# Patient Record
Sex: FEMALE | Race: OTHER | HISPANIC OR LATINO | Employment: OTHER | URBAN - METROPOLITAN AREA
[De-identification: names, ages, dates, MRNs, and addresses within clinical notes are randomized per-mention and may not be internally consistent; named-entity substitution may affect disease eponyms.]

---

## 2018-05-11 LAB
ALBUMIN SERPL BCP-MCNC: 4.1 G/DL (ref 3–5.2)
ALP SERPL-CCNC: 92 U/L (ref 43–122)
ALT SERPL W P-5'-P-CCNC: 24 U/L (ref 9–52)
ANION GAP SERPL CALCULATED.3IONS-SCNC: 12 MMOL/L (ref 5–14)
AST SERPL W P-5'-P-CCNC: 21 U/L (ref 14–36)
B-NP NT PRO (HISTORICAL): 182 PG/ML
BILIRUB SERPL-MCNC: 0.3 MG/DL
BUN SERPL-MCNC: 31 MG/DL (ref 5–25)
CALCIUM SERPL-MCNC: 9.2 MG/DL (ref 8.4–10.2)
CHLORIDE SERPL-SCNC: 101 MEQ/L (ref 97–108)
CHOLEST SERPL-MCNC: 160 MG/DL
CHOLEST/HDLC SERPL: 2.8 {RATIO}
CO2 SERPL-SCNC: 29 MMOL/L (ref 22–30)
CREATINE, SERUM (HISTORICAL): 0.73 MG/DL (ref 0.6–1.2)
EGFR (HISTORICAL): >60 ML/MIN/1.73 M2
GLUCOSE SERPL-MCNC: 90 MG/DL (ref 70–99)
HDLC SERPL-MCNC: 57 MG/DL
LDL/HDL RATIO (HISTORICAL): 1.3
LDLC SERPL CALC-MCNC: 73 MG/DL
POTASSIUM SERPL-SCNC: 4.6 MEQ/L (ref 3.6–5)
SODIUM SERPL-SCNC: 142 MEQ/L (ref 137–147)
TOTAL PROTEIN (HISTORICAL): 7.5 G/DL (ref 5.9–8.4)
TRIGL SERPL-MCNC: 149 MG/DL
VLDLC SERPL CALC-MCNC: 30 MG/DL (ref 0–40)

## 2018-05-18 LAB
AMORPHOUS MATERIAL (HISTORICAL): ABNORMAL
BACTERIA UR QL AUTO: ABNORMAL
BILIRUB UR QL STRIP: NEGATIVE MG/DL
CASTS/CASTS TYPE (HISTORICAL): ABNORMAL /LPF
CLARITY UR: ABNORMAL
COLOR UR: YELLOW
CRYSTAL TYPE (HISTORICAL): ABNORMAL /HPF
GLUCOSE UR STRIP-MCNC: NEGATIVE MG/DL
HGB UR QL STRIP.AUTO: ABNORMAL
KETONES UR STRIP-MCNC: NEGATIVE MG/DL
LEUKOCYTE ESTERASE UR QL STRIP: ABNORMAL
MUCOUS THREADS URNS QL MICRO: ABNORMAL
NITRITE UR QL STRIP: NEGATIVE
NON-SQ EPI CELLS URNS QL MICRO: ABNORMAL
OTHER STN SPEC: ABNORMAL
PH UR STRIP.AUTO: 6.5 [PH] (ref 4.5–8)
PROT UR STRIP-MCNC: 30 MG/DL
RBC #/AREA URNS AUTO: 1 /HPF
SP GR UR STRIP.AUTO: 1 (ref 1–1.04)
UROBILINOGEN UR QL STRIP.AUTO: NEGATIVE MG/DL (ref 0–1)
WBC #/AREA URNS AUTO: 8 /HPF

## 2018-07-16 ENCOUNTER — CONSULT (OUTPATIENT)
Dept: FAMILY MEDICINE CLINIC | Facility: CLINIC | Age: 83
End: 2018-07-16
Payer: COMMERCIAL

## 2018-07-16 VITALS
SYSTOLIC BLOOD PRESSURE: 170 MMHG | HEART RATE: 77 BPM | RESPIRATION RATE: 16 BRPM | DIASTOLIC BLOOD PRESSURE: 92 MMHG | WEIGHT: 212 LBS | HEIGHT: 62 IN | BODY MASS INDEX: 39.01 KG/M2 | TEMPERATURE: 86.4 F | OXYGEN SATURATION: 96 %

## 2018-07-16 DIAGNOSIS — I16.0 HYPERTENSIVE URGENCY: ICD-10-CM

## 2018-07-16 DIAGNOSIS — Z01.810 PREOPERATIVE CARDIOVASCULAR EXAMINATION: Primary | ICD-10-CM

## 2018-07-16 PROBLEM — H25.11 NUCLEAR SCLEROTIC CATARACT OF RIGHT EYE: Status: ACTIVE | Noted: 2018-06-15

## 2018-07-16 PROCEDURE — 3725F SCREEN DEPRESSION PERFORMED: CPT | Performed by: FAMILY MEDICINE

## 2018-07-16 PROCEDURE — 99214 OFFICE O/P EST MOD 30 MIN: CPT | Performed by: FAMILY MEDICINE

## 2018-07-16 RX ORDER — TIMOLOL 5 MG/ML
SOLUTION/ DROPS OPHTHALMIC
COMMUNITY
End: 2018-09-12

## 2018-07-16 RX ORDER — HYDROCHLOROTHIAZIDE 25 MG/1
TABLET ORAL
COMMUNITY
Start: 2018-03-23 | End: 2019-06-21 | Stop reason: SDUPTHER

## 2018-07-16 RX ORDER — LATANOPROST 50 UG/ML
SOLUTION/ DROPS OPHTHALMIC
COMMUNITY
Start: 2016-06-13 | End: 2018-09-12

## 2018-07-16 RX ORDER — TIMOLOL 5.12 MG/ML
1 SOLUTION/ DROPS OPHTHALMIC 2 TIMES DAILY
COMMUNITY
Start: 2014-09-11

## 2018-07-16 RX ORDER — ATORVASTATIN CALCIUM 20 MG/1
TABLET, FILM COATED ORAL EVERY 24 HOURS
COMMUNITY
Start: 2018-03-23 | End: 2018-12-17 | Stop reason: SDUPTHER

## 2018-07-16 RX ORDER — FESOTERODINE FUMARATE 8 MG/1
TABLET, EXTENDED RELEASE ORAL EVERY 24 HOURS
COMMUNITY
End: 2018-10-09 | Stop reason: SDUPTHER

## 2018-07-16 RX ORDER — LISINOPRIL 40 MG/1
TABLET ORAL
COMMUNITY
Start: 2017-01-12 | End: 2018-08-22 | Stop reason: SDUPTHER

## 2018-07-16 RX ORDER — ALBUTEROL SULFATE 90 UG/1
AEROSOL, METERED RESPIRATORY (INHALATION)
COMMUNITY
Start: 2018-03-08 | End: 2018-10-09 | Stop reason: SDUPTHER

## 2018-07-16 RX ORDER — VIT A/VIT C/VIT E/ZINC/COPPER 4296-226
CAPSULE ORAL
COMMUNITY
Start: 2017-01-12 | End: 2019-03-15 | Stop reason: ALTCHOICE

## 2018-07-16 RX ORDER — DOCUSATE SODIUM 100 MG/1
CAPSULE, LIQUID FILLED ORAL EVERY 12 HOURS
COMMUNITY
Start: 2017-01-12 | End: 2018-09-12 | Stop reason: SDUPTHER

## 2018-07-16 RX ORDER — RANITIDINE 150 MG/1
TABLET ORAL EVERY 12 HOURS
COMMUNITY
Start: 2017-01-12 | End: 2018-09-12 | Stop reason: SDUPTHER

## 2018-07-16 RX ORDER — FUROSEMIDE 40 MG/1
TABLET ORAL EVERY 24 HOURS
COMMUNITY
Start: 2018-05-18 | End: 2018-09-12 | Stop reason: SDUPTHER

## 2018-07-16 RX ORDER — ASPIRIN 81 MG/1
TABLET ORAL EVERY 24 HOURS
COMMUNITY
Start: 2017-08-10 | End: 2018-09-17 | Stop reason: ALTCHOICE

## 2018-07-16 RX ORDER — ANASTROZOLE 1 MG/1
TABLET ORAL
COMMUNITY
End: 2019-10-04 | Stop reason: HOSPADM

## 2018-07-16 NOTE — PROGRESS NOTES
Assessment/Plan:    Preoperative cardiovascular examination    Diagnoses and all orders for this visit:    Preoperative cardiovascular examination    Hypertensive urgency    Other orders  -     fluticasone-salmeterol (ADVAIR DISKUS) 500-50 mcg/dose inhaler; 2 (two) times a day  -     lisinopril (ZESTRIL) 40 mg tablet; take 1 tablet by oral route  every bedtime  -     hydrochlorothiazide (HYDRODIURIL) 25 mg tablet; take 1 tablet (25MG)  by oral route  every day  -     furosemide (LASIX) 40 mg tablet; every 24 hours  -     hydrocortisone (ANUSOL-HC, PROCTOSOL HC,) 2 5 % rectal cream; Every 12 hours  -     docusate sodium (COLACE) 100 mg capsule; Every 12 hours  -     Calcium Carbonate-Vitamin D (CALCIUM 500 + D) 500-125 MG-UNIT TABS; every 24 hours  -     timolol (BETIMOL) 0 5 % ophthalmic solution; Every 12 hours  -     atorvastatin (LIPITOR) 20 mg tablet; every 24 hours  -     aspirin (ASPIR-81) 81 mg EC tablet; every 24 hours  -     anastrozole (ARIMIDEX) 1 mg tablet; take 1 tablet (1MG)  by oral route  every day  -     latanoprost (XALATAN) 0 005 % ophthalmic solution;   -     Fesoterodine Fumarate ER (TOVIAZ) 8 MG TB24; every 24 hours  -     albuterol (VENTOLIN HFA) 90 mcg/act inhaler; inhale 2 puff by inhalation route every 4 - 6 hours as needed as needed  -     Timolol Maleate PF 0 5 % SOLN; as directed  -     ranitidine (ZANTAC) 150 mg tablet; Every 12 hours  -     Multiple Vitamins-Minerals (PRESERVISION AREDS) CAPS; take 1 tablet by oral route  every evening  -     Misc  Devices (CANE) MISC; Four point aluminum cane  Subjective:      Patient ID: Marshall Neither is a 80 y o  female  HPI    The following portions of the patient's history were reviewed and updated as appropriate: allergies, current medications, past family history, past medical history, past social history, past surgical history and problem list     Review of Systems   Constitutional: Negative  HENT: Negative  Respiratory: Negative  Cardiovascular: Negative  Gastrointestinal: Negative  Endocrine: Negative  Genitourinary: Negative  Musculoskeletal: Negative  Objective:      /92 (BP Location: Left arm, Patient Position: Sitting, Cuff Size: Adult)   Pulse 77   Temp (!) 86 4 °F (30 2 °C) (Tympanic)   Resp 16   Ht 5' 2" (1 575 m)   Wt 96 2 kg (212 lb)   SpO2 96%   BMI 38 78 kg/m²          Physical Exam   Constitutional: She is oriented to person, place, and time  She appears well-developed and well-nourished  No distress  HENT:   Head: Normocephalic and atraumatic  Eyes: Pupils are equal, round, and reactive to light  Neck: Normal range of motion  Neck supple  Cardiovascular: Normal rate, regular rhythm and normal heart sounds  Exam reveals no gallop and no friction rub  No murmur heard  Pulmonary/Chest: Effort normal and breath sounds normal  No respiratory distress  Abdominal: Soft  Bowel sounds are normal  She exhibits no distension  Musculoskeletal: Normal range of motion  She exhibits edema  Neurological: She is alert and oriented to person, place, and time  Skin: Skin is warm  Vitals reviewed

## 2018-07-16 NOTE — PROGRESS NOTES
[facility] PRE-OPERATIVE EVALUATION  [facility]YS[facility] - [address] [facility]    NAME: Alejandrina Zfaar  AGE: 80 y o  SEX: female  : 1934     DATE: 2018    [facility] Pre-Operative Evaluation      Chief Complaint: Pre-operative Evaluation     Surgery: 2018  Anticipated Date of Surgery: 18     History of Present Illness:     Patient has no prior history of bleeding issues or blood clots  Chronic conditions, medications and allergies were reviewed  There is no currently active infectious process  Assessment of Cardiac Risk:  · No unstable or severe angina or MI in the last 6 weeks or history of stent placement in the last year   · No decompensated heart failure (e g  New onset heart failure, NYHA functional class IV heart failure, or worsening existing heart failure) in past 3 mos  · No severe heart valve disease including aortic stenosis or symptomatic mitral stenosis     Exercise Capacity:  · Able to walk 4 blocks without symptoms  · Able to walk 2 flights without symptoms    NO Prior Anesthesia Reactions       No prolonged steroid use in past 6 mos  P A T  If done reviewed  Review of Systems:     Review of Systems   Constitutional: Negative  HENT: Negative  Respiratory: Negative  Cardiovascular: Negative  Gastrointestinal: Negative  Genitourinary: Negative  Musculoskeletal: Negative  Skin: Negative  Neurological: Negative  Hematological: Negative          Current Problem List:     Patient Active Problem List   Diagnosis    Abnormal gait    Anemia    Aortic stenosis    Asthma    Benign essential hypertension    Bilateral occipital neuralgia    Carpal tunnel syndrome on both sides    Diastolic dysfunction    Glaucoma    Hyperlipidemia    Malignant neoplasm of overlapping sites of left female breast ([address] )    Nuclear sclerotic cataract of right eye    Preoperative cardiovascular examination    Hypertensive urgency       Allergies: Allergies   Allergen Reactions    No Active Allergies        Current Medications:       Current Outpatient Prescriptions:     albuterol (VENTOLIN HFA) 90 mcg/act inhaler, inhale 2 puff by inhalation route every 4 - 6 hours as needed as needed, Disp: , Rfl:     aspirin (ASPIR-81) 81 mg EC tablet, every 24 hours, Disp: , Rfl:     atorvastatin (LIPITOR) 20 mg tablet, every 24 hours, Disp: , Rfl:     Calcium Carbonate-Vitamin D (CALCIUM 500 + D) 500-125 MG-UNIT TABS, every 24 hours, Disp: , Rfl:     docusate sodium (COLACE) 100 mg capsule, Every 12 hours, Disp: , Rfl:     fluticasone-salmeterol (ADVAIR DISKUS) 500-50 mcg/dose inhaler, 2 (two) times a day   , Disp: , Rfl:     furosemide (LASIX) 40 mg tablet, every 24 hours, Disp: , Rfl:     hydrochlorothiazide (HYDRODIURIL) 25 mg tablet, take 1 tablet (25MG)  by oral route  every day, Disp: , Rfl:     latanoprost (XALATAN) 0 005 % ophthalmic solution, , Disp: , Rfl:     lisinopril (ZESTRIL) 40 mg tablet, take 1 tablet by oral route  every bedtime, Disp: , Rfl:     Misc   Devices (CANE) MISC, Four point aluminum cane , Disp: , Rfl:     Multiple Vitamins-Minerals (PRESERVISION AREDS) CAPS, take 1 tablet by oral route  every evening, Disp: , Rfl:     ranitidine (ZANTAC) 150 mg tablet, Every 12 hours, Disp: , Rfl:     timolol (BETIMOL) 0 5 % ophthalmic solution, Every 12 hours, Disp: , Rfl:     anastrozole (ARIMIDEX) 1 mg tablet, take 1 tablet (1MG)  by oral route  every day, Disp: , Rfl:     Fesoterodine Fumarate ER (TOVIAZ) 8 MG TB24, every 24 hours, Disp: , Rfl:     hydrocortisone (ANUSOL-HC, PROCTOSOL HC,) 2 5 % rectal cream, Every 12 hours, Disp: , Rfl:     Timolol Maleate PF 0 5 % SOLN, as directed, Disp: , Rfl:     Past Medical History:       Past Medical History:   Diagnosis Date    Diverticulosis 2001    Heart failure ([facility] [address] )     History of cystocele     Hypertension     Obesity     Positive PPD 1999    treated        Past Surgical History:   Procedure Laterality Date    CARDIOVASCULAR STRESS TEST  2001    dobutamine stress test    CHOLECYSTECTOMY  04/2002    COLONOSCOPY      CYSTOCELE REPAIR      HYSTERECTOMY      KNEE SURGERY      knee replacement        Family History   Problem Relation Age of Onset    Family history unknown: Yes        Social History     Social History    Marital status: Single     Spouse name: N/A    Number of children: N/A    Years of education: N/A     Occupational History    Not on file  Social History Main Topics    Smoking status: Never Smoker    Smokeless tobacco: Never Used    Alcohol use No    Drug use: No    Sexual activity: Not Currently     Partners: Male     Other Topics Concern    Not on file     Social History Narrative    Lives at home with son and 2 grandkids  Physical Exam:     /92 (BP Location: Left arm, Patient Position: Sitting, Cuff Size: Adult)   Pulse 77   Temp (!) 86 4 °F (30 2 °C) (Tympanic)   Resp 16   Ht 5' 2" (1 575 m)   Wt 96 2 kg (212 lb)   SpO2 96%   BMI 38 78 kg/m²     Physical Exam   Constitutional: She is oriented to person, place, and time  She appears well-developed and well-nourished  No distress  HENT:   Head: Normocephalic and atraumatic  Eyes: Pupils are equal, round, and reactive to light  Neck: Normal range of motion  Neck supple  Cardiovascular: Normal rate, regular rhythm and normal heart sounds  Exam reveals no gallop and no friction rub  No murmur heard  Pulmonary/Chest: Effort normal and breath sounds normal  No respiratory distress  Abdominal: Soft  Bowel sounds are normal  She exhibits no distension  Musculoskeletal: Normal range of motion  She exhibits edema  Neurological: She is alert and oriented to person, place, and time  Skin: Skin is warm  Vitals reviewed  Operative site has been examined and clear of skin infection and inflammation  Assessment & Recommendations:     Patient is cleared for surgery as detailed above       Surgical Procedure risk category: low risk    Patient specific operative risk categegory: Low risk

## 2018-07-17 ENCOUNTER — TELEPHONE (OUTPATIENT)
Dept: FAMILY MEDICINE CLINIC | Facility: CLINIC | Age: 83
End: 2018-07-17

## 2018-07-17 NOTE — TELEPHONE ENCOUNTER
Rosalie from [facility] called they need a physical signature not electronic signature    Sherre Soulier Sherre Soulier Sherre Soulier She has sx coming up if you can call her if you have questions at [phone]    Sherre Soulier

## 2018-07-18 NOTE — TELEPHONE ENCOUNTER
Rosalie from [address] [place] [address]  for sight will fax a form for me to sign     Thanks

## 2018-08-22 DIAGNOSIS — I10 HTN (HYPERTENSION): Primary | ICD-10-CM

## 2018-08-22 RX ORDER — LISINOPRIL 40 MG/1
40 TABLET ORAL DAILY
Qty: 90 TABLET | Refills: 0 | Status: SHIPPED | OUTPATIENT
Start: 2018-08-22 | End: 2018-10-09 | Stop reason: SDUPTHER

## 2018-08-23 ENCOUNTER — OFFICE VISIT (OUTPATIENT)
Dept: OTOLARYNGOLOGY | Facility: CLINIC | Age: 83
End: 2018-08-23
Payer: COMMERCIAL

## 2018-08-23 VITALS
HEART RATE: 90 BPM | WEIGHT: 209.6 LBS | HEIGHT: 62 IN | BODY MASS INDEX: 38.57 KG/M2 | DIASTOLIC BLOOD PRESSURE: 78 MMHG | SYSTOLIC BLOOD PRESSURE: 158 MMHG

## 2018-08-23 DIAGNOSIS — H90.3 HEARING LOSS, SENSORINEURAL, ASYMMETRICAL: Primary | ICD-10-CM

## 2018-08-23 PROCEDURE — 99213 OFFICE O/P EST LOW 20 MIN: CPT | Performed by: OTOLARYNGOLOGY

## 2018-08-23 NOTE — PROGRESS NOTES
Tahria Frances 80 y o  female MRN: [id number]  Unit/Bed#:  Encounter: [id number]            History of Present Illness     Reason for Visit[de-identified]  Follow-up  HPI: Tahira Frances is a 80y o  year old female who presents with history of asymmetric sensorineural hearing loss  She uses a hearing aid  She has been followed before in the office last visit was February of 2018  In addition she does complain of nasal obstruction mostly when she is lying flat  And also complains of throat discomfort  She does have a history of cardiac issues and CHF  Review of Systems  Revision of Systems:    Complete review done, only positive for the symptoms described in the H&P section above      Historical Information   Past Medical History:   Diagnosis Date    Diverticulosis 2001    Heart failure ([address] )     History of cystocele     Hypertension     Obesity     Positive PPD 1999    treated     Past Surgical History:   Procedure Laterality Date    CARDIOVASCULAR STRESS TEST  2001    dobutamine stress test    CHOLECYSTECTOMY  04/2002    COLONOSCOPY      CYSTOCELE REPAIR      HYSTERECTOMY      KNEE SURGERY      knee replacement     Social History   History   Alcohol Use No     History   Drug Use No     History   Smoking Status    Never Smoker   Smokeless Tobacco    Never Used     Family History:   Family History   Problem Relation Age of Onset    Family history unknown: Yes       Meds/Allergies   No current facility-administered medications for this visit      Current Outpatient Prescriptions:     albuterol (VENTOLIN HFA) 90 mcg/act inhaler, inhale 2 puff by inhalation route every 4 - 6 hours as needed as needed, Disp: , Rfl:     aspirin (ASPIR-81) 81 mg EC tablet, every 24 hours, Disp: , Rfl:     atorvastatin (LIPITOR) 20 mg tablet, every 24 hours, Disp: , Rfl:     Calcium Carbonate-Vitamin D (CALCIUM 500 + D) 500-125 MG-UNIT TABS, every 24 hours, Disp: , Rfl:     docusate sodium (COLACE) 100 mg capsule, Every 12 hours, Disp: , Rfl:     fluticasone-salmeterol (ADVAIR DISKUS) 500-50 mcg/dose inhaler, 2 (two) times a day   , Disp: , Rfl:     furosemide (LASIX) 40 mg tablet, every 24 hours, Disp: , Rfl:     hydrochlorothiazide (HYDRODIURIL) 25 mg tablet, take 1 tablet (25MG)  by oral route  every day, Disp: , Rfl:     latanoprost (XALATAN) 0 005 % ophthalmic solution, , Disp: , Rfl:     lisinopril (ZESTRIL) 40 mg tablet, take 1 tablet by oral route  every bedtime, Disp: , Rfl:     Misc  Devices (CANE) MISC, Four point aluminum cane , Disp: , Rfl:     Multiple Vitamins-Minerals (PRESERVISION AREDS) CAPS, take 1 tablet by oral route  every evening, Disp: , Rfl:     ranitidine (ZANTAC) 150 mg tablet, Every 12 hours, Disp: , Rfl:     timolol (BETIMOL) 0 5 % ophthalmic solution, Every 12 hours, Disp: , Rfl:     anastrozole (ARIMIDEX) 1 mg tablet, take 1 tablet (1MG)  by oral route  every day, Disp: , Rfl:     Fesoterodine Fumarate ER (TOVIAZ) 8 MG TB24, every 24 hours, Disp: , Rfl:     hydrocortisone (ANUSOL-HC, PROCTOSOL HC,) 2 5 % rectal cream, Every 12 hours, Disp: , Rfl:     Timolol Maleate PF 0 5 % SOLN, as directed, Disp: , Rfl:       Allergies   Allergen Reactions    No Active Allergies        Objective       Physical Exam   Blood pressure 158/78, pulse 90, height 5' 2" (1 575 m), weight 95 1 kg (209 lb 9 6 oz)  Constitutional: Oriented to person, place, and time  Well-developed, obese, no apparent distress, non-toxic appearance  Cooperative, able to hear and answer questions without difficulty  Voice: Normal voice quality  Head: Normocephalic, atraumatic  No scars, masses or lesions  Face: Symmetric, no edema, no sinus tenderness  Eyes: Vision grossly intact, extra-ocular movement intact  Right Ear: External ear normal, scant wax removed  Auditory canal clear  Tympanic membrane with myringosclerosis with old perforation healed  No fluid present   No post-auricular erythema or tenderness  Left Ear: External ear normal   Auditory canal clear  Tympanic membrane well-appearing, without retraction or scarring  No fluid present  No post-auricular erythema or tenderness  Nose: Septum deviated to right Mucosa moist, turbinates normal size, no edema  No polyps or masses, no discharge evident  Oral cavity:  Lips normal, no mucosal lesions  Partially edentulous  Gingiva normal in appearance  Mucosa moist,  Tongue mobile, floor of mouth normal   Hard palate unremarkable  No masses or lesions  Oropharynx: Uvula is midline, sot palate normal   Unremarkable oropharyngeal inlet  Tonsils 2+   Posterior pharyngeal wall clear  No masses or lesions  Salivary glands:  Parotid glands and submandibular glands symmetric, no enlargement or tenderness  Neck: Normal laryngeal elevation with swallow  Trachea midline  No masses or lesions  No palpable adenopathy  Thyroid: normal in size, and consistency, unremarkable without tenderness or palpable nodules  Pulmonary/Chest: Normal effort and rate  No respiratory distress  Musculoskeletal: Normal range of motion  Neurological: Cranial nerves 2-12 intact  Skin: Skin is warm and dry  Psychiatric: Normal mood and affect  Lab Results: CBC: No results found for: WBC, HGB, HCT, MCV, PLT, ADJUSTEDWBC, MCH, MCHC, RDW, MPV, NRBC, CMP: No results found for: NA, K, CL, CO2, ANIONGAP, BUN, CREATININE, GLUCOSE, CALCIUM, AST, ALT, ALKPHOS, PROT, ALBUMIN, BILITOT, EGFR  Imaging Studies: I have personally reviewed images on the PACS system and : non contributory     EKG, Pathology, and   Other Studies: I have personally reviewed pertinent reports and Audiometry [facility] 6/3/15: PTA L30, r 55 WITH 100% DISCR  MRI [facility] 7/2/15 WAS (-)    Assessment:  ASYMMETRIC SENSORINEURAL HEARING LOSS  CHRONIC PHARYNGITIS  SEPTAL DEVIATION    Plan:  PATIENT NEEDS SEPTATED AUDIOMETRY LAST AUDIOMETRY WAS 3 YEARS AGO

## 2018-09-12 ENCOUNTER — LAB (OUTPATIENT)
Dept: LAB | Facility: HOSPITAL | Age: 83
End: 2018-09-12
Payer: COMMERCIAL

## 2018-09-12 ENCOUNTER — OFFICE VISIT (OUTPATIENT)
Dept: FAMILY MEDICINE CLINIC | Facility: CLINIC | Age: 83
End: 2018-09-12
Payer: COMMERCIAL

## 2018-09-12 ENCOUNTER — OFFICE VISIT (OUTPATIENT)
Dept: AUDIOLOGY | Age: 83
End: 2018-09-12
Payer: COMMERCIAL

## 2018-09-12 VITALS
OXYGEN SATURATION: 98 % | TEMPERATURE: 97.3 F | SYSTOLIC BLOOD PRESSURE: 124 MMHG | BODY MASS INDEX: 37.31 KG/M2 | HEART RATE: 81 BPM | RESPIRATION RATE: 16 BRPM | WEIGHT: 204 LBS | DIASTOLIC BLOOD PRESSURE: 82 MMHG

## 2018-09-12 DIAGNOSIS — I50.9 CHF (CONGESTIVE HEART FAILURE) (HCC): ICD-10-CM

## 2018-09-12 DIAGNOSIS — K64.4 EXTERNAL HEMORRHOIDS: ICD-10-CM

## 2018-09-12 DIAGNOSIS — K64.9 HEMORRHOIDS: ICD-10-CM

## 2018-09-12 DIAGNOSIS — Z13.29 SCREENING FOR THYROID DISORDER: ICD-10-CM

## 2018-09-12 DIAGNOSIS — K59.00 CONSTIPATION: ICD-10-CM

## 2018-09-12 DIAGNOSIS — H90.3 SENSORINEURAL HEARING LOSS, BILATERAL: Primary | ICD-10-CM

## 2018-09-12 DIAGNOSIS — M54.81 BILATERAL OCCIPITAL NEURALGIA: ICD-10-CM

## 2018-09-12 DIAGNOSIS — M54.9 BACK PAIN: Primary | ICD-10-CM

## 2018-09-12 DIAGNOSIS — H40.9 GLAUCOMA: ICD-10-CM

## 2018-09-12 DIAGNOSIS — E78.5 HLD (HYPERLIPIDEMIA): ICD-10-CM

## 2018-09-12 DIAGNOSIS — E78.2 MIXED HYPERLIPIDEMIA: ICD-10-CM

## 2018-09-12 DIAGNOSIS — I10 BENIGN ESSENTIAL HYPERTENSION: ICD-10-CM

## 2018-09-12 DIAGNOSIS — K21.9 GERD (GASTROESOPHAGEAL REFLUX DISEASE): ICD-10-CM

## 2018-09-12 PROBLEM — H26.9 CATARACT: Status: ACTIVE | Noted: 2018-07-23

## 2018-09-12 LAB
ALBUMIN SERPL BCP-MCNC: 4.4 G/DL (ref 3–5.2)
ALP SERPL-CCNC: 105 U/L (ref 43–122)
ALT SERPL W P-5'-P-CCNC: 24 U/L (ref 9–52)
ANION GAP SERPL CALCULATED.3IONS-SCNC: 10 MMOL/L (ref 5–14)
AST SERPL W P-5'-P-CCNC: 29 U/L (ref 14–36)
BILIRUB SERPL-MCNC: 0.6 MG/DL
BILIRUB UR QL STRIP: NEGATIVE
BUN SERPL-MCNC: 22 MG/DL (ref 5–25)
CALCIUM SERPL-MCNC: 9.1 MG/DL (ref 8.4–10.2)
CHLORIDE SERPL-SCNC: 98 MMOL/L (ref 97–108)
CHOLEST SERPL-MCNC: 188 MG/DL
CLARITY UR: NORMAL
CO2 SERPL-SCNC: 32 MMOL/L (ref 22–30)
COLOR UR: YELLOW
CREAT SERPL-MCNC: 0.67 MG/DL (ref 0.6–1.2)
EOSINOPHIL # BLD AUTO: 0.99 THOUSAND/UL (ref 0–0.4)
EOSINOPHIL NFR BLD MANUAL: 17 % (ref 0–6)
ERYTHROCYTE [DISTWIDTH] IN BLOOD BY AUTOMATED COUNT: 14.3 %
GFR SERPL CREATININE-BSD FRML MDRD: 93 ML/MIN/1.73SQ M
GLUCOSE P FAST SERPL-MCNC: 97 MG/DL (ref 70–99)
GLUCOSE UR STRIP-MCNC: NEGATIVE MG/DL
HCT VFR BLD AUTO: 38.2 % (ref 36–46)
HDLC SERPL-MCNC: 65 MG/DL (ref 40–59)
HGB BLD-MCNC: 12.3 G/DL (ref 12–16)
HGB UR QL STRIP.AUTO: NEGATIVE
KETONES UR STRIP-MCNC: NEGATIVE MG/DL
LDLC SERPL CALC-MCNC: 98 MG/DL
LEUKOCYTE ESTERASE UR QL STRIP: NEGATIVE
LYMPHOCYTES # BLD AUTO: 2.09 THOUSAND/UL (ref 0.5–4)
LYMPHOCYTES # BLD AUTO: 36 % (ref 20–50)
MCH RBC QN AUTO: 29.2 PG (ref 26.8–34.3)
MCHC RBC AUTO-ENTMCNC: 32.2 G/DL (ref 31.4–37.4)
MCV RBC AUTO: 91 FL (ref 82–98)
MONOCYTES # BLD AUTO: 0.17 THOUSAND/UL (ref 0.2–0.9)
MONOCYTES NFR BLD AUTO: 3 % (ref 1–10)
NEUTS BAND NFR BLD MANUAL: 1 % (ref 0–8)
NEUTS SEG # BLD: 2.55 THOUSAND/UL (ref 1.8–7.8)
NEUTS SEG NFR BLD AUTO: 43 %
NITRITE UR QL STRIP: NEGATIVE
NONHDLC SERPL-MCNC: 123 MG/DL
PH UR STRIP.AUTO: 8 [PH] (ref 4.5–8)
PLATELET # BLD AUTO: 268 THOUSANDS/UL (ref 150–450)
PLATELET BLD QL SMEAR: ADEQUATE
PMV BLD AUTO: 7.8 FL (ref 8.9–12.7)
POTASSIUM SERPL-SCNC: 4.3 MMOL/L (ref 3.6–5)
PROT SERPL-MCNC: 8.7 G/DL (ref 5.9–8.4)
PROT UR STRIP-MCNC: NEGATIVE MG/DL
RBC # BLD AUTO: 4.21 MILLION/UL (ref 4–5.2)
RBC MORPH BLD: NORMAL
SL AMB  POCT GLUCOSE, UA: NORMAL
SL AMB LEUKOCYTE ESTERASE,UA: ABNORMAL
SL AMB POCT BILIRUBIN,UA: NEGATIVE
SL AMB POCT BLOOD,UA: NEGATIVE
SL AMB POCT CLARITY,UA: CLEAR
SL AMB POCT COLOR,UA: YELLOW
SL AMB POCT KETONES,UA: NEGATIVE
SL AMB POCT NITRITE,UA: NEGATIVE
SL AMB POCT PH,UA: 8
SL AMB POCT SPECIFIC GRAVITY,UA: 1
SL AMB POCT URINE PROTEIN: ABNORMAL
SL AMB POCT UROBILINOGEN: NORMAL
SODIUM SERPL-SCNC: 140 MMOL/L (ref 137–147)
SP GR UR STRIP.AUTO: 1.01 (ref 1–1.03)
TOTAL CELLS COUNTED SPEC: 100
TRIGL SERPL-MCNC: 123 MG/DL
TSH SERPL DL<=0.05 MIU/L-ACNC: 1.53 UIU/ML (ref 0.47–4.68)
UROBILINOGEN UR QL STRIP.AUTO: 0.2 E.U./DL
WBC # BLD AUTO: 5.8 THOUSAND/UL (ref 4.31–10.16)

## 2018-09-12 PROCEDURE — 81002 URINALYSIS NONAUTO W/O SCOPE: CPT | Performed by: FAMILY MEDICINE

## 2018-09-12 PROCEDURE — 3079F DIAST BP 80-89 MM HG: CPT | Performed by: FAMILY MEDICINE

## 2018-09-12 PROCEDURE — 36415 COLL VENOUS BLD VENIPUNCTURE: CPT

## 2018-09-12 PROCEDURE — 3074F SYST BP LT 130 MM HG: CPT | Performed by: FAMILY MEDICINE

## 2018-09-12 PROCEDURE — 99213 OFFICE O/P EST LOW 20 MIN: CPT | Performed by: FAMILY MEDICINE

## 2018-09-12 PROCEDURE — 80061 LIPID PANEL: CPT

## 2018-09-12 PROCEDURE — 85027 COMPLETE CBC AUTOMATED: CPT

## 2018-09-12 PROCEDURE — 85007 BL SMEAR W/DIFF WBC COUNT: CPT

## 2018-09-12 PROCEDURE — 80053 COMPREHEN METABOLIC PANEL: CPT

## 2018-09-12 PROCEDURE — 84443 ASSAY THYROID STIM HORMONE: CPT

## 2018-09-12 PROCEDURE — 92557 COMPREHENSIVE HEARING TEST: CPT | Performed by: AUDIOLOGIST

## 2018-09-12 PROCEDURE — 81003 URINALYSIS AUTO W/O SCOPE: CPT | Performed by: FAMILY MEDICINE

## 2018-09-12 PROCEDURE — 92567 TYMPANOMETRY: CPT | Performed by: AUDIOLOGIST

## 2018-09-12 RX ORDER — AMITRIPTYLINE HYDROCHLORIDE 25 MG/1
25 TABLET, FILM COATED ORAL
COMMUNITY
End: 2018-10-15 | Stop reason: SDUPTHER

## 2018-09-12 RX ORDER — CELECOXIB 50 MG/1
50 CAPSULE ORAL 2 TIMES DAILY
COMMUNITY
End: 2018-12-17 | Stop reason: SDUPTHER

## 2018-09-12 RX ORDER — DOCUSATE SODIUM 100 MG/1
100 CAPSULE, LIQUID FILLED ORAL 3 TIMES DAILY PRN
Qty: 60 CAPSULE | Refills: 2 | Status: SHIPPED | OUTPATIENT
Start: 2018-09-12 | End: 2018-12-17 | Stop reason: SDUPTHER

## 2018-09-12 RX ORDER — RANITIDINE 150 MG/1
150 TABLET ORAL 2 TIMES DAILY
Qty: 180 TABLET | Refills: 2 | Status: SHIPPED | OUTPATIENT
Start: 2018-09-12 | End: 2019-03-25 | Stop reason: SDUPTHER

## 2018-09-12 RX ORDER — FUROSEMIDE 40 MG/1
40 TABLET ORAL DAILY
Qty: 90 TABLET | Refills: 3 | Status: SHIPPED | OUTPATIENT
Start: 2018-09-12 | End: 2019-06-21 | Stop reason: SDUPTHER

## 2018-09-12 NOTE — ASSESSMENT & PLAN NOTE
Controlled on ranitidine  Reviewed the causes of heartburn  Discussed importance of diet and lifestyle modifications to control GERD symptoms  Avoid things which worsen heartburn (Ex: Caffeine, tomato based products, spicy foods, tobacco, alcohol, obesity, tight-fitting clothing )  Discussed importance of eating small frequent meals instead of large meals    Elevate the head of the bed and to not lay down 2-3 hours following a meal

## 2018-09-12 NOTE — ASSESSMENT & PLAN NOTE
Current blood pressure is controlled at this time  Reviewed blood pressure target goal with patient  Continue to maintain healthy balanced diet with focus on low-salt intake  Limit alcohol intake

## 2018-09-12 NOTE — LETTER
September 12, 2018     Fred Mclaughlin MD  [address]    Patient: Vani Oquendo   YOB: 1934   Date of Visit: 9/12/2018       Dear Dr Dyan Van: Thank you for referring [facility] to me for evaluation  Below are my notes for this consultation  If you have questions, please do not hesitate to call me  I look forward to following your patient along with you           Sincerely,        Ambar Hamilton        CC: No Recipients

## 2018-09-12 NOTE — PROGRESS NOTES
HEARING EVALUATION    Name:  Mark Anthony Aguilar  :  1934  Age:  80 y o  Date of Evaluation: 18     History: Known hearing loss, wears Roge hearing aid in right ear  Reason for visit: Mark Anthony Aguilar is being seen today at the request of Dr Ciera Shabazz for an evaluation of hearing  Patient reports that hearing aid is working well  EVALUATION:    Otoscopic Evaluation:   Right Ear: Clear and healthy ear canal and tympanic membrane   Left Ear: Clear and healthy ear canal and tympanic membrane    Tympanometry:   Right: Type Ad - hypermobile compliance   Left: Type A - normal middle ear pressure and compliance    Audiogram Results:  Right: Moderate to moderately severe essentially sensorineural hearing loss  Left: Mild essentially sensorineural (air bone gap at 1k Hz) hearing loss    *see attached audiogram       Hearing Aid Visit:    Name:  Mark Anthony Aguilar  :  1934  Age:  80 y o  Date of Evaluation: 18     Geraldine Florez is being seen for a hearing aid visit  Mark Anthony Aguilar   is fit in the right ear only with Roge 3 Series 110 LAZARO hearing aid(s) serial number [id number] right  Warranty 19  Patient reports good benefit from hearing aid  Action:  Hearing aid was cleaned and checked and found to be working well  Instructed patient on wax filter changing and gave her pack of wax traps  RECOMMENDATIONS:  Annual hearing eval, Return to [facility]  for F/U and Copy to Patient/Caregiver, patient will contact center should any issues arise    PATIENT EDUCATION:   Discussed results and recommendations with patient  Questions were addressed and the patient was encouraged to contact our department should concerns arise        Debbie Sheffield   Clinical Audiologist

## 2018-09-12 NOTE — PROGRESS NOTES
Assessment/Plan:    Hemorrhoids  Have been chronic, complete relief with anusol but has been using more recently  Will refer to GI for possibility of banding her hemorrhoids  Constipation  Recommended to increase fiber in diet  Uses docusate PRN  Will send to GI to evaluate for constipation and hemorrhoids  GERD (gastroesophageal reflux disease)  Controlled on ranitidine  Reviewed the causes of heartburn  Discussed importance of diet and lifestyle modifications to control GERD symptoms  Avoid things which worsen heartburn (Ex: Caffeine, tomato based products, spicy foods, tobacco, alcohol, obesity, tight-fitting clothing )  Discussed importance of eating small frequent meals instead of large meals  Elevate the head of the bed and to not lay down 2-3 hours following a meal       Benign essential hypertension  Current blood pressure is controlled at this time  Reviewed blood pressure target goal with patient  Continue to maintain healthy balanced diet with focus on low-salt intake  Limit alcohol intake  CHF (congestive heart failure) (HCC)  Reports compliance with her furosemide and compression stockings  Weight is stable  Bilateral occipital neuralgia  Followed by neurology, on QHS elavil and prn celebrex  Glaucoma  Reports compliance with timolol  Denies any acute concerns at this time  HLD (hyperlipidemia)  Discussed increased cholesterol with patient as well as target cholesterol goal   Maintain a low-fat, low-cholesterol diet  Outlined low-fat, low-cholesterol alternatives  Weight loss can help control cholesterol levels along with diet  Will follow lipid panel  Back pain  Discussed nonpharmacological management options for osteoarthritis such as weight loss, ice and warm packs, physical therapy, occupational therapy, and exercise           Diagnoses and all orders for this visit:    Back pain  -     Urinalysis with reflex to microscopic  -     POCT urine dip    Hemorrhoids  -     hydrocortisone (ANUSOL-HC, PROCTOSOL HC,) 2 5 % rectal cream; Insert into the rectum 2 (two) times a day for 30 days    Constipation  -     docusate sodium (COLACE) 100 mg capsule; Take 1 capsule (100 mg total) by mouth 3 (three) times a day as needed for constipation for up to 90 days    CHF (congestive heart failure) (HCC)  -     furosemide (LASIX) 40 mg tablet; Take 1 tablet (40 mg total) by mouth daily for 360 days  -     CBC and differential; Future  -     Comprehensive metabolic panel; Future    GERD (gastroesophageal reflux disease)  -     ranitidine (ZANTAC) 150 mg tablet; Take 1 tablet (150 mg total) by mouth 2 (two) times a day for 90 days    HLD (hyperlipidemia)  -     Lipid panel; Future  -     Comprehensive metabolic panel; Future    Screening for thyroid disorder  -     TSH, 3rd generation; Future    Mixed hyperlipidemia    External hemorrhoids    Benign essential hypertension    Bilateral occipital neuralgia    Glaucoma    Other orders  -     Multiple Vitamins-Minerals (ICAPS AREDS 2) CAPS; Take 1 capsule by mouth  -     celecoxib (CeleBREX) 50 MG capsule; Take 50 mg by mouth 2 (two) times a day  -     amitriptyline (ELAVIL) 25 mg tablet; Take 25 mg by mouth daily at bedtime          Subjective:      Patient ID: Rafa Renteria is a 80 y o  female  HPI    The following portions of the patient's history were reviewed and updated as appropriate: allergies, current medications, past family history, past medical history, past social history, past surgical history and problem list     Review of Systems   Constitutional: Negative for activity change, appetite change, fatigue and fever  HENT: Negative for drooling and sore throat  Eyes: Negative for pain and visual disturbance  Respiratory: Negative for cough, chest tightness and shortness of breath  Cardiovascular: Negative for chest pain and palpitations  Gastrointestinal: Positive for constipation and rectal pain  Negative for abdominal pain, diarrhea and nausea  Genitourinary: Negative for dysuria and hematuria  Musculoskeletal: Negative for back pain and myalgias  Skin: Negative for color change  Neurological: Negative for numbness and headaches  Psychiatric/Behavioral: Negative for hallucinations and suicidal ideas  Objective:      /82 (BP Location: Left arm, Patient Position: Sitting, Cuff Size: Adult)   Pulse 81   Temp (!) 97 3 °F (36 3 °C) (Temporal)   Resp 16   Wt 92 5 kg (204 lb)   SpO2 98%   BMI 37 31 kg/m²          Physical Exam   Constitutional: She is oriented to person, place, and time  She appears well-developed and well-nourished  HENT:   Head: Normocephalic and atraumatic  Right Ear: External ear normal    Left Ear: External ear normal    Eyes: Pupils are equal, round, and reactive to light  Neck: Normal range of motion  Neck supple  Cardiovascular: Normal rate, regular rhythm and intact distal pulses  Pulmonary/Chest: Effort normal and breath sounds normal  No respiratory distress  She has no wheezes  She has no rales  Abdominal: Soft  Bowel sounds are normal  She exhibits no distension  There is no tenderness  Musculoskeletal: Normal range of motion  She exhibits edema (+1 b/l pitting edema)  Lymphadenopathy:     She has no cervical adenopathy  Neurological: She is alert and oriented to person, place, and time  Skin: Skin is warm and dry  Psychiatric: She has a normal mood and affect

## 2018-09-12 NOTE — ASSESSMENT & PLAN NOTE
Have been chronic, complete relief with anusol but has been using more recently  Will refer to GI for possibility of banding her hemorrhoids

## 2018-09-12 NOTE — ASSESSMENT & PLAN NOTE
Discussed increased cholesterol with patient as well as target cholesterol goal   Maintain a low-fat, low-cholesterol diet  Outlined low-fat, low-cholesterol alternatives  Weight loss can help control cholesterol levels along with diet  Will follow lipid panel

## 2018-09-12 NOTE — ASSESSMENT & PLAN NOTE
Discussed nonpharmacological management options for osteoarthritis such as weight loss, ice and warm packs, physical therapy, occupational therapy, and exercise

## 2018-09-12 NOTE — PATIENT INSTRUCTIONS
Deshidratación   CUIDADO AMBULATORIO:   Deshidratación  es daniel afección que se produce cuando guzman cuerpo no tiene suficiente líquido  Usted podría deshidratarse si no edy suficiente agua o pierde demasiado líquido  La pérdida de líquido también podría provocar la pérdida de electrolitos (minerales), piter el sodio  Los síntomas más comunes incluyen los siguientes:   · Ojos o boca seca    · Aumento de la sed    · Orina de color amarillo oscuro    · Orinar poco o nada en lo absoluto    · Cansancio o debilidad corporal    · Dolor de gin, mareos o confusión    · Respiración rápida o irregular, ritmo cardíaco valentina o acelerado y presión arterial baja    · Pérdida repentina de peso  Busque atención médica de inmediato si:   · Usted sufre daniel convulsión  · Usted está confundido o no puede pensar con claridad  · Usted está muy soñoliento, u otra persona no puede despertarlo  · Usted se siente mareado o se desmaya al ponerse de pie  · Usted no puede orinar  · Usted tiene dificultad para respirar  · Guzman ritmo cardíaco es acelerado o irregular  · Tiene las albert o los pies fríos o palidez en la joce  Pregúntele a guzman [place] vitaminas y minerales son adecuados para usted  · Usted tiene dificultad para alfa líquidos porque tiene vómitos  · [place] síntomas empeoran  · Usted tiene fiebre  · Usted se siente muy débil o cansado  · Usted tiene preguntas o inquietudes acerca de guzman condición o cuidado  El tratamiento para la deshidratación  podría incluir cualquiera de los siguientes:  · Soluciones orales:      ¨ Si tiene deshidratación leve o moderada, podría necesitar solución de rehidratación oral (SRO o kvng oral)  Esta solución contiene la cantidad [place] de azúcar, sal y minerales en agua para reponer los líquidos corporales   Pregunte a guzman médico dónde puede conseguir daniel solución de rehidratación oral      ¨ Luana un solución de rehidratación oral en pequeñas cantidades si usted ha estado vomitando  Si vomita, espere 30 minutos y vuelva a intentarlo  Pregunte a los médicos cuál es la cantidad de solución de rehidratación oral que necesita cuando está deshidratado y con qué frecuencia debería tomarla  ¨ Daniel bebida deportiva no  es lo mismo que daniel solución de rehidratación oral  No tome bebidas deportivas sin consultar a guzman médico     ¨ No luana jugos de fruta ni refrescos  Estas bebidas pueden empeorar la condición  · Es posible que le administren daniel sustancia líquida por [place]  Los electrolitos también podrían estar incluidos en la solución líquida  · Con la hipodermoclisis se administra rápidamente daniel gran cantidad de agua al cuerpo  Se administra el agua en la capa más profunda de la piel  Pida más información a guzman médico sobre la hipodermoclisis  Prevenga o controle la deshidratación:   · [place] líquidos piter se le haya indicado  Los líquidos que contienen agua, azúcar y [employer] pueden contribuir a que guzman organismo retenga líquido y evitar que se deshidrate  Consuma líquido a lo wilber del día y no solamente cuando tenga sed  Los hombres deben alfa aproximadamente 3 litros de líquido al día (13 vasos de ocho onzas)  Las mujeres deben alfa aproximadamente 2 litros de líquido al día (9 vasos de ocho onzas o 250 ml)  Luana incluso más líquido si realiza actividades al [employer], si se encuentra al sol henry un período prolongado de [place] o está practicando STEINKREUZ  · Manténgase fresco   Limite la cantidad de tiempo que pasa al aire andria henry las horas mas calurosas del día  Vístase con Justino Sauceda y fresca  · Mantenga un registro de la frecuencia con que orina  Si orina menos de lo usual o guzman orina es más Igor, luana más líquidos  Acuda a jonas consultas de control con guzman médico según le indicaron  Anote jonas preguntas para que se acuerde de hacerlas henry jonas visitas     © 2017 [address] [employer] Information is for End User's use only and may not be sold, redistributed or otherwise used for commercial purposes  All illustrations and images included in CareNotes® are the copyrighted property of A D A M , Inc  or Brady [employer]  Esta información es sólo para uso en educación  Guzman intención no es darle un consejo médico sobre enfermedades o tratamientos  Colsulte con guzman Chauvin Jazmyn farmacéutico antes de seguir cualquier régimen médico para saber si es seguro y efectivo para usted

## 2018-09-12 NOTE — ASSESSMENT & PLAN NOTE
Recommended to increase fiber in diet  Uses docusate PRN  Will send to GI to evaluate for constipation and hemorrhoids

## 2018-09-17 ENCOUNTER — OFFICE VISIT (OUTPATIENT)
Dept: CARDIOLOGY CLINIC | Facility: CLINIC | Age: 83
End: 2018-09-17
Payer: COMMERCIAL

## 2018-09-17 VITALS
BODY MASS INDEX: 38.83 KG/M2 | DIASTOLIC BLOOD PRESSURE: 82 MMHG | HEART RATE: 73 BPM | SYSTOLIC BLOOD PRESSURE: 140 MMHG | OXYGEN SATURATION: 98 % | HEIGHT: 62 IN | WEIGHT: 211 LBS

## 2018-09-17 DIAGNOSIS — I10 HYPERTENSION, UNSPECIFIED TYPE: Primary | ICD-10-CM

## 2018-09-17 DIAGNOSIS — E78.2 MIXED HYPERLIPIDEMIA: ICD-10-CM

## 2018-09-17 DIAGNOSIS — I35.0 NONRHEUMATIC AORTIC VALVE STENOSIS: ICD-10-CM

## 2018-09-17 DIAGNOSIS — I10 BENIGN ESSENTIAL HYPERTENSION: ICD-10-CM

## 2018-09-17 PROBLEM — I16.0 HYPERTENSIVE URGENCY: Status: RESOLVED | Noted: 2018-07-16 | Resolved: 2018-09-17

## 2018-09-17 PROCEDURE — 99214 OFFICE O/P EST MOD 30 MIN: CPT | Performed by: INTERNAL MEDICINE

## 2018-09-17 PROCEDURE — 93000 ELECTROCARDIOGRAM COMPLETE: CPT | Performed by: INTERNAL MEDICINE

## 2018-09-17 NOTE — PROGRESS NOTES
Cardiology Follow Up    Remington Levi  1934  [id number]  [address] [facility] [facility]  [address]  [place] [address]  [phone]    1  Hypertension, unspecified type  POCT ECG   2  Nonrheumatic aortic valve stenosis  Echo complete with contrast if indicated   3  Benign essential hypertension     4  Mixed hyperlipidemia         Patient was originally referred from [facility] for evaluation of an abnormal echocardiogram  Echocardiogram demonstrated normal LV systolic function with mild-to-moderate aortic stenosis  Other history includes hypertension and hyperlipidemia  02/09/2018 echocardiogram: Mild aortic stenosis, aortic valve velocity 2 7 Meters/sec  Normal LV systolic function, EF 68%  Grade 1 diastolic dysfunction  Normal PA pressures       Interval History:   Patient denies chest discomfort, unusual shortness of breath or symptoms of near-syncope/syncope  She has no complaints      Patient Active Problem List   Diagnosis    Abnormal gait    Anemia    Aortic stenosis    Asthma    Benign essential hypertension    Bilateral occipital neuralgia    Carpal tunnel syndrome on both sides    Diastolic dysfunction    Glaucoma    HLD (hyperlipidemia)    Malignant neoplasm of overlapping sites of left female breast [facility])    Nuclear sclerotic cataract of right eye    Preoperative cardiovascular examination    Cataract    Hemorrhoids    Back pain    Constipation    CHF (congestive heart failure) (HCC)    GERD (gastroesophageal reflux disease)    Screening for thyroid disorder    Hypertension     Past Medical History:   Diagnosis Date    Diverticulosis 2001    Heart failure ([address] )     History of cystocele     Hypertension     Obesity     Positive PPD 1999    treated     Social History     Social History    Marital status: Single     Spouse name: N/A    Number of children: N/A    Years of education: N/A     Occupational History    Not on file  Social History Main Topics    Smoking status: Never Smoker    Smokeless tobacco: Never Used    Alcohol use No    Drug use: No    Sexual activity: Not Currently     Partners: Male     Other Topics Concern    Not on file     Social History Narrative    Lives at home with son and 2 grandkids        Family History   Problem Relation Age of Onset    Family history unknown: Yes     Past Surgical History:   Procedure Laterality Date    CARDIOVASCULAR STRESS TEST  2001    dobutamine stress test    CHOLECYSTECTOMY  04/2002    COLONOSCOPY      CYSTOCELE REPAIR      HYSTERECTOMY      KNEE SURGERY      knee replacement       Current Outpatient Prescriptions:     albuterol (VENTOLIN HFA) 90 mcg/act inhaler, inhale 2 puff by inhalation route every 4 - 6 hours as needed as needed, Disp: , Rfl:     amitriptyline (ELAVIL) 25 mg tablet, Take 25 mg by mouth daily at bedtime, Disp: , Rfl:     anastrozole (ARIMIDEX) 1 mg tablet, take 1 tablet (1MG)  by oral route  every day, Disp: , Rfl:     atorvastatin (LIPITOR) 20 mg tablet, every 24 hours, Disp: , Rfl:     Calcium Carbonate-Vitamin D (CALCIUM 500 + D) 500-125 MG-UNIT TABS, every 24 hours, Disp: , Rfl:     celecoxib (CeleBREX) 50 MG capsule, Take 50 mg by mouth 2 (two) times a day, Disp: , Rfl:     docusate sodium (COLACE) 100 mg capsule, Take 1 capsule (100 mg total) by mouth 3 (three) times a day as needed for constipation for up to 90 days, Disp: 60 capsule, Rfl: 2    Fesoterodine Fumarate ER (TOVIAZ) 8 MG TB24, every 24 hours, Disp: , Rfl:     fluticasone-salmeterol (ADVAIR DISKUS) 500-50 mcg/dose inhaler, 2 (two) times a day   , Disp: , Rfl:     furosemide (LASIX) 40 mg tablet, Take 1 tablet (40 mg total) by mouth daily for 360 days, Disp: 90 tablet, Rfl: 3    hydrochlorothiazide (HYDRODIURIL) 25 mg tablet, take 1 tablet (25MG)  by oral route  every day, Disp: , Rfl:   hydrocortisone (ANUSOL-HC, PROCTOSOL HC,) 2 5 % rectal cream, Insert into the rectum 2 (two) times a day for 30 days, Disp: 30 g, Rfl: 2    lisinopril (ZESTRIL) 40 mg tablet, Take 1 tablet (40 mg total) by mouth daily for 90 days, Disp: 90 tablet, Rfl: 0    Misc  Devices (CANE) MISC, Four point aluminum cane , Disp: , Rfl:     Multiple Vitamins-Minerals (ICAPS AREDS 2) CAPS, Take 1 capsule by mouth, Disp: , Rfl:     Multiple Vitamins-Minerals (PRESERVISION AREDS) CAPS, take 1 tablet by oral route  every evening, Disp: , Rfl:     ranitidine (ZANTAC) 150 mg tablet, Take 1 tablet (150 mg total) by mouth 2 (two) times a day for 90 days, Disp: 180 tablet, Rfl: 2    timolol (BETIMOL) 0 5 % ophthalmic solution, Every 12 hours, Disp: , Rfl:   Allergies   Allergen Reactions    No Active Allergies        Results for orders placed or performed in visit on 09/17/18   POCT ECG    Narrative    Normal sinus rhythm at a rate of 73 beats per minute  Lead V6 as artifact  Otherwise normal tracing           Lab Results   Component Value Date    CHOL 160 05/11/2018     Lab Results   Component Value Date    HDL 65 (H) 09/12/2018    HDL 57 05/11/2018     Lab Results   Component Value Date    LDLCALC 98 09/12/2018    LDLCALC 73 05/11/2018     Lab Results   Component Value Date    TRIG 123 09/12/2018    TRIG 149 05/11/2018     No components found for: CHOLHDL  Lab Results   Component Value Date    GLUCOSE 90 05/11/2018    CALCIUM 9 1 09/12/2018     09/12/2018    K 4 3 09/12/2018    CO2 32 (H) 09/12/2018    CL 98 09/12/2018    BUN 22 09/12/2018    CREATININE 0 67 09/12/2018     Lab Results   Component Value Date     09/12/2018    K 4 3 09/12/2018    CL 98 09/12/2018    CO2 32 (H) 09/12/2018    ANIONGAP 12 05/11/2018    BUN 22 09/12/2018    CREATININE 0 67 09/12/2018    GLUCOSE 90 05/11/2018    GLUF 97 09/12/2018    CALCIUM 9 1 09/12/2018    AST 29 09/12/2018    ALT 24 09/12/2018    ALKPHOS 105 09/12/2018    PROT 7 5 05/11/2018    BILITOT 0 3 05/11/2018    EGFR 93 09/12/2018     Lab Results   Component Value Date    WBC 5 80 09/12/2018    HGB 12 3 09/12/2018    HCT 38 2 09/12/2018    MCV 91 09/12/2018     09/12/2018     Lab Results   Component Value Date    LDQ2LVMQZQBC 1 530 09/12/2018      Echocardiogram:  02/09/2018 echocardiogram: Mild aortic stenosis, aortic valve velocity 2 7 Meters/sec  Normal LV systolic function, EF 32%  Grade 1 diastolic dysfunction  Normal PA pressures  Review of Systems:  Review of Systems   Constitutional: Negative  HENT: Negative  Respiratory: Negative for chest tightness and shortness of breath  Cardiovascular: Negative for chest pain and leg swelling  Gastrointestinal: Negative  Endocrine: Negative  Genitourinary: Negative  Musculoskeletal: Negative  Skin: Negative  Allergic/Immunologic: Negative  Neurological: Negative  Hematological: Negative  Psychiatric/Behavioral: Negative  Physical Exam:  /82 (BP Location: Left arm, Patient Position: Sitting, Cuff Size: Large)   Pulse 73   Ht 5' 2" (1 575 m)   Wt 95 7 kg (211 lb)   SpO2 98%   BMI 38 59 kg/m²   Physical Exam   Constitutional: She is oriented to person, place, and time  She appears well-developed and well-nourished  Obese   HENT:   Head: Normocephalic and atraumatic  Eyes: Conjunctivae and EOM are normal  Pupils are equal, round, and reactive to light  Neck: Normal range of motion  Neck supple  No JVD present  No tracheal deviation present  No thyromegaly present  Cardiovascular: Normal rate, regular rhythm and intact distal pulses  Exam reveals no gallop and no friction rub  Murmur heard  Grade 1/6 faint systolic ejection murmur at the base  Pulmonary/Chest: Effort normal  No respiratory distress  She has no rales  Abdominal: Soft  Bowel sounds are normal    Musculoskeletal: Normal range of motion  She exhibits no edema     Neurological: She is alert and oriented to person, place, and time  Skin: Skin is warm and dry  Psychiatric: She has a normal mood and affect  Her behavior is normal        Discussion/Summary:  1  Mild aortic stenosis  2  Hypertension  3  Hyperlipidemia    Plan:  1  Return in 1 year  2   Echocardiogram several weeks prior to return appointment

## 2018-10-09 ENCOUNTER — TELEPHONE (OUTPATIENT)
Dept: FAMILY MEDICINE CLINIC | Facility: CLINIC | Age: 83
End: 2018-10-09

## 2018-10-09 DIAGNOSIS — J44.9 COPD (CHRONIC OBSTRUCTIVE PULMONARY DISEASE) (HCC): ICD-10-CM

## 2018-10-09 DIAGNOSIS — I10 HTN (HYPERTENSION): ICD-10-CM

## 2018-10-09 DIAGNOSIS — K26.9 DUODENAL ULCER: Primary | ICD-10-CM

## 2018-10-09 DIAGNOSIS — N32.81 OVERACTIVE BLADDER: Primary | ICD-10-CM

## 2018-10-09 DIAGNOSIS — K21.9 GERD (GASTROESOPHAGEAL REFLUX DISEASE): ICD-10-CM

## 2018-10-09 RX ORDER — ASPIRIN 81 MG/1
TABLET ORAL
Qty: 90 TABLET | Refills: 0 | Status: SHIPPED | OUTPATIENT
Start: 2018-10-09 | End: 2019-03-15 | Stop reason: ALTCHOICE

## 2018-10-09 RX ORDER — OMEPRAZOLE 20 MG/1
CAPSULE, DELAYED RELEASE ORAL
Qty: 180 CAPSULE | Refills: 0 | Status: SHIPPED | OUTPATIENT
Start: 2018-10-09 | End: 2018-10-09

## 2018-10-09 RX ORDER — FLUTICASONE PROPIONATE 50 MCG
SPRAY, SUSPENSION (ML) NASAL
Qty: 16 G | Refills: 0 | Status: SHIPPED | OUTPATIENT
Start: 2018-10-09 | End: 2018-11-01 | Stop reason: SDUPTHER

## 2018-10-09 RX ORDER — FESOTERODINE FUMARATE 8 MG/1
TABLET, FILM COATED, EXTENDED RELEASE ORAL
Qty: 90 TABLET | Refills: 0 | Status: SHIPPED | OUTPATIENT
Start: 2018-10-09 | End: 2019-01-02 | Stop reason: SDUPTHER

## 2018-10-09 RX ORDER — LISINOPRIL 40 MG/1
TABLET ORAL
Qty: 90 TABLET | Refills: 0 | Status: SHIPPED | OUTPATIENT
Start: 2018-10-09 | End: 2019-01-02 | Stop reason: SDUPTHER

## 2018-10-09 RX ORDER — SUCRALFATE 1 G/10ML
SUSPENSION ORAL
Qty: 1200 ML | Refills: 0 | Status: SHIPPED | OUTPATIENT
Start: 2018-10-09 | End: 2018-12-17 | Stop reason: SDUPTHER

## 2018-10-10 ENCOUNTER — TELEPHONE (OUTPATIENT)
Dept: FAMILY MEDICINE CLINIC | Facility: CLINIC | Age: 83
End: 2018-10-10

## 2018-10-10 DIAGNOSIS — K64.9 HEMORRHOIDS: ICD-10-CM

## 2018-10-10 DIAGNOSIS — K59.00 CONSTIPATION: ICD-10-CM

## 2018-10-10 DIAGNOSIS — K64.9 HEMORRHOIDS: Primary | ICD-10-CM

## 2018-10-10 DIAGNOSIS — K64.4 EXTERNAL HEMORRHOIDS: ICD-10-CM

## 2018-10-10 NOTE — TELEPHONE ENCOUNTER
Please contact Kasandra regarding ajleesa's gastro/colonoscopy appt    Please contact mari after 2pm [phone]    DDay: Spoke to Brianna Davis, she is her daughter in law, helps her with her appointments and medications  Calling regarding GI referral sent in September, she still has not heard back  Will resend, it is for chronic painful hemorroids previously controlled with foam but have gotten worse   Will resend referral

## 2018-10-15 DIAGNOSIS — M79.2 NEUROPATHIC PAIN: Primary | ICD-10-CM

## 2018-10-15 RX ORDER — AMITRIPTYLINE HYDROCHLORIDE 25 MG/1
TABLET, FILM COATED ORAL
Qty: 90 TABLET | Refills: 0 | Status: SHIPPED | OUTPATIENT
Start: 2018-10-15 | End: 2019-04-18

## 2018-10-15 NOTE — TELEPHONE ENCOUNTER
They were all sent to her pharmacy on 10/9/18  Please all pharmacy and confirm that they received the orders if patient still cannot pick them up

## 2018-10-30 DIAGNOSIS — J44.9 COPD (CHRONIC OBSTRUCTIVE PULMONARY DISEASE) (HCC): Primary | ICD-10-CM

## 2018-11-01 ENCOUNTER — OFFICE VISIT (OUTPATIENT)
Dept: OTOLARYNGOLOGY | Facility: CLINIC | Age: 83
End: 2018-11-01
Payer: COMMERCIAL

## 2018-11-01 VITALS
HEART RATE: 91 BPM | DIASTOLIC BLOOD PRESSURE: 80 MMHG | HEIGHT: 62 IN | BODY MASS INDEX: 38.28 KG/M2 | WEIGHT: 208 LBS | SYSTOLIC BLOOD PRESSURE: 145 MMHG

## 2018-11-01 DIAGNOSIS — H91.13 PRESBYCUSIS OF BOTH EARS: ICD-10-CM

## 2018-11-01 DIAGNOSIS — J30.1 SEASONAL ALLERGIC RHINITIS DUE TO POLLEN: Primary | ICD-10-CM

## 2018-11-01 PROCEDURE — 99203 OFFICE O/P NEW LOW 30 MIN: CPT | Performed by: SPECIALIST

## 2018-11-01 RX ORDER — FLUTICASONE PROPIONATE 50 MCG
1 SPRAY, SUSPENSION (ML) NASAL 2 TIMES DAILY
Qty: 1 BOTTLE | Refills: 5 | Status: SHIPPED | OUTPATIENT
Start: 2018-11-01 | End: 2019-09-11 | Stop reason: SDUPTHER

## 2018-11-01 NOTE — PROGRESS NOTES
Otolaryngology Head and Neck Surgery History and Physical    Chief complaint  Nasal congestion and hearing loss      History of the Present Illness    Geraldine Chavez is a 80 y  o  who presents with complaints of nasal congestion  She does report that Flonase tends to help  Sometimes worse when she lies down  We did discuss that this is secondary to fluid changes with changes in position  Patient also has a history of hearing loss  She did get some hearing aids and have her hearing checked recently in 2018  Review of Systems    Past Medical History:   Diagnosis Date    Diverticulosis 2001    Heart failure ([address] )     History of cystocele     Hypertension     Obesity     Positive PPD 1999    treated       Past Surgical History:   Procedure Laterality Date    CARDIOVASCULAR STRESS TEST  2001    dobutamine stress test    CHOLECYSTECTOMY  04/2002    COLONOSCOPY      CYSTOCELE REPAIR      HYSTERECTOMY      KNEE SURGERY      knee replacement       Social History     Social History    Marital status: Single     Spouse name: N/A    Number of children: N/A    Years of education: N/A     Occupational History    Not on file  Social History Main Topics    Smoking status: Never Smoker    Smokeless tobacco: Never Used    Alcohol use No    Drug use: No    Sexual activity: Not Currently     Partners: Male     Other Topics Concern    Not on file     Social History Narrative    Lives at home with son and 2 grandkids  Family History   Problem Relation Age of Onset    Family history unknown: Yes           /80   Pulse 91   Ht 5' 2" (1 575 m)   Wt 94 3 kg (208 lb)   BMI 38 04 kg/m²     Physical Exam   Constitutional: She is oriented to person, place, and time  She appears well-developed and well-nourished  HENT:   Head: Normocephalic and atraumatic  Right Ear: External ear normal  No drainage, swelling or tenderness  No mastoid tenderness   Tympanic membrane is not injected and not perforated  Tympanic membrane mobility is normal  No middle ear effusion  Left Ear: External ear normal  No drainage, swelling or tenderness  No mastoid tenderness  Tympanic membrane is not injected and not perforated  Tympanic membrane mobility is normal   No middle ear effusion  Nose: Nose normal  No mucosal edema, rhinorrhea, sinus tenderness, nasal deformity or septal deviation  Right sinus exhibits no maxillary sinus tenderness and no frontal sinus tenderness  Left sinus exhibits no maxillary sinus tenderness and no frontal sinus tenderness  Mouth/Throat: Uvula is midline and oropharynx is clear and moist  Mucous membranes are not pale and not dry  No oral lesions  Normal dentition  Eyes: Pupils are equal, round, and reactive to light  Conjunctivae and EOM are normal    Neck: Normal range of motion  Neck supple  No JVD present  No tracheal deviation present  No thyromegaly present  Cardiovascular:   Carotid normal   Jugular no distension   Pulmonary/Chest: No respiratory distress  Abdominal: Soft  She exhibits no distension  Musculoskeletal: Normal range of motion  Lymphadenopathy:     She has no cervical adenopathy  Neurological: She is alert and oriented to person, place, and time  No cranial nerve deficit  Skin: Skin is warm  No rash noted  No erythema  Psychiatric: She has a normal mood and affect  Her behavior is normal  Thought content normal          Procedure:      Imaging studies:      Pertinent laboratory data:      Assessment and plan:    1  Seasonal allergic rhinitis due to pollen  fluticasone (FLONASE) 50 mcg/act nasal spray   2  Presbycusis of both ears         Patient doing well on Flonase  Renewed for 6 months  I asked her to get this from her family physician if it continues to help her  2  Patient continue with amplification  Contact us if there is any change in her situation

## 2018-11-19 ENCOUNTER — TELEPHONE (OUTPATIENT)
Dept: FAMILY MEDICINE CLINIC | Facility: CLINIC | Age: 83
End: 2018-11-19

## 2018-11-19 DIAGNOSIS — K21.9 GASTROESOPHAGEAL REFLUX DISEASE, ESOPHAGITIS PRESENCE NOT SPECIFIED: ICD-10-CM

## 2018-11-19 DIAGNOSIS — K62.89 ANAL OR RECTAL PAIN: ICD-10-CM

## 2018-11-19 DIAGNOSIS — K59.00 CONSTIPATION: Primary | ICD-10-CM

## 2018-11-19 NOTE — TELEPHONE ENCOUNTER
Daughter of pt stopped by the office today requesting if you can please contact her  Griselda(daughter) is concern about pt's medical conditions  Natalia Check states pt has been acting like she might be developing Alzheimer and would like for her to be tested for it  Pt as well has been having loss of appetite for about 1 month  Please contact Natalia Check since there are other concerns she will like to speak to you about  Phone number is [phone]  Home [phone]

## 2018-11-20 ENCOUNTER — TELEPHONE (OUTPATIENT)
Dept: OTHER | Facility: HOSPITAL | Age: 83
End: 2018-11-20

## 2018-11-20 PROBLEM — K62.89 ANAL OR RECTAL PAIN: Status: ACTIVE | Noted: 2018-11-20

## 2018-11-20 NOTE — TELEPHONE ENCOUNTER
Called regarding Geraldine's appetite, worried about her weight, explained that in September she was 204lb and that on visit 11/1 with ENT she weighed 208lb, explained weight is stable  Her other concern is her mom's rectal pain, explained that her mom refuses to be examined and that I cannot force her  Again will follow up on referral to GI for her hemorrhoids, rectal pain and GERD  She verbalized understanding  GI referral was placed again and routed to [facility] [place] for follow up

## 2018-11-23 ENCOUNTER — OFFICE VISIT (OUTPATIENT)
Dept: GASTROENTEROLOGY | Facility: CLINIC | Age: 83
End: 2018-11-23
Payer: COMMERCIAL

## 2018-11-23 VITALS
DIASTOLIC BLOOD PRESSURE: 76 MMHG | HEART RATE: 69 BPM | HEIGHT: 62 IN | TEMPERATURE: 97.6 F | SYSTOLIC BLOOD PRESSURE: 134 MMHG | WEIGHT: 208 LBS | BODY MASS INDEX: 38.28 KG/M2

## 2018-11-23 DIAGNOSIS — K21.9 GASTROESOPHAGEAL REFLUX DISEASE, ESOPHAGITIS PRESENCE NOT SPECIFIED: ICD-10-CM

## 2018-11-23 DIAGNOSIS — R10.13 EPIGASTRIC ABDOMINAL PAIN: ICD-10-CM

## 2018-11-23 DIAGNOSIS — K59.00 CONSTIPATION: ICD-10-CM

## 2018-11-23 DIAGNOSIS — K62.89 ANAL OR RECTAL PAIN: ICD-10-CM

## 2018-11-23 DIAGNOSIS — K64.9 HEMORRHOIDS, UNSPECIFIED HEMORRHOID TYPE: Primary | ICD-10-CM

## 2018-11-23 PROCEDURE — 99205 OFFICE O/P NEW HI 60 MIN: CPT | Performed by: INTERNAL MEDICINE

## 2018-11-23 PROCEDURE — 4040F PNEUMOC VAC/ADMIN/RCVD: CPT | Performed by: INTERNAL MEDICINE

## 2018-11-23 NOTE — LETTER
November 23, 2018     Daja Zafar MD  [address]    Patient: Antony Randall   YOB: 1934   Date of Visit: 11/23/2018       Dear Dr Fang Cormier: Thank you for referring Rubina Rich to me for evaluation  Below are my notes for this consultation  If you have questions, please do not hesitate to call me  I look forward to following your patient along with you  Sincerely,        Horacio Moreland DO        CC: No Recipients  Horacio Moreland DO  11/23/2018  9:39 AM  Sign at close encounter  [facility] - Outpatient Consultation  Antony Randall 80 y o  female MRN: [id number]  Encounter: [id number]          ASSESSMENT AND PLAN:    80year old female referred by PCP  She is Belizean speaking only and history is obtained by the aid of my MA and the pt's granddaughter       1  Constipation- improved with colace and miralax      2  Anal or rectal pain- on exam no source identified, will treat symptomatically with steroid cream  Explained to daughter and pt that if not improved can do colonoscopy to eval for a colonic source but given her advanced age and aortic stenosis favor conservative treatment  She is not anemic and does not have alarm symptoms      3  Gastroesophageal reflux disease, esophagitis presence not specified- continue zantac    4  Possible history of H pylori- will check h pylori stool ag to confirm eradication      Follow up in a few months time        ______________________________________________________________________    HPI:  80year old Faroese speaking female referred by PCP  She is accompanied by her daughter and granddaughter  History obtained by aid of my MA and the patient's granddaughter  Pt and family are somewhat of poor historians even in their native language  She reports anal pain for the past month  She denies any rectal bleeding    She reports a prior colonoscopy a few years ago but she is unsure when       The daughter reports she had what sounds like H pylori in the past   This was a few years ago  The pt's daughter states that she has some epigastric pain now  She has a bowel movement every day but has to strain to have a BM  Weight is stable and she is eating fine  REVIEW OF SYSTEMS:    CONSTITUTIONAL: Denies any fever, chills, rigors, and weight loss  HEENT: No earache or tinnitus  Denies hearing loss or visual disturbances  CARDIOVASCULAR: No chest pain or palpitations  RESPIRATORY: Denies any cough, hemoptysis, shortness of breath or dyspnea on exertion  GASTROINTESTINAL: As noted in the History of Present Illness  GENITOURINARY: No problems with urination  Denies any hematuria or dysuria  NEUROLOGIC: No dizziness or vertigo, denies headaches  MUSCULOSKELETAL: Denies any muscle or joint pain  SKIN: Denies skin rashes or itching  ENDOCRINE: Denies excessive thirst  Denies intolerance to heat or cold  PSYCHOSOCIAL: Denies depression or anxiety  Denies any recent memory loss         Historical Information   Past Medical History:   Diagnosis Date    Diverticulosis 2001    Heart failure ([facility] [address] )     History of cystocele     Hypertension     Obesity     Positive PPD 1999    treated     Past Surgical History:   Procedure Laterality Date    CARDIOVASCULAR STRESS TEST  2001    dobutamine stress test    CHOLECYSTECTOMY  04/2002    COLONOSCOPY      CYSTOCELE REPAIR      HYSTERECTOMY      KNEE SURGERY      knee replacement    UPPER GASTROINTESTINAL ENDOSCOPY       Social History   History   Alcohol Use No     History   Drug Use No     History   Smoking Status    Never Smoker   Smokeless Tobacco    Never Used     Family History   Problem Relation Age of Onset    Family history unknown: Yes       Meds/Allergies       Current Outpatient Prescriptions:     amitriptyline (ELAVIL) 25 mg tablet    anastrozole (ARIMIDEX) 1 mg tablet    atorvastatin (LIPITOR) 20 mg tablet   Calcium Carbonate-Vitamin D (CALCIUM 500 + D) 500-125 MG-UNIT TABS    celecoxib (CeleBREX) 50 MG capsule    docusate sodium (COLACE) 100 mg capsule    fluticasone (FLONASE) 50 mcg/act nasal spray    fluticasone-salmeterol (ADVAIR DISKUS) 500-50 mcg/dose inhaler    furosemide (LASIX) 40 mg tablet    hydrochlorothiazide (HYDRODIURIL) 25 mg tablet    Misc  Devices (CANE) MISC    Multiple Vitamins-Minerals (ICAPS AREDS 2) CAPS    Multiple Vitamins-Minerals (PRESERVISION AREDS) CAPS    ranitidine (ZANTAC) 150 mg tablet    timolol (BETIMOL) 0 5 % ophthalmic solution    TOVIAZ 8 MG TB24    VENTOLIN  (90 Base) MCG/ACT inhaler    aspirin (ECOTRIN LOW STRENGTH) 81 mg EC tablet    CARAFATE 1 GM/10ML suspension    hydrocortisone (ANUSOL-HC, PROCTOSOL HC,) 2 5 % rectal cream    lisinopril (ZESTRIL) 40 mg tablet    Allergies   Allergen Reactions    No Active Allergies            Objective     Blood pressure 134/76, pulse 69, temperature 97 6 °F (36 4 °C), temperature source Tympanic, height 5' 2" (1 575 m), weight 94 3 kg (208 lb)  Body mass index is 38 04 kg/m²  PHYSICAL EXAM:      General Appearance:   Alert, cooperative, no distress   HEENT:   Normocephalic, atraumatic, anicteric      Neck:  Supple, symmetrical, trachea midline   Lungs:   Clear to auscultation bilaterally; no rales, rhonchi or wheezing; respirations unlabored    Heart[de-identified]   Regular rate and rhythm; no murmur, rub, or gallop  Abdomen:   Soft, non-tender, non-distended; normal bowel sounds; no masses, no organomegaly    Genitalia:   Deferred    Rectal:   Deferred    Extremities:  No cyanosis, clubbing or edema    Pulses:  2+ and symmetric    Skin:  No jaundice, rashes, or lesions    Lymph nodes:  No palpable cervical lymphadenopathy        Lab Results:   No visits with results within 1 Day(s) from this visit     Latest known visit with results is:   Lab on 09/12/2018   Component Date Value    WBC 09/12/2018 5 80     RBC 09/12/2018 4 21     Hemoglobin 09/12/2018 12 3     Hematocrit 09/12/2018 38 2     MCV 09/12/2018 91     MCH 09/12/2018 29 2     MCHC 09/12/2018 32 2     RDW 09/12/2018 14 3     MPV 09/12/2018 7 8*    Platelets 36/22/6403 268     Cholesterol 09/12/2018 188     Triglycerides 09/12/2018 123     HDL, Direct 09/12/2018 65*    LDL Calculated 09/12/2018 98     Non-HDL-Chol (CHOL-HDL) 09/12/2018 123     TSH 3RD GENERATON 09/12/2018 1 530     Sodium 09/12/2018 140     Potassium 09/12/2018 4 3     Chloride 09/12/2018 98     CO2 09/12/2018 32*    ANION GAP 09/12/2018 10     BUN 09/12/2018 22     Creatinine 09/12/2018 0 67     Glucose, Fasting 09/12/2018 97     Calcium 09/12/2018 9 1     AST 09/12/2018 29     ALT 09/12/2018 24     Alkaline Phosphatase 09/12/2018 105     Total Protein 09/12/2018 8 7*    Albumin 09/12/2018 4 4     Total Bilirubin 09/12/2018 0 60     eGFR 09/12/2018 93     Segmented % 09/12/2018 43*    Bands % 09/12/2018 1     Lymphocytes % 09/12/2018 36     Monocytes % 09/12/2018 3     Eosinophils, % 09/12/2018 17*    Neutrophils Absolute 09/12/2018 2 55     Lymphocytes Absolute 09/12/2018 2 09     Monocytes Absolute 09/12/2018 0 17*    Eosinophils Absolute 09/12/2018 0 99*    Total Counted 09/12/2018 100     RBC Morphology 09/12/2018 Normal     Platelet Estimate 16/13/8511 Adequate          Radiology Results:   No results found

## 2018-11-23 NOTE — PROGRESS NOTES
[facility] - Outpatient Consultation  Jeff Hatch 80 y o  female MRN: [id number]  Encounter: [id number]          ASSESSMENT AND PLAN:    80year old female referred by PCP  She is Maldivian speaking only and history is obtained by the aid of my MA and the pt's granddaughter       1  Constipation- improved with colace and miralax      2  Anal or rectal pain- on exam no source identified, will treat symptomatically with steroid cream  Explained to daughter and pt that if not improved can do colonoscopy to eval for a colonic source but given her advanced age and aortic stenosis favor conservative treatment  She is not anemic and does not have alarm symptoms    3  Gastroesophageal reflux disease, esophagitis presence not specified- continue zantac    4  Possible history of H pylori- will check h pylori stool ag to confirm eradication      Follow up in a few months time        ______________________________________________________________________    HPI:  80year old Filipino speaking female referred by PCP  She is accompanied by her daughter and granddaughter  History obtained by aid of my MA and the patient's granddaughter  Pt and family are somewhat of poor historians even in their native language  She reports anal pain for the past month  She denies any rectal bleeding  She reports a prior colonoscopy a few years ago but she is unsure when  The daughter reports she had what sounds like H pylori in the past   This was a few years ago  The pt's daughter states that she has some epigastric pain now  She has a bowel movement every day but has to strain to have a BM  Weight is stable and she is eating fine  REVIEW OF SYSTEMS:    CONSTITUTIONAL: Denies any fever, chills, rigors, and weight loss  HEENT: No earache or tinnitus  Denies hearing loss or visual disturbances  CARDIOVASCULAR: No chest pain or palpitations     RESPIRATORY: Denies any cough, hemoptysis, shortness of breath or dyspnea on exertion  GASTROINTESTINAL: As noted in the History of Present Illness  GENITOURINARY: No problems with urination  Denies any hematuria or dysuria  NEUROLOGIC: No dizziness or vertigo, denies headaches  MUSCULOSKELETAL: Denies any muscle or joint pain  SKIN: Denies skin rashes or itching  ENDOCRINE: Denies excessive thirst  Denies intolerance to heat or cold  PSYCHOSOCIAL: Denies depression or anxiety  Denies any recent memory loss  Historical Information   Past Medical History:   Diagnosis Date    Diverticulosis 2001    Heart failure ([address] )     History of cystocele     Hypertension     Obesity     Positive PPD 1999    treated     Past Surgical History:   Procedure Laterality Date    CARDIOVASCULAR STRESS TEST  2001    dobutamine stress test    CHOLECYSTECTOMY  04/2002    COLONOSCOPY      CYSTOCELE REPAIR      HYSTERECTOMY      KNEE SURGERY      knee replacement    UPPER GASTROINTESTINAL ENDOSCOPY       Social History   History   Alcohol Use No     History   Drug Use No     History   Smoking Status    Never Smoker   Smokeless Tobacco    Never Used     Family History   Problem Relation Age of Onset    Family history unknown: Yes       Meds/Allergies       Current Outpatient Prescriptions:     amitriptyline (ELAVIL) 25 mg tablet    anastrozole (ARIMIDEX) 1 mg tablet    atorvastatin (LIPITOR) 20 mg tablet    Calcium Carbonate-Vitamin D (CALCIUM 500 + D) 500-125 MG-UNIT TABS    celecoxib (CeleBREX) 50 MG capsule    docusate sodium (COLACE) 100 mg capsule    fluticasone (FLONASE) 50 mcg/act nasal spray    fluticasone-salmeterol (ADVAIR DISKUS) 500-50 mcg/dose inhaler    furosemide (LASIX) 40 mg tablet    hydrochlorothiazide (HYDRODIURIL) 25 mg tablet    Misc   Devices (CANE) MISC    Multiple Vitamins-Minerals (ICAPS AREDS 2) CAPS    Multiple Vitamins-Minerals (PRESERVISION AREDS) CAPS    ranitidine (ZANTAC) 150 mg tablet    timolol (BETIMOL) 0 5 % ophthalmic solution    TOVIAZ 8 MG TB24    VENTOLIN  (90 Base) MCG/ACT inhaler    aspirin (ECOTRIN LOW STRENGTH) 81 mg EC tablet    CARAFATE 1 GM/10ML suspension    hydrocortisone (ANUSOL-HC, PROCTOSOL HC,) 2 5 % rectal cream    lisinopril (ZESTRIL) 40 mg tablet    Allergies   Allergen Reactions    No Active Allergies            Objective     Blood pressure 134/76, pulse 69, temperature 97 6 °F (36 4 °C), temperature source Tympanic, height 5' 2" (1 575 m), weight 94 3 kg (208 lb)  Body mass index is 38 04 kg/m²  PHYSICAL EXAM:      General Appearance:   Alert, cooperative, no distress   HEENT:   Normocephalic, atraumatic, anicteric      Neck:  Supple, symmetrical, trachea midline   Lungs:   Clear to auscultation bilaterally; no rales, rhonchi or wheezing; respirations unlabored    Heart[de-identified]   Regular rate and rhythm; no murmur, rub, or gallop  Abdomen:   Soft, non-tender, non-distended; normal bowel sounds; no masses, no organomegaly    Genitalia:   Deferred    Rectal:   Deferred    Extremities:  No cyanosis, clubbing or edema    Pulses:  2+ and symmetric    Skin:  No jaundice, rashes, or lesions    Lymph nodes:  No palpable cervical lymphadenopathy        Lab Results:   No visits with results within 1 Day(s) from this visit     Latest known visit with results is:   Lab on 09/12/2018   Component Date Value    WBC 09/12/2018 5 80     RBC 09/12/2018 4 21     Hemoglobin 09/12/2018 12 3     Hematocrit 09/12/2018 38 2     MCV 09/12/2018 91     MCH 09/12/2018 29 2     MCHC 09/12/2018 32 2     RDW 09/12/2018 14 3     MPV 09/12/2018 7 8*    Platelets 68/56/5945 268     Cholesterol 09/12/2018 188     Triglycerides 09/12/2018 123     HDL, Direct 09/12/2018 65*    LDL Calculated 09/12/2018 98     Non-HDL-Chol (CHOL-HDL) 09/12/2018 123     TSH 3RD GENERATON 09/12/2018 1 530     Sodium 09/12/2018 140     Potassium 09/12/2018 4 3     Chloride 09/12/2018 98     CO2 09/12/2018 32*    ANION GAP 09/12/2018 10     BUN 09/12/2018 22     Creatinine 09/12/2018 0 67     Glucose, Fasting 09/12/2018 97     Calcium 09/12/2018 9 1     AST 09/12/2018 29     ALT 09/12/2018 24     Alkaline Phosphatase 09/12/2018 105     Total Protein 09/12/2018 8 7*    Albumin 09/12/2018 4 4     Total Bilirubin 09/12/2018 0 60     eGFR 09/12/2018 93     Segmented % 09/12/2018 43*    Bands % 09/12/2018 1     Lymphocytes % 09/12/2018 36     Monocytes % 09/12/2018 3     Eosinophils, % 09/12/2018 17*    Neutrophils Absolute 09/12/2018 2 55     Lymphocytes Absolute 09/12/2018 2 09     Monocytes Absolute 09/12/2018 0 17*    Eosinophils Absolute 09/12/2018 0 99*    Total Counted 09/12/2018 100     RBC Morphology 09/12/2018 Normal     Platelet Estimate 90/51/2840 Adequate          Radiology Results:   No results found

## 2018-11-24 ENCOUNTER — APPOINTMENT (OUTPATIENT)
Dept: LAB | Facility: HOSPITAL | Age: 83
End: 2018-11-24
Attending: INTERNAL MEDICINE
Payer: COMMERCIAL

## 2018-11-24 DIAGNOSIS — R10.13 EPIGASTRIC ABDOMINAL PAIN: ICD-10-CM

## 2018-11-24 PROCEDURE — 87338 HPYLORI STOOL AG IA: CPT

## 2018-11-26 LAB — H PYLORI AG STL QL IA: NEGATIVE

## 2018-11-28 ENCOUNTER — TELEPHONE (OUTPATIENT)
Dept: GASTROENTEROLOGY | Facility: MEDICAL CENTER | Age: 83
End: 2018-11-28

## 2018-11-28 NOTE — TELEPHONE ENCOUNTER
----- Message from Jonny January MA sent at 11/27/2018  9:52 AM EST -----      ----- Message -----  From: Andrea Luevano DO  Sent: 11/26/2018  10:04 PM  To: [facility]    Please let patient know that h pylori was negative, good news

## 2018-11-30 NOTE — TELEPHONE ENCOUNTER
Left voicemail for patient to call office  Letter had been sent to the patient  So they can call the office for results

## 2018-12-06 ENCOUNTER — HOSPITAL ENCOUNTER (OUTPATIENT)
Dept: NON INVASIVE DIAGNOSTICS | Facility: HOSPITAL | Age: 83
Discharge: HOME | End: 2018-12-06
Payer: COMMERCIAL

## 2018-12-06 DIAGNOSIS — I35.0 NONRHEUMATIC AORTIC VALVE STENOSIS: ICD-10-CM

## 2018-12-06 PROCEDURE — 93306 TTE W/DOPPLER COMPLETE: CPT

## 2018-12-06 PROCEDURE — 93306 TTE W/DOPPLER COMPLETE: CPT | Performed by: INTERNAL MEDICINE

## 2018-12-16 PROBLEM — I50.32 CHRONIC DIASTOLIC CONGESTIVE HEART FAILURE (HCC): Status: ACTIVE | Noted: 2018-09-12

## 2018-12-17 ENCOUNTER — OFFICE VISIT (OUTPATIENT)
Dept: FAMILY MEDICINE CLINIC | Facility: CLINIC | Age: 83
End: 2018-12-17
Payer: COMMERCIAL

## 2018-12-17 VITALS
SYSTOLIC BLOOD PRESSURE: 138 MMHG | OXYGEN SATURATION: 97 % | RESPIRATION RATE: 17 BRPM | HEART RATE: 91 BPM | BODY MASS INDEX: 38.04 KG/M2 | TEMPERATURE: 97.1 F | DIASTOLIC BLOOD PRESSURE: 80 MMHG | WEIGHT: 208 LBS

## 2018-12-17 DIAGNOSIS — I51.89 DIASTOLIC DYSFUNCTION: Primary | ICD-10-CM

## 2018-12-17 DIAGNOSIS — R26.9 ABNORMAL GAIT: ICD-10-CM

## 2018-12-17 DIAGNOSIS — M19.90 OSTEOARTHRITIS: ICD-10-CM

## 2018-12-17 DIAGNOSIS — K59.00 CONSTIPATION: ICD-10-CM

## 2018-12-17 DIAGNOSIS — K26.9 DUODENAL ULCER: ICD-10-CM

## 2018-12-17 DIAGNOSIS — K62.89 ANAL OR RECTAL PAIN: ICD-10-CM

## 2018-12-17 DIAGNOSIS — K64.9 HEMORRHOIDS, UNSPECIFIED HEMORRHOID TYPE: ICD-10-CM

## 2018-12-17 DIAGNOSIS — I50.32 CHRONIC DIASTOLIC CONGESTIVE HEART FAILURE (HCC): ICD-10-CM

## 2018-12-17 DIAGNOSIS — E78.2 MIXED HYPERLIPIDEMIA: ICD-10-CM

## 2018-12-17 PROCEDURE — 1036F TOBACCO NON-USER: CPT | Performed by: INTERNAL MEDICINE

## 2018-12-17 PROCEDURE — 99213 OFFICE O/P EST LOW 20 MIN: CPT | Performed by: INTERNAL MEDICINE

## 2018-12-17 PROCEDURE — 1160F RVW MEDS BY RX/DR IN RCRD: CPT | Performed by: INTERNAL MEDICINE

## 2018-12-17 RX ORDER — SUCRALFATE ORAL 1 G/10ML
1 SUSPENSION ORAL
Qty: 1200 ML | Refills: 0 | Status: SHIPPED | OUTPATIENT
Start: 2018-12-17 | End: 2018-12-28 | Stop reason: SDUPTHER

## 2018-12-17 RX ORDER — CELECOXIB 50 MG/1
50 CAPSULE ORAL 2 TIMES DAILY
Qty: 180 CAPSULE | Refills: 0 | Status: SHIPPED | OUTPATIENT
Start: 2018-12-17 | End: 2018-12-28 | Stop reason: SDUPTHER

## 2018-12-17 RX ORDER — DOCUSATE SODIUM 100 MG/1
100 CAPSULE, LIQUID FILLED ORAL 3 TIMES DAILY PRN
Qty: 60 CAPSULE | Refills: 0 | Status: ON HOLD | OUTPATIENT
Start: 2018-12-17 | End: 2019-10-03 | Stop reason: SDUPTHER

## 2018-12-17 RX ORDER — ATORVASTATIN CALCIUM 20 MG/1
20 TABLET, FILM COATED ORAL DAILY
Qty: 90 TABLET | Refills: 0 | Status: SHIPPED | OUTPATIENT
Start: 2018-12-17 | End: 2019-01-02 | Stop reason: SDUPTHER

## 2018-12-17 NOTE — PROGRESS NOTES
Assessment/Plan:    Hemorrhoids  GI did not find a source of rectal pain on exam agreed to continue conservative management with steroids  They discussed risks and benefits of colonoscopy with patient  HLD (hyperlipidemia)  Lipid panel in 9/2018 WNL, will continue on atorvastatin 20mg daily  Chronic diastolic congestive heart failure ([facility] [address] )  ECHO 12/6: IMPRESSIONS:  1  Mild concentric left ventricular hypertrophy, normal left ventricular systolic function, EF around 60-65%  Grade 1 diastolic dysfunction  2  No other significant chamber hypertrophy or enlargement  3  Mild aortic valve stenosis and mild-to-moderate aortic valve regurgitation  4  Trace mitral valve and trace to mild tricuspid valve regurgitation  5  Mild pulmonary hypertension  Estimated RVSP/PASP is 40 mmHg  6  Trace, hemodynamically insignificant pericardial effusion        GERD (gastroesophageal reflux disease)  Reviewed the causes of heartburn  Discussed importance of diet and lifestyle modifications to control GERD symptoms  Avoid things which worsen heartburn (Ex: Caffeine, tomato based products, spicy foods, tobacco, alcohol, obesity, tight-fitting clothing )  Discussed importance of eating small frequent meals instead of large meals  Elevate the head of the bed and to not lay down 2-3 hours following a meal     Controlled on ranitidine    Osteoarthritis  Discussed importance of daily exercise and incorporation of light weight-bearing exercises to  Agreeable to go to physical therapy for evaluation and treatment  Abnormal gait  Stable with a cane, will send to PT  Constipation  Controlled on docusate and diet  Diagnoses and all orders for this visit:    Diastolic dysfunction  -     Compression Stocking    Hemorrhoids, unspecified hemorrhoid type    Anal or rectal pain  -     atorvastatin (LIPITOR) 20 mg tablet;  Take 1 tablet (20 mg total) by mouth daily for 90 days    Constipation  -     atorvastatin (LIPITOR) 20 mg tablet; Take 1 tablet (20 mg total) by mouth daily for 90 days  -     docusate sodium (COLACE) 100 mg capsule; Take 1 capsule (100 mg total) by mouth 3 (three) times a day as needed for constipation for up to 90 days    Mixed hyperlipidemia    Chronic diastolic congestive heart failure (HCC)  -     Compression Stocking    Osteoarthritis  -     celecoxib (CeleBREX) 50 MG capsule; Take 1 capsule (50 mg total) by mouth 2 (two) times a day for 90 days  -     Ambulatory referral to Physical Therapy; Future    Duodenal ulcer  -     sucralfate (CARAFATE) 1 g/10 mL suspension; Take 10 mL (1 g total) by mouth 4 (four) times a day (with meals and at bedtime)    Abnormal gait          Subjective:      Patient ID: Raman Pérez is a 80 y o  female  She presents for management of chronic conditions  The following portions of the patient's history were reviewed and updated as appropriate: allergies, current medications, past family history, past medical history, past social history, past surgical history and problem list     Review of Systems   Constitutional: Negative for activity change, appetite change, fatigue and fever  HENT: Negative for drooling and sore throat  Eyes: Negative for pain and visual disturbance  Respiratory: Negative for cough, chest tightness and shortness of breath  Cardiovascular: Negative for chest pain and palpitations  Gastrointestinal: Negative for abdominal pain, constipation, diarrhea and nausea  Genitourinary: Negative for dysuria and hematuria  Musculoskeletal: Negative for back pain and myalgias  Skin: Negative for color change  Neurological: Negative for numbness and headaches  Psychiatric/Behavioral: Negative for hallucinations and suicidal ideas           Objective:      /80 (BP Location: Left arm, Patient Position: Sitting, Cuff Size: Adult)   Pulse 91   Temp (!) 97 1 °F (36 2 °C) (Temporal)   Resp 17   Wt 94 3 kg (208 lb)   SpO2 97%   BMI 38 04 kg/m²          Physical Exam   Constitutional: She is oriented to person, place, and time  She appears well-developed and well-nourished  HENT:   Head: Normocephalic and atraumatic  Right Ear: External ear normal    Left Ear: External ear normal    Eyes: Pupils are equal, round, and reactive to light  Neck: Normal range of motion  Neck supple  Cardiovascular: Normal rate, regular rhythm and intact distal pulses  Exam reveals no gallop and no friction rub  Murmur (Systolic ejection murmur) heard  Pulmonary/Chest: Effort normal and breath sounds normal  No respiratory distress  Abdominal: Soft  Bowel sounds are normal  She exhibits no distension  There is no tenderness  Musculoskeletal: Normal range of motion  She exhibits edema (+2 pitting edema to knee bilaterally)  Lymphadenopathy:     She has no cervical adenopathy  Neurological: She is alert and oriented to person, place, and time  Skin: Skin is warm and dry  Psychiatric: She has a normal mood and affect  Vitals reviewed

## 2018-12-17 NOTE — ASSESSMENT & PLAN NOTE
GI did not find a source of rectal pain on exam agreed to continue conservative management with steroids  They discussed risks and benefits of colonoscopy with patient

## 2018-12-17 NOTE — ASSESSMENT & PLAN NOTE
Discussed importance of daily exercise and incorporation of light weight-bearing exercises to  Agreeable to go to physical therapy for evaluation and treatment

## 2018-12-17 NOTE — PATIENT INSTRUCTIONS
Dolor de gin rachna   CUIDADO AMBULATORIO:   Un dolor de gin rachna  es un dolor o molestia que comienza de repente y empeora rápidamente  Usted puede tener un dolor de gin rachna sólo cuando siente estrés o come ciertos alimentos  Otro tipo dolor de gin rachna puede producirse todos los días y a veces varias veces al día  La causa de un dolor de gin rachna puede no conocerse  Es probable que sea provocado por el estrés, fatiga, hormonas, alimentos o trauma  Tipos comunes de dolor de gin rachna:   · El dolor de gin tensional  es el tipo de dolor de gin más común  Normalmente aparece por la tarde y se va al atardecer  El dolor generalmente es leve o moderado  La nanci brillante o el ruido valentina podrían estorbarle  Generalmente el dolor se encuentra a través de la frente o en la parte posterior de la gin y con frecuencia sólo en un lado  Sondra dolor de gin podría ocurrir todos los días  · Las migrañas  provocan dolor de moderado a intenso  El dolor de gin dura de 1 a 3 días generalmente y [employer] es recurrente  El dolor tiende a estar sólo en un lado quinn puede cambiar de Audrey  La migraña se localiza en la sien o en la parte posterior de la gin o del jerome  El dolor podría pulsar o estar rachna y continuo  · Daniel migraña con aura  significa que usted ve o siente algo antes de [place]  Podría tosha daniel pequeña nazia rodeada de líneas brillantes en zigzag  Otros signos o síntomas podrían seguir al aura  · El dolor de gin en racimo  se siente solamente en un lado  A menudo causa dolor severo y puede durar de 30 minutos a 2 horas  Sondra dolor de gin podría ocurrir 1 vez o 2 veces al día, a menudo a la noche  El dolor puede despertarlo  Busque atención médica de inmediato si:   · Usted tiene dolor intenso  · Usted tiene entumecimiento en un lado de guzman joce o cuerpo  · Usted tiene un dolor de gin que ocurre después de un golpe en la gin, daniel caída u otro trauma  · Tiene dolor de [place], está olvidadizo o confundido o tiene dificultad para hablar  · Tiene dolor de [place], rigidez en el connie y [place]  Pregúntele a guzman E[employer]se Show vitaminas y minerales son adecuados para usted  · Usted tiene un dolor de gin javed y está vomitando  · Usted tiene dolor de gin todos los días y no se [place] aun después de tratarlo  · Sunshine zion de [place] u ocurren nuevos síntomas cuando tiene dolor de [place]  · Usted tiene preguntas o inquietudes acerca de guzman condición o cuidado  Tratamiento:   · Medicamento  se pueden administrar para disminuir el dolor  El medicamento que recomienda guzman médico dependerá del tipo de dolor de gin que tenga  Usted necesitará alfa medicamentos para el dolor de gin según las indicaciones para evitar un problema llamado dolor de gin de rebote  Estos zion de [place] ocurren con el uso regular de analgésicos para los trastornos de dolor de [place]  Los AINEs o el acetaminofeno pueden ayudar a aliviar algunos tipos de zion de [place]  · La bioretroalimentación  puede ayudar a aprender a [employer] al estrés  Por ejemplo, usted aprende a disminuir guzman latido cardíaco cuando se molesta  Puede que también aprenda cómo prevenir ciertos zion de gin al combinar el calor con el relajamiento  · La terapia cognitivo conductual,  o el manejo del estrés pueden ser utilizadas junto con otras terapias para prevenir zion de [place]  El manejo de guzman síntomas:   · Aplique hielo o calor  en la gloria donde guzman hijo siente el dolor de gin  Utilice un paquete (compresa) de hielo o calor  Para un paquete de hielo, también puede colocar hielo molido en daniel bolsa plástica  Cubra el paquete de hielo o la bolsa con daniel toalla pequeña antes de aplicarla en la piel  Tanto el hielo piter el calor ayudan a reducir el dolor, y el calor también contribuye a reducir los [employer]   Aplique calor henry 20 a 30 minutos cada 2 horas  Aplique hielo henry 15 a 20 minutos cada hora  Aplique calor o hielo henry el tiempo y la cantidad de días que se le indique  Usted puede alternar el calor y el hielo  · Relaje jonas músculos  Acuéstese en daniel posición cómoda y cierre jonas ojos  Relaje jonas músculos lentamente  Comience por los dedos de los pies y avance hacia arriba al charlette de guzman cuerpo  · Registre en un diario jonas zion de [place]  Escriba cuándo comienzan y terminan jonas migrañas  Curlene Pel y qué estaba haciendo cuando comenzó la migraña  Registre lo que comió y lo que tomó las 24 horas previas al comienzo de guzman migraña  Meriam Lisa dolor y dónde le duele: Lleve un registro de lo que hizo para tratar guzman Yoly Gee y si obtuvo un resultado satisfactorio  Cómo prevenir un dolor de gin rachna:   · Evite cualquier cosa que provoque un dolor de gin rachna  Los ejemplos incluyen la exposición a sustancias químicas, las grandes altitudes o no dormir lo suficiente  Alicia daniel rutina para dormir  Acuéstese y [employer] días a la misma hora  No utilice aparatos electrónicos antes de acostarse  Pueden provocarle un dolor de gin o impedirle dormir andrew  · No fume  La nicotina y otras sustancias químicas en los cigarrillos y puros pueden desencadenar un dolor de gin rachna o [place]  Pida información a guzman médico si usted actualmente fuma y necesita ayuda para dejar de fumar  Los cigarrillos electrónicos o tabaco sin humo todavía contienen nicotina  Consulte con guzman médico antes de [employer]  · Limite el consumo de alcohol según le indicaron  El alcohol puede provocar un dolor de gin rachna o empeorarlo  Si usted tiene zion de [place] de racimo, no leatha alcohol henry un episodio  Para otros tipos de zion de [place], pregúntele a guzman proveedor de atención médica si es seguro para usted beber alcohol   Pregunte cuál es la cantidad anderson que puede beber y con [place] frecuencia  · Ejercítese según indicaciones  El ejercicio puede reducir la tensión y [place] a aliviar el dolor de [place]  Propóngase hacer 30 minutos de [place] física triny todos los días de la [place]  Guzman médico puede ayudarle a crear un plan de ejercicios  · Consuma alimentos saludables y variados  [address] frutas, verduras, productos lácteos bajos en grasa, david [place], pescado y frijoles cocidos  Guzman médico o dietista puede ayudarle a crear planes de comidas si desea evitar los alimentos que provocan zion de [place]  Acuda a jonas consultas de control con guzman médico según le indicaron  Traiga guzman registro de zion de gin con usted cuando visite a guzman médico  Anote jonas preguntas para que se acuerde de hacerlas henry jonas visitas  © 2017 [address]  Information is for End User's use only and may not be sold, redistributed or otherwise used for commercial purposes  All illustrations and images included in CareNotes® are the copyrighted property of A D A M , Inc  or [employer]   Esta información es sólo para uso en educación  Guzman intención no es darle un consejo médico sobre enfermedades o tratamientos  Colsulte con guzman [place] Pinta farmacéutico antes de seguir cualquier régimen médico para saber si es seguro y efectivo para usted

## 2018-12-17 NOTE — ASSESSMENT & PLAN NOTE
Reviewed the causes of heartburn  Discussed importance of diet and lifestyle modifications to control GERD symptoms  Avoid things which worsen heartburn (Ex: Caffeine, tomato based products, spicy foods, tobacco, alcohol, obesity, tight-fitting clothing )  Discussed importance of eating small frequent meals instead of large meals    Elevate the head of the bed and to not lay down 2-3 hours following a meal     Controlled on ranitidine

## 2018-12-17 NOTE — ASSESSMENT & PLAN NOTE
ECHO 12/6: IMPRESSIONS:  1  Mild concentric left ventricular hypertrophy, normal left ventricular systolic function, EF around 60-65%  Grade 1 diastolic dysfunction  2  No other significant chamber hypertrophy or enlargement  3  Mild aortic valve stenosis and mild-to-moderate aortic valve regurgitation  4  Trace mitral valve and trace to mild tricuspid valve regurgitation  5  Mild pulmonary hypertension  Estimated RVSP/PASP is 40 mmHg    6  Trace, hemodynamically insignificant pericardial effusion

## 2018-12-28 DIAGNOSIS — J44.9 COPD (CHRONIC OBSTRUCTIVE PULMONARY DISEASE) (HCC): ICD-10-CM

## 2018-12-28 DIAGNOSIS — K26.9 DUODENAL ULCER: ICD-10-CM

## 2018-12-28 DIAGNOSIS — M19.90 OSTEOARTHRITIS: ICD-10-CM

## 2018-12-31 RX ORDER — SUCRALFATE 1 G/10ML
1 SUSPENSION ORAL
Qty: 1200 ML | Refills: 4 | Status: SHIPPED | OUTPATIENT
Start: 2018-12-31 | End: 2019-05-30 | Stop reason: SDUPTHER

## 2018-12-31 RX ORDER — CELECOXIB 50 MG/1
50 CAPSULE ORAL 2 TIMES DAILY
Qty: 42 CAPSULE | Refills: 1 | Status: SHIPPED | OUTPATIENT
Start: 2018-12-31 | End: 2019-01-03 | Stop reason: SDUPTHER

## 2019-01-02 DIAGNOSIS — N32.81 OVERACTIVE BLADDER: ICD-10-CM

## 2019-01-02 DIAGNOSIS — K59.00 CONSTIPATION: ICD-10-CM

## 2019-01-02 DIAGNOSIS — K62.89 ANAL OR RECTAL PAIN: ICD-10-CM

## 2019-01-02 DIAGNOSIS — I10 HTN (HYPERTENSION): ICD-10-CM

## 2019-01-02 DIAGNOSIS — G43.909 MIGRAINE WITHOUT STATUS MIGRAINOSUS, NOT INTRACTABLE, UNSPECIFIED MIGRAINE TYPE: Primary | ICD-10-CM

## 2019-01-02 RX ORDER — ALBUTEROL SULFATE 90 UG/1
2 AEROSOL, METERED RESPIRATORY (INHALATION)
Qty: 18 G | Refills: 4 | Status: CANCELLED | OUTPATIENT
Start: 2019-01-02

## 2019-01-03 DIAGNOSIS — J44.9 CHRONIC OBSTRUCTIVE PULMONARY DISEASE, UNSPECIFIED COPD TYPE (HCC): Primary | ICD-10-CM

## 2019-01-03 RX ORDER — LISINOPRIL 40 MG/1
TABLET ORAL
Qty: 90 TABLET | Refills: 2 | Status: SHIPPED | OUTPATIENT
Start: 2019-01-03 | End: 2019-03-15 | Stop reason: ALTCHOICE

## 2019-01-03 RX ORDER — FESOTERODINE FUMARATE 8 MG/1
TABLET, FILM COATED, EXTENDED RELEASE ORAL
Qty: 90 TABLET | Refills: 2 | Status: SHIPPED | OUTPATIENT
Start: 2019-01-03 | End: 2019-09-23 | Stop reason: SDUPTHER

## 2019-01-03 RX ORDER — CELECOXIB 50 MG/1
50 CAPSULE ORAL 2 TIMES DAILY
Qty: 42 CAPSULE | Refills: 1 | Status: SHIPPED | OUTPATIENT
Start: 2019-01-03 | End: 2019-05-30

## 2019-01-03 RX ORDER — ATORVASTATIN CALCIUM 20 MG/1
20 TABLET, FILM COATED ORAL DAILY
Qty: 90 TABLET | Refills: 2 | Status: SHIPPED | OUTPATIENT
Start: 2019-01-03 | End: 2019-05-30

## 2019-01-08 ENCOUNTER — HOSPITAL ENCOUNTER (EMERGENCY)
Facility: HOSPITAL | Age: 84
Discharge: HOME | End: 2019-01-08
Attending: EMERGENCY MEDICINE | Admitting: EMERGENCY MEDICINE
Payer: COMMERCIAL

## 2019-01-08 VITALS
WEIGHT: 207.31 LBS | SYSTOLIC BLOOD PRESSURE: 160 MMHG | RESPIRATION RATE: 16 BRPM | TEMPERATURE: 98.7 F | OXYGEN SATURATION: 96 % | HEART RATE: 84 BPM | BODY MASS INDEX: 36.73 KG/M2 | DIASTOLIC BLOOD PRESSURE: 83 MMHG | HEIGHT: 63 IN

## 2019-01-08 DIAGNOSIS — M79.662 PAIN OF LEFT CALF: Primary | ICD-10-CM

## 2019-01-08 PROCEDURE — 99284 EMERGENCY DEPT VISIT MOD MDM: CPT

## 2019-01-09 ENCOUNTER — HOSPITAL ENCOUNTER (OUTPATIENT)
Dept: NON INVASIVE DIAGNOSTICS | Facility: HOSPITAL | Age: 84
Discharge: HOME | End: 2019-01-09
Attending: EMERGENCY MEDICINE
Payer: COMMERCIAL

## 2019-01-09 DIAGNOSIS — M79.662 PAIN OF LEFT CALF: ICD-10-CM

## 2019-01-09 PROCEDURE — 93971 EXTREMITY STUDY: CPT

## 2019-01-09 NOTE — ED PROVIDER NOTES
History  Chief Complaint   Patient presents with    Leg Pain     Patient went to an appt at [facility] for physical therapy and patient has been complaining of Left lower extremity pain  Before they start therapy they want to rule out DVT  Denies long car or plane rides  Patient is an 77-year-old female  She went for therapy at [facility] today and mentioned that she was having left calf pain  They would not do physical therapy until she underwent an ultrasound to rule out DVT  Patient denies any trauma  No history of thromboembolic disease  She is not on oral anticoagulations  She denies any chest pain or trouble breathing  Essentially patient is here to obtain an ultrasound of her left lower extremity to rule out DVT  Prior to Admission Medications   Prescriptions Last Dose Informant Patient Reported? Taking? CARAFATE 1 GM/10ML suspension   No No   Sig: Take 10 mL (1 g total) by mouth 4 (four) times a day (with meals and at bedtime)   Calcium Carbonate-Vitamin D (CALCIUM 500 + D) 500-125 MG-UNIT TABS  Self Yes No   Sig: every 24 hours   Misc  Devices (CANE) MISC  Self Yes No   Sig: Four point aluminum cane  Multiple Vitamins-Minerals (ICAPS AREDS 2) CAPS  Self Yes No   Sig: Take 1 capsule by mouth   Multiple Vitamins-Minerals (PRESERVISION AREDS) CAPS  Self Yes No   Sig: take 1 tablet by oral route  every evening   TOVIAZ 8 MG TB24   No No   Sig: TAKE 1 TABLET BY MOUTH ONCE DAILY  VENTOLIN  (90 Base) MCG/ACT inhaler   No No   Sig: inhale 2 puffs by mouth every 4 - 6 hours as needed as needed   amitriptyline (ELAVIL) 25 mg tablet  Self No No   Sig: TAKE ONE TABLET BY MOUTH DAILY AT BEDTIME     anastrozole (ARIMIDEX) 1 mg tablet  Self Yes No   Sig: take 1 tablet (1MG)  by oral route  every day   aspirin (ECOTRIN LOW STRENGTH) 81 mg EC tablet  Self No No   Sig: TAKE ONE TABLET BY MOUTH EVERY DAY   Patient not taking: Reported on 11/23/2018   atorvastatin (LIPITOR) 20 mg tablet No No   Sig: Take 1 tablet (20 mg total) by mouth daily for 90 days   celecoxib (CeleBREX) 50 MG capsule   No No   Sig: Take 1 capsule (50 mg total) by mouth 2 (two) times a day for 90 days   docusate sodium (COLACE) 100 mg capsule   No No   Sig: Take 1 capsule (100 mg total) by mouth 3 (three) times a day as needed for constipation for up to 90 days   fluticasone (FLONASE) 50 mcg/act nasal spray  Self No No   Si spray into each nostril 2 (two) times a day for 181 days   fluticasone-salmeterol (ADVAIR DISKUS) 500-50 mcg/dose inhaler   No No   Sig: Inhale 1 puff 2 (two) times a day for 91 days Rinse mouth after use  furosemide (LASIX) 40 mg tablet  Self No No   Sig: Take 1 tablet (40 mg total) by mouth daily for 360 days   hydrochlorothiazide (HYDRODIURIL) 25 mg tablet  Self Yes No   Sig: take 1 tablet (25MG)  by oral route  every day   hydrocortisone (ANUSOL-HC, PROCTOSOL HC,) 2 5 % rectal cream  Self No No   Sig: Insert into the rectum 2 (two) times a day for 30 days   hydrocortisone (ANUSOL-HC, PROCTOSOL HC,) 2 5 % rectal cream   No No   Sig: Insert into the rectum 2 (two) times a day   lisinopril (ZESTRIL) 40 mg tablet   No No   Sig: TAKE 1 TABLET BY MOUTH ONCE DAILY     ranitidine (ZANTAC) 150 mg tablet  Self No No   Sig: Take 1 tablet (150 mg total) by mouth 2 (two) times a day for 90 days   timolol (BETIMOL) 0 5 % ophthalmic solution  Self Yes No   Sig: Every 12 hours      Facility-Administered Medications: None       Past Medical History:   Diagnosis Date    Diverticulosis     Heart failure ([facility] [address] )     History of cystocele     Hypertension     Obesity     Positive PPD     treated       Past Surgical History:   Procedure Laterality Date    CARDIOVASCULAR STRESS TEST  2001    dobutamine stress test    CHOLECYSTECTOMY  2002    COLONOSCOPY      CYSTOCELE REPAIR      HYSTERECTOMY      KNEE SURGERY      knee replacement    UPPER GASTROINTESTINAL ENDOSCOPY         Family History Problem Relation Age of Onset    Family history unknown: Yes     I have reviewed and agree with the history as documented  Social History   Substance Use Topics    Smoking status: Never Smoker    Smokeless tobacco: Never Used    Alcohol use No        Review of Systems   Respiratory: Negative for cough and shortness of breath  Cardiovascular: Negative for chest pain and leg swelling  All other systems reviewed and are negative  Physical Exam  Physical Exam   Constitutional: No distress  Obese female   HENT:   Head: Normocephalic and atraumatic  Neck: Normal range of motion  Neck supple  Cardiovascular: Normal rate, regular rhythm, normal heart sounds and intact distal pulses  Exam reveals no gallop and no friction rub  No murmur heard  Pulmonary/Chest: Effort normal and breath sounds normal  No respiratory distress  She has no wheezes  She has no rales  Abdominal: Soft  Bowel sounds are normal  She exhibits no distension  There is no tenderness  Musculoskeletal:   There is tenderness to the left calf down near the Achilles tendon  Lora test is negative  Neurovascular exam is normal   There is no swelling  There are no palpable cords  Neurological: She is alert  No focal motor or sensory deficits   Skin: Skin is warm and dry  Psychiatric: She has a normal mood and affect  Vitals reviewed        Vital Signs  ED Triage Vitals [01/08/19 1952]   Temperature Pulse Respirations Blood Pressure SpO2   98 7 °F (37 1 °C) 84 16 160/83 96 %      Temp Source Heart Rate Source Patient Position - Orthostatic VS BP Location FiO2 (%)   Tympanic Monitor -- -- --      Pain Score       5           Vitals:    01/08/19 1952   BP: 160/83   Pulse: 84       Visual Acuity      ED Medications  Medications - No data to display    Diagnostic Studies  Results Reviewed     None                 VAS lower limb venous duplex study, unilateral/limited    (Results Pending) Procedures  Procedures       Phone Contacts  ED Phone Contact    ED Course                               [facility]  Number of Diagnoses or Management Options  Diagnosis management comments: Ultrasound is not available here at this time  Will write a prescription for duplex to be done in the a m  Recommended a shot of Lovenox in the meantime  Patient declined  Understands risks of pulmonary embolism and DVT  Amount and/or Complexity of Data Reviewed  Tests in the radiology section of CPT®: ordered      CritCare Time    Disposition  Final diagnoses:   Pain of left calf     Time reflects when diagnosis was documented in both MDM as applicable and the Disposition within this note     Time User Action Codes Description Comment    1/8/2019  8:40 PM Kelvin Garay Add [[id number] [id number]] Pain of left calf       ED Disposition     ED Disposition Condition Comment    Discharge  Geraldine Beltrán discharge to home/self care  Condition at discharge: Good        Follow-up Information     Follow up With Specialties Details Why Contact Info       Follow-up with family doctor later this week to review ultrasound results           Patient's Medications   Discharge Prescriptions    No medications on file       Outpatient Discharge Orders  VAS lower limb venous duplex study, unilateral/limited   Standing Status: Future  Standing Exp   Date: 01/08/23         ED Provider  Electronically Signed by           Kamran Martinez MD  01/08/19 3770

## 2019-01-09 NOTE — DISCHARGE INSTRUCTIONS
Calambres en las piernas   LO QUE NECESITA SABER:   Un calambre en la pierna es un apretón doloroso y repentino en la pantorrilla (pierna inferior) o en los músculos del muslo (pierna superior)  El músculo puede sacudirse debajo de la piel o sentirse andry  Guzman pierna podría sentirse dolorida mucho después de que los músculos se relajan  INSTRUCCIONES SOBRE EL [facility]A HOSPITALARIA:   Para estirar guzman pierna:  [place] jonas músculos antes de estirarse  Barb daniel caminata corta o corra lentamente en lugar  Si usted tiene BUSTER Andres que duerme, es posible que también desee estirarse antes de [place]  Los siguientes [address] pantorrillas y los muslos para ayudar a detener o prevenir calambres en las piernas:  · Para estirar guzman pantorrilla y guzman talón:      ¨ Póngase de pie y apoye las khanh de jonas albert sobre daniel pared  ¨ Inclínese hacia la pared, con daniel pierna atrás de la otra  Doble la pierna de adelante y deje la pierna de atrás derecha  ¨ Presione el talón de guzman pierna de atrás [employer]  ¨ Barb esto por 15 a 30 segundos, entonces cambie de lado  · Para estirar la parte de atrás de guzman Davida Liner y guzman muslo:      ¨ Siéntese en el piso con las piernas enfrente suyo  No extienda las puntas de los pies  [address] guzman mano a jonas lados en el piso  Deslice jonas albert más allá de jonas caderas hacia guzman tobillos  ¨ Muévase hacia jonas tobillos hasta que sienta un estiramiento en la parte posterior de jonas piernas  No intente tocar jonas rodillas con guzman gin ni redondee guzman espalda  ¨ Sostenga está posición por 30 segundos  Luana abundantes líquidos henry el ejercicio:  El agua y [de-identified] líquidos [facility] a prevenir calambres al [employer] líquidos que se pierden al sudar  Luana más líquidos cuando esté activo cuando hace calor  Para detener un calambre en la pierna si le pasa otra vez:   · Estire guzman músculo y eve un masaje para detener el calambre       · Aplique calor o hielo al calambre  Daniel almohadilla o compresa caliente podría ayudar a [employer]  Daniel bolsa de hielo o hielo milo en daniel bolsa, envuelto en daniel toalla puede [employer] músculos sensibles o doloridos  [place] guzman medicamento según sunshine indicaciones:  Llame a guzman médico si usted piensa que el medicamento no está ayudando o si tiene efectos secundarios  Infórmele si está tomando vitaminas, hierbas u otros medicamentos  Lleve daniel lista de los medicamentos que jamie  Incluya los siguientes datos de los medicamentos: cantidad, frecuencia y motivo de administración  Traiga con usted la lista o los envases de la píldoras a sunshine citas de seguimiento  Acuda a sunshine consultas de control con guzman médico según le indicaron  Anote cualquier pregunta que tenga así se acuerda de hacerla henry sunshine citas de seguimiento  Pregúntele a guzman Ardie Mamie vitaminas y minerales son adecuados para usted  · Sunshine calambres empeoran o suceden más seguido  · Guzman pierna se siente entumecida  · Sunshine calambres en las piernas no desaparecen al estirarse ni con masajes  · Se siente mareado, aturdido o confundido cuando hace ejercicios y hace calor  Podría tener dolor de gin o visión borrosa  © 2017 [address] [employer]en Information is for End User's use only and may not be sold, redistributed or otherwise used for commercial purposes  All illustrations and images included in CareNotes® are the copyrighted property of A D A M , Inc  or [place]  Esta información es sólo para uso en educación  Guzman intención no es darle un consejo médico sobre enfermedades o tratamientos  Colsulte con guzman Elder Pinta farmacéutico antes de seguir cualquier régimen médico para saber si es seguro y efectivo para usted

## 2019-01-10 PROCEDURE — 93971 EXTREMITY STUDY: CPT | Performed by: SURGERY

## 2019-01-15 ENCOUNTER — TELEPHONE (OUTPATIENT)
Dept: FAMILY MEDICINE CLINIC | Facility: CLINIC | Age: 84
End: 2019-01-15

## 2019-01-15 NOTE — TELEPHONE ENCOUNTER
The US of legs are negative for DVT in both legs, please call the therapist tomorrow morning and let him know, thank you!

## 2019-01-15 NOTE — TELEPHONE ENCOUNTER
As per daughter she needs to know the results and info needs to be given to patient physical therapy [address] Therapist Jigar Moser phone # [phone]  She has an appt with them [email or web address] they want to make sure the results are negative before they continue with therapy tomorrow

## 2019-01-15 NOTE — TELEPHONE ENCOUNTER
Pt had duplex done at the er  Can you please contact Florin Otto, Daughter of pt to inform her the results

## 2019-01-16 NOTE — TELEPHONE ENCOUNTER
Spoke to mendy from Physical therapy gave her the results and she stated she will let Iftikhar Colon know about Negative DVT on both legs

## 2019-02-13 DIAGNOSIS — J44.9 COPD (CHRONIC OBSTRUCTIVE PULMONARY DISEASE) (HCC): ICD-10-CM

## 2019-03-13 RX ORDER — SENNOSIDES 8.6 MG
1 CAPSULE ORAL AS NEEDED
COMMUNITY
Start: 2017-08-10 | End: 2021-08-25 | Stop reason: HOSPADM

## 2019-03-15 ENCOUNTER — OFFICE VISIT (OUTPATIENT)
Dept: CARDIOLOGY CLINIC | Facility: CLINIC | Age: 84
End: 2019-03-15
Payer: COMMERCIAL

## 2019-03-15 VITALS
HEIGHT: 63 IN | HEART RATE: 92 BPM | BODY MASS INDEX: 36.68 KG/M2 | WEIGHT: 207 LBS | SYSTOLIC BLOOD PRESSURE: 138 MMHG | DIASTOLIC BLOOD PRESSURE: 68 MMHG

## 2019-03-15 DIAGNOSIS — I35.0 NONRHEUMATIC AORTIC VALVE STENOSIS: ICD-10-CM

## 2019-03-15 DIAGNOSIS — I10 BENIGN ESSENTIAL HYPERTENSION: Primary | ICD-10-CM

## 2019-03-15 DIAGNOSIS — E78.2 MIXED HYPERLIPIDEMIA: ICD-10-CM

## 2019-03-15 PROCEDURE — 93000 ELECTROCARDIOGRAM COMPLETE: CPT | Performed by: INTERNAL MEDICINE

## 2019-03-15 PROCEDURE — 99215 OFFICE O/P EST HI 40 MIN: CPT | Performed by: INTERNAL MEDICINE

## 2019-03-15 RX ORDER — OMEPRAZOLE 20 MG/1
20 CAPSULE, DELAYED RELEASE ORAL DAILY
COMMUNITY
End: 2019-05-30

## 2019-03-15 NOTE — PROGRESS NOTES
Cardiology Follow Up    Patric Age  1934  [address]  [address] [address] [phone]    1  Benign essential hypertension  POCT ECG   2  Nonrheumatic aortic valve stenosis     3  Mixed hyperlipidemia         Patient was originally referred from [facility] for evaluation of an abnormal echocardiogram  Echocardiogram demonstrated normal LV systolic function with mild-to-moderate aortic stenosis  Other history includes hypertension and hyperlipidemia  02/09/2018 echocardiogram: Mild aortic stenosis, aortic valve velocity 2 7 Meters/sec  Normal LV systolic function, EF 75%  Grade 1 diastolic dysfunction  Normal PA pressures       Interval History:  Patient was very difficult to communicate with  We tried to interpretation phone but the patient did not seem to be able to understand the person speaking Chinese to her  We got the son from the waiting room to communicate with her, but he was having difficulty too  Patient denies chest discomfort or shortness of breath  She denies palpitations  She denies dizziness, lightheadedness or near-syncope/syncope  She has chronic lower extremity edema and wear support stockings  Patient has her medicine delivered by the [facility] every 3 months with a 3 months supply at 1 time  The medicine is in a bubble pack divided into daily doses  She has not been receiving the lisinopril or aspirin for many months but is taking the other medications  Will discontinue these from her list     Presently her blood pressure is well controlled for her age  Her last echocardiogram demonstrates mild to aortic stenosis  She is having no symptoms from her aortic stenosis      Patient Active Problem List   Diagnosis    Abnormal gait    Anemia    Aortic stenosis    Asthma    Benign essential hypertension    Bilateral occipital neuralgia    Carpal tunnel syndrome on both sides    Glaucoma    HLD (hyperlipidemia)    Malignant neoplasm of overlapping sites of left female breast [facility])    Nuclear sclerotic cataract of right eye    Preoperative cardiovascular examination    Cataract    Hemorrhoids    Back pain    Constipation    Chronic diastolic congestive heart failure (HCC)    GERD (gastroesophageal reflux disease)    Screening for thyroid disorder    Hypertension    Anal or rectal pain    Chronic tension-type headache, not intractable    Osteoarthritis     Past Medical History:   Diagnosis Date    Diverticulosis 2001    Heart failure (HCC)     History of cystocele     Hypertension     Obesity     Positive PPD 1999    treated     Social History     Socioeconomic History    Marital status:       Spouse name: Not on file    Number of children: Not on file    Years of education: Not on file    Highest education level: Not on file   Occupational History    Not on file   Social Needs    Financial resource strain: Not on file    Food insecurity:     Worry: Not on file     Inability: Not on file    Transportation needs:     Medical: Not on file     Non-medical: Not on file   Tobacco Use    Smoking status: Never Smoker    Smokeless tobacco: Never Used   Substance and Sexual Activity    Alcohol use: No    Drug use: No    Sexual activity: Not Currently     Partners: Male   Lifestyle    Physical activity:     Days per week: Not on file     Minutes per session: Not on file    Stress: Not on file   Relationships    Social connections:     Talks on phone: Not on file     Gets together: Not on file     Attends Worship service: Not on file     Active member of club or organization: Not on file     Attends meetings of clubs or organizations: Not on file     Relationship status: Not on file    Intimate partner violence:     Fear of current or ex partner: Not on file     Emotionally abused: Not on file Physically abused: Not on file     Forced sexual activity: Not on file   Other Topics Concern    Not on file   Social History Narrative    Lives at home with son and 2 grandkids  Family History   Family history unknown: Yes     Past Surgical History:   Procedure Laterality Date    CARDIOVASCULAR STRESS TEST  2001    dobutamine stress test    CHOLECYSTECTOMY  04/2002    COLONOSCOPY      CYSTOCELE REPAIR      HYSTERECTOMY      KNEE SURGERY      knee replacement    UPPER GASTROINTESTINAL ENDOSCOPY         Current Outpatient Medications:     acetaminophen (TYLENOL) 650 mg CR tablet, 1 tablet every 6 (six) hours, Disp: , Rfl:     ADVAIR DISKUS 500-50 MCG/DOSE inhaler, Inhale 1 puff by mouth 2 (two) times a day   Rinse mouth after use , Disp: 1 Inhaler, Rfl: 2    amitriptyline (ELAVIL) 25 mg tablet, TAKE ONE TABLET BY MOUTH DAILY AT BEDTIME , Disp: 90 tablet, Rfl: 0    anastrozole (ARIMIDEX) 1 mg tablet, take 1 tablet (1MG)  by oral route  every day, Disp: , Rfl:     atorvastatin (LIPITOR) 20 mg tablet, Take 1 tablet (20 mg total) by mouth daily for 90 days, Disp: 90 tablet, Rfl: 2    Calcium Carbonate-Vitamin D (CALCIUM 500 + D) 500-125 MG-UNIT TABS, every 24 hours, Disp: , Rfl:     CARAFATE 1 GM/10ML suspension, Take 10 mL (1 g total) by mouth 4 (four) times a day (with meals and at bedtime), Disp: 1200 mL, Rfl: 4    celecoxib (CeleBREX) 50 MG capsule, Take 1 capsule (50 mg total) by mouth 2 (two) times a day for 90 days, Disp: 42 capsule, Rfl: 1    docusate sodium (COLACE) 100 mg capsule, Take 1 capsule (100 mg total) by mouth 3 (three) times a day as needed for constipation for up to 90 days, Disp: 60 capsule, Rfl: 0    fluticasone (FLONASE) 50 mcg/act nasal spray, 1 spray into each nostril 2 (two) times a day for 181 days, Disp: 1 Bottle, Rfl: 5    furosemide (LASIX) 40 mg tablet, Take 1 tablet (40 mg total) by mouth daily for 360 days, Disp: 90 tablet, Rfl: 3    hydrochlorothiazide (HYDRODIURIL) 25 mg tablet, take 1 tablet (25MG)  by oral route  every day, Disp: , Rfl:     hydrocortisone (ANUSOL-HC, PROCTOSOL HC,) 2 5 % rectal cream, Insert into the rectum 2 (two) times a day, Disp: 30 g, Rfl: 5    hydrocortisone 2 5 % cream, Apply topically 4 (four) times a day as needed, Disp: , Rfl:     Misc  Devices (CANE) MISC, Four point aluminum cane , Disp: , Rfl:     omeprazole (PriLOSEC) 20 mg delayed release capsule, Take 20 mg by mouth daily, Disp: , Rfl:     ranitidine (ZANTAC) 150 mg tablet, Take 1 tablet (150 mg total) by mouth 2 (two) times a day for 90 days, Disp: 180 tablet, Rfl: 2    timolol (BETIMOL) 0 5 % ophthalmic solution, Every 12 hours, Disp: , Rfl:     TOVIAZ 8 MG TB24, TAKE 1 TABLET BY MOUTH ONCE DAILY  , Disp: 90 tablet, Rfl: 2    VENTOLIN  (90 Base) MCG/ACT inhaler, inhale 2 puffs by mouth every 4 - 6 hours as needed as needed, Disp: 18 g, Rfl: 4    aspirin (ECOTRIN LOW STRENGTH) 81 mg EC tablet, TAKE ONE TABLET BY MOUTH EVERY DAY (Patient not taking: Reported on 3/15/2019), Disp: 90 tablet, Rfl: 0    hydrocortisone (ANUSOL-HC, PROCTOSOL HC,) 2 5 % rectal cream, Insert into the rectum 2 (two) times a day for 30 days, Disp: 30 g, Rfl: 2    lisinopril (ZESTRIL) 40 mg tablet, TAKE 1 TABLET BY MOUTH ONCE DAILY  (Patient not taking: Reported on 3/15/2019), Disp: 90 tablet, Rfl: 2  Allergies   Allergen Reactions    No Active Allergies        Results for orders placed or performed in visit on 03/15/19   POCT ECG    Narrative    Normal sinus rhythm with ventricular premature complexes  Old anterior septal myocardial infarction  Cannot rule out old inferior wall myocardial infarction           Lab Results   Component Value Date    CHOL 160 05/11/2018     Lab Results   Component Value Date    HDL 65 (H) 09/12/2018    HDL 57 05/11/2018     Lab Results   Component Value Date    LDLCALC 98 09/12/2018    LDLCALC 73 05/11/2018     Lab Results   Component Value Date    TRIG 123 09/12/2018    TRIG 149 05/11/2018     No results found for: [place], [place]  Lab Results   Component Value Date    GLUCOSE 90 05/11/2018    CALCIUM 9 1 09/12/2018     05/11/2018    SODIUM 140 09/12/2018    K 4 3 09/12/2018    CO2 32 (H) 09/12/2018    CL 98 09/12/2018    BUN 22 09/12/2018    CREATININE 0 67 09/12/2018     Lab Results   Component Value Date     05/11/2018    SODIUM 140 09/12/2018    K 4 3 09/12/2018    CL 98 09/12/2018    CO2 32 (H) 09/12/2018    ANIONGAP 12 05/11/2018    AGAP 10 09/12/2018    BUN 22 09/12/2018    CREATININE 0 67 09/12/2018    GLUF 97 09/12/2018    CALCIUM 9 1 09/12/2018    AST 29 09/12/2018    ALT 24 09/12/2018    ALKPHOS 105 09/12/2018    PROT 7 5 05/11/2018    TP 8 7 (H) 09/12/2018    BILITOT 0 3 05/11/2018    TBILI 0 60 09/12/2018    EGFR 93 09/12/2018     Lab Results   Component Value Date    WBC 5 80 09/12/2018    HGB 12 3 09/12/2018    HCT 38 2 09/12/2018    MCV 91 09/12/2018     09/12/2018     Lab Results   Component Value Date    MAR4QQIUKKGH 1 530 09/12/2018       Review of Systems:  Review of Systems   Respiratory: Negative for cough, choking, chest tightness, shortness of breath and wheezing  Cardiovascular: Positive for leg swelling  Negative for chest pain and palpitations  Musculoskeletal: Negative for gait problem  Skin: Negative for rash  Neurological: Negative for dizziness, tremors, syncope, weakness, light-headedness, numbness and headaches  Psychiatric/Behavioral: Negative for agitation and behavioral problems  The patient is not hyperactive  Physical Exam:  /68 (BP Location: Right arm, Patient Position: Sitting, Cuff Size: Adult)   Pulse 92   Ht 5' 3" (1 6 m)   Wt 93 9 kg (207 lb)   BMI 36 67 kg/m²    Physical Exam   Constitutional: She is oriented to person, place, and time  She appears well-developed and well-nourished  No distress  HENT:   Head: Normocephalic and atraumatic  Neck: No JVD present   No thyromegaly present  Cardiovascular: Normal rate, regular rhythm and intact distal pulses  Exam reveals no gallop and no friction rub  Murmur heard  Grade 2/6 early peaking systolic ejection murmur radiating to the carotids  Pulmonary/Chest: No respiratory distress  She has no wheezes  She has no rales  Musculoskeletal: She exhibits no edema  Neurological: She is alert and oriented to person, place, and time  Skin: Skin is warm and dry  Psychiatric: She has a normal mood and affect  Her behavior is normal        Discussion/Summary:  1  Patient to bring a bubble pack of her medications   2  Return in 6 months

## 2019-03-18 DIAGNOSIS — K64.9 HEMORRHOIDS: ICD-10-CM

## 2019-03-25 DIAGNOSIS — K21.9 GERD (GASTROESOPHAGEAL REFLUX DISEASE): ICD-10-CM

## 2019-03-25 RX ORDER — RANITIDINE 150 MG/1
TABLET ORAL
Qty: 180 TABLET | Refills: 5 | Status: SHIPPED | OUTPATIENT
Start: 2019-03-25 | End: 2019-11-04 | Stop reason: SDDI

## 2019-03-29 ENCOUNTER — TELEPHONE (OUTPATIENT)
Dept: FAMILY MEDICINE CLINIC | Facility: CLINIC | Age: 84
End: 2019-03-29

## 2019-03-29 ENCOUNTER — OFFICE VISIT (OUTPATIENT)
Dept: FAMILY MEDICINE CLINIC | Facility: CLINIC | Age: 84
End: 2019-03-29

## 2019-03-29 VITALS
OXYGEN SATURATION: 97 % | HEIGHT: 63 IN | WEIGHT: 206 LBS | SYSTOLIC BLOOD PRESSURE: 132 MMHG | TEMPERATURE: 97.8 F | HEART RATE: 84 BPM | DIASTOLIC BLOOD PRESSURE: 84 MMHG | BODY MASS INDEX: 36.5 KG/M2 | RESPIRATION RATE: 18 BRPM

## 2019-03-29 DIAGNOSIS — B08.5 ACUTE HERPANGINA: ICD-10-CM

## 2019-03-29 DIAGNOSIS — H66.92 OTITIS OF LEFT EAR: Primary | ICD-10-CM

## 2019-03-29 PROCEDURE — 99214 OFFICE O/P EST MOD 30 MIN: CPT | Performed by: FAMILY MEDICINE

## 2019-03-29 RX ORDER — AMOXICILLIN 500 MG/1
500 TABLET, FILM COATED ORAL 3 TIMES DAILY
Qty: 30 TABLET | Refills: 0 | Status: SHIPPED | OUTPATIENT
Start: 2019-03-29 | End: 2019-04-08

## 2019-03-29 NOTE — PROGRESS NOTES
Assessment/Plan:    No problem-specific Assessment & Plan notes found for this encounter  Problem List Items Addressed This Visit        Digestive    Acute herpangina    Relevant Medications    al mag oxide-diphenhydramine-lidocaine viscous (MAGIC MOUTHWASH) 1:1:1 suspension       Nervous and Auditory    Otitis of left ear - Primary    Relevant Medications    amoxicillin (AMOXIL) 500 MG tablet            Subjective:      Patient ID: Antony Randall is a 80 y o  female  79 yo  female complains of sore throat and ear pain for about 5 days  States it is very painful to swallow  She is also having trouble with her dentures, as she states that the friction of the dentures is causing pain on her palate  Denies fever, chills, cough  The following portions of the patient's history were reviewed and updated as appropriate: She  has a past medical history of Diverticulosis (2001), Heart failure ([address] ), History of cystocele, Hypertension, Obesity, and Positive PPD (1999)    She   Patient Active Problem List    Diagnosis Date Noted    Acute herpangina 03/29/2019    Otitis of left ear 03/29/2019    Osteoarthritis 12/17/2018    Anal or rectal pain 11/20/2018    Hypertension 09/17/2018    Hemorrhoids 09/12/2018    Back pain 09/12/2018    Constipation 09/12/2018    Chronic diastolic congestive heart failure ([address] ) 09/12/2018    GERD (gastroesophageal reflux disease) 09/12/2018    Screening for thyroid disorder 09/12/2018    Cataract 07/23/2018    Preoperative cardiovascular examination 07/16/2018    Nuclear sclerotic cataract of right eye 06/15/2018    Malignant neoplasm of overlapping sites of left female breast ([address] ) 11/10/2016    HLD (hyperlipidemia) 10/31/2016    Aortic stenosis 10/26/2016    Carpal tunnel syndrome on both sides 09/02/2016    Bilateral occipital neuralgia 10/15/2015    Abnormal gait 06/24/2015    Anemia 09/25/2014    Asthma 09/25/2014    Benign essential hypertension 09/25/2014    Glaucoma 09/11/2014    Chronic tension-type headache, not intractable 09/11/2014     She  has a past surgical history that includes Cholecystectomy (04/2002); Cystocele repair; Colonoscopy; Knee surgery; Hysterectomy; Cardiovascular stress test (2001); and Upper gastrointestinal endoscopy  Her Family history is unknown by patient  She  reports that she has never smoked  She has never used smokeless tobacco  She reports that she does not drink alcohol or use drugs  Current Outpatient Medications   Medication Sig Dispense Refill    acetaminophen (TYLENOL) 650 mg CR tablet 1 tablet every 6 (six) hours      ADVAIR DISKUS 500-50 MCG/DOSE inhaler Inhale 1 puff by mouth 2 (two) times a day  Rinse mouth after use  1 Inhaler 2    al mag oxide-diphenhydramine-lidocaine viscous (MAGIC MOUTHWASH) 1:1:1 suspension Swish and spit 10 mL every 4 (four) hours as needed for mouth pain or discomfort 180 mL 0    amitriptyline (ELAVIL) 25 mg tablet TAKE ONE TABLET BY MOUTH DAILY AT BEDTIME   90 tablet 0    amoxicillin (AMOXIL) 500 MG tablet Take 1 tablet (500 mg total) by mouth 3 (three) times a day for 10 days 30 tablet 0    anastrozole (ARIMIDEX) 1 mg tablet take 1 tablet (1MG)  by oral route  every day      atorvastatin (LIPITOR) 20 mg tablet Take 1 tablet (20 mg total) by mouth daily for 90 days 90 tablet 2    Calcium Carbonate-Vitamin D (CALCIUM 500 + D) 500-125 MG-UNIT TABS every 24 hours      CARAFATE 1 GM/10ML suspension Take 10 mL (1 g total) by mouth 4 (four) times a day (with meals and at bedtime) 1200 mL 4    celecoxib (CeleBREX) 50 MG capsule Take 1 capsule (50 mg total) by mouth 2 (two) times a day for 90 days 42 capsule 1    docusate sodium (COLACE) 100 mg capsule Take 1 capsule (100 mg total) by mouth 3 (three) times a day as needed for constipation for up to 90 days 60 capsule 0    fluticasone (FLONASE) 50 mcg/act nasal spray 1 spray into each nostril 2 (two) times a day for 181 days 1 Bottle 5    furosemide (LASIX) 40 mg tablet Take 1 tablet (40 mg total) by mouth daily for 360 days 90 tablet 3    hydrochlorothiazide (HYDRODIURIL) 25 mg tablet take 1 tablet (25MG)  by oral route  every day      hydrocortisone (ANUSOL-HC, PROCTOSOL HC,) 2 5 % rectal cream Insert into the rectum 2 (two) times a day for 30 days 30 g 2    hydrocortisone 2 5 % cream Apply topically 4 (four) times a day as needed      Misc  Devices (CANE) MISC Four point aluminum cane   omeprazole (PriLOSEC) 20 mg delayed release capsule Take 20 mg by mouth daily      ranitidine (ZANTAC) 150 mg tablet TAKE 1 TABLET BY MOUTH TWICE DAILY  180 tablet 5    timolol (BETIMOL) 0 5 % ophthalmic solution Every 12 hours      TOVIAZ 8 MG TB24 TAKE 1 TABLET BY MOUTH ONCE DAILY  90 tablet 2    VENTOLIN  (90 Base) MCG/ACT inhaler inhale 2 puffs by mouth every 4 - 6 hours as needed as needed 18 g 4     No current facility-administered medications for this visit  Current Outpatient Medications on File Prior to Visit   Medication Sig    acetaminophen (TYLENOL) 650 mg CR tablet 1 tablet every 6 (six) hours    ADVAIR DISKUS 500-50 MCG/DOSE inhaler Inhale 1 puff by mouth 2 (two) times a day  Rinse mouth after use   amitriptyline (ELAVIL) 25 mg tablet TAKE ONE TABLET BY MOUTH DAILY AT BEDTIME      anastrozole (ARIMIDEX) 1 mg tablet take 1 tablet (1MG)  by oral route  every day    atorvastatin (LIPITOR) 20 mg tablet Take 1 tablet (20 mg total) by mouth daily for 90 days    Calcium Carbonate-Vitamin D (CALCIUM 500 + D) 500-125 MG-UNIT TABS every 24 hours    CARAFATE 1 GM/10ML suspension Take 10 mL (1 g total) by mouth 4 (four) times a day (with meals and at bedtime)    celecoxib (CeleBREX) 50 MG capsule Take 1 capsule (50 mg total) by mouth 2 (two) times a day for 90 days    docusate sodium (COLACE) 100 mg capsule Take 1 capsule (100 mg total) by mouth 3 (three) times a day as needed for constipation for up to 90 days    fluticasone (FLONASE) 50 mcg/act nasal spray 1 spray into each nostril 2 (two) times a day for 181 days    furosemide (LASIX) 40 mg tablet Take 1 tablet (40 mg total) by mouth daily for 360 days    hydrochlorothiazide (HYDRODIURIL) 25 mg tablet take 1 tablet (25MG)  by oral route  every day    hydrocortisone (ANUSOL-HC, PROCTOSOL HC,) 2 5 % rectal cream Insert into the rectum 2 (two) times a day for 30 days    hydrocortisone 2 5 % cream Apply topically 4 (four) times a day as needed    Misc  Devices (CANE) MISC Four point aluminum cane   omeprazole (PriLOSEC) 20 mg delayed release capsule Take 20 mg by mouth daily    ranitidine (ZANTAC) 150 mg tablet TAKE 1 TABLET BY MOUTH TWICE DAILY   timolol (BETIMOL) 0 5 % ophthalmic solution Every 12 hours    TOVIAZ 8 MG TB24 TAKE 1 TABLET BY MOUTH ONCE DAILY   VENTOLIN  (90 Base) MCG/ACT inhaler inhale 2 puffs by mouth every 4 - 6 hours as needed as needed     No current facility-administered medications on file prior to visit       Review of Systems   HENT: Positive for ear pain, mouth sores and sore throat  All other systems reviewed and are negative  Objective:      /84 (BP Location: Left arm, Patient Position: Sitting, Cuff Size: Large)   Pulse 84   Temp 97 8 °F (36 6 °C) (Temporal)   Resp 18   Ht 5' 3" (1 6 m)   Wt 93 4 kg (206 lb)   SpO2 97%   BMI 36 49 kg/m²          Physical Exam   Constitutional: She is oriented to person, place, and time  She appears well-developed  HENT:   Head: Normocephalic  Right Ear: External ear normal    Left Ear: External ear normal  Tympanic membrane is erythematous and retracted  Tympanic membrane mobility is abnormal    Nose: Nose normal    Mouth/Throat: Oral lesions present  Posterior oropharyngeal edema and posterior oropharyngeal erythema present  Eyes: Pupils are equal, round, and reactive to light  Conjunctivae and EOM are normal    Neck: Normal range of motion   Neck supple  No thyromegaly present  Cardiovascular: Normal rate, regular rhythm and normal heart sounds  Pulmonary/Chest: Effort normal and breath sounds normal    Abdominal: Soft  There is no tenderness  There is no rebound and no guarding  Musculoskeletal: Normal range of motion  Neurological: She is alert and oriented to person, place, and time  She has normal reflexes  Skin: Skin is dry  Psychiatric: She has a normal mood and affect  Nursing note and vitals reviewed

## 2019-04-12 ENCOUNTER — TELEPHONE (OUTPATIENT)
Dept: GASTROENTEROLOGY | Facility: CLINIC | Age: 84
End: 2019-04-12

## 2019-04-18 ENCOUNTER — CONSULT (OUTPATIENT)
Dept: FAMILY MEDICINE CLINIC | Facility: CLINIC | Age: 84
End: 2019-04-18

## 2019-04-18 ENCOUNTER — APPOINTMENT (OUTPATIENT)
Dept: LAB | Facility: CLINIC | Age: 84
End: 2019-04-18
Payer: COMMERCIAL

## 2019-04-18 VITALS
DIASTOLIC BLOOD PRESSURE: 70 MMHG | BODY MASS INDEX: 36.49 KG/M2 | RESPIRATION RATE: 16 BRPM | WEIGHT: 206 LBS | TEMPERATURE: 97.6 F | HEART RATE: 69 BPM | OXYGEN SATURATION: 95 % | SYSTOLIC BLOOD PRESSURE: 118 MMHG

## 2019-04-18 DIAGNOSIS — I10 BENIGN ESSENTIAL HYPERTENSION: Primary | ICD-10-CM

## 2019-04-18 DIAGNOSIS — R41.89 COGNITIVE IMPAIRMENT: ICD-10-CM

## 2019-04-18 DIAGNOSIS — R30.0 DYSURIA: ICD-10-CM

## 2019-04-18 LAB
BILIRUB UR QL STRIP: NEGATIVE
CLARITY UR: CLEAR
COLOR UR: YELLOW
GLUCOSE UR STRIP-MCNC: NEGATIVE MG/DL
HGB UR QL STRIP.AUTO: NEGATIVE
KETONES UR STRIP-MCNC: NEGATIVE MG/DL
LEUKOCYTE ESTERASE UR QL STRIP: NEGATIVE
NITRITE UR QL STRIP: NEGATIVE
PH UR STRIP.AUTO: 7.5 [PH]
PROT UR STRIP-MCNC: NEGATIVE MG/DL
SP GR UR STRIP.AUTO: 1.01 (ref 1–1.03)
UROBILINOGEN UR QL STRIP.AUTO: 0.2 E.U./DL

## 2019-04-18 PROCEDURE — 81003 URINALYSIS AUTO W/O SCOPE: CPT | Performed by: FAMILY MEDICINE

## 2019-04-18 PROCEDURE — 99213 OFFICE O/P EST LOW 20 MIN: CPT | Performed by: FAMILY MEDICINE

## 2019-05-30 ENCOUNTER — OFFICE VISIT (OUTPATIENT)
Dept: FAMILY MEDICINE CLINIC | Facility: CLINIC | Age: 84
End: 2019-05-30

## 2019-05-30 ENCOUNTER — APPOINTMENT (OUTPATIENT)
Dept: LAB | Facility: CLINIC | Age: 84
End: 2019-05-30
Payer: COMMERCIAL

## 2019-05-30 DIAGNOSIS — I10 ESSENTIAL HYPERTENSION: ICD-10-CM

## 2019-05-30 DIAGNOSIS — K26.9 DUODENAL ULCER: ICD-10-CM

## 2019-05-30 DIAGNOSIS — E78.2 MIXED HYPERLIPIDEMIA: ICD-10-CM

## 2019-05-30 DIAGNOSIS — R41.89 COGNITIVE IMPAIRMENT: ICD-10-CM

## 2019-05-30 DIAGNOSIS — K64.9 HEMORRHOIDS: ICD-10-CM

## 2019-05-30 DIAGNOSIS — I10 ESSENTIAL HYPERTENSION: Primary | ICD-10-CM

## 2019-05-30 DIAGNOSIS — K21.9 GASTROESOPHAGEAL REFLUX DISEASE, ESOPHAGITIS PRESENCE NOT SPECIFIED: ICD-10-CM

## 2019-05-30 LAB
ANION GAP SERPL CALCULATED.3IONS-SCNC: 5 MMOL/L (ref 4–13)
BASOPHILS # BLD AUTO: 0.03 THOUSANDS/ΜL (ref 0–0.1)
BASOPHILS NFR BLD AUTO: 1 % (ref 0–1)
BUN SERPL-MCNC: 33 MG/DL (ref 5–25)
CALCIUM SERPL-MCNC: 8.9 MG/DL (ref 8.3–10.1)
CHLORIDE SERPL-SCNC: 103 MMOL/L (ref 100–108)
CHOLEST SERPL-MCNC: 173 MG/DL (ref 50–200)
CO2 SERPL-SCNC: 29 MMOL/L (ref 21–32)
CREAT SERPL-MCNC: 0.8 MG/DL (ref 0.6–1.3)
EOSINOPHIL # BLD AUTO: 0.72 THOUSAND/ΜL (ref 0–0.61)
EOSINOPHIL NFR BLD AUTO: 13 % (ref 0–6)
ERYTHROCYTE [DISTWIDTH] IN BLOOD BY AUTOMATED COUNT: 13.2 % (ref 11.6–15.1)
GFR SERPL CREATININE-BSD FRML MDRD: 67 ML/MIN/1.73SQ M
GLUCOSE P FAST SERPL-MCNC: 90 MG/DL (ref 65–99)
HCT VFR BLD AUTO: 36.9 % (ref 34.8–46.1)
HDLC SERPL-MCNC: 63 MG/DL (ref 40–60)
HGB BLD-MCNC: 11.8 G/DL (ref 11.5–15.4)
IMM GRANULOCYTES # BLD AUTO: 0.01 THOUSAND/UL (ref 0–0.2)
IMM GRANULOCYTES NFR BLD AUTO: 0 % (ref 0–2)
LDLC SERPL CALC-MCNC: 89 MG/DL (ref 0–100)
LYMPHOCYTES # BLD AUTO: 1.74 THOUSANDS/ΜL (ref 0.6–4.47)
LYMPHOCYTES NFR BLD AUTO: 32 % (ref 14–44)
MCH RBC QN AUTO: 29.8 PG (ref 26.8–34.3)
MCHC RBC AUTO-ENTMCNC: 32 G/DL (ref 31.4–37.4)
MCV RBC AUTO: 93 FL (ref 82–98)
MONOCYTES # BLD AUTO: 0.41 THOUSAND/ΜL (ref 0.17–1.22)
MONOCYTES NFR BLD AUTO: 8 % (ref 4–12)
NEUTROPHILS # BLD AUTO: 2.54 THOUSANDS/ΜL (ref 1.85–7.62)
NEUTS SEG NFR BLD AUTO: 46 % (ref 43–75)
NONHDLC SERPL-MCNC: 110 MG/DL
NRBC BLD AUTO-RTO: 0 /100 WBCS
PLATELET # BLD AUTO: 247 THOUSANDS/UL (ref 149–390)
PMV BLD AUTO: 10.3 FL (ref 8.9–12.7)
POTASSIUM SERPL-SCNC: 4 MMOL/L (ref 3.5–5.3)
RBC # BLD AUTO: 3.96 MILLION/UL (ref 3.81–5.12)
SODIUM SERPL-SCNC: 137 MMOL/L (ref 136–145)
TRIGL SERPL-MCNC: 104 MG/DL
TSH SERPL DL<=0.05 MIU/L-ACNC: 1.73 UIU/ML (ref 0.36–3.74)
WBC # BLD AUTO: 5.45 THOUSAND/UL (ref 4.31–10.16)

## 2019-05-30 PROCEDURE — 36415 COLL VENOUS BLD VENIPUNCTURE: CPT

## 2019-05-30 PROCEDURE — 80061 LIPID PANEL: CPT

## 2019-05-30 PROCEDURE — 84443 ASSAY THYROID STIM HORMONE: CPT

## 2019-05-30 PROCEDURE — 80048 BASIC METABOLIC PNL TOTAL CA: CPT

## 2019-05-30 PROCEDURE — 85025 COMPLETE CBC W/AUTO DIFF WBC: CPT

## 2019-05-30 PROCEDURE — 99213 OFFICE O/P EST LOW 20 MIN: CPT | Performed by: INTERNAL MEDICINE

## 2019-05-30 RX ORDER — SUCRALFATE ORAL 1 G/10ML
1 SUSPENSION ORAL
Qty: 1200 ML | Refills: 4 | Status: SHIPPED | OUTPATIENT
Start: 2019-05-30 | End: 2019-11-04 | Stop reason: CLARIF

## 2019-06-21 DIAGNOSIS — I50.9 CHF (CONGESTIVE HEART FAILURE) (HCC): ICD-10-CM

## 2019-06-24 ENCOUNTER — OFFICE VISIT (OUTPATIENT)
Dept: FAMILY MEDICINE CLINIC | Facility: CLINIC | Age: 84
End: 2019-06-24

## 2019-06-24 VITALS
TEMPERATURE: 97.8 F | BODY MASS INDEX: 36.14 KG/M2 | RESPIRATION RATE: 16 BRPM | OXYGEN SATURATION: 99 % | SYSTOLIC BLOOD PRESSURE: 130 MMHG | WEIGHT: 204 LBS | HEART RATE: 98 BPM | DIASTOLIC BLOOD PRESSURE: 70 MMHG

## 2019-06-24 DIAGNOSIS — I10 BENIGN ESSENTIAL HYPERTENSION: ICD-10-CM

## 2019-06-24 DIAGNOSIS — J30.1 SEASONAL ALLERGIC RHINITIS DUE TO POLLEN: ICD-10-CM

## 2019-06-24 DIAGNOSIS — J02.9 SORE THROAT: Primary | ICD-10-CM

## 2019-06-24 DIAGNOSIS — R26.9 ABNORMAL GAIT: ICD-10-CM

## 2019-06-24 DIAGNOSIS — I50.32 CHRONIC DIASTOLIC CONGESTIVE HEART FAILURE (HCC): ICD-10-CM

## 2019-06-24 PROBLEM — B97.89 SORE THROAT (VIRAL): Status: ACTIVE | Noted: 2019-06-24

## 2019-06-24 PROBLEM — J02.8 SORE THROAT (VIRAL): Status: ACTIVE | Noted: 2019-06-24

## 2019-06-24 PROCEDURE — 99213 OFFICE O/P EST LOW 20 MIN: CPT | Performed by: INTERNAL MEDICINE

## 2019-06-24 RX ORDER — HYDROCHLOROTHIAZIDE 25 MG/1
TABLET ORAL
Qty: 90 TABLET | Refills: 3 | Status: SHIPPED | OUTPATIENT
Start: 2019-06-24 | End: 2019-11-12 | Stop reason: DRUGHIGH

## 2019-06-24 RX ORDER — FUROSEMIDE 40 MG/1
TABLET ORAL
Qty: 90 TABLET | Refills: 3 | Status: SHIPPED | OUTPATIENT
Start: 2019-06-24 | End: 2020-09-29

## 2019-06-24 RX ORDER — GUAIFENESIN/DEXTROMETHORPHAN 100-10MG/5
5 SYRUP ORAL 3 TIMES DAILY PRN
Qty: 118 ML | Refills: 0 | Status: SHIPPED | OUTPATIENT
Start: 2019-06-24 | End: 2019-09-11 | Stop reason: SDUPTHER

## 2019-08-12 ENCOUNTER — OFFICE VISIT (OUTPATIENT)
Dept: FAMILY MEDICINE CLINIC | Facility: CLINIC | Age: 84
End: 2019-08-12

## 2019-08-12 VITALS
DIASTOLIC BLOOD PRESSURE: 80 MMHG | HEART RATE: 106 BPM | TEMPERATURE: 98.5 F | WEIGHT: 212 LBS | SYSTOLIC BLOOD PRESSURE: 120 MMHG | RESPIRATION RATE: 20 BRPM | BODY MASS INDEX: 37.55 KG/M2 | OXYGEN SATURATION: 98 %

## 2019-08-12 DIAGNOSIS — J02.8 ACUTE PHARYNGITIS DUE TO OTHER SPECIFIED ORGANISMS: Primary | ICD-10-CM

## 2019-08-12 DIAGNOSIS — J44.1 COPD EXACERBATION (HCC): ICD-10-CM

## 2019-08-12 DIAGNOSIS — H72.01 CENTRAL PERFORATION OF TYMPANIC MEMBRANE OF RIGHT EAR: ICD-10-CM

## 2019-08-12 PROBLEM — H72.00 CENTRAL PERFORATION OF TYMPANIC MEMBRANE: Status: ACTIVE | Noted: 2019-08-12

## 2019-08-12 PROCEDURE — 99213 OFFICE O/P EST LOW 20 MIN: CPT | Performed by: FAMILY MEDICINE

## 2019-08-12 RX ORDER — PREDNISONE 20 MG/1
40 TABLET ORAL DAILY
Qty: 10 TABLET | Refills: 0 | Status: SHIPPED | OUTPATIENT
Start: 2019-08-12 | End: 2019-08-17

## 2019-08-12 RX ORDER — AMITRIPTYLINE HYDROCHLORIDE 25 MG/1
TABLET, FILM COATED ORAL
COMMUNITY
Start: 2019-06-24

## 2019-08-12 RX ORDER — AZITHROMYCIN 250 MG/1
TABLET, FILM COATED ORAL
Qty: 6 TABLET | Refills: 0 | Status: SHIPPED | OUTPATIENT
Start: 2019-08-12 | End: 2019-08-16

## 2019-08-12 NOTE — ASSESSMENT & PLAN NOTE
Patient Clinically appears to be in COPD exacerbation with increase sputum production as well as increased SOB

## 2019-08-12 NOTE — ASSESSMENT & PLAN NOTE
For the past week patient has been progressively getting worse  C/O Sore throat, cough with sputum yellow, Patient denies having COPD however does use advair daily as well as albuterol   She is complaining of wheezing as well as SOB  - Prednisone 40 mg daily X 5 days  - Azithromycin pack

## 2019-08-12 NOTE — PROGRESS NOTES
Assessment/Plan:    Acute pharyngitis due to other specified organisms  For the past week patient has been progressively getting worse  C/O Sore throat, cough with sputum yellow, Patient denies having COPD however does use advair daily as well as albuterol  She is complaining of wheezing as well as SOB  - Prednisone 40 mg daily X 5 days  - Azithromycin pack    COPD exacerbation (HCC)  Patient Clinically appears to be in COPD exacerbation with increase sputum production as well as increased SOB    Central perforation of tympanic membrane  Will send to ENT       Diagnoses and all orders for this visit:    Acute pharyngitis due to other specified organisms    COPD exacerbation (HCC)  -     predniSONE 20 mg tablet; Take 2 tablets (40 mg total) by mouth daily for 5 days  -     azithromycin (ZITHROMAX) 250 mg tablet; Take 2 tablets today then 1 tablet daily x 4 days    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  -     Ambulatory Referral to Otolaryngology; Future    Other orders  -     amitriptyline (ELAVIL) 25 mg tablet; TAKE ONE TABLET BY MOUTH DAILY AT BEDTIME  Subjective:      Patient ID: Joshua Puri is a 80 y o  female  This is a very pleasant 66-year-old female who presents to the clinic with acute pharyngitis, which appears to have progressed to a COPD exacerbation  Patient does not have on record a diagnosis of COPD however she is on appropriate COPD medication and clinically appears to have a COPD exacerbation including increased sputum production change in quality of sputum as well as wheezing and shortness of breath  Patient has tried over-the-counter medication however over the past week continued to progressively got worse  Symptoms include sore from coughing wheezing, decreased appetite, decreased activity  Patient denies any fever        The following portions of the patient's history were reviewed and updated as appropriate: allergies, current medications, past family history, past medical history, past social history, past surgical history and problem list     Review of Systems   Constitutional: Positive for fatigue and fever  Negative for chills  HENT: Positive for congestion, ear pain and sore throat  Eyes: Negative for visual disturbance  Respiratory: Positive for cough, shortness of breath and wheezing  Cardiovascular: Negative for chest pain  Gastrointestinal: Negative for abdominal pain, blood in stool and nausea  Endocrine: Negative for cold intolerance and heat intolerance  Genitourinary: Negative for dysuria and hematuria  Musculoskeletal: Negative for gait problem  Skin: Negative for rash  Allergic/Immunologic: Negative for environmental allergies  Neurological: Negative for syncope  Hematological: Does not bruise/bleed easily  Psychiatric/Behavioral: Negative for behavioral problems  Objective:      /80 (BP Location: Left arm, Patient Position: Sitting, Cuff Size: Large)   Pulse (!) 106   Temp 98 5 °F (36 9 °C)   Resp 20   Wt 96 2 kg (212 lb)   SpO2 98%   BMI 37 55 kg/m²          Physical Exam   Constitutional: She is oriented to person, place, and time  She appears well-developed and well-nourished  No distress  HENT:   Head: Normocephalic and atraumatic  Right Ear: External ear normal    Left Ear: External ear normal    Nose: Nose normal    Mouth/Throat: Oropharyngeal exudate present  Perforated tympanic membrane on the right here  Eyes: Pupils are equal, round, and reactive to light  Conjunctivae and EOM are normal  Right eye exhibits no discharge  Left eye exhibits no discharge  Neck: Normal range of motion  Neck supple  No JVD present  No tracheal deviation present  No thyromegaly present  Cardiovascular: Normal rate, regular rhythm, normal heart sounds and intact distal pulses  Exam reveals no gallop and no friction rub  No murmur heard  Pulmonary/Chest: Effort normal  No respiratory distress   She has wheezes  She exhibits no tenderness  Abdominal: Soft  Bowel sounds are normal  She exhibits no distension  There is no tenderness  There is no rebound  Musculoskeletal: Normal range of motion  She exhibits no edema or tenderness  Neurological: She is alert and oriented to person, place, and time  She has normal reflexes  Coordination normal    Skin: Skin is warm and dry  No rash noted  She is not diaphoretic  No erythema  Psychiatric: She has a normal mood and affect  Her behavior is normal  Thought content normal    Nursing note and vitals reviewed

## 2019-09-11 ENCOUNTER — OFFICE VISIT (OUTPATIENT)
Dept: FAMILY MEDICINE CLINIC | Facility: CLINIC | Age: 84
End: 2019-09-11

## 2019-09-11 ENCOUNTER — OFFICE VISIT (OUTPATIENT)
Dept: CARDIOLOGY CLINIC | Facility: CLINIC | Age: 84
End: 2019-09-11
Payer: COMMERCIAL

## 2019-09-11 VITALS
WEIGHT: 207.2 LBS | HEART RATE: 80 BPM | BODY MASS INDEX: 41.77 KG/M2 | HEIGHT: 59 IN | DIASTOLIC BLOOD PRESSURE: 72 MMHG | SYSTOLIC BLOOD PRESSURE: 130 MMHG

## 2019-09-11 VITALS
SYSTOLIC BLOOD PRESSURE: 118 MMHG | BODY MASS INDEX: 41.53 KG/M2 | RESPIRATION RATE: 18 BRPM | WEIGHT: 206 LBS | HEIGHT: 59 IN | TEMPERATURE: 98 F | DIASTOLIC BLOOD PRESSURE: 74 MMHG | HEART RATE: 86 BPM | OXYGEN SATURATION: 97 %

## 2019-09-11 DIAGNOSIS — J44.9 CHRONIC OBSTRUCTIVE PULMONARY DISEASE, UNSPECIFIED COPD TYPE (HCC): ICD-10-CM

## 2019-09-11 DIAGNOSIS — I10 BENIGN ESSENTIAL HYPERTENSION: ICD-10-CM

## 2019-09-11 DIAGNOSIS — E78.2 MIXED HYPERLIPIDEMIA: ICD-10-CM

## 2019-09-11 DIAGNOSIS — I35.0 NONRHEUMATIC AORTIC VALVE STENOSIS: ICD-10-CM

## 2019-09-11 DIAGNOSIS — I10 BENIGN ESSENTIAL HYPERTENSION: Primary | ICD-10-CM

## 2019-09-11 DIAGNOSIS — J06.9 UPPER RESPIRATORY INFECTION, VIRAL: Primary | ICD-10-CM

## 2019-09-11 DIAGNOSIS — J30.1 SEASONAL ALLERGIC RHINITIS DUE TO POLLEN: ICD-10-CM

## 2019-09-11 DIAGNOSIS — H72.01 CENTRAL PERFORATION OF TYMPANIC MEMBRANE OF RIGHT EAR: ICD-10-CM

## 2019-09-11 PROBLEM — J02.9 SORE THROAT: Status: ACTIVE | Noted: 2019-09-11

## 2019-09-11 PROCEDURE — 3078F DIAST BP <80 MM HG: CPT | Performed by: INTERNAL MEDICINE

## 2019-09-11 PROCEDURE — 3725F SCREEN DEPRESSION PERFORMED: CPT | Performed by: FAMILY MEDICINE

## 2019-09-11 PROCEDURE — 99214 OFFICE O/P EST MOD 30 MIN: CPT | Performed by: INTERNAL MEDICINE

## 2019-09-11 PROCEDURE — 99214 OFFICE O/P EST MOD 30 MIN: CPT | Performed by: FAMILY MEDICINE

## 2019-09-11 PROCEDURE — 3075F SYST BP GE 130 - 139MM HG: CPT | Performed by: INTERNAL MEDICINE

## 2019-09-11 RX ORDER — GUAIFENESIN/DEXTROMETHORPHAN 100-10MG/5
5 SYRUP ORAL 3 TIMES DAILY PRN
Qty: 118 ML | Refills: 0 | Status: SHIPPED | OUTPATIENT
Start: 2019-09-11 | End: 2019-10-04 | Stop reason: HOSPADM

## 2019-09-11 RX ORDER — LISINOPRIL 40 MG/1
40 TABLET ORAL DAILY
COMMUNITY
End: 2019-10-04 | Stop reason: HOSPADM

## 2019-09-11 RX ORDER — FLUTICASONE PROPIONATE 50 MCG
1 SPRAY, SUSPENSION (ML) NASAL 2 TIMES DAILY
Qty: 1 BOTTLE | Refills: 5 | Status: SHIPPED | OUTPATIENT
Start: 2019-09-11 | End: 2020-03-10

## 2019-09-11 RX ORDER — ATORVASTATIN CALCIUM 20 MG/1
20 TABLET, FILM COATED ORAL DAILY
COMMUNITY
End: 2019-09-23 | Stop reason: SDUPTHER

## 2019-09-11 NOTE — PATIENT INSTRUCTIONS
Síndrome viral, cuidados ambulatorios   INFORMACIÓN GENERAL:   Un síndrome viral  es un término que los proveedores de lyndsay usan para los síntomas generales de daniel infección viral que no tiene daniel causa aparente  Los siguientes son los síntomas más comunes:   · Dylan y escalofríos, o un salpullido    · Congestión o escurrimiento nasal    · Tos, dolor de garganta y voz ronca    · Dolor de [place], o dolor y presión alrededor de jonas ojos    · Dolor en los músculos y de las articulaciones    · Falta la respiración o sibilancias (tatyana silbante o chillón en el pecho)    · Dolor y retorcijones en el abdomen y diarrea    · [place], vómito o falta de apetito  Busque atención inmediata al presentar los siguientes síntomas:   · Vómito y diarrea persistente    · Dolor en el pecho o dificultad para respirar  El tratamiento para el síndrome viral puede incluir medicamentos para disminuir la fiebre, tos o la congestión nasal  También es posible que necesite medicamento para aliviar el salpullido, comezón o para ayudar tratar la infección viral   Controle jonas síntomas:  Consuma líquidos según le indicaron para ayudar a evitar la deshidratación  Pregunte cuál es la cantidad de líquido que debe ingerir al día y cuáles son las bebidas recomendadas  Es posible que tenga que alfa daniel solución de sales rehidratantes SRO o kvng oral  La solución de rehidratación contiene la combinación [address], sales y azúcar para reemplazar los líquidos corporales  Prevención de la propagación del virus:   · Lave jonas albert con frecuencia  Use agua y [place]  Lávese jonas albert después de ir al baño, [employer] pañales o estornudar  Lávese jonas albert antes y después de preparar o consumir alimentos  Use un gel limpiador en seco si no hay agua disponible  · Lleve daniel máscara  para ayudar a evitar el contagio del virus a otras personas  · Cocine y Pined[place]'s  Cocine andrew los alimentos   Limpie con un desinfectante las superficies donde se preparan los alimentos  · Consulte sobre la inmunización  Usted puede necesitar la vacuna contra la influenza (gripe), la vacuna antimeningocócica o vacuna contra el meningococos  Estas vacunas sirven para prevenir la gripe, neumonía y enfermedades meningocócicas  Programe daniel angelica con eubanks proveedor de [employer] se le haya indicado: Anote jonas preguntas para que se acuerde de hacerlas henry jonas visitas  ACUERDOS SOBRE EUBANKS CUIDADO:   Usted tiene el derecho de participar en la planificación de eubanks cuidado  Aprenda todo lo que pueda sobre eubanks condición y piter darle tratamiento  Discuta con jonas médicos jonas opciones de tratamiento para juntos decidir el cuidado que usted quiere recibir  Usted siempre tiene el derecho a rechazar eubanks tratamiento  Esta información es sólo para uso en educación  Eubanks intención no es darle un consejo médico sobre enfermedades o tratamientos  Colsulte con eubanks Dorna Jose Manuel farmacéutico antes de seguir cualquier régimen médico para saber si es seguro y efectivo para usted  © 2014 [address] is for End User's use only and may not be sold, redistributed or otherwise used for commercial purposes  All illustrations and images included in CareNotes® are the copyrighted property of A D A M , Inc  or Brady Marsh

## 2019-09-11 NOTE — ASSESSMENT & PLAN NOTE
Patient not in exacerbation today  She reports being compliant to her medications Advair and ventolin  She indicates noticing dry mouth  On physical exam, dry mouth noted, no oral thrush present  Encouraged patient to rinse mouth after using Advair to prevent dryness symptoms or infections related to inhalation steroid medications  Patient agreed to recommendations and all questions were answered  Will continue to follow up during next visit

## 2019-09-11 NOTE — PROGRESS NOTES
Assessment/Plan:    Upper respiratory infection, viral  Patient with symptoms of sore throat and productive cough with yellow sputum since about 1 week ago  She recently came back from a trip to [facility], where she said she was exposed to sick contacts at home that had similar symptoms  Symptoms associated with nasal congestion, runny nose and headache  On physical examination, mild shortness of breath noted but patient was able to have a full conversation and walk in the room without difficulty  Mild erythema of throat noted, dry mouth, mild rhinitis with clear fluid discharge, no cervical adenopathy and clear breath sounds on auscultation  Based on today's findings, these symptoms could be mostly associated to a upper viral respiratory infection  Will give Robitussin for cough relief and advised to take Tylenol PRN for headache relief  Counseled patient on medication adherence and course of viral illness an encouraged to call in about 3 days if symptoms worsen  Will follow up in 1 month or earlier if symptoms worsen      -Robitussin  -flonase  -follow up in 1 month    COPD (chronic obstructive pulmonary disease) ([facility] [address] )  Patient not in exacerbation today  She reports being compliant to her medications Advair and ventolin  She indicates noticing dry mouth  On physical exam, dry mouth noted, no oral thrush present  Encouraged patient to rinse mouth after using Advair to prevent dryness symptoms or infections related to inhalation steroid medications  Patient agreed to recommendations and all questions were answered  Will continue to follow up during next visit  Benign essential hypertension  Blood Pressure: 118/74     Blood pressure controlled today  Patient currently on hydrochlorothiazide 25 mg  Congratulated patient on maintaining good blood pressure control   Patient indicates she had a minor slip and fall on her back at a bathroom on a restaurant, she did not had any dizziness or symptoms related to it which is more indicative of a mechanical fall  She currently denies any episodes of dizziness or chest pain  Counseled patient that if blood pressure continues to be controlled during next follow up visit, will talk about considering weaning off and the possibility of eventually discontinuing blood pressure medications since due to age, she is at increased risk of hypotension and falls with blood pressure medications  Also counseled on the importance of maintaining low salt, low fat diet  Encouraged patient on prevention of falls and to notify if symptoms of dizziness occur  Patient agreed to recommendations and will follow up during next visit for assessment of medication adjustment if needed      -continue hydrochlorothiazide 25mg    Central perforation of tympanic membrane  Patient with previous history on perforation of tympanic membrane of right ear  Patient indicates to have noticed persistent tinnitus but otherwise has been stable since previous visit  During today's evaluation, tympanic membrane of right ear appears to be healing adequately, no redness, swelling or discharge noted  Patient currently has appointment with ENT on October, 2019  Encouraged patient to keep the appointment as recommendations from ENT would be beneficial for further assessment  Patient agreed to recommendations and all questions were answered  Will continue to follow up during next visit  Diagnoses and all orders for this visit:    Upper respiratory infection, viral  -     dextromethorphan-guaiFENesin (ROBITUSSIN DM)  mg/5 mL syrup;  Take 5 mL by mouth 3 (three) times a day as needed for cough    Seasonal allergic rhinitis due to pollen  -     fluticasone (FLONASE) 50 mcg/act nasal spray; 1 spray into each nostril 2 (two) times a day    Benign essential hypertension    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    Chronic obstructive pulmonary disease, unspecified COPD type ([facility] [address] )          Subjective: Patient ID: Marshall Collins is a 80 y o  female  Case of 80year old female who came today due to symptoms of sore throat and cough  Patient indicates she came recently from a trip to [facility], where she was exposed to sick contacts at home with similar symptoms  She describes having symptoms of sore throat, productive cough with yellowish sputum, nasal congestion with clear discharge and headache that relieves with Tylenol  Patient also indicates that she was in a restaurant a few weeks ago where she went to the bathroom and she slipped and fell on her back, denies having had dizziness or any symptoms prior to the event  She has felt mild soreness afterwards which has relieved with tylenol and has been able to ambulate without difficulty  She also has a history of tympanic membrane perforation of right ear, for which she indicated only noticing the persistent tinnitus but otherwise no new changes from previous visit, currently has appointment with ENT on October, 2019  Denies fever, chest pain, shortness of breath, dizziness, abdominal pain or any other additional symptoms  The following portions of the patient's history were reviewed and updated as appropriate: allergies, past family history, past social history and past surgical history  Review of Systems   Constitutional: Negative for activity change, appetite change and fever  HENT: Positive for congestion, rhinorrhea, sinus pressure, sore throat and tinnitus  Negative for ear discharge  Eyes: Negative for discharge and redness  Respiratory: Positive for cough ( productive, yellow sputum)  Negative for chest tightness, shortness of breath and wheezing  Cardiovascular: Negative for chest pain and palpitations  Gastrointestinal: Negative for abdominal pain  Musculoskeletal: Negative for arthralgias  Skin: Negative for color change, pallor and rash  Neurological: Positive for headaches  Negative for dizziness  Objective:      /74 (BP Location: Left arm, Patient Position: Sitting, Cuff Size: Large)   Pulse 86   Temp 98 °F (36 7 °C) (Temporal)   Resp 18   Ht 4' 11" (1 499 m)   Wt 93 4 kg (206 lb)   SpO2 97%   BMI 41 61 kg/m²          Physical Exam   Constitutional: She is oriented to person, place, and time  She appears well-developed and well-nourished  No distress  HENT:   Right Ear: No drainage  Tympanic membrane is perforated ( right ear tympanic membrane with small perforation noted, healing well , no erythema, no swelling noted)  Left Ear: External ear normal    Mouth/Throat: No oropharyngeal exudate  Mild clear rhinorrhea noted, mild throat erythema present, no oral thrush noted  Eyes: Conjunctivae and EOM are normal    Neck: Neck supple  Cardiovascular: Normal heart sounds  Pulmonary/Chest: Breath sounds normal  No stridor  She has no wheezes  She has no rales  Mild shortness of breath noted, was able to have a fluent conversation and walking in the room without difficulty or exacerbation of symptoms  Abdominal: Soft  Bowel sounds are normal  She exhibits no distension  There is no tenderness  There is no guarding  Musculoskeletal: She exhibits no edema or tenderness  Lymphadenopathy:     She has no cervical adenopathy  Neurological: She is alert and oriented to person, place, and time  Skin: Skin is warm  No rash noted  She is not diaphoretic  No erythema  Psychiatric: She has a normal mood and affect

## 2019-09-11 NOTE — ASSESSMENT & PLAN NOTE
Patient with symptoms of sore throat and productive cough with yellow sputum since about 1 week ago  She recently came back from a trip to [facility], where she said she was exposed to sick contacts at home that had similar symptoms  Symptoms associated with nasal congestion, runny nose and headache  On physical examination, mild shortness of breath noted but patient was able to have a full conversation and walk in the room without difficulty  Mild erythema of throat noted, dry mouth, mild rhinitis with clear fluid discharge, no cervical adenopathy and clear breath sounds on auscultation  Based on today's findings, these symptoms could be mostly associated to a upper viral respiratory infection  Will give Robitussin for cough relief and advised to take Tylenol PRN for headache relief  Counseled patient on medication adherence and course of viral illness an encouraged to call in about 3 days if symptoms worsen   Will follow up in 1 month or earlier if symptoms worsen      -Robitussin  -flonase  -follow up in 1 month

## 2019-09-11 NOTE — ASSESSMENT & PLAN NOTE
Patient with previous history on perforation of tympanic membrane of right ear  Patient indicates to have noticed persistent tinnitus but otherwise has been stable since previous visit  During today's evaluation, tympanic membrane of right ear appears to be healing adequately, no redness, swelling or discharge noted  Patient currently has appointment with ENT on October, 2019  Encouraged patient to keep the appointment as recommendations from ENT would be beneficial for further assessment  Patient agreed to recommendations and all questions were answered  Will continue to follow up during next visit

## 2019-09-11 NOTE — ASSESSMENT & PLAN NOTE
Blood Pressure: 118/74     Blood pressure controlled today  Patient currently on hydrochlorothiazide 25 mg  Congratulated patient on maintaining good blood pressure control  Patient indicates she had a minor slip and fall on her back at a bathroom on a restaurant, she did not had any dizziness or symptoms related to it which is more indicative of a mechanical fall  She currently denies any episodes of dizziness or chest pain  Counseled patient that if blood pressure continues to be controlled during next follow up visit, will talk about considering weaning off and the possibility of eventually discontinuing blood pressure medications since due to age, she is at increased risk of hypotension and falls with blood pressure medications  Also counseled on the importance of maintaining low salt, low fat diet  Encouraged patient on prevention of falls and to notify if symptoms of dizziness occur   Patient agreed to recommendations and will follow up during next visit for assessment of medication adjustment if needed      -continue hydrochlorothiazide 25mg

## 2019-09-11 NOTE — PROGRESS NOTES
Cardiology Follow Up    Salty Villaseñor  1934  [id number]  [address] [facility] [address]  [phone]    1  Benign essential hypertension     2  Nonrheumatic aortic valve stenosis     3  Mixed hyperlipidemia         Patient was originally referred from [facility] for evaluation of an abnormal echocardiogram  Echocardiogram demonstrated normal LV systolic function with mild-to-moderate aortic stenosis  Other history includes hypertension and hyperlipidemia  02/09/2018 echocardiogram: Mild aortic stenosis, aortic valve velocity 2 7 Meters/sec  Normal LV systolic function, EF 47%  Grade 1 diastolic dysfunction  Normal PA pressures  05/30/2019 lipid profile: Total cholesterol 173, triglycerides 104, HDL 63, LDL calculated 89, non HDL cholesterol 110    Interval History:  Patient with mild aortic stenosis returns  She has no symptoms of chest discomfort, excessive dyspnea on exertion, or feelings of near syncope  She sleeps on 2 pillows but is not orthopneic  She has no PND  She does have chronic lower extremity edema for which she wears support stockings  The son states that the stockings help immensely  Her blood pressure is good and her last lipid profile is acceptable        Patient Active Problem List   Diagnosis    Abnormal gait    Anemia    Aortic stenosis    Asthma    Benign essential hypertension    Bilateral occipital neuralgia    Carpal tunnel syndrome on both sides    Glaucoma    HLD (hyperlipidemia)    Malignant neoplasm of overlapping sites of left female breast [facility])    Nuclear sclerotic cataract of right eye    Preoperative cardiovascular examination    Cataract    Hemorrhoids    Back pain    Constipation    Chronic diastolic congestive heart failure (HCC)    GERD (gastroesophageal reflux disease)    Screening for thyroid disorder    Anal or rectal pain  Chronic tension-type headache, not intractable    Osteoarthritis    Acute herpangina    Otitis of left ear    Cognitive impairment    Dysuria    Sore throat (viral)    Acute pharyngitis due to other specified organisms    [facility]    Central perforation of tympanic membrane    Upper respiratory infection, viral     Past Medical History:   Diagnosis Date    Diverticulosis 2001    Heart failure (HCC)     History of cystocele     Hypertension     Obesity     Positive PPD 1999    treated     Social History     Socioeconomic History    Marital status:      Spouse name: Not on file    Number of children: Not on file    Years of education: Not on file    Highest education level: Not on file   Occupational History    Not on file   Social Needs    Financial resource strain: Not on file    Food insecurity:     Worry: Not on file     Inability: Not on file    Transportation needs:     Medical: Not on file     Non-medical: Not on file   Tobacco Use    Smoking status: Never Smoker    Smokeless tobacco: Never Used   Substance and Sexual Activity    Alcohol use: No    Drug use: No    Sexual activity: Not Currently     Partners: Male   Lifestyle    Physical activity:     Days per week: Not on file     Minutes per session: Not on file    Stress: Not on file   Relationships    Social connections:     Talks on phone: Not on file     Gets together: Not on file     Attends Caodaism service: Not on file     Active member of club or organization: Not on file     Attends meetings of clubs or organizations: Not on file     Relationship status: Not on file    Intimate partner violence:     Fear of current or ex partner: Not on file     Emotionally abused: Not on file     Physically abused: Not on file     Forced sexual activity: Not on file   Other Topics Concern    Not on file   Social History Narrative    Lives at home with son and 2 grandkids  Family History   Family history unknown: Yes     Past Surgical History:   Procedure Laterality Date    CARDIOVASCULAR STRESS TEST  2001    dobutamine stress test    CHOLECYSTECTOMY  04/2002    COLONOSCOPY      CYSTOCELE REPAIR      HYSTERECTOMY      KNEE SURGERY      knee replacement    UPPER GASTROINTESTINAL ENDOSCOPY         Current Outpatient Medications:     acetaminophen (TYLENOL) 650 mg CR tablet, 1 tablet every 6 (six) hours, Disp: , Rfl:     ADVAIR DISKUS 500-50 MCG/DOSE inhaler, Inhale 1 puff by mouth 2 (two) times a day  Rinse mouth after use , Disp: 1 Inhaler, Rfl: 2    atorvastatin (LIPITOR) 20 mg tablet, Take 20 mg by mouth daily, Disp: , Rfl:     dextromethorphan-guaiFENesin (ROBITUSSIN DM)  mg/5 mL syrup, Take 5 mL by mouth 3 (three) times a day as needed for cough, Disp: 118 mL, Rfl: 0    fluticasone (FLONASE) 50 mcg/act nasal spray, 1 spray into each nostril 2 (two) times a day, Disp: 1 Bottle, Rfl: 5    furosemide (LASIX) 40 mg tablet, TAKE 1 TABLET BY MOUTH ONCE DAILY  , Disp: 90 tablet, Rfl: 3    hydrochlorothiazide (HYDRODIURIL) 25 mg tablet, TAKE 1 TABLET BY MOUTH ONCE DAILY  , Disp: 90 tablet, Rfl: 3    hydrocortisone 2 5 % cream, Apply topically 4 (four) times a day as needed, Disp: , Rfl:     lisinopril (ZESTRIL) 40 mg tablet, Take 40 mg by mouth daily, Disp: , Rfl:     Misc  Devices (CANE) MISC, Four point aluminum cane , Disp: , Rfl:     ranitidine (ZANTAC) 150 mg tablet, TAKE 1 TABLET BY MOUTH TWICE DAILY  , Disp: 180 tablet, Rfl: 5    sucralfate (CARAFATE) 1 g/10 mL suspension, Take 10 mL (1 g total) by mouth 4 (four) times a day (with meals and at bedtime), Disp: 1200 mL, Rfl: 4    timolol (BETIMOL) 0 5 % ophthalmic solution, Every 12 hours, Disp: , Rfl:     TOVIAZ 8 MG TB24, TAKE 1 TABLET BY MOUTH ONCE DAILY  , Disp: 90 tablet, Rfl: 2    VENTOLIN  (90 Base) MCG/ACT inhaler, inhale 2 puffs by mouth every 4 - 6 hours as needed as needed, Disp: 18 g, Rfl: 4    amitriptyline (ELAVIL) 25 mg tablet, TAKE ONE TABLET BY MOUTH DAILY AT BEDTIME , Disp: , Rfl:     anastrozole (ARIMIDEX) 1 mg tablet, take 1 tablet (1MG)  by oral route  every day, Disp: , Rfl:     Calcium Carbonate-Vitamin D (CALCIUM 500 + D) 500-125 MG-UNIT TABS, every 24 hours, Disp: , Rfl:     docusate sodium (COLACE) 100 mg capsule, Take 1 capsule (100 mg total) by mouth 3 (three) times a day as needed for constipation for up to 90 days, Disp: 60 capsule, Rfl: 0    hydrocortisone (ANUSOL-HC, PROCTOSOL HC,) 2 5 % rectal cream, Insert into the rectum 2 (two) times a day for 30 days, Disp: 30 g, Rfl: 2  Allergies   Allergen Reactions    No Active Allergies        No results found for this visit on 09/11/19        Lab Results   Component Value Date    CHOL 160 05/11/2018     Lab Results   Component Value Date    HDL 63 (H) 05/30/2019    HDL 65 (H) 09/12/2018    HDL 57 05/11/2018     Lab Results   Component Value Date    LDLCALC 89 05/30/2019    LDLCALC 98 09/12/2018    LDLCALC 73 05/11/2018     Lab Results   Component Value Date    TRIG 104 05/30/2019    TRIG 123 09/12/2018    TRIG 149 05/11/2018     No results found for: CHOLHDL  Lab Results   Component Value Date    SODIUM 137 05/30/2019    K 4 0 05/30/2019     05/30/2019    CO2 29 05/30/2019    BUN 33 (H) 05/30/2019    CREATININE 0 80 05/30/2019    CALCIUM 8 9 05/30/2019     Lab Results   Component Value Date     05/11/2018    SODIUM 137 05/30/2019    K 4 0 05/30/2019     05/30/2019    CO2 29 05/30/2019    ANIONGAP 12 05/11/2018    AGAP 5 05/30/2019    BUN 33 (H) 05/30/2019    CREATININE 0 80 05/30/2019    GLUF 90 05/30/2019    CALCIUM 8 9 05/30/2019    AST 29 09/12/2018    ALT 24 09/12/2018    ALKPHOS 105 09/12/2018    PROT 7 5 05/11/2018    TP 8 7 (H) 09/12/2018    BILITOT 0 3 05/11/2018    TBILI 0 60 09/12/2018    EGFR 67 05/30/2019     Lab Results   Component Value Date    WBC 5 45 05/30/2019    HGB 11 8 05/30/2019    HCT 36 9 05/30/2019    MCV 93 05/30/2019     05/30/2019     Lab Results   Component Value Date    [id number] 1 730 05/30/2019         Review of Systems:  Review of Systems   Respiratory: Negative for cough, choking, chest tightness, shortness of breath and wheezing  Cardiovascular: Negative for chest pain, palpitations and leg swelling  Musculoskeletal: Negative for gait problem  Skin: Negative for rash  Neurological: Negative for dizziness, tremors, syncope, weakness, light-headedness, numbness and headaches  Psychiatric/Behavioral: Negative for agitation and behavioral problems  The patient is not hyperactive  Physical Exam:  /72 (BP Location: Left arm, Patient Position: Sitting, Cuff Size: Large)   Pulse 80   Ht 4' 11" (1 499 m)   Wt 94 kg (207 lb 3 2 oz)   BMI 41 85 kg/m²    Physical Exam   Constitutional: She is oriented to person, place, and time  She appears well-developed and well-nourished  No distress  HENT:   Head: Normocephalic and atraumatic  Neck: No JVD present  No thyromegaly present  Cardiovascular: Normal rate, regular rhythm and intact distal pulses  Exam reveals no gallop and no friction rub  Murmur heard  Grade 1/6 systolic ejection murmur, early peaking heard at the base and radiating to the carotids  Murmur is also heard at the apex  Pulmonary/Chest: No respiratory distress  She has no wheezes  She has no rales  Musculoskeletal: She exhibits edema  Neurological: She is alert and oriented to person, place, and time  Skin: Skin is warm and dry  Psychiatric: She has a normal mood and affect   Her behavior is normal        Discussion/Summary:  Return in 6 months

## 2019-09-23 DIAGNOSIS — I10 BENIGN ESSENTIAL HYPERTENSION: Primary | ICD-10-CM

## 2019-09-23 DIAGNOSIS — N32.81 OVERACTIVE BLADDER: ICD-10-CM

## 2019-09-23 DIAGNOSIS — E78.2 MIXED HYPERLIPIDEMIA: ICD-10-CM

## 2019-09-24 RX ORDER — ATORVASTATIN CALCIUM 20 MG/1
TABLET, FILM COATED ORAL
Qty: 90 TABLET | Refills: 0 | Status: SHIPPED | OUTPATIENT
Start: 2019-09-24 | End: 2020-04-01 | Stop reason: SDUPTHER

## 2019-09-24 RX ORDER — LISINOPRIL 40 MG/1
TABLET ORAL
Qty: 90 TABLET | Refills: 0 | Status: SHIPPED | OUTPATIENT
Start: 2019-09-24 | End: 2020-04-01 | Stop reason: SDUPTHER

## 2019-09-24 RX ORDER — FESOTERODINE FUMARATE 8 MG/1
TABLET, FILM COATED, EXTENDED RELEASE ORAL
Qty: 90 TABLET | Refills: 0 | Status: SHIPPED | OUTPATIENT
Start: 2019-09-24 | End: 2020-04-01 | Stop reason: SDUPTHER

## 2019-10-03 ENCOUNTER — APPOINTMENT (OUTPATIENT)
Dept: ULTRASOUND IMAGING | Facility: HOSPITAL | Age: 84
End: 2019-10-03
Payer: COMMERCIAL

## 2019-10-03 ENCOUNTER — OFFICE VISIT (OUTPATIENT)
Dept: FAMILY MEDICINE CLINIC | Facility: CLINIC | Age: 84
End: 2019-10-03

## 2019-10-03 ENCOUNTER — APPOINTMENT (EMERGENCY)
Dept: RADIOLOGY | Facility: HOSPITAL | Age: 84
End: 2019-10-03
Payer: COMMERCIAL

## 2019-10-03 ENCOUNTER — HOSPITAL ENCOUNTER (OUTPATIENT)
Facility: HOSPITAL | Age: 84
Setting detail: OBSERVATION
Discharge: HOME | End: 2019-10-04
Attending: EMERGENCY MEDICINE | Admitting: FAMILY MEDICINE
Payer: COMMERCIAL

## 2019-10-03 VITALS
DIASTOLIC BLOOD PRESSURE: 78 MMHG | SYSTOLIC BLOOD PRESSURE: 124 MMHG | HEIGHT: 59 IN | OXYGEN SATURATION: 98 % | TEMPERATURE: 97.5 F | WEIGHT: 204 LBS | RESPIRATION RATE: 18 BRPM | BODY MASS INDEX: 41.12 KG/M2 | HEART RATE: 85 BPM

## 2019-10-03 DIAGNOSIS — R26.2 AMBULATORY DYSFUNCTION: ICD-10-CM

## 2019-10-03 DIAGNOSIS — R07.9 CHEST PAIN, UNSPECIFIED TYPE: Primary | ICD-10-CM

## 2019-10-03 DIAGNOSIS — R07.89 OTHER CHEST PAIN: ICD-10-CM

## 2019-10-03 LAB
ANION GAP SERPL CALCULATED.3IONS-SCNC: 7 MMOL/L (ref 5–14)
BASOPHILS # BLD AUTO: 0 THOUSANDS/ΜL (ref 0–0.1)
BASOPHILS NFR BLD AUTO: 1 % (ref 0–1)
BUN SERPL-MCNC: 32 MG/DL (ref 5–25)
CALCIUM SERPL-MCNC: 9.2 MG/DL (ref 8.4–10.2)
CHLORIDE SERPL-SCNC: 99 MMOL/L (ref 97–108)
CO2 SERPL-SCNC: 31 MMOL/L (ref 22–30)
CREAT SERPL-MCNC: 0.68 MG/DL (ref 0.6–1.2)
EOSINOPHIL # BLD AUTO: 0.6 THOUSAND/ΜL (ref 0–0.4)
EOSINOPHIL NFR BLD AUTO: 10 % (ref 0–6)
ERYTHROCYTE [DISTWIDTH] IN BLOOD BY AUTOMATED COUNT: 13.8 %
GFR SERPL CREATININE-BSD FRML MDRD: 80 ML/MIN/1.73SQ M
GLUCOSE SERPL-MCNC: 100 MG/DL (ref 70–99)
HCT VFR BLD AUTO: 35.6 % (ref 36–46)
HGB BLD-MCNC: 11.6 G/DL (ref 12–16)
LYMPHOCYTES # BLD AUTO: 1.5 THOUSANDS/ΜL (ref 0.5–4)
LYMPHOCYTES NFR BLD AUTO: 24 % (ref 25–45)
MCH RBC QN AUTO: 29 PG (ref 26.8–34.3)
MCHC RBC AUTO-ENTMCNC: 32.6 G/DL (ref 31.4–37.4)
MCV RBC AUTO: 89 FL (ref 80–100)
MONOCYTES # BLD AUTO: 0.5 THOUSAND/ΜL (ref 0.2–0.9)
MONOCYTES NFR BLD AUTO: 7 % (ref 1–10)
NEUTROPHILS # BLD AUTO: 3.6 THOUSANDS/ΜL (ref 1.8–7.8)
NEUTS SEG NFR BLD AUTO: 58 % (ref 45–65)
PLATELET # BLD AUTO: 216 THOUSANDS/UL (ref 150–450)
PMV BLD AUTO: 8.3 FL (ref 8.9–12.7)
POTASSIUM SERPL-SCNC: 4.4 MMOL/L (ref 3.6–5)
RBC # BLD AUTO: 3.99 MILLION/UL (ref 4–5.2)
SODIUM SERPL-SCNC: 137 MMOL/L (ref 137–147)
TROPONIN I SERPL-MCNC: <0.01 NG/ML (ref 0–0.03)
TSH SERPL DL<=0.05 MIU/L-ACNC: 0.85 UIU/ML (ref 0.47–4.68)
WBC # BLD AUTO: 6.2 THOUSAND/UL (ref 4.31–10.16)

## 2019-10-03 PROCEDURE — 85025 COMPLETE CBC W/AUTO DIFF WBC: CPT | Performed by: EMERGENCY MEDICINE

## 2019-10-03 PROCEDURE — 99285 EMERGENCY DEPT VISIT HI MDM: CPT

## 2019-10-03 PROCEDURE — 84484 ASSAY OF TROPONIN QUANT: CPT | Performed by: EMERGENCY MEDICINE

## 2019-10-03 PROCEDURE — 1160F RVW MEDS BY RX/DR IN RCRD: CPT | Performed by: FAMILY MEDICINE

## 2019-10-03 PROCEDURE — 80048 BASIC METABOLIC PNL TOTAL CA: CPT | Performed by: EMERGENCY MEDICINE

## 2019-10-03 PROCEDURE — 99285 EMERGENCY DEPT VISIT HI MDM: CPT | Performed by: EMERGENCY MEDICINE

## 2019-10-03 PROCEDURE — 36415 COLL VENOUS BLD VENIPUNCTURE: CPT | Performed by: EMERGENCY MEDICINE

## 2019-10-03 PROCEDURE — 1111F DSCHRG MED/CURRENT MED MERGE: CPT | Performed by: FAMILY MEDICINE

## 2019-10-03 PROCEDURE — 71045 X-RAY EXAM CHEST 1 VIEW: CPT

## 2019-10-03 PROCEDURE — 93005 ELECTROCARDIOGRAM TRACING: CPT

## 2019-10-03 PROCEDURE — G0008 ADMIN INFLUENZA VIRUS VAC: HCPCS | Performed by: FAMILY MEDICINE

## 2019-10-03 PROCEDURE — NC001 PR NO CHARGE: Performed by: FAMILY MEDICINE

## 2019-10-03 PROCEDURE — 99213 OFFICE O/P EST LOW 20 MIN: CPT | Performed by: FAMILY MEDICINE

## 2019-10-03 PROCEDURE — 90662 IIV NO PRSV INCREASED AG IM: CPT | Performed by: FAMILY MEDICINE

## 2019-10-03 PROCEDURE — 84443 ASSAY THYROID STIM HORMONE: CPT | Performed by: FAMILY MEDICINE

## 2019-10-03 PROCEDURE — 84484 ASSAY OF TROPONIN QUANT: CPT | Performed by: FAMILY MEDICINE

## 2019-10-03 PROCEDURE — 93000 ELECTROCARDIOGRAM COMPLETE: CPT | Performed by: FAMILY MEDICINE

## 2019-10-03 RX ORDER — FAMOTIDINE 20 MG/1
20 TABLET, FILM COATED ORAL 2 TIMES DAILY
Status: DISCONTINUED | OUTPATIENT
Start: 2019-10-03 | End: 2019-10-04 | Stop reason: HOSPADM

## 2019-10-03 RX ORDER — TIMOLOL MALEATE 5 MG/ML
1 SOLUTION/ DROPS OPHTHALMIC 2 TIMES DAILY
Status: DISCONTINUED | OUTPATIENT
Start: 2019-10-03 | End: 2019-10-04 | Stop reason: HOSPADM

## 2019-10-03 RX ORDER — LISINOPRIL 20 MG/1
40 TABLET ORAL DAILY
Status: DISCONTINUED | OUTPATIENT
Start: 2019-10-04 | End: 2019-10-04 | Stop reason: HOSPADM

## 2019-10-03 RX ORDER — FLUTICASONE FUROATE AND VILANTEROL 200; 25 UG/1; UG/1
1 POWDER RESPIRATORY (INHALATION) DAILY
Status: DISCONTINUED | OUTPATIENT
Start: 2019-10-03 | End: 2019-10-04 | Stop reason: HOSPADM

## 2019-10-03 RX ORDER — HYDROCHLOROTHIAZIDE 25 MG/1
25 TABLET ORAL DAILY
Status: DISCONTINUED | OUTPATIENT
Start: 2019-10-04 | End: 2019-10-04 | Stop reason: HOSPADM

## 2019-10-03 RX ORDER — FLUTICASONE FUROATE AND VILANTEROL 200; 25 UG/1; UG/1
1 POWDER RESPIRATORY (INHALATION) 2 TIMES DAILY
Status: DISCONTINUED | OUTPATIENT
Start: 2019-10-03 | End: 2019-10-03

## 2019-10-03 RX ORDER — ALBUTEROL SULFATE 90 UG/1
2 AEROSOL, METERED RESPIRATORY (INHALATION) EVERY 4 HOURS PRN
Status: DISCONTINUED | OUTPATIENT
Start: 2019-10-03 | End: 2019-10-04 | Stop reason: HOSPADM

## 2019-10-03 RX ORDER — ATORVASTATIN CALCIUM 20 MG/1
20 TABLET, FILM COATED ORAL
Status: DISCONTINUED | OUTPATIENT
Start: 2019-10-03 | End: 2019-10-04 | Stop reason: HOSPADM

## 2019-10-03 RX ORDER — OXYBUTYNIN CHLORIDE 5 MG/1
10 TABLET, EXTENDED RELEASE ORAL DAILY
Status: DISCONTINUED | OUTPATIENT
Start: 2019-10-04 | End: 2019-10-04 | Stop reason: HOSPADM

## 2019-10-03 RX ORDER — SUCRALFATE ORAL 1 G/10ML
1000 SUSPENSION ORAL
Status: DISCONTINUED | OUTPATIENT
Start: 2019-10-03 | End: 2019-10-04 | Stop reason: HOSPADM

## 2019-10-03 RX ORDER — FUROSEMIDE 40 MG/1
40 TABLET ORAL DAILY
Status: DISCONTINUED | OUTPATIENT
Start: 2019-10-04 | End: 2019-10-04 | Stop reason: HOSPADM

## 2019-10-03 RX ADMIN — TIMOLOL MALEATE 1 DROP: 5 SOLUTION/ DROPS OPHTHALMIC at 17:40

## 2019-10-03 RX ADMIN — FLUTICASONE FUROATE AND VILANTEROL TRIFENATATE 1 PUFF: 200; 25 POWDER RESPIRATORY (INHALATION) at 17:42

## 2019-10-03 RX ADMIN — SUCRALFATE 1000 MG: 1 SUSPENSION ORAL at 21:15

## 2019-10-03 RX ADMIN — FAMOTIDINE 20 MG: 20 TABLET ORAL at 17:41

## 2019-10-03 RX ADMIN — INFLUENZA A VIRUS A/MICHIGAN/45/2015 X-275 (H1N1) ANTIGEN (FORMALDEHYDE INACTIVATED), INFLUENZA A VIRUS A/SINGAPORE/INFIMH-16-0019/2016 IVR-186 (H3N2) ANTIGEN (FORMALDEHYDE INACTIVATED), AND INFLUENZA B VIRUS B/MARYLAND/15/2016 BX-69A (A B/COLORADO/6/2017-LIKE VIRUS) ANTIGEN (FORMALDEHYDE INACTIVATED) 0.5 ML: 60; 60; 60 INJECTION, SUSPENSION INTRAMUSCULAR at 14:13

## 2019-10-03 RX ADMIN — ATORVASTATIN CALCIUM 20 MG: 20 TABLET, FILM COATED ORAL at 17:41

## 2019-10-03 RX ADMIN — SUCRALFATE 1000 MG: 1 SUSPENSION ORAL at 17:41

## 2019-10-03 NOTE — PROGRESS NOTES
Assessment/Plan:    Other chest pain  Patient initially presents for " left sided breast pain" however given her significant PMHx she is at very high risk for cardiac pathology  - Will get ECG POCT  - on EKG patient appears to have and inferior infarct age undetermined  -reviewing patient's previous EKGs it is notable for an anterior septal MI found on EKG which was not apparent at a previous EKG 1 year ago   -spoke to patient and son-in-law out patient's risk factors given the fact that she had previous heart attack that she is at very high risk for having another 1 especially with her comorbidities  -will send patient to emergency department for further evaluation at this time       Diagnoses and all orders for this visit:    Chest pain, unspecified type  -     POCT ECG    Other chest pain          Subjective:      Patient ID: Rose Infante is a 80 y o  female  This is a very pleasant 60-year-old female presents to the clinic for sick visit  Patient states that she has been experiencing left breast pain" since Saturday  She describes as left-sided chest pain, she is unsure if his worsened with breathing and or exertion  Patient has significant comorbidities putting her at high risk for cardiac pathology  The following portions of the patient's history were reviewed and updated as appropriate: allergies, current medications, past family history, past medical history, past social history, past surgical history and problem list     Review of Systems   Constitutional: Negative for chills and fever  HENT: Negative for congestion and sore throat  Eyes: Negative for visual disturbance  Respiratory: Negative for wheezing  Cardiovascular: Positive for chest pain  Gastrointestinal: Negative for abdominal pain, blood in stool and nausea  Endocrine: Negative for cold intolerance and heat intolerance  Genitourinary: Negative for dysuria and hematuria     Musculoskeletal: Negative for gait problem  Skin: Negative for rash  Allergic/Immunologic: Negative for environmental allergies  Neurological: Negative for syncope  Hematological: Does not bruise/bleed easily  Psychiatric/Behavioral: Negative for behavioral problems  Objective:      /78 (BP Location: Left arm, Patient Position: Sitting, Cuff Size: Large)   Pulse 85   Temp 97 5 °F (36 4 °C) (Temporal)   Resp 18   Ht 4' 11" (1 499 m)   Wt 92 5 kg (204 lb)   SpO2 98%   BMI 41 20 kg/m²          Physical Exam   Constitutional: She is oriented to person, place, and time  She appears well-developed and well-nourished  No distress  HENT:   Head: Normocephalic and atraumatic  Right Ear: External ear normal    Left Ear: External ear normal    Nose: Nose normal    Mouth/Throat: Oropharynx is clear and moist    Eyes: Pupils are equal, round, and reactive to light  Conjunctivae and EOM are normal  Right eye exhibits no discharge  Left eye exhibits no discharge  Neck: Normal range of motion  Neck supple  No JVD present  No tracheal deviation present  No thyromegaly present  Cardiovascular: Normal rate, regular rhythm and intact distal pulses  Exam reveals no gallop and no friction rub  Murmur heard  3/6 systolic murmur noted at aortic and pulmonic post   Pulmonary/Chest: Effort normal and breath sounds normal  No respiratory distress  She has no wheezes  She exhibits no tenderness  Abdominal: Soft  Bowel sounds are normal  She exhibits no distension  There is no tenderness  There is no rebound  Musculoskeletal: Normal range of motion  She exhibits tenderness  She exhibits no edema  Chest pain reproducible on palpation    Neurological: She is alert and oriented to person, place, and time  She has normal reflexes  Coordination normal    Skin: Skin is warm and dry  No rash noted  She is not diaphoretic  No erythema  Psychiatric: She has a normal mood and affect   Her behavior is normal  Thought content normal  Nursing note and vitals reviewed

## 2019-10-03 NOTE — ED NOTES
Attempt made to call report no nurse assigned at this time       Jameson Carrera, YELITZA  10/03/19 2357

## 2019-10-03 NOTE — H&P
History and Physical - Mateo Rivera    Patient Information: Isabela Michel 80 y o  female MRN: [id number]  Unit/Bed#: 7T [facility] 716-02 Encounter: [id number]  Admitting Physician: Rach Edwards MD  PCP: Shayne Nielson MD  Date of Admission:  10/03/19    Assessment and Plan    * Chest pain  Assessment & Plan  Symptoms of left sided chest pain, more localized in area of left breast, reproducible to palpation without radiation  Patient evaluated earlier in the day in outpatient clinic where EKG was done, which showed new Q wave changes from previous EKG on chart, due to which patient was referred to the ED for further evaluation  EKG done at ED confirmed Q wave changes in lead III and aVF, 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, unchanged from EKG done at the outpatient clinic  Echocardiogram done on December, 2018 showing mild left ventricular hypertrophy with an EF of 60-65% and grade 1 diastolic dysfunction  Differential diagnosis include ACS vs costochondritis  HEART score 4, patient would benefit from inpatient admission for further evaluation and rule out cardiac causes  Troponin negative X1, CXR with no acute pathology noted  - admit to med surg floor with telemetry  - trend troponin q3h x2   - maintain O2 saturation >94%  - ekg am  - echocardiogram  - lipid panel, TSH am   - CBC, BMP AM       Benign essential hypertension  Assessment & Plan  Blood Pressure: 114/83     Controlled, Currently on lisinopril 40 mg, HCTZ 25 mg  Will continue  COPD (chronic obstructive pulmonary disease) ([facility] [address] )  Assessment & Plan  Not in exacerbation  Currently on Advair, Albuterol PRN  Will continue  HLD (hyperlipidemia)  Assessment & Plan  Currently on atorvastastin 20 mg daily   Will continue     -lipid panel    Malignant neoplasm of overlapping sites of left female breast [facility])  Assessment & Plan  History of invasive ductal carcinoma of left breast, underwent lumpectomy with sentinel lymph node biopsy in 2013  Received radiotherapy and 5 years of adjuvant anastrozole therapy that completed on January, 2019  Patient describes left side chest pain more localized in area of left breast        VTE Prophylaxis: not indicated- patient ambulates  Code Status: Level 1 - Full Code  Anticipated Length of Stay:  Patient will be admitted on an Observation basis with an anticipated length of stay of  less than 2 midnights  Justification for Hospital Stay: chest pain, rule out ACS  Total Time for Visit, including Counseling / Coordination of Care: 60 mins  Greater than 50% of this total time spent on direct patient counseling and coordination of care  Chief Complaint:     Chief Complaint   Patient presents with    Chest Pain     Per EMS, patient sent from doctor's office, patient has had consistent chest pain since Thursday (5 days PTA)     History of Present Illness:    Geraldine Mcrae is a 80 y o  female with a PMHx of myocardial infarction (6 months ago), non-rheumatic aortic stenosis, and intraductal carcinoma of the breast (2013) currently in remission who presents with severe chest pain starting Saturday, September 28th  The patient states the pain is located on the left side of the chest, non-radiating, that is reproducible upon palpation  The patient stated that she frequently gets similar chest pain for which she uses a "home remedy" consisting of menthol and putting a cooling towel on her head which the patient states usually works  The patient reported that this ritual was somewhat helpful in reducing the pain on this occasion, but last night the pain became worse to the point that she sought medical help this morning  Geraldine saw Dr Adina Ferguson at [address] who had an EKG taken that showed new Q waves changes from previous on chart, prompting his sending the patient to the ER for further evaluation     During ED evaluation, EKG was done, that confirmed Q waves in leads III and aVF, troponin X1 normal  The patient denies coughing, nausea, vomiting, dizziness, and shortness of breath  The patient has a family history significant for myocardial infarction in her sister who  at the age of 80years old due to a myocardial infarction, her mother who  at age 80years old due to a myocardial infarction, and her father  Patient was admitted to our services for observation due to above complaints and cardiac history  Review of Systems:  Review of Systems   Constitutional: Negative for appetite change  Respiratory: Negative for cough and shortness of breath  Cardiovascular: Positive for chest pain ( left side, localized near area of left breast)  Negative for palpitations and leg swelling  Gastrointestinal: Negative for abdominal pain  Musculoskeletal: Negative for arthralgias  Skin: Negative for color change, pallor and rash  Neurological: Negative for headaches  Past Medical and Surgical History:   Past Medical History:   Diagnosis Date    Diverticulosis     Heart failure ([address] )     History of cystocele     Hypertension     Obesity     Positive PPD     treated     Past Surgical History:   Procedure Laterality Date    CARDIOVASCULAR STRESS TEST      dobutamine stress test    CHOLECYSTECTOMY  2002    COLONOSCOPY      CYSTOCELE REPAIR      HYSTERECTOMY      KNEE SURGERY      knee replacement    UPPER GASTROINTESTINAL ENDOSCOPY       Meds/Allergies: Allergies: Allergies   Allergen Reactions    No Active Allergies      Prior to Admission Medications   Prescriptions Last Dose Informant Patient Reported? Taking? ADVAIR DISKUS 500-50 MCG/DOSE inhaler 10/3/2019 at Unknown time Self No Yes   Sig: Inhale 1 puff by mouth 2 (two) times a day  Rinse mouth after use  Calcium Carbonate-Vitamin D (CALCIUM 500 + D) 500-125 MG-UNIT TABS 10/3/2019 at Unknown time Self Yes Yes   Sig: every 24 hours   Misc   Devices (CANE) MISC  Self Yes No   Sig: Four point aluminum cane  TOVIAZ 8 MG TB24 10/3/2019 at Unknown time  No Yes   Sig: TAKE 1 TABLET BY MOUTH ONCE DAILY  VENTOLIN  (90 Base) MCG/ACT inhaler 10/3/2019 at Unknown time Self No Yes   Sig: inhale 2 puffs by mouth every 4 - 6 hours as needed as needed   acetaminophen (TYLENOL) 650 mg CR tablet Not Taking at Unknown time Self Yes No   Si tablet every 6 (six) hours   amitriptyline (ELAVIL) 25 mg tablet Not Taking at Unknown time  Yes No   Sig: TAKE ONE TABLET BY MOUTH DAILY AT BEDTIME  anastrozole (ARIMIDEX) 1 mg tablet Not Taking at Unknown time Self Yes No   Sig: take 1 tablet (1MG)  by oral route  every day   atorvastatin (LIPITOR) 20 mg tablet 10/2/2019 at Unknown time  No Yes   Sig: Take 1 tablet (20 mg total) by mouth daily for 90 days   dextromethorphan-guaiFENesin (ROBITUSSIN DM)  mg/5 mL syrup Not Taking at Unknown time  No No   Sig: Take 5 mL by mouth 3 (three) times a day as needed for cough   Patient not taking: Reported on 10/3/2019   fluticasone (FLONASE) 50 mcg/act nasal spray Not Taking at Unknown time  No No   Si spray into each nostril 2 (two) times a day   Patient not taking: Reported on 10/3/2019   furosemide (LASIX) 40 mg tablet 10/3/2019 at Unknown time  No Yes   Sig: TAKE 1 TABLET BY MOUTH ONCE DAILY  hydrochlorothiazide (HYDRODIURIL) 25 mg tablet 10/3/2019 at Unknown time  No Yes   Sig: TAKE 1 TABLET BY MOUTH ONCE DAILY  hydrocortisone (ANUSOL-HC, PROCTOSOL HC,) 2 5 % rectal cream   No No   Sig: Insert into the rectum 2 (two) times a day for 30 days   hydrocortisone 2 5 % cream 10/3/2019 at Unknown time Self Yes Yes   Sig: Apply topically 4 (four) times a day as needed   lisinopril (ZESTRIL) 40 mg tablet Not Taking at Unknown time  Yes No   Sig: Take 40 mg by mouth daily   lisinopril (ZESTRIL) 40 mg tablet 10/3/2019 at Unknown time  No Yes   Sig: TAKE 1 TABLET BY MOUTH ONCE DAILY     ranitidine (ZANTAC) 150 mg tablet 10/3/2019 at Unknown time No Yes   Sig: TAKE 1 TABLET BY MOUTH TWICE DAILY  sucralfate (CARAFATE) 1 g/10 mL suspension 10/3/2019 at Unknown time  No Yes   Sig: Take 10 mL (1 g total) by mouth 4 (four) times a day (with meals and at bedtime)   timolol (BETIMOL) 0 5 % ophthalmic solution 10/3/2019 at Unknown time Self Yes Yes   Sig: Administer 1 drop to both eyes 2 (two) times a day       Facility-Administered Medications: None     Social History:     Social History     Socioeconomic History    Marital status:      Spouse name: Not on file    Number of children: Not on file    Years of education: Not on file    Highest education level: Not on file   Occupational History    Not on file   Social Needs    Financial resource strain: Not on file    Food insecurity:     Worry: Not on file     Inability: Not on file    Transportation needs:     Medical: Not on file     Non-medical: Not on file   Tobacco Use    Smoking status: Never Smoker    Smokeless tobacco: Never Used   Substance and Sexual Activity    Alcohol use: No    Drug use: No    Sexual activity: Not Currently     Partners: Male   Lifestyle    Physical activity:     Days per week: Not on file     Minutes per session: Not on file    Stress: Not on file   Relationships    Social connections:     Talks on phone: Not on file     Gets together: Not on file     Attends Church service: Not on file     Active member of club or organization: Not on file     Attends meetings of clubs or organizations: Not on file     Relationship status: Not on file    Intimate partner violence:     Fear of current or ex partner: Not on file     Emotionally abused: Not on file     Physically abused: Not on file     Forced sexual activity: Not on file   Other Topics Concern    Not on file   Social History Narrative    Lives at home with son and 2 grandkids       Patient Pre-hospital Living Situation: home  Patient Pre-hospital Level of Mobility: ambulation  Patient Pre-hospital Diet Restrictions: none    Family History:  Family History   Family history unknown: Yes     Physical Exam:   Vitals:   Blood Pressure: 114/83 (10/03/19 1530)  Pulse: 71 (10/03/19 1530)  Temperature: (!) 97 4 °F (36 3 °C) (10/03/19 1530)  Temp Source: Temporal (10/03/19 1530)  Respirations: 20 (10/03/19 1530)  Height: 4' 11" (149 9 cm) (10/03/19 1315)  Weight - Scale: 92 1 kg (203 lb) (10/03/19 1321)  SpO2: 98 % (10/03/19 1530)    Physical Exam   Constitutional: She is oriented to person, place, and time  She appears well-developed and well-nourished  No distress  HENT:   Head: Normocephalic and atraumatic  Eyes: EOM are normal  No scleral icterus  Neck: Neck supple  Cardiovascular: Regular rhythm  No murmur heard  Pulmonary/Chest: Effort normal and breath sounds normal  No respiratory distress  She exhibits tenderness ( left side chest pain reproducible upon palpation)  Abdominal: Soft  Bowel sounds are normal  She exhibits no distension  There is no tenderness  There is no guarding  Musculoskeletal: She exhibits no tenderness  Neurological: She is alert and oriented to person, place, and time  Skin: Skin is warm  No rash noted  She is not diaphoretic  No erythema  Psychiatric: She has a normal mood and affect  Lab Results: I have personally reviewed pertinent reports      Results from last 7 days   Lab Units 10/03/19  1033   WBC Thousand/uL 6 20   HEMOGLOBIN g/dL 11 6*   HEMATOCRIT % 35 6*   PLATELETS Thousands/uL 216   NEUTROS PCT % 58   LYMPHS PCT % 24*   MONOS PCT % 7   EOS PCT % 10*     Results from last 7 days   Lab Units 10/03/19  1033   POTASSIUM mmol/L 4 4   CHLORIDE mmol/L 99   CO2 mmol/L 31*   BUN mg/dL 32*   CREATININE mg/dL 0 68   CALCIUM mg/dL 9 2   EGFR ml/min/1 73sq m 80         Results from last 7 days   Lab Units 10/03/19  1428 10/03/19  1033   TROPONIN I ng/mL <0 01 <0 01                        Invalid input(s): URIBILINOGEN          Imaging: I have personally reviewed pertinent reports  Xr Chest Portable    Result Date: 10/3/2019  Narrative: CHEST INDICATION:   cp  COMPARISON:  Chest radiograph 4/13/2015 EXAM PERFORMED/VIEWS:  XR CHEST PORTABLE FINDINGS: Cardiomediastinal silhouette appears unremarkable  The lungs are clear  No pneumothorax or pleural effusion  Postsurgical changes of the left shoulder  Osseous structures appear stable from prior exam      Impression: No acute cardiopulmonary disease  Workstation performed: [id number]       EKG, Pathology, and Other Studies Reviewed on Admission:   EKG  Result Date: 10/03/19  Impression: EKG done at ED showed sinus rhythm with Q waves in lead III and aVF, unchanged from previous study done at outpatient clinic    [employer] Records Reviewed: Yes    Entire H&P was discussed with Dr Shilpi Pratt who agreed to what is noted above    Elizabeth Cuenca MD  10/03/19  6:28 PM

## 2019-10-03 NOTE — NURSING NOTE
Pt received as admit from ecu to room 716 02 without problem, pt Vincentian speaking, son in law in room translating, pt alert and oriented x4, c/o of intermittent medial left breast on and off for 6 days, pain worse with palpation, denied sob   SR on monitor

## 2019-10-03 NOTE — ASSESSMENT & PLAN NOTE
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

## 2019-10-03 NOTE — ASSESSMENT & PLAN NOTE
Patient initially presents for " left sided breast pain" however given her significant PMHx she is at very high risk for cardiac pathology  - Will get ECG POCT  - on EKG patient appears to have and inferior infarct age undetermined  -reviewing patient's previous EKGs it is notable for an anterior septal MI found on EKG which was not apparent at a previous EKG 1 year ago   -spoke to patient and son-in-law out patient's risk factors given the fact that she had previous heart attack that she is at very high risk for having another 1 especially with her comorbidities    -will send patient to emergency department for further evaluation at this time

## 2019-10-03 NOTE — ED PROVIDER NOTES
History  Chief Complaint   Patient presents with    Chest Pain     Per EMS, patient sent from doctor's office, patient has had consistent chest pain since Thursday (5 days PTA)     Patient is a 80-year-old female with a recent history of a MI presents from the [facility] office with intermittent left-sided nonradiating chest pain for the last 6 days  Patient states that she has the pain when she presses on her chest   Denies any cough difficulty breathing nausea vomiting dizziness or other complaints  Did not take anything for it  Had an EKG done at the office today and staff called in stating that there new Q-waves  Sent patient in for evaluation with troponin  Patient cannot characterize or quantify the pain  Again only pressing on her chest triggers it and not pressing makes it better  Prior to Admission Medications   Prescriptions Last Dose Informant Patient Reported? Taking? ADVAIR DISKUS 500-50 MCG/DOSE inhaler  Self No No   Sig: Inhale 1 puff by mouth 2 (two) times a day  Rinse mouth after use  Calcium Carbonate-Vitamin D (CALCIUM 500 + D) 500-125 MG-UNIT TABS  Self Yes No   Sig: every 24 hours   Misc  Devices (CANE) MISC  Self Yes No   Sig: Four point aluminum cane  TOVIAZ 8 MG TB24   No No   Sig: TAKE 1 TABLET BY MOUTH ONCE DAILY  VENTOLIN  (90 Base) MCG/ACT inhaler  Self No No   Sig: inhale 2 puffs by mouth every 4 - 6 hours as needed as needed   acetaminophen (TYLENOL) 650 mg CR tablet  Self Yes No   Si tablet every 6 (six) hours   amitriptyline (ELAVIL) 25 mg tablet   Yes No   Sig: TAKE ONE TABLET BY MOUTH DAILY AT BEDTIME     anastrozole (ARIMIDEX) 1 mg tablet  Self Yes No   Sig: take 1 tablet (1MG)  by oral route  every day   atorvastatin (LIPITOR) 20 mg tablet   No No   Sig: Take 1 tablet (20 mg total) by mouth daily for 90 days   dextromethorphan-guaiFENesin (ROBITUSSIN DM)  mg/5 mL syrup   No No   Sig: Take 5 mL by mouth 3 (three) times a day as needed for cough   docusate sodium (COLACE) 100 mg capsule  Self No No   Sig: Take 1 capsule (100 mg total) by mouth 3 (three) times a day as needed for constipation for up to 90 days   fluticasone (FLONASE) 50 mcg/act nasal spray   No No   Si spray into each nostril 2 (two) times a day   furosemide (LASIX) 40 mg tablet   No No   Sig: TAKE 1 TABLET BY MOUTH ONCE DAILY  hydrochlorothiazide (HYDRODIURIL) 25 mg tablet   No No   Sig: TAKE 1 TABLET BY MOUTH ONCE DAILY  hydrocortisone (ANUSOL-HC, PROCTOSOL HC,) 2 5 % rectal cream   No No   Sig: Insert into the rectum 2 (two) times a day for 30 days   hydrocortisone 2 5 % cream  Self Yes No   Sig: Apply topically 4 (four) times a day as needed   lisinopril (ZESTRIL) 40 mg tablet   Yes No   Sig: Take 40 mg by mouth daily   lisinopril (ZESTRIL) 40 mg tablet   No No   Sig: TAKE 1 TABLET BY MOUTH ONCE DAILY  ranitidine (ZANTAC) 150 mg tablet   No No   Sig: TAKE 1 TABLET BY MOUTH TWICE DAILY  sucralfate (CARAFATE) 1 g/10 mL suspension   No No   Sig: Take 10 mL (1 g total) by mouth 4 (four) times a day (with meals and at bedtime)   timolol (BETIMOL) 0 5 % ophthalmic solution  Self Yes No   Sig: Every 12 hours      Facility-Administered Medications: None       Past Medical History:   Diagnosis Date    Diverticulosis     Heart failure ([facility] [address] )     History of cystocele     Hypertension     Obesity     Positive PPD     treated       Past Surgical History:   Procedure Laterality Date    CARDIOVASCULAR STRESS TEST      dobutamine stress test    CHOLECYSTECTOMY  2002    COLONOSCOPY      CYSTOCELE REPAIR      HYSTERECTOMY      KNEE SURGERY      knee replacement    UPPER GASTROINTESTINAL ENDOSCOPY         Family History   Family history unknown: Yes     I have reviewed and agree with the history as documented  Social History     Tobacco Use    Smoking status: Never Smoker    Smokeless tobacco: Never Used   Substance Use Topics    Alcohol use:  No  Drug use: No        Review of Systems   Constitutional: Negative  HENT: Negative  Eyes: Negative  Respiratory: Negative  Negative for cough and shortness of breath  Cardiovascular: Positive for chest pain  Gastrointestinal: Negative  Negative for abdominal pain, nausea and vomiting  Endocrine: Negative  Genitourinary: Negative  Musculoskeletal: Negative  Skin: Negative  Allergic/Immunologic: Negative  Neurological: Negative  Hematological: Negative  Psychiatric/Behavioral: Negative  All other systems reviewed and are negative  Physical Exam  Physical Exam   Constitutional: She is oriented to person, place, and time  She appears well-developed and well-nourished  HENT:   Head: Normocephalic  Nose: Nose normal    Mouth/Throat: Oropharynx is clear and moist    Eyes: Pupils are equal, round, and reactive to light  Conjunctivae are normal    Neck: Normal range of motion  Neck supple  Cardiovascular: Normal rate, regular rhythm, normal heart sounds and intact distal pulses  Pulmonary/Chest: Effort normal and breath sounds normal  She exhibits tenderness  Patient with reproducible tenderness palpation over the left anterior chest wall  Abdominal: Soft  Bowel sounds are normal    Musculoskeletal: Normal range of motion  She exhibits no edema, tenderness or deformity  Neurological: She is alert and oriented to person, place, and time  Skin: Skin is warm and dry  Capillary refill takes less than 2 seconds  Psychiatric: She has a normal mood and affect  Her behavior is normal    Nursing note and vitals reviewed        Vital Signs  ED Triage Vitals [10/03/19 1016]   Temperature Pulse Respirations Blood Pressure SpO2   (!) 97 4 °F (36 3 °C) 78 16 133/68 100 %      Temp Source Heart Rate Source Patient Position - Orthostatic VS BP Location FiO2 (%)   Tympanic Monitor Lying Left arm --      Pain Score       --           Vitals:    10/03/19 1016   BP: 133/68 Pulse: 78   Patient Position - Orthostatic VS: Lying         Visual Acuity      ED Medications  Medications - No data to display    Diagnostic Studies  Results Reviewed     Procedure Component Value Units Date/Time    Basic metabolic panel [[id number]]  (Abnormal) Collected:  10/03/19 1033    Lab Status:  Final result Specimen:  Blood from Line, Venous Updated:  10/03/19 1127     Sodium 137 mmol/L      Potassium 4 4 mmol/L      Chloride 99 mmol/L      CO2 31 mmol/L      ANION GAP 7 mmol/L      BUN 32 mg/dL      Creatinine 0 68 mg/dL      Glucose 100 mg/dL      Calcium 9 2 mg/dL      eGFR 80 ml/min/1 73sq m     Narrative:       Hemolysis  National Kidney Disease Foundation guidelines for Chronic Kidney Disease (CKD):     Stage 1 with normal or high GFR (GFR > 90 mL/min/1 73 square meters)    Stage 2 Mild CKD (GFR = 60-89 mL/min/1 73 square meters)    Stage 3A Moderate CKD (GFR = 45-59 mL/min/1 73 square meters)    Stage 3B Moderate CKD (GFR = 30-44 mL/min/1 73 square meters)    Stage 4 Severe CKD (GFR = 15-29 mL/min/1 73 square meters)    Stage 5 End Stage CKD (GFR <15 mL/min/1 73 square meters)  Note: GFR calculation is accurate only with a steady state creatinine    Troponin I [[id number]]  (Normal) Collected:  10/03/19 1033    Lab Status:  Final result Specimen:  Blood from Arm, Left Updated:  10/03/19 1127     Troponin I <0 01 ng/mL     CBC and differential [[id number]]  (Abnormal) Collected:  10/03/19 1033    Lab Status:  Final result Specimen:  Blood from Line, Venous Updated:  10/03/19 1112     WBC 6 20 Thousand/uL      RBC 3 99 Million/uL      Hemoglobin 11 6 g/dL      Hematocrit 35 6 %      MCV 89 fL      MCH 29 0 pg      MCHC 32 6 g/dL      RDW 13 8 %      MPV 8 3 fL      Platelets 542 Thousands/uL      Neutrophils Relative 58 %      Lymphocytes Relative 24 %      Monocytes Relative 7 %      Eosinophils Relative 10 %      Basophils Relative 1 %      Neutrophils Absolute 3 60 Thousands/µL Lymphocytes Absolute 1 50 Thousands/µL      Monocytes Absolute 0 50 Thousand/µL      Eosinophils Absolute 0 60 Thousand/µL      Basophils Absolute 0 00 Thousands/µL                  XR chest portable   ED Interpretation by Ankita Nguyen MD (10/03 1116)   NAD                 Procedures  Procedures       ED Course  ED Course as of Oct 03 1205   Thu Oct 03, 2019   1142 Spoke with Dr Meseret Murphy wants admission  Spoke with Dr Craon Renae  Will admit, obs  Went over results with pt and family  Agreeable to admission               HEART Risk Score      Most Recent Value   History  1 Filed at: 10/03/2019 1143   ECG  1 Filed at: 10/03/2019 1143   Age  2 Filed at: 10/03/2019 1143   Risk Factors  2 Filed at: 10/03/2019 1143   Troponin  0 Filed at: 10/03/2019 1143   Heart Score Risk Calculator   History  1 Filed at: 10/03/2019 1143   ECG  1 Filed at: 10/03/2019 1143   Age  2 Filed at: 10/03/2019 1143   Risk Factors  2 Filed at: 10/03/2019 1143   Troponin  0 Filed at: 10/03/2019 1143   HEART Score  6 Filed at: 10/03/2019 1143   HEART Score  6 Filed at: 10/03/2019 1143                            MDM  Number of Diagnoses or Management Options  Chest pain, unspecified type:      Amount and/or Complexity of Data Reviewed  Clinical lab tests: ordered and reviewed  Tests in the radiology section of CPT®: ordered and reviewed  Tests in the medicine section of CPT®: reviewed and ordered  Obtain history from someone other than the patient: yes  Review and summarize past medical records: yes  Discuss the patient with other providers: yes  Independent visualization of images, tracings, or specimens: yes        Disposition  Final diagnoses:   Chest pain, unspecified type     Time reflects when diagnosis was documented in both MDM as applicable and the Disposition within this note     Time User Action Codes Description Comment    10/3/2019 11:43 AM Francia Ramirez Add [R07 9] Chest pain, unspecified type       ED Disposition     ED Disposition Condition Date/Time Comment    Admit Stable Thu Oct 3, 2019 11:43 AM Case was discussed with Dr Khadra Callahan and the patient's admission status was agreed to be Admission Status: observation status to the service of Dr Yeimy Pablo   Follow-up Information    None         Patient's Medications   Discharge Prescriptions    No medications on file     No discharge procedures on file      ED Provider  Electronically Signed by           Inez Castro MD  10/03/19 0650

## 2019-10-03 NOTE — LETTER
[address] & [facility] SURGICAL UNIT  [address] [place] [id number]  [phone]  Dept: [phone]    October 4, 2019     Patient: Nanette Colindres   YOB: 1934   Date of Visit: 10/3/2019       To Whom it May Concern:    Vin Davis is under my professional care  She was seen in the hospital on  10/3/2019 and 10/4/2019  Patient's granddaugther Krystyna Akins stayed at bedside to take care of her grandmother  If you have any questions or concerns, please don't hesitate to call           Sincerely,          Tracee Medrano MD

## 2019-10-03 NOTE — NURSING NOTE
On initial rounds patient in no apparent distress  When asked if she has pain patient is pressing on the top of her left breast to see if she has pain but denies pain without pressing  Family members at bedside  Cardiac monitor with NSR  All safety maintained  Will continue to monitor

## 2019-10-03 NOTE — ASSESSMENT & PLAN NOTE
History of invasive ductal carcinoma of left breast, underwent lumpectomy with sentinel lymph node biopsy in 2013  Received radiotherapy and 5 years of adjuvant anastrozole therapy that completed on January, 2019     Patient describes left side chest pain more localized in area of left breast

## 2019-10-03 NOTE — ED PROCEDURE NOTE
PROCEDURE  ECG 12 Lead Documentation Only  Date/Time: 10/3/2019 10:20 AM  Performed by: Luma Chase MD  Authorized by: Luma Chase MD     Indications / Diagnosis:  Cp  ECG reviewed by me, the ED Provider: yes    Patient location:  ED  Interpretation:     Interpretation: normal    Rate:     ECG rate:  73    ECG rate assessment: normal    Rhythm:     Rhythm: sinus rhythm    Ectopy:     Ectopy: none    QRS:     QRS axis:  Normal    QRS intervals:  Normal  Conduction:     Conduction: normal    ST segments:     ST segments:  Normal  T waves:     T waves: normal    Q waves:     Q waves:  III and aVF         Luma Chase MD  10/03/19 1023

## 2019-10-03 NOTE — NURSING NOTE
[facility] and care transfered

## 2019-10-03 NOTE — DISCHARGE SUMMARY
Discharge Summary - Mateo Rivera    Patient Information: Shashank Guzman 80 y o  female MRN: [id number]  Unit/Bed#: 7T [facility] 716-02 Encounter: [id number]    Discharging Physician / Practitioner: Crystal Corado MD  PCP: Lyly Long MD  Admission Date: 10/3/2019  Admission Orders (From admission, onward)     Ordered        10/03/19 1144  Place in Observation  Once                   Discharge Date: 10/4/2019    Reason for Admission: CP r/o ACS    Discharge Diagnoses:     Principal Problem (Resolved):    Chest pain  Active Problems:    Benign essential hypertension    COPD (chronic obstructive pulmonary disease) ([facility] [address] )    HLD (hyperlipidemia)    Malignant ne[facility] of overlapping sites of [facility] )    Chronic diastolic congestive heart failure ([facility] )      Consultations During Hospital Stay:  · none    Procedures Performed:   · none    Significant Findings / Test Results:   · Echo: follow up with PCP for results    Incidental Findings:   · none     Test Results Pending at Discharge (will require follow up):   · none     Outpatient Tests Requested:  · none    Complications:  none    Hospital Course:     Elizabeth Rivera is a 80 y o  female patient with PMHx silent MI (approx  6 months ago), non-rheumatic aortic stenosis, and intraductal carcinoma of the breast (2013) currently in remission  who originally presented to the hospital on 10/3/2019  Patient was to the ED from PCP office due to complaints of L sided CP  In ED, EKG showed Q waves in leads III and aVF  due to Heart score of  4, she was admitted for work-up of ACS  Troponins were trended and negative x 3  Echo was ordered did not showed any acute pathology  Due to history of L breast cancer, concern for malignancy was entertained however patient underwent mammogram in July, 2019 which did not show concerning findings       Given negative cardiac biomarkers, stable ECG and chest wall tenderness on exam, CP was attributed as musculoskeletal  Aspirin therapy not indicated at the time, given patient's age and higher risk of falls  Patient was discharged in stable condition to follow-up with PCP and cardiology  Condition at Discharge: good     Discharge Day Visit / Exam:     Vitals: Blood Pressure: 160/79 (10/04/19 0711)  Pulse: 72 (10/04/19 0711)  Temperature: 97 5 °F (36 4 °C) (10/04/19 0711)  Temp Source: Temporal (10/04/19 0711)  Respirations: 20 (10/04/19 0711)  Height: 4' 11" (149 9 cm) (10/03/19 1315)  Weight - Scale: 92 2 kg (203 lb 4 2 oz) (10/04/19 0555)  SpO2: 100 % (10/04/19 0711)  Exam:   Physical Exam   Constitutional: She appears well-developed and well-nourished  HENT:   Head: Normocephalic and atraumatic  Eyes: EOM are normal    Neck: Normal range of motion  Neck supple  Cardiovascular: Normal rate and normal heart sounds  Pulmonary/Chest: Effort normal and breath sounds normal  No respiratory distress  Visual exam of chest wall does not reveal rashes, ecchymosis, lacerations or abrasions  Tenderness to palpation elicited the left side of chest wall  Abdominal: Soft  Bowel sounds are normal  She exhibits no distension  Neurological: She is alert  Skin: Skin is warm and dry  Psychiatric: She has a normal mood and affect  Discussion with Family: multiple discussions were made with patient's family members during this hospitalization  Discharge instructions/Information to patient and family:   See after visit summary for information provided to patient and family  Discharge Medications:  Acetaminophen 650 mg 1 tablet Every 6 hours   Albuterol Sulfate 108 (90 Base) MCG/ACT inhale 2 puffs by mouth every 4 - 6 hours as needed as needed   Amitriptyline HCl 25 mg TAKE ONE TABLET BY MOUTH DAILY AT BEDTIME     Anastrozole 1 mg take 1 tablet (1MG)  by oral route  every day   Atorvastatin Calcium 20 mg Take 1 tablet (20 mg total) by mouth daily for 90 days   Calcium Carbonate-Vitamin D 500-125 MG-UNIT Every 24 hours   Fluticasone Propionate 50 mcg/act 1 spray Nasal 2 times daily   Fluticasone-Salmeterol 500-50 MCG/DOSE Inhale 1 puff by mouth 2 (two) times a day  Rinse mouth after use  Furosemide 40 mg TAKE 1 TABLET BY MOUTH ONCE DAILY  hydroCHLOROthiazide 25 mg TAKE 1 TABLET BY MOUTH ONCE DAILY  Hydrocortisone 2 5 % Crea, Topical 4 times daily PRN   Lisinopril 40 mg Tabs, TAKE 1 TABLET BY MOUTH ONCE DAILY    raNITIdine HCl 150 mg TAKE 1 TABLET BY MOUTH TWICE DAILY  Sucralfate 1 g Oral 4 times daily (with meals and at bedtime)      Provisions for Follow-Up Care:  See after visit summary for information related to follow-up care and any pertinent home health orders  Disposition:     Home      Planned Readmission: none     Discharge Statement:  I spent 30 minutes discharging the patient  This time was spent on the day of discharge  I had direct contact with the patient on the day of discharge  Greater than 50% of the total time was spent examining patient, answering all patient questions, arranging and discussing plan of care with patient as well as directly providing post-discharge instructions  Additional time then spent on discharge activities

## 2019-10-04 ENCOUNTER — APPOINTMENT (OUTPATIENT)
Dept: NON INVASIVE DIAGNOSTICS | Facility: HOSPITAL | Age: 84
End: 2019-10-04
Payer: COMMERCIAL

## 2019-10-04 VITALS
TEMPERATURE: 97.5 F | BODY MASS INDEX: 40.98 KG/M2 | DIASTOLIC BLOOD PRESSURE: 65 MMHG | OXYGEN SATURATION: 100 % | HEART RATE: 70 BPM | WEIGHT: 203.26 LBS | RESPIRATION RATE: 20 BRPM | HEIGHT: 59 IN | SYSTOLIC BLOOD PRESSURE: 135 MMHG

## 2019-10-04 PROBLEM — J02.8 ACUTE PHARYNGITIS DUE TO OTHER SPECIFIED ORGANISMS: Status: RESOLVED | Noted: 2019-08-12 | Resolved: 2019-10-04

## 2019-10-04 PROBLEM — J06.9 UPPER RESPIRATORY INFECTION, VIRAL: Status: RESOLVED | Noted: 2019-09-11 | Resolved: 2019-10-04

## 2019-10-04 PROBLEM — Z01.810 PREOPERATIVE CARDIOVASCULAR EXAMINATION: Status: RESOLVED | Noted: 2018-07-16 | Resolved: 2019-10-04

## 2019-10-04 PROBLEM — Z13.29 SCREENING FOR THYROID DISORDER: Status: RESOLVED | Noted: 2018-09-12 | Resolved: 2019-10-04

## 2019-10-04 PROBLEM — R07.9 CHEST PAIN: Status: RESOLVED | Noted: 2019-10-03 | Resolved: 2019-10-04

## 2019-10-04 PROBLEM — K59.00 CONSTIPATION: Status: RESOLVED | Noted: 2018-09-12 | Resolved: 2019-10-04

## 2019-10-04 PROBLEM — M54.9 BACK PAIN: Status: RESOLVED | Noted: 2018-09-12 | Resolved: 2019-10-04

## 2019-10-04 PROBLEM — H66.92 OTITIS OF LEFT EAR: Status: RESOLVED | Noted: 2019-03-29 | Resolved: 2019-10-04

## 2019-10-04 PROBLEM — J02.8 SORE THROAT (VIRAL): Status: RESOLVED | Noted: 2019-06-24 | Resolved: 2019-10-04

## 2019-10-04 PROBLEM — B97.89 SORE THROAT (VIRAL): Status: RESOLVED | Noted: 2019-06-24 | Resolved: 2019-10-04

## 2019-10-04 PROBLEM — H25.11 NUCLEAR SCLEROTIC CATARACT OF RIGHT EYE: Status: RESOLVED | Noted: 2018-06-15 | Resolved: 2019-10-04

## 2019-10-04 PROBLEM — B08.5 ACUTE HERPANGINA: Status: RESOLVED | Noted: 2019-03-29 | Resolved: 2019-10-04

## 2019-10-04 LAB
ANION GAP SERPL CALCULATED.3IONS-SCNC: 7 MMOL/L (ref 5–14)
ATRIAL RATE: 68 BPM
ATRIAL RATE: 73 BPM
BASOPHILS # BLD AUTO: 0.05 THOUSAND/UL (ref 0–0.1)
BASOPHILS NFR MAR MANUAL: 1 % (ref 0–1)
BUN SERPL-MCNC: 22 MG/DL (ref 5–25)
CALCIUM SERPL-MCNC: 9.2 MG/DL (ref 8.4–10.2)
CHLORIDE SERPL-SCNC: 102 MMOL/L (ref 97–108)
CHOLEST SERPL-MCNC: 170 MG/DL
CO2 SERPL-SCNC: 29 MMOL/L (ref 22–30)
CREAT SERPL-MCNC: 0.64 MG/DL (ref 0.6–1.2)
EOSINOPHIL # BLD AUTO: 0.66 THOUSAND/UL (ref 0–0.4)
EOSINOPHIL NFR BLD MANUAL: 13 % (ref 0–6)
ERYTHROCYTE [DISTWIDTH] IN BLOOD BY AUTOMATED COUNT: 14 %
GFR SERPL CREATININE-BSD FRML MDRD: 82 ML/MIN/1.73SQ M
GLUCOSE SERPL-MCNC: 101 MG/DL (ref 70–99)
HCT VFR BLD AUTO: 36.1 % (ref 36–46)
HDLC SERPL-MCNC: 50 MG/DL (ref 40–59)
HGB BLD-MCNC: 12 G/DL (ref 12–16)
LDLC SERPL CALC-MCNC: 98 MG/DL
LYMPHOCYTES # BLD AUTO: 1.48 THOUSAND/UL (ref 0.5–4)
LYMPHOCYTES # BLD AUTO: 29 % (ref 25–45)
MCH RBC QN AUTO: 29.4 PG (ref 26.8–34.3)
MCHC RBC AUTO-ENTMCNC: 33.3 G/DL (ref 31.4–37.4)
MCV RBC AUTO: 88 FL (ref 80–100)
MONOCYTES # BLD AUTO: 0.46 THOUSAND/UL (ref 0.2–0.9)
MONOCYTES NFR BLD AUTO: 9 % (ref 1–10)
NEUTS BAND NFR BLD MANUAL: 1 % (ref 0–8)
NEUTS SEG # BLD: 2.45 THOUSAND/UL (ref 1.8–7.8)
NEUTS SEG NFR BLD AUTO: 47 %
NONHDLC SERPL-MCNC: 120 MG/DL
P AXIS: 49 DEGREES
P AXIS: 62 DEGREES
PLATELET # BLD AUTO: 208 THOUSANDS/UL (ref 150–450)
PLATELET BLD QL SMEAR: ADEQUATE
PMV BLD AUTO: 8.3 FL (ref 8.9–12.7)
POTASSIUM SERPL-SCNC: 3.9 MMOL/L (ref 3.6–5)
PR INTERVAL: 200 MS
PR INTERVAL: 208 MS
QRS AXIS: -11 DEGREES
QRS AXIS: -20 DEGREES
QRSD INTERVAL: 90 MS
QRSD INTERVAL: 92 MS
QT INTERVAL: 390 MS
QT INTERVAL: 412 MS
QTC INTERVAL: 429 MS
QTC INTERVAL: 438 MS
RBC # BLD AUTO: 4.09 MILLION/UL (ref 4–5.2)
RBC MORPH BLD: NORMAL
SODIUM SERPL-SCNC: 138 MMOL/L (ref 137–147)
T WAVE AXIS: 30 DEGREES
T WAVE AXIS: 44 DEGREES
TOTAL CELLS COUNTED SPEC: 100
TRIGL SERPL-MCNC: 110 MG/DL
VENTRICULAR RATE: 68 BPM
VENTRICULAR RATE: 73 BPM
WBC # BLD AUTO: 5.1 THOUSAND/UL (ref 4.31–10.16)

## 2019-10-04 PROCEDURE — 97167 OT EVAL HIGH COMPLEX 60 MIN: CPT

## 2019-10-04 PROCEDURE — 85027 COMPLETE CBC AUTOMATED: CPT | Performed by: FAMILY MEDICINE

## 2019-10-04 PROCEDURE — 80048 BASIC METABOLIC PNL TOTAL CA: CPT | Performed by: FAMILY MEDICINE

## 2019-10-04 PROCEDURE — G8979 MOBILITY GOAL STATUS: HCPCS

## 2019-10-04 PROCEDURE — 93005 ELECTROCARDIOGRAM TRACING: CPT

## 2019-10-04 PROCEDURE — 97162 PT EVAL MOD COMPLEX 30 MIN: CPT

## 2019-10-04 PROCEDURE — 93306 TTE W/DOPPLER COMPLETE: CPT | Performed by: INTERNAL MEDICINE

## 2019-10-04 PROCEDURE — 85007 BL SMEAR W/DIFF WBC COUNT: CPT | Performed by: FAMILY MEDICINE

## 2019-10-04 PROCEDURE — NC001 PR NO CHARGE: Performed by: FAMILY MEDICINE

## 2019-10-04 PROCEDURE — 93306 TTE W/DOPPLER COMPLETE: CPT

## 2019-10-04 PROCEDURE — G8978 MOBILITY CURRENT STATUS: HCPCS

## 2019-10-04 PROCEDURE — 93010 ELECTROCARDIOGRAM REPORT: CPT | Performed by: INTERNAL MEDICINE

## 2019-10-04 PROCEDURE — 80061 LIPID PANEL: CPT | Performed by: FAMILY MEDICINE

## 2019-10-04 RX ADMIN — SUCRALFATE 1000 MG: 1 SUSPENSION ORAL at 11:54

## 2019-10-04 RX ADMIN — SUCRALFATE 1000 MG: 1 SUSPENSION ORAL at 08:19

## 2019-10-04 RX ADMIN — FLUTICASONE FUROATE AND VILANTEROL TRIFENATATE 1 PUFF: 200; 25 POWDER RESPIRATORY (INHALATION) at 08:19

## 2019-10-04 RX ADMIN — TIMOLOL MALEATE 1 DROP: 5 SOLUTION/ DROPS OPHTHALMIC at 08:20

## 2019-10-04 RX ADMIN — HYDROCHLOROTHIAZIDE 25 MG: 25 TABLET ORAL at 08:20

## 2019-10-04 RX ADMIN — FUROSEMIDE 40 MG: 40 TABLET ORAL at 08:19

## 2019-10-04 RX ADMIN — OXYBUTYNIN CHLORIDE 10 MG: 5 TABLET, EXTENDED RELEASE ORAL at 08:19

## 2019-10-04 RX ADMIN — FAMOTIDINE 20 MG: 20 TABLET ORAL at 08:19

## 2019-10-04 RX ADMIN — LISINOPRIL 40 MG: 20 TABLET ORAL at 08:19

## 2019-10-04 NOTE — PHYSICAL THERAPY NOTE
Order received and note discharge order pending  Nursing requests PT to see prior to discharge  Pt currently eating lunch and agreeable for PT following lunch  Will return for PT evaluation after lunch completion      Miriam Leal, PT

## 2019-10-04 NOTE — PHYSICAL THERAPY NOTE
Physical Therapy Evaluation   Time In/Out: 9399-1934    Patient's Name: Lala Anne    Admitting Diagnosis  Chest pain [R07 9]  Chest pain, unspecified type [R07 9]    Problem List  Patient Active Problem List   Diagnosis    Anemia    Aortic stenosis    Benign essential hypertension    Bilateral occipital neuralgia    Carpal tunnel syndrome on both sides    Glaucoma    HLD (hyperlipidemia)    Malignant neoplasm of overlapping sites of left female breast ([address] )    Cataract    Hemorrhoids    Chronic diastolic congestive heart failure (HCC)    GERD (gastroesophageal reflux disease)    Anal or rectal pain    Osteoarthritis    Cognitive impairment    Dysuria    COPD (chronic obstructive pulmonary disease) ([address] )    Central perforation of tympanic membrane       Past Medical History  Past Medical History:   Diagnosis Date    Diverticulosis 2001    [facility]     History of cystocele     Hypertension     Obesity     Positive PPD 1999    treated       Past Surgical History  Past Surgical History:   Procedure Laterality Date    CARDIOVASCULAR STRESS TEST  2001    dobutamine stress test    CHOLECYSTECTOMY  04/2002    COLONOSCOPY      CYSTOCELE REPAIR      HYSTERECTOMY      KNEE SURGERY      knee replacement    UPPER GASTROINTESTINAL ENDOSCOPY              10/04/19 1230   Note Type   Note type Eval only   Pain Assessment   Pain Assessment No/denies pain   Pain Score No Pain   Home Living   Type of [address] [place] [address] One level  (0STE)   Home Equipment Walker;Cane  (NBQC)   Prior Function   Lives With Son  (DIL, children)   Receives Help From Family   ADL Assistance Needs assistance   IADLs Needs assistance   Falls in the last 6 months 0   Comments Pt is alone during the day and family is present in evening and night   Restrictions/Precautions   Weight Bearing Precautions Per Order No   Other Precautions Cognitive; Fall Risk   General   Additional Pertinent History Resting BP 140/90, HR 79 bpm, SpO2 97%   Family/Caregiver Present Yes   Cognition   Overall Cognitive Status Impaired   Attention Difficulty attending to directions   Orientation Level Oriented to person   Following Commands Follows one step commands inconsistently   Comments Pt interpretor reports that patient at time is not making sense  Ongoing speech with difficulty redirecting  RLE Assessment   RLE Assessment WFL   LLE Assessment   LLE Assessment WFL   Light Touch   RLE Light Touch Grossly intact   LLE Light Touch Grossly intact   Bed Mobility   Supine to Sit 5  Supervision   Sit to Supine 5  Supervision   Transfers   Sit to Stand 5  Supervision   Stand to Sit 5  Supervision   Stand pivot 5  Supervision   Additional items   (NBQC)   Ambulation/Elevation   Gait pattern Inconsistent flaca; Step to;Redundant gait at times  (furniture assist  at times)   Gait Assistance 5  Supervision   Additional items Assist x 1   Assistive Device   (NBQC)   Distance 30 feet   Stair Management Assistance   (npot applicable)   Balance   Static Sitting Good   Static Standing Fair   Ambulatory Fair  (with NBQC short distances)   Activity Tolerance   Nurse Made Aware   (RN clears patient for evaluation )   Assessment   Prognosis Fair   Problem List Impaired balance;Decreased mobility; Decreased cognition; Impaired judgement;Decreased safety awareness;Decreased strength   Assessment Geraldine Mayberry is an 80 yr old admitted to [facility] on 10/3/19 with benign essential HTN  Order for PT evaluation received  Cleared by nursing  Pt is for possible discharge however nursing request PT to see for evaluation  PMHx of myocardial infarction (6 months ago), non-rheumatic aortic stenosis, COPD, OA, aortic stenosis, CHF,cognitive impairment, HLD and intraductal carcinoma of the breast (2013) currently in remission who presents with severe chest pain starting Saturday, September 28th  On evaluation pt is seated at EOB  Personal NBQC at bedside   Pt is amanda speaking therefore [facility] interpretor used to assist with translation  Gonzalo # [id number]  Pt with continual talking during session with interpretor unable to understand at times  She was difficult to redirect  She demonstrated ability to perform functional mobility in bed, for transfers and short distance ambulation with CS  Unable to get patient to take instruction today  Language barrier and not allowing reciprocal conversation with interpretor  Pt also was not listening to family member present in room  Pt appears near baseline function however abnormal gait with poor sequencing with cane and inconsistent flaca  Pt noted to ER with hip flexion in sitting  Due to langauge barrier and impaired attn to interpretor, complete MMT difficult to perform today  BLE hip flexion 3-/5, knee ext RLE 4-/5, LLE 4/5  Unable to assess ankle strength due to language barrier and impaired cognition  Will benefit from rehab to address gait and balance to decrease fall risk  It will be beneficial to have Pashto speaking therapist for treatment  In summary the patient's history, examination of body system(s), activity limitations, participation restrictions, and collaboration of care are the guiding factors that were used to determine the clinical decision  The clinical presentation is evolving and therefore the assigned level of complexity is: Moderate  Goals   Patient Goals unable to attain, however she did state she was going home  STG Expiration Date 10/09/19   Short Term Goal #1 1  Pt will perform all functional transfers with least restrictive AD CS  2  Pt will ambulate 50 feet with NBQC with appropriate sequence and stable BP/HR  3  Pt will demonstrate increase in balance obtained by functional and /or objective findings to decrease fall risk  PT Treatment Day 0   Plan   Treatment/Interventions Functional transfer training;LE strengthening/ROM; Therapeutic exercise; Endurance training;Patient/family training;Equipment eval/education;Gait training;Spoke to nursing   PT Frequency 2-3x/wk   Recommendation   Recommendation Home PT; Home with family support   Equipment Recommended   (no needs)   PT - OK to Discharge Yes   Barthel Index   Feeding 10   Bathing 0   Grooming Score 5   Dressing Score 5   Bladder Score 10   Bowels Score 10   Toilet Use Score 5   Transfers (Bed/Chair) Score 10   Mobility (Level Surface) Score 0   Stairs Score 0   Barthel Index Score 55       Kishan Fleming, PT

## 2019-10-04 NOTE — NURSING NOTE
AOX3 HR regular Lungs decreased occasional cough non productive + Bowel sounds x4 + PP ABD soft  Denies any pain  Will continue to monitor call bell within reach

## 2019-10-04 NOTE — CASE MANAGEMENT
LOS 0 GMLOS none  Patient is not bundled or a 30 day readmission    Patient admitted under observation for c/o chest pain  CM in to see patient for assessment, son in law Thomas present  Patient lives in a [facility], one [facility]  She lives with her son  Patient is independent with  ADLs  Patient has a walker and cane she uses to assist with ambulation  Patient does not currently have any in home services or has not been in a SNF for the past 60 days  Patient does not have a living will  PCP is [facility] and pharmacy is [facility]  Patient receives SSI for source of income  Patient denies hx of substance abuse or any mental health hx  Patient does not drive her family drives her and will be driving her home at D/C  PT/OT recommending home PT/OT, CM discussed this with patient and son in , patient states she would like [facility] for these in home services  CM sent referral awaiting response

## 2019-10-04 NOTE — PLAN OF CARE
Problem: PHYSICAL THERAPY ADULT  Goal: Performs mobility at highest level of function for planned discharge setting  See evaluation for individualized goals  Description  Treatment/Interventions: Functional transfer training, LE strengthening/ROM, Therapeutic exercise, Endurance training, Patient/family training, Equipment eval/education, Gait training, Spoke to nursing  Equipment Recommended: (no needs)       See flowsheet documentation for full assessment, interventions and recommendations  Outcome: Progressing  Note:   Prognosis: Fair  Problem List: Impaired balance, Decreased mobility, Decreased cognition, Impaired judgement, Decreased safety awareness, Decreased strength  Assessment: Jean Carlos Gonzáles is an 80 yr old admitted to [facility] on 10/3/19 with benign essential HTN  Order for PT evaluation received  Cleared by nursing  Pt is for possible discharge however nursing request PT to see for evaluation  PMHx of myocardial infarction (6 months ago), non-rheumatic aortic stenosis, COPD, OA, aortic stenosis, CHF,cognitive impairment, HLD and intraductal carcinoma of the breast (2013) currently in remission who presents with severe chest pain starting Saturday, September 28th  On evaluation pt is seated at EOB  Personal NBQC at bedside  Pt is Iranian speaking therefore [facility] interpretor used to assist with translation  Gonzalo # [id number]  Pt with continual talking during session with interpretor unable to understand at times  She was difficult to redirect  She demonstrated ability to perform functional mobility in bed, for transfers and short distance ambulation with CS  Unable to get patient to take instruction today  Language barrier and not allowing reciprocal conversation with interpretor  Pt also was not listening to family member present in room  Pt appears near baseline function however abnormal gait with poor sequencing with cane and inconsistent flaca  Pt noted to ER with hip flexion in sitting   Due to langauge barrier and impaired attn to interpretor, complete MMT difficult to perform today  BLE hip flexion 3-/5, knee ext RLE 4-/5, LLE 4/5  Unable to assess ankle strength due to language barrier and impaired cognition  Will benefit from rehab to address gait and balance to decrease fall risk  It will be beneficial to have Citizen of [place] speaking therapist for treatment  In summary the patient's history, examination of body system(s), activity limitations, participation restrictions, and collaboration of care are the guiding factors that were used to determine the clinical decision  The clinical presentation is evolving and therefore the assigned level of complexity is: Moderate  Recommendation: Home PT, Home with family support     PT - OK to Discharge: Yes    See flowsheet documentation for full assessment

## 2019-10-04 NOTE — PLAN OF CARE
Problem: Potential for Falls  Goal: Patient will remain free of falls  Description  INTERVENTIONS:  - Assess patient frequently for physical needs  -  Identify cognitive and physical deficits and behaviors that affect risk of falls    -  [place] fall precautions as indicated by assessment   - Educate patient/family on patient safety including physical limitations  - Instruct patient to call for assistance with activity based on assessment  - Modify environment to reduce risk of injury  - Consider OT/PT consult to assist with strengthening/mobility  Outcome: Progressing     Problem: PAIN - ADULT  Goal: Verbalizes/displays adequate comfort level or baseline comfort level  Description  Interventions:  - Encourage patient to monitor pain and request assistance  - Assess pain using appropriate pain scale  - Administer analgesics based on type and severity of pain and evaluate response  - Implement non-pharmacological measures as appropriate and evaluate response  - Consider cultural and social influences on pain and pain management  - Notify physician/advanced practitioner if interventions unsuccessful or patient reports new pain  Outcome: Progressing     Problem: INFECTION - ADULT  Goal: Absence or prevention of progression during hospitalization  Description  INTERVENTIONS:  - Assess and monitor for signs and symptoms of infection  - Monitor lab/diagnostic results  - Monitor all insertion sites, i e  indwelling lines, tubes, and drains  - Monitor endotracheal if appropriate and nasal secretions for changes in amount and color  - [place] appropriate cooling/warming therapies per order  - Administer medications as ordered  - Instruct and encourage patient and family to use good hand hygiene technique  - Identify and instruct in appropriate isolation precautions for identified infection/condition  Outcome: Progressing  Goal: Absence of fever/infection during neutropenic period  Description  INTERVENTIONS:  - Monitor WBC    Outcome: Progressing     Problem: SAFETY ADULT  Goal: Patient will remain free of falls  Description  INTERVENTIONS:  - Assess patient frequently for physical needs  -  Identify cognitive and physical deficits and behaviors that affect risk of falls  -  [place] fall precautions as indicated by assessment   - Educate patient/family on patient safety including physical limitations  - Instruct patient to call for assistance with activity based on assessment  - Modify environment to reduce risk of injury  - Consider OT/PT consult to assist with strengthening/mobility  Outcome: Progressing  Goal: Maintain or return to baseline ADL function  Description  INTERVENTIONS:  - Assess patient frequently for physical needs  -  Identify cognitive and physical deficits and behaviors that affect risk of falls    -  [place] fall precautions as indicated by assessment   - Educate patient/family on patient safety including physical limitations  - Instruct patient to call for assistance with activity based on assessment  - Modify environment to reduce risk of injury  - Consider OT/PT consult to assist with strengthening/mobility  Outcome: Progressing  Goal: Maintain or return mobility status to optimal level  Description  INTERVENTIONS:  -  Assess patient's ability to carry out ADLs; assess patient's baseline for ADL function and identify physical deficits which impact ability to perform ADLs (bathing, care of mouth/teeth, toileting, grooming, dressing, etc )  - Assess/evaluate cause of self-care deficits   - Assess range of motion  - Assess patient's mobility; develop plan if impaired  - Assess patient's need for assistive devices and provide as appropriate  - Encourage maximum independence but intervene and supervise when necessary  - Involve family in performance of ADLs  - Assess for home care needs following discharge   - Consider OT consult to assist with ADL evaluation and planning for discharge  - Provide patient education as appropriate  Outcome: Progressing     Problem: DISCHARGE PLANNING  Goal: Discharge to home or other facility with appropriate resources  Description  INTERVENTIONS:  - Identify barriers to discharge w/patient and caregiver  - Arrange for needed discharge resources and transportation as appropriate  - Identify discharge learning needs (meds, wound care, etc )  - Arrange for interpretive services to assist at discharge as needed  - Refer to Case Management Department for coordinating discharge planning if the patient needs post-hospital services based on physician/advanced practitioner order or complex needs related to functional status, cognitive ability, or social support system  Outcome: Progressing     Problem: Knowledge Deficit  Goal: Patient/family/caregiver demonstrates understanding of disease process, treatment plan, medications, and discharge instructions  Description  Complete learning assessment and assess knowledge base    Interventions:  - Provide teaching at level of understanding  - Provide teaching via preferred learning methods  Outcome: Progressing

## 2019-10-04 NOTE — OCCUPATIONAL THERAPY NOTE
Occupational Therapy Evaluation  10:26-10:44am     Patient Name: Sherita Ramos  NLHGK'[id number] Date: 10/4/2019  Problem List  Active Problems:    Benign essential hypertension    HLD (hyperlipidemia)    Malignant neoplasm of overlapping sites of left female breast (HCC)    Chronic diastolic congestive heart failure (HCC)    COPD (chronic obstructive pulmonary disease) ([facility] [facility] )    Past Medical History  Past Medical History:   Diagnosis Date    Diverticulosis 2001    Heart failure ([facility] [address] [facility]     History of cystocele     Hypertension     Obesity     Positive PPD 1999    treated     Past Surgical History  Past Surgical History:   Procedure Laterality Date    CARDIOVASCULAR STRESS TEST  2001    dobutamine stress test    CHOLECYSTECTOMY  04/2002    COLONOSCOPY      CYSTOCELE REPAIR      HYSTERECTOMY      KNEE SURGERY      knee replacement    UPPER GASTROINTESTINAL ENDOSCOPY             10/04/19 1026   Note Type   Note type Eval only   Restrictions/Precautions   Weight Bearing Precautions Per Order No   Pain Assessment   Pain Assessment No/denies pain   Home Living   Type of [address] One level;Performs ADLs on one level  (0 [address]YCIA)   Bathroom Shower/Tub Tub/shower unit   Bathroom Equipment Grab bars in [place] [address] Walker;Cane   Additional Comments Pt uses a quad cane at baseline  Prior Function   Level of [place] Needs assistance with IADLs; Needs assistance with ADLs and functional mobility   Lives With Son  (DIL, grandchildren )   Receives Help From Family   ADL Assistance Needs assistance   IADLs Needs assistance   Falls in the last 6 months 0   Comments Pt Divehi-speaking, however granddaughter present and interpreting  Per her report, Pt lives with her son and daughter-in-law, as well as her grandchilren  Pt's children rotate who has patient on the weekends, therefore home living details listed above are re: Pt's Monday-Friday home    Pt is alone during the day, has family present at night and on weekends  Pt ambulates with a cane, furniture/wall surfs without, requires assist with dressing and bathing, is able to complete toileting on her own  Psychosocial   Psychosocial (WDL) WDL   Subjective   Subjective "I can do it"   ADL   Where Assessed Edge of bed   Grooming Assistance 6  Modified Independent   UB Bathing Assistance 5  Supervision/Setup   LB Bathing Assistance 4  Minimal Assistance   UB Dressing Assistance 5  Supervision/Setup   LB Dressing Assistance 4  Minimal [address]  4  Minimal Assistance   Bed Mobility   Supine to Sit 4  Minimal assistance   Transfers   Sit to Stand 4  Minimal assistance   Stand to Sit 4  Minimal assistance   Functional Mobility   Functional Mobility 4  Minimal assistance   Additional Comments from EOB to stretcher for test    Additional items Hand hold assistance   Balance   Static Sitting Good   Dynamic Sitting Fair +   Static Standing Fair -   Dynamic Standing Fair -   Ambulatory Fair -   Activity Tolerance   Activity Tolerance Patient tolerated treatment well   RUE Assessment   RUE Assessment WFL   LUE Assessment   LUE Assessment WFL   Hand Function   Gross Motor Coordination Functional   Fine Motor Coordination Functional   Sensation   Light Touch No apparent deficits   Proprioception   Proprioception No apparent deficits   Vision-Basic Assessment   Current Vision No visual deficits   Cognition   Overall Cognitive Status WFL   Arousal/Participation Alert; Responsive   Attention Within functional limits   Orientation Level Oriented X4   Following Commands Follows one step commands without difficulty   Comments Pt Bulgarian-speaking, therefore difficulty determining cognitive deficits vs language barrier, however Pt alert and oriented, following commands  Assessment   Limitation Decreased ADL status; Decreased endurance   Prognosis Fair   Assessment   Pt is a 80 y o  female seen for OT evaluation [facility] w/ Chest pain  Comorbidities affecting Pt's functional performance at time of assessment include: HTN, COPD, CHF  Personal factors affecting Pt at time of IE include:limited home support, difficulty performing ADLS, difficulty performing IADLS  and health management   Upon evaluation: Pt requires [place] for transfers and ambulation, assist for LB ADL  Vitals WNL throughout  The following deficits impact occupational performance: decreased balance and decreased tolerance  Pt to benefit from continued skilled OT services while in the hospital to address deficits as defined above and maximize level of functional independence w ADL's and functional mobility  Occupational performance areas to address include: grooming, bathing/shower, toilet hygiene, health maintenance, functional mobility and clothing management  Pt appears near her functional baseline, with increased deficit in standing balance and activity tolerance  From OT standpoint, recommendation at time of d/c would be home OT and family assist        Goals   STG Time Frame   (1 week)   Short Term Goal #1 Pt will complete bathroom mobility Rajiv  Short Term Goal #2 Pt will complete toileting Rajiv  Short Term Goal  Pt will complete transfers Rajiv  Plan   Treatment Interventions ADL retraining;Functional transfer training;UE strengthening/ROM; Endurance training;Patient/family training   Goal Expiration Date 10/11/19   OT Treatment Day 0   OT Frequency 2-3x/wk   Recommendation   OT Discharge Recommendation Home OT  (and resumed family assist )   Barthel Index   Feeding 10   Bathing 0   Grooming Score 5   Dressing Score 5   Bladder Score 10   Bowels Score 10   Toilet Use Score 5   Transfers (Bed/Chair) Score 10   Mobility (Level Surface) Score 0   Stairs Score 0   Barthel Index Score 55

## 2019-10-04 NOTE — PLAN OF CARE
Problem: Potential for Falls  Goal: Patient will remain free of falls  Description  INTERVENTIONS:  - Assess patient frequently for physical needs  -  Identify cognitive and physical deficits and behaviors that affect risk of falls    -  [place] fall precautions as indicated by assessment   - Educate patient/family on patient safety including physical limitations  - Instruct patient to call for assistance with activity based on assessment  - Modify environment to reduce risk of injury  - Consider OT/PT consult to assist with strengthening/mobility  Outcome: Progressing     Problem: PAIN - ADULT  Goal: Verbalizes/displays adequate comfort level or baseline comfort level  Description  Interventions:  - Encourage patient to monitor pain and request assistance  - Assess pain using appropriate pain scale  - Administer analgesics based on type and severity of pain and evaluate response  - Implement non-pharmacological measures as appropriate and evaluate response  - Consider cultural and social influences on pain and pain management  - Notify physician/advanced practitioner if interventions unsuccessful or patient reports new pain  Outcome: Progressing     Problem: INFECTION - ADULT  Goal: Absence or prevention of progression during hospitalization  Description  INTERVENTIONS:  - Assess and monitor for signs and symptoms of infection  - Monitor lab/diagnostic results  - Monitor all insertion sites, i e  indwelling lines, tubes, and drains  - Monitor endotracheal if appropriate and nasal secretions for changes in amount and color  - [place] appropriate cooling/warming therapies per order  - Administer medications as ordered  - Instruct and encourage patient and family to use good hand hygiene technique  - Identify and instruct in appropriate isolation precautions for identified infection/condition  Outcome: Progressing  Goal: Absence of fever/infection during neutropenic period  Description  INTERVENTIONS:  - Monitor WBC    Outcome: Progressing     Problem: SAFETY ADULT  Goal: Maintain or return to baseline ADL function  Description  INTERVENTIONS:  -  Assess patient's ability to carry out ADLs; assess patient's baseline for ADL function and identify physical deficits which impact ability to perform ADLs (bathing, care of mouth/teeth, toileting, grooming, dressing, etc )  - Assess/evaluate cause of self-care deficits   - Assess range of motion  - Assess patient's mobility; develop plan if impaired  - Assess patient's need for assistive devices and provide as appropriate  - Encourage maximum independence but intervene and supervise when necessary  - Involve family in performance of ADLs  - Assess for home care needs following discharge   - Consider OT consult to assist with ADL evaluation and planning for discharge  - Provide patient education as appropriate  Outcome: Progressing  Goal: Maintain or return mobility status to optimal level  Description  INTERVENTIONS:  - Assess patient's baseline mobility status (ambulation, transfers, stairs, etc )    - Identify cognitive and physical deficits and behaviors that affect mobility  - Identify mobility aids required to assist with transfers and/or ambulation (gait belt, sit-to-stand, lift, walker, cane, etc )  - [place] fall precautions as indicated by assessment  - Record patient progress and toleration of activity level on Mobility SBAR; progress patient to next Phase/Stage  - Instruct patient to call for assistance with activity based on assessment  - Consider rehabilitation consult to assist with strengthening/weightbearing, etc   Outcome: Progressing     Problem: DISCHARGE PLANNING  Goal: Discharge to home or other facility with appropriate resources  Description  INTERVENTIONS:  - Identify barriers to discharge w/patient and caregiver  - Arrange for needed discharge resources and transportation as appropriate  - Identify discharge learning needs (meds, wound care, etc )  - Arrange for interpretive services to assist at discharge as needed  - Refer to Case Management Department for coordinating discharge planning if the patient needs post-hospital services based on physician/advanced practitioner order or complex needs related to functional status, cognitive ability, or social support system  Outcome: Progressing

## 2019-10-04 NOTE — UTILIZATION REVIEW
Initial Clinical Review    Admission: Date/Time/Statement: 10/83 @ [address] [address] This Encounter   Procedures    Place in Observation     Standing Status:   Standing     Number of Occurrences:   1     Order Specific Question:   Admitting Physician     Answer:   Sol Guardado     Order Specific Question:   Level of Care     Answer:   Med Surg [16]     ED Arrival Information     Expected Arrival Acuity Means of Arrival Escorted By Service Admission Type    - 10/3/2019 10:08 Urgent [facility] ([address]) General Medicine Urgent    Arrival Complaint    breast/chest pain        Chief Complaint   Patient presents with    Chest Pain     Per EMS, patient sent from doctor's office, patient has had consistent chest pain since Thursday (5 days PTA)     Assessment/Plan: 79 y/o female presents to ED from PCP office by EMS with intermittent L sided nonradiating chest pain for past 6 days  New Q waves on EKG at PCP office  On exam, reproducible tenderness to palpation over L anterior chest wall  EKG done at ED confirmed Q wave changes in lead III and aVF, 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, unchanged from EKG done at the outpatient clinic  Admitted as observation due to chest pain  Differential diagnosis include ACS vs costochondritis  Tele  Trend troponins  Echo      ED Triage Vitals   Temperature Pulse Respirations Blood Pressure SpO2   10/03/19 1016 10/03/19 1016 10/03/19 1016 10/03/19 1016 10/03/19 1016   (!) 97 4 °F (36 3 °C) 78 16 133/68 100 %      Temp Source Heart Rate Source Patient Position - Orthostatic VS BP Location FiO2 (%)   10/03/19 1016 10/03/19 1016 10/03/19 1016 10/03/19 1016 --   Tympanic Monitor Lying Left arm       Pain Score       10/03/19 1330       5        Wt Readings from Last 1 Encounters:   10/04/19 92 2 kg (203 lb 4 2 oz)     Additional Vital Signs:   10/04/19 0711  97 5 °F (36 4 °C)  72  20  160/79  100 %  None (Room air)   10/03/19 2297 98 °F (36 7 °C)  66  20  129/53  96 %  None (Room air)   10/03/19 2012  97 5 °F (36 4 °C)  67  20  152/65  96 %  None (Room air)   10/03/19 1530  97 4 °F (36 3 °C)  71  20  114/83  98 %  None (Room air)   10/03/19 1315  99 °F (37 2 °C)  78  20  137/73  96 %  None (Room air)       Pertinent Labs/Diagnostic Test Results:   Results from last 7 days   Lab Units 10/04/19  0439 10/03/19  1033   WBC Thousand/uL 5 10 6 20   HEMOGLOBIN g/dL 12 0 11 6*   HEMATOCRIT % 36 1 35 6*   PLATELETS Thousands/uL 208 216   NEUTROS ABS Thousands/µL  --  3 60   TOTAL NEUT ABS Thousand/uL 2 45  --    BANDS PCT % 1  --          Results from last 7 days   Lab Units 10/04/19  0439 10/03/19  1033   SODIUM mmol/L 138 137   POTASSIUM mmol/L 3 9 4 4   CHLORIDE mmol/L 102 99   CO2 mmol/L 29 31*   ANION GAP mmol/L 7 7   BUN mg/dL 22 32*   CREATININE mg/dL 0 64 0 68   EGFR ml/min/1 73sq m 82 80   CALCIUM mg/dL 9 2 9 2             Results from last 7 days   Lab Units 10/04/19  0439 10/03/19  1033   GLUCOSE RANDOM mg/dL 101* 100*       Results from last 7 days   Lab Units 10/03/19  2054 10/03/19  1428 10/03/19  1033   TROPONIN I ng/mL <0 01 <0 01 <0 01         Results from last 7 days   Lab Units 10/03/19  1428   TSH 3RD GENERATON uIU/mL 0 849        10/3 CXR:  No acute cardiopulmonary disease      Echo pending      Past Medical History:   Diagnosis Date    Diverticulosis 2001    Heart failure ([facility] [address] )     History of cystocele     Hypertension     Obesity     Positive PPD 1999    treated     Present on Admission:   (Resolved) Chest pain   Benign essential hypertension   COPD (chronic obstructive pulmonary disease) (HCC)   HLD (hyperlipidemia)   Malignant neoplasm of overlapping sites of left female breast (HCC)   Chronic diastolic congestive heart failure (HCC)      Admitting Diagnosis: Chest pain [R07 9]  Chest pain, unspecified type [R07 9]  Age/Sex: 80 y o  female  Admission Orders:    Current Facility-Administered Medications:  albuterol 2 puff Inhalation Q4H PRN   atorvastatin 20 mg Oral Daily With Dinner   famotidine 20 mg Oral BID   fluticasone-vilanterol 1 puff Inhalation Daily   furosemide 40 mg Oral Daily   hydrochlorothiazide 25 mg Oral Daily   lisinopril 40 mg Oral Daily   oxybutynin 10 mg Oral Daily   sucralfate 1,000 mg Oral 4x Daily (with meals and at bedtime)   timolol 1 drop Both Eyes BID       Fall precautions  Tele  VS  SCDs  Echo  PT/OT    Network Utilization Review Department  Phone: [phone]; Fax [phone]  [email or web address]  ATTENTION: Please call with any questions or concerns to [phone]  and carefully listen to the prompts so that you are directed to the right person  Send all requests for admission clinical reviews, approved or denied determinations and any other requests to fax [phone]   All voicemails are confidential

## 2019-10-04 NOTE — NURSING NOTE
PT was D/C to home D/C instruction was given to PT and PT and family verbalized understanding of D/C instruction  All personal belonging was given to PT  IV was D/C  Volunteer accompany PT to lobby

## 2019-10-04 NOTE — PLAN OF CARE
Problem: OCCUPATIONAL THERAPY ADULT  Goal: Performs self-care activities at highest level of function for planned discharge setting  See evaluation for individualized goals  Description  Treatment Interventions: ADL retraining, Functional transfer training, UE strengthening/ROM, Endurance training, Patient/family training          See flowsheet documentation for full assessment, interventions and recommendations  Note:   Limitation: Decreased ADL status, Decreased endurance  Prognosis: Fair  Assessment: Pt is a 80 y o  female seen for OT evaluation s/p [facility] w/ Chest pain  Comorbidities affecting Pt's functional performance at time of assessment include: HTN, COPD, CHF  Personal factors affecting Pt at time of IE include:limited home support, difficulty performing ADLS, difficulty performing IADLS  and health management   Upon evaluation: Pt requires [place] for transfers and ambulation, assist for LB ADL  Vitals WNL throughout  The following deficits impact occupational performance: decreased balance and decreased tolerance  Pt to benefit from continued skilled OT services while in the hospital to address deficits as defined above and maximize level of functional independence w ADL's and functional mobility  Occupational performance areas to address include: grooming, bathing/shower, toilet hygiene, health maintenance, functional mobility and clothing management  Pt appears near her functional baseline, with increased deficit in standing balance and activity tolerance   From OT standpoint, recommendation at time of d/c would be home OT and family assist        OT Discharge Recommendation: Home OT(and resumed family assist )

## 2019-10-04 NOTE — NURSING NOTE
Received patient in bed, AAOx4, A[place] speaking, c/o mild L chest pain  Troponin x 3 negative  Chyna Trevor G#20 IV intact to L AC, flushes well, positive blood return  Occ coughing with small amt of whitish sputum noted  Will continue to monitor

## 2019-10-04 NOTE — PLAN OF CARE
Problem: Potential for Falls  Goal: Patient will remain free of falls  Description  INTERVENTIONS:  - Assess patient frequently for physical needs  -  Identify cognitive and physical deficits and behaviors that affect risk of falls    -  [place] fall precautions as indicated by assessment   - Educate patient/family on patient safety including physical limitations  - Instruct patient to call for assistance with activity based on assessment  - Modify environment to reduce risk of injury  - Consider OT/PT consult to assist with strengthening/mobility  10/4/2019 0327 by Julisa Tsai RN  Outcome: Progressing  10/4/2019 0324 by Julisa Tsai RN  Outcome: Progressing     Problem: PAIN - ADULT  Goal: Verbalizes/displays adequate comfort level or baseline comfort level  Description  Interventions:  - Encourage patient to monitor pain and request assistance  - Assess pain using appropriate pain scale  - Administer analgesics based on type and severity of pain and evaluate response  - Implement non-pharmacological measures as appropriate and evaluate response  - Consider cultural and social influences on pain and pain management  - Notify physician/advanced practitioner if interventions unsuccessful or patient reports new pain  10/4/2019 0327 by Julisa Tsai RN  Outcome: Progressing  10/4/2019 0324 by Julisa Tsai RN  Outcome: Progressing     Problem: INFECTION - ADULT  Goal: Absence or prevention of progression during hospitalization  Description  INTERVENTIONS:  - Assess and monitor for signs and symptoms of infection  - Monitor lab/diagnostic results  - Monitor all insertion sites, i e  indwelling lines, tubes, and drains  - Monitor endotracheal if appropriate and nasal secretions for changes in amount and color  - [place] appropriate cooling/warming therapies per order  - Administer medications as ordered  - Instruct and encourage patient and family to use good hand hygiene technique  - Identify and instruct in appropriate isolation precautions for identified infection/condition  10/4/2019 0327 by Everett Henderson RN  Outcome: Progressing  10/4/2019 0324 by Everett Henderson RN  Outcome: Progressing  Goal: Absence of fever/infection during neutropenic period  Description  INTERVENTIONS:  - Monitor WBC    10/4/2019 0327 by Everett Henderson RN  Outcome: Progressing  10/4/2019 0324 by Everett Henderson RN  Outcome: Progressing     Problem: INFECTION - ADULT  Goal: Absence of fever/infection during neutropenic period  Description  INTERVENTIONS:  - Monitor WBC    10/4/2019 0327 by Everett Henderson RN  Outcome: Progressing  10/4/2019 0324 by Everett Henderson RN  Outcome: Progressing     Problem: SAFETY ADULT  Goal: Maintain or return to baseline ADL function  Description  INTERVENTIONS:  -  Assess patient's ability to carry out ADLs; assess patient's baseline for ADL function and identify physical deficits which impact ability to perform ADLs (bathing, care of mouth/teeth, toileting, grooming, dressing, etc )  - Assess/evaluate cause of self-care deficits   - Assess range of motion  - Assess patient's mobility; develop plan if impaired  - Assess patient's need for assistive devices and provide as appropriate  - Encourage maximum independence but intervene and supervise when necessary  - Involve family in performance of ADLs  - Assess for home care needs following discharge   - Consider OT consult to assist with ADL evaluation and planning for discharge  - Provide patient education as appropriate  10/4/2019 0327 by Everett Henderson RN  Outcome: Progressing  10/4/2019 0324 by Everett Henderson RN  Outcome: Progressing  Goal: Maintain or return mobility status to optimal level  Description  INTERVENTIONS:  - Assess patient's baseline mobility status (ambulation, transfers, stairs, etc )    - Identify cognitive and physical deficits and behaviors that affect mobility  - Identify mobility aids required to assist with transfers and/or ambulation (gait belt, sit-to-stand, lift, walker, cane, etc )  - [place] fall precautions as indicated by assessment  - Record patient progress and toleration of activity level on Mobility SBAR; progress patient to next Phase/Stage  - Instruct patient to call for assistance with activity based on assessment  - Consider rehabilitation consult to assist with strengthening/weightbearing, etc   10/4/2019 0327 by Nahomi Cardoso RN  Outcome: Progressing  10/4/2019 0324 by Nahomi Cardoso RN  Outcome: Progressing     Problem: SAFETY ADULT  Goal: Maintain or return mobility status to optimal level  Description  INTERVENTIONS:  - Assess patient's baseline mobility status (ambulation, transfers, stairs, etc )    - Identify cognitive and physical deficits and behaviors that affect mobility  - Identify mobility aids required to assist with transfers and/or ambulation (gait belt, sit-to-stand, lift, walker, cane, etc )  - [place] fall precautions as indicated by assessment  - Record patient progress and toleration of activity level on Mobility SBAR; progress patient to next Phase/Stage  - Instruct patient to call for assistance with activity based on assessment  - Consider rehabilitation consult to assist with strengthening/weightbearing, etc   10/4/2019 0327 by Nahomi Cardoso RN  Outcome: Progressing  10/4/2019 0324 by Nahomi Cardoso RN  Outcome: Progressing     Problem: DISCHARGE PLANNING  Goal: Discharge to home or other facility with appropriate resources  Description  INTERVENTIONS:  - Identify barriers to discharge w/patient and caregiver  - Arrange for needed discharge resources and transportation as appropriate  - Identify discharge learning needs (meds, wound care, etc )  - Arrange for interpretive services to assist at discharge as needed  - Refer to Case Management Department for coordinating discharge planning if the patient needs post-hospital services based on physician/advanced practitioner order or complex needs related to functional status, cognitive ability, or social support system  10/4/2019 0327 by Kemar Lazo, RN  Outcome: Progressing  10/4/2019 0324 by Kemar Lazo, RN  Outcome: Progressing

## 2019-10-09 ENCOUNTER — TRANSITIONAL CARE MANAGEMENT (OUTPATIENT)
Dept: FAMILY MEDICINE CLINIC | Facility: CLINIC | Age: 84
End: 2019-10-09

## 2019-10-11 ENCOUNTER — TELEPHONE (OUTPATIENT)
Dept: FAMILY MEDICINE CLINIC | Facility: CLINIC | Age: 84
End: 2019-10-11

## 2019-10-17 ENCOUNTER — TELEPHONE (OUTPATIENT)
Dept: FAMILY MEDICINE CLINIC | Facility: CLINIC | Age: 84
End: 2019-10-17

## 2019-10-17 NOTE — TELEPHONE ENCOUNTER
DOTTIEI:    [facility] called stating they were not able to admit the pt to home care states they are unable to locate the pt  Stating they attempted multiple numbers and days to reach the pt states they had a physical therapist go to the pts home today and no one answered  So the pt is not being admitted to [address] [place] [address]   [phone]

## 2019-11-04 ENCOUNTER — OFFICE VISIT (OUTPATIENT)
Dept: GASTROENTEROLOGY | Facility: CLINIC | Age: 84
End: 2019-11-04
Payer: COMMERCIAL

## 2019-11-04 VITALS
HEIGHT: 61 IN | TEMPERATURE: 97.6 F | HEART RATE: 89 BPM | WEIGHT: 206.2 LBS | SYSTOLIC BLOOD PRESSURE: 107 MMHG | BODY MASS INDEX: 38.93 KG/M2 | DIASTOLIC BLOOD PRESSURE: 60 MMHG

## 2019-11-04 DIAGNOSIS — A04.8 H. PYLORI INFECTION: ICD-10-CM

## 2019-11-04 DIAGNOSIS — K21.9 GASTROESOPHAGEAL REFLUX DISEASE, ESOPHAGITIS PRESENCE NOT SPECIFIED: Primary | ICD-10-CM

## 2019-11-04 PROCEDURE — 99213 OFFICE O/P EST LOW 20 MIN: CPT | Performed by: INTERNAL MEDICINE

## 2019-11-04 NOTE — PROGRESS NOTES
[facility] - Outpatient Follow-up Note  Gricelda Salas 80 y o  female MRN: [id number]  Encounter: [id number]          ASSESSMENT AND PLAN:      80year old female here for follow up  1 GERD- She reported that she doesn't get the reflux anymore  She was on zantac but has discontinued  Pt is doing well and I will have her follow up as needed  2  Rectal pain- Pts pain has subsided while using rectal ointment  And overall is feeling well  Follow up as needed  ______________________________________________________________________    SUBJECTIVE:  Gricelda Salas is a 80 y o  female is a 6 month follow up  She had her granddaughter translate  Today she is feeling well and has discontinued Carafate and Zantac  Her rectal/anal pain has subsided with he cream  Her H Pylori has resolved, and GERD has improved  Her weight is stable and is eating well  She has been having normal BM  REVIEW OF SYSTEMS IS OTHERWISE NEGATIVE        Historical Information   Past Medical History:   Diagnosis Date    Diverticulosis 2001    Heart failure ([address] )     History of cystocele     Hypertension     Obesity     Positive PPD 1999    treated     Past Surgical History:   Procedure Laterality Date    CARDIOVASCULAR STRESS TEST  2001    dobutamine stress test    CHOLECYSTECTOMY  04/2002    COLONOSCOPY      CYSTOCELE REPAIR      HYSTERECTOMY      KNEE SURGERY      knee replacement    UPPER GASTROINTESTINAL ENDOSCOPY       Social History   Social History     Substance and Sexual Activity   Alcohol Use Not Currently     Social History     Substance and Sexual Activity   Drug Use Not Currently     Social History     Tobacco Use   Smoking Status Never Smoker   Smokeless Tobacco Never Used     Family History   Family history unknown: Yes       Meds/Allergies       Current Outpatient Medications:     acetaminophen (TYLENOL) 650 mg CR tablet    ADVAIR DISKUS 500-50 MCG/DOSE inhaler   amitriptyline (ELAVIL) 25 mg tablet    Calcium Carbonate-Vitamin D (CALCIUM 500 + D) 500-125 MG-UNIT TABS    fluticasone (FLONASE) 50 mcg/act nasal spray    furosemide (LASIX) 40 mg tablet    hydrochlorothiazide (HYDRODIURIL) 25 mg tablet    hydrocortisone 2 5 % cream    Misc  Devices (CANE) MISC    timolol (BETIMOL) 0 5 % ophthalmic solution    TOVIAZ 8 MG TB24    VENTOLIN  (90 Base) MCG/ACT inhaler    atorvastatin (LIPITOR) 20 mg tablet    lisinopril (ZESTRIL) 40 mg tablet    ranitidine (ZANTAC) 150 mg tablet    sucralfate (CARAFATE) 1 g/10 mL suspension    Allergies   Allergen Reactions    No Active Allergies            Objective     There were no vitals taken for this visit  There is no height or weight on file to calculate BMI  PHYSICAL EXAM:      General Appearance:   Alert, cooperative, no distress   HEENT:   Normocephalic, atraumatic, anicteric      Neck:  Supple, symmetrical, trachea midline   Lungs:   Clear to auscultation bilaterally; no rales, rhonchi or wheezing; respirations unlabored    Heart[de-identified]   Regular rate and rhythm; no murmur, rub, or gallop, systolic heart murmur   Abdomen:   Soft, non-tender, non-distended; normal bowel sounds; no masses, no organomegaly    Genitalia:   Deferred    Rectal:   Deferred    Extremities:  No cyanosis, clubbing or edema    Pulses:  2+ and symmetric    Skin:  No jaundice, rashes, or lesions    Lymph nodes:  No palpable cervical lymphadenopathy        Lab Results:   No visits with results within 1 Day(s) from this visit     Latest known visit with results is:   Admission on 10/03/2019, Discharged on 10/04/2019   Component Date Value    Sodium 10/03/2019 137     Potassium 10/03/2019 4 4     Chloride 10/03/2019 99     CO2 10/03/2019 31*    ANION GAP 10/03/2019 7     BUN 10/03/2019 32*    Creatinine 10/03/2019 0 68     Glucose 10/03/2019 100*    Calcium 10/03/2019 9 2     eGFR 10/03/2019 80     WBC 10/03/2019 6 20     RBC 10/03/2019 3 99*    Hemoglobin 10/03/2019 11 6*    Hematocrit 10/03/2019 35 6*    MCV 10/03/2019 89     MCH 10/03/2019 29 0     MCHC 10/03/2019 32 6     RDW 10/03/2019 13 8     MPV 10/03/2019 8 3*    Platelets 27/23/8437 216     Neutrophils Relative 10/03/2019 58     Lymphocytes Relative 10/03/2019 24*    Monocytes Relative 10/03/2019 7     Eosinophils Relative 10/03/2019 10*    Basophils Relative 10/03/2019 1     Neutrophils Absolute 10/03/2019 3 60     Lymphocytes Absolute 10/03/2019 1 50     Monocytes Absolute 10/03/2019 0 50     Eosinophils Absolute 10/03/2019 0 60*    Basophils Absolute 10/03/2019 0 00     Troponin I 10/03/2019 <0 01     TSH 3RD GENERATON 10/03/2019 0 849     Troponin I 10/03/2019 <0 01     Troponin I 10/03/2019 <0 01     Sodium 10/04/2019 138     Potassium 10/04/2019 3 9     Chloride 10/04/2019 102     CO2 10/04/2019 29     ANION GAP 10/04/2019 7     BUN 10/04/2019 22     Creatinine 10/04/2019 0 64     Glucose 10/04/2019 101*    Calcium 10/04/2019 9 2     eGFR 10/04/2019 82     Cholesterol 10/04/2019 170     Triglycerides 10/04/2019 110     HDL, Direct 10/04/2019 50     LDL Calculated 10/04/2019 98     Non-HDL-Chol (CHOL-HDL) 10/04/2019 120     WBC 10/04/2019 5 10     RBC 10/04/2019 4 09     Hemoglobin 10/04/2019 12 0     Hematocrit 10/04/2019 36 1     MCV 10/04/2019 88     MCH 10/04/2019 29 4     MCHC 10/04/2019 33 3     RDW 10/04/2019 14 0     MPV 10/04/2019 8 3*    Platelets 37/80/7759 208     Segmented % 10/04/2019 47     Bands % 10/04/2019 1     Lymphocytes % 10/04/2019 29     Monocytes % 10/04/2019 9     Eosinophils, % 10/04/2019 13*    Basophils % 10/04/2019 1     Neutrophils Absolute 10/04/2019 2 45     Lymphocytes Absolute 10/04/2019 1 48     Monocytes Absolute 10/04/2019 0 46     Eosinophils Absolute 10/04/2019 0 66*    Basophils Absolute 10/04/2019 0 05     Total Counted 10/04/2019 100     RBC Morphology 10/04/2019 Normal     Platelet Estimate 13/77/7646 Adequate     Ventricular Rate 10/04/2019 68     Atrial Rate 10/04/2019 68     AK Interval 10/04/2019 208     QRSD Interval 10/04/2019 92     QT Interval 10/04/2019 412     QTC Interval 10/04/2019 438     P Axis 10/04/2019 62     QRS Axis 10/04/2019 -20     T Wave Axis 10/04/2019 44     Ventricular Rate 10/03/2019 73     Atrial Rate 10/03/2019 73     AK Interval 10/03/2019 200     QRSD Interval 10/03/2019 90     QT Interval 10/03/2019 390     QTC Interval 10/03/2019 429     P Axis 10/03/2019 49     QRS Salter Path 10/03/2019 -11     T Wave Salter Path 10/03/2019 30          Radiology Results:   No results found  Attestation:     By signing my name below, Katrina Car, attest that this documentation has been prepared under the direction and in the presence of Zia Pritchard DO  Electronically Signed: Abi Benavidez  11/04/19      I, Zia Pritchard, personally performed the services described in this documentation  All medical record entries made by the scribe were at my direction and in my presence  I have reviewed the chart and discharge instructions and agree that the record reflects my personal performance and is accurate and complete   Zia Pritchard DO  11/04/19

## 2019-11-12 ENCOUNTER — OFFICE VISIT (OUTPATIENT)
Dept: FAMILY MEDICINE CLINIC | Facility: CLINIC | Age: 84
End: 2019-11-12

## 2019-11-12 ENCOUNTER — HOSPITAL ENCOUNTER (OUTPATIENT)
Dept: RADIOLOGY | Facility: HOSPITAL | Age: 84
Discharge: HOME | End: 2019-11-12
Payer: COMMERCIAL

## 2019-11-12 VITALS
SYSTOLIC BLOOD PRESSURE: 124 MMHG | HEART RATE: 92 BPM | DIASTOLIC BLOOD PRESSURE: 72 MMHG | TEMPERATURE: 97.8 F | WEIGHT: 204 LBS | OXYGEN SATURATION: 96 % | HEIGHT: 61 IN | RESPIRATION RATE: 18 BRPM | BODY MASS INDEX: 38.51 KG/M2

## 2019-11-12 DIAGNOSIS — I10 BENIGN ESSENTIAL HYPERTENSION: ICD-10-CM

## 2019-11-12 DIAGNOSIS — M25.511 ACUTE PAIN OF RIGHT SHOULDER: Primary | ICD-10-CM

## 2019-11-12 DIAGNOSIS — I50.9 CHF (CONGESTIVE HEART FAILURE) (HCC): ICD-10-CM

## 2019-11-12 DIAGNOSIS — M25.511 ACUTE PAIN OF RIGHT SHOULDER: ICD-10-CM

## 2019-11-12 PROCEDURE — 73030 X-RAY EXAM OF SHOULDER: CPT

## 2019-11-12 PROCEDURE — 99213 OFFICE O/P EST LOW 20 MIN: CPT | Performed by: FAMILY MEDICINE

## 2019-11-12 PROCEDURE — 3074F SYST BP LT 130 MM HG: CPT | Performed by: FAMILY MEDICINE

## 2019-11-12 PROCEDURE — 3078F DIAST BP <80 MM HG: CPT | Performed by: FAMILY MEDICINE

## 2019-11-12 RX ORDER — HYDROCHLOROTHIAZIDE 12.5 MG/1
12.5 TABLET ORAL DAILY
Qty: 90 TABLET | Refills: 3 | Status: SHIPPED | OUTPATIENT
Start: 2019-11-12 | End: 2020-06-24

## 2019-11-12 NOTE — PROGRESS NOTES
Assessment/Plan:    Benign essential hypertension  Blood Pressure: 124/72     Controlled  Patient on lisinopril 40 mg, and HCTZ  Since patient is currently on two types of diuretics (furosemide for heart failure) and blood pressure is currently controlled, will adjust HCTZ to 12 5 mg daily  Will keep monitoring during next visit and adjust medications accordingly  Patient and daughter verbalized understanding and all questions were answered  -HCTZ 12 5 mg daily    Acute pain of right shoulder  Patient indicates to have noticed symptoms of shoulder discomfort and "cliking" sensation when moving right shoulder which worsens when washes the dishes  On physical examination, some decreased range of motion noted with extension of the shoulder, mild scoliosis noted, No signs of wing scapula noted  Will order shoulder x-ray to rule out any bone abnormalities in the shoulder or presence of calcium deposits in shoulder joints that may be precipitating the symptoms and after results will continue to assess if further workup is needed  Patient and daughter verbalized understanding and all questions were answered     -shoulder x-ray       Diagnoses and all orders for this visit:    Acute pain of right shoulder  -     XR shoulder 2+ vw right; Future    CHF (congestive heart failure) (HCC)    Benign essential hypertension  -     hydrochlorothiazide (HYDRODIURIL) 12 5 mg tablet; Take 1 tablet (12 5 mg total) by mouth daily          Subjective:      Patient ID: Yazmin Dunham is a 80 y o  female  Case of 80year old female who came to the clinic today due to symptoms of shoulder discomfort  Daughter was also in the room, who indicated that patient has been complaining of shoulder discomfort and "clicking" sensation when moving the arm and washing dishes  Denies any trauma to the area or any other additional symptoms  As for her chronic conditions, indicates being compliant to all her medications         The following portions of the patient's history were reviewed and updated as appropriate: allergies, past family history, past social history and past surgical history  Review of Systems   Constitutional: Negative for appetite change  Eyes: Negative for visual disturbance  Respiratory: Negative for cough and shortness of breath  Cardiovascular: Negative for chest pain, palpitations and leg swelling  Gastrointestinal: Negative for abdominal pain and constipation  Musculoskeletal: Negative for back pain  Right shoulder pain   Skin: Negative for color change, pallor, rash and wound  Objective:      /72 (BP Location: Left arm, Patient Position: Sitting, Cuff Size: Standard)   Pulse 92   Temp 97 8 °F (36 6 °C) (Temporal)   Resp 18   Ht 5' 1" (1 549 m)   Wt 92 5 kg (204 lb)   SpO2 96%   BMI 38 55 kg/m²          Physical Exam   Constitutional: She appears well-developed and well-nourished  No distress  HENT:   Head: Atraumatic  Eyes: EOM are normal  No scleral icterus  Neck: Neck supple  Cardiovascular: Normal rate and normal heart sounds  No murmur heard  Pulmonary/Chest: Effort normal and breath sounds normal  No respiratory distress  Abdominal: Soft  Bowel sounds are normal  She exhibits no distension  There is no tenderness  Musculoskeletal: She exhibits no edema or tenderness  Mild decrease in right shoulder range of motion with extension of the right arm, mild scoliosis noted, no signs of wing scapula noted  Neurological: She is alert  Skin: Skin is warm  No rash noted  She is not diaphoretic  No erythema  Psychiatric: She has a normal mood and affect

## 2019-11-13 NOTE — ASSESSMENT & PLAN NOTE
Blood Pressure: 124/72     Controlled  Patient on lisinopril 40 mg, and HCTZ  Since patient is currently on two types of diuretics (furosemide for heart failure) and blood pressure is currently controlled, will adjust HCTZ to 12 5 mg daily  Will keep monitoring during next visit and adjust medications accordingly  Patient and daughter verbalized understanding and all questions were answered      -HCTZ 12 5 mg daily

## 2019-11-13 NOTE — ASSESSMENT & PLAN NOTE
Patient indicates to have noticed symptoms of shoulder discomfort and "cliking" sensation when moving right shoulder which worsens when washes the dishes  On physical examination, some decreased range of motion noted with extension of the shoulder, mild scoliosis noted, No signs of wing scapula noted  Will order shoulder x-ray to rule out any bone abnormalities in the shoulder or presence of calcium deposits in shoulder joints that may be precipitating the symptoms and after results will continue to assess if further workup is needed   Patient and daughter verbalized understanding and all questions were answered     -shoulder x-ray

## 2019-11-14 ENCOUNTER — TELEPHONE (OUTPATIENT)
Dept: FAMILY MEDICINE CLINIC | Facility: CLINIC | Age: 84
End: 2019-11-14

## 2019-11-14 NOTE — TELEPHONE ENCOUNTER
Called patient today to notify about x-ray results but patient could not be reached  Sent message to [facility] clerical team to contact patient to notify when would be a good time to call to notify about results

## 2019-11-15 NOTE — TELEPHONE ENCOUNTER
Patient's daughter called and stated she will be available until 10 am   She goes to work after that she will be going to work

## 2019-11-20 ENCOUNTER — TELEPHONE (OUTPATIENT)
Dept: OTHER | Facility: HOSPITAL | Age: 84
End: 2019-11-20

## 2019-11-21 ENCOUNTER — OFFICE VISIT (OUTPATIENT)
Dept: AUDIOLOGY | Facility: CLINIC | Age: 84
End: 2019-11-21
Payer: COMMERCIAL

## 2019-11-21 ENCOUNTER — TELEPHONE (OUTPATIENT)
Dept: FAMILY MEDICINE CLINIC | Facility: CLINIC | Age: 84
End: 2019-11-21

## 2019-11-21 ENCOUNTER — OFFICE VISIT (OUTPATIENT)
Dept: OTOLARYNGOLOGY | Facility: CLINIC | Age: 84
End: 2019-11-21
Payer: COMMERCIAL

## 2019-11-21 ENCOUNTER — DOCUMENTATION (OUTPATIENT)
Dept: AUDIOLOGY | Age: 84
End: 2019-11-21

## 2019-11-21 VITALS
DIASTOLIC BLOOD PRESSURE: 66 MMHG | RESPIRATION RATE: 17 BRPM | SYSTOLIC BLOOD PRESSURE: 121 MMHG | BODY MASS INDEX: 38.72 KG/M2 | HEART RATE: 73 BPM | HEIGHT: 61 IN | WEIGHT: 205.1 LBS

## 2019-11-21 DIAGNOSIS — H90.3 ASYMMETRIC SNHL (SENSORINEURAL HEARING LOSS): Primary | ICD-10-CM

## 2019-11-21 DIAGNOSIS — J30.1 SEASONAL ALLERGIC RHINITIS DUE TO POLLEN: Primary | ICD-10-CM

## 2019-11-21 PROCEDURE — 92557 COMPREHENSIVE HEARING TEST: CPT | Performed by: AUDIOLOGIST

## 2019-11-21 PROCEDURE — 99213 OFFICE O/P EST LOW 20 MIN: CPT | Performed by: OTOLARYNGOLOGY

## 2019-11-21 PROCEDURE — 92567 TYMPANOMETRY: CPT | Performed by: AUDIOLOGIST

## 2019-11-21 RX ORDER — FLUTICASONE PROPIONATE 50 MCG
2 SPRAY, SUSPENSION (ML) NASAL DAILY
Qty: 1 BOTTLE | Refills: 3 | Status: SHIPPED | OUTPATIENT
Start: 2019-11-21

## 2019-11-21 NOTE — TELEPHONE ENCOUNTER
----- Message from Sung Walters MD sent at 11/20/2019  8:58 PM EST -----  Regarding: Shoulder injection scheduling for patient   Hi,    This patient needs an appointment schedule for Right shoulder steroid injection performed by Me with attending supervision  Please let me know about my schedule availability  Patient would prefer to be at latest appointment during the day available (possibly at 3:40 PM appointment)  Thanks!

## 2019-11-21 NOTE — TELEPHONE ENCOUNTER
Called patient and also spoke with family member to discuss shoulder x ray results  Please see chart documentation regarding recommendations  Thanks!

## 2019-11-21 NOTE — PROGRESS NOTES
Lala Anne 80 y o  female MRN: [id number]  Unit/Bed#:  Encounter: [id number]            History of Present Illness     Reason for Visit[de-identified] URI   HPI: Lala Anne is a 80y o  year old female who presents with a history of sensorineural hearing loss, asymmetric  Last seen 1 year ago  She does have an MRI of the brain that was done in 5/5/15 with no IAC lesion  Patient reports that she went to a [facility],  she has an upper respiratory infection with congestion of the nose  Review of Systems   Constitutional: Positive for fatigue  HENT: Positive for ear pain and sore throat  Eyes: Positive for visual disturbance  Respiratory: Negative  Cardiovascular: Negative  Gastrointestinal: Negative  Endocrine: Negative  Genitourinary: Negative  Musculoskeletal: Positive for back pain and neck pain  Skin: Negative  Allergic/Immunologic: Negative  Neurological: Positive for dizziness and headaches  Hematological: Negative  Psychiatric/Behavioral: Negative        Revision of Systems:    Complete review done, only positive for the symptoms described in the H&P section above      Historical Information   Past Medical History:   Diagnosis Date    Diverticulosis 2001    [address] failure [address] [address]     History of cystocele     Hypertension     Obesity     Positive PPD 1999    treated     Past Surgical History:   Procedure Laterality Date    CARDIOVASCULAR STRESS TEST  2001    dobutamine stress test    CHOLECYSTECTOMY  04/2002    COLONOSCOPY      CYSTOCELE REPAIR      HYSTERECTOMY      KNEE SURGERY      knee replacement    UPPER GASTROINTESTINAL ENDOSCOPY       Social History   Social History     Substance and Sexual Activity   Alcohol Use Not Currently     Social History     Substance and Sexual Activity   Drug Use Not Currently     Social History     Tobacco Use   Smoking Status Never Smoker   Smokeless Tobacco Never Used     Family History:   Family History   Family history unknown: Yes       Meds/Allergies   No current facility-administered medications for this visit  Allergies   Allergen Reactions    No Active Allergies        Objective       Physical Exam   Blood pressure 121/66, pulse 73, resp  rate 17, height 5' 1" (1 549 m), weight 93 kg (205 lb 1 6 oz)  Constitutional: Oriented to person, place, and time  Well-developed and well-nourished, no apparent distress, non-toxic appearance  Cooperative, able to hear and answer questions without difficulty  Voice: Normal voice quality  Head: Normocephalic, atraumatic  No scars, masses or lesions  Face: Symmetric, no edema, no sinus tenderness  Eyes: Vision grossly intact, extra-ocular movement intact  Right Ear: External ear normal   Auditory canal clear  Tympanic membrane well-appearing, without retraction or scarring  No fluid present  No post-auricular erythema or tenderness  Left Ear: External ear normal   Auditory canal clear  Tympanic membrane well-appearing, without retraction or scarring  No fluid present  No post-auricular erythema or tenderness  Nose: Septum mild left deviation, mucosa appears dry  Turbinates normal size, no edema  No polyps or masses, no discharge evident  Oral cavity:  Lips normal, no mucosal lesions  Edentulous, uses superior and inferior dentures  Gingiva normal in appearance  Mucosa moist,  Tongue mobile, floor of mouth normal   Hard palate unremarkable  No masses or lesions  Oropharynx: Uvula is midline, sot palate normal   Unremarkable oropharyngeal inlet  Tonsils unremarkable  Posterior pharyngeal wall clear  No masses or lesions  Salivary glands:  Parotid glands and submandibular glands symmetric, no enlargement or tenderness  Neck: Normal laryngeal elevation with swallow  Trachea midline  No masses or lesions  No palpable adenopathy  Thyroid: normal in size, and consistency, unremarkable without tenderness or palpable nodules  Pulmonary/Chest: Normal effort and rate   No respiratory distress  Musculoskeletal: Normal range of motion  Neurological: Cranial nerves 2-12 intact  Skin: Skin is warm and dry  Psychiatric: Normal mood and affect  Lab Results: CBC: No results found for: WBC, HGB, HCT, MCV, PLT, ADJUSTEDWBC, MCH, MCHC, RDW, MPV, NRBC, CMP: No results found for: NA, K, CL, CO2, ANIONGAP, BUN, CREATININE, GLUCOSE, CALCIUM, AST, ALT, ALKPHOS, PROT, BILITOT, EGFR  Imaging Studies: MRI [facility] 5/5/15 1  Study is limited by motion artifact  Close followup imaging is   recommended possibly with sedation or monitored anesthesia care  2  Multiple non-specific T2 hyperintense lesions within the white   matter  3  There is now evidence of mass effect or acute infarction  4  Focus of increased T1 signal within the left frontal lobe on   postcontrast imaging  Although more likely artifactual a small   metastatic lesion cannot     EKG, Pathology, and   Other Studies: I have personally reviewed pertinent reports and audiometry basically unchanged when compared to the last audiometry  Assessment:  Allergic rhinitis continue Flonase  Asymmetric sensorineural hearing loss, no middle ear effusion  Plan:  Follow up p r n

## 2019-11-21 NOTE — TELEPHONE ENCOUNTER
Called patient to notify about Right shoulder x ray results  Results discussed also with Family member Juliana also available on the phone that helped the patient  Indicated that Right shoulder x ray results showed osteoarthritis degenerative changes and signs of chronic rotator cuff tear  Explained to patient that this are common signs due to age and wear and tear effect of continuous movement of extremity throughout the years  No MRI, orthopedic or surgical evaluation needed, patient not a candidate for surgery  Discussed that options of treatment include shoulder steroid injection for pain and inflammation relief as well as consider physical therapy for helping in shoulder exercise techniques  Patient agreed to receive shoulder steroid injection, will send a message to clinic Clerical staff to schedule appointment for steroid injection and will follow up for steroid injection visit  Plan of care discussed with patient and family member, both verbalized understanding and all questions were answered

## 2019-11-21 NOTE — PROGRESS NOTES
Progress Note    Name:  Lala Anne  :  1934  Age:  80 y o  Date of Evaluation: 19     Scanned documents        Debbie Harris   Clinical Audiologist

## 2020-01-07 ENCOUNTER — OFFICE VISIT (OUTPATIENT)
Dept: FAMILY MEDICINE CLINIC | Facility: CLINIC | Age: 85
End: 2020-01-07

## 2020-01-07 VITALS
HEIGHT: 61 IN | SYSTOLIC BLOOD PRESSURE: 120 MMHG | OXYGEN SATURATION: 98 % | WEIGHT: 200 LBS | TEMPERATURE: 97.5 F | DIASTOLIC BLOOD PRESSURE: 66 MMHG | BODY MASS INDEX: 37.76 KG/M2 | RESPIRATION RATE: 18 BRPM | HEART RATE: 72 BPM

## 2020-01-07 DIAGNOSIS — J44.9 COPD (CHRONIC OBSTRUCTIVE PULMONARY DISEASE) (HCC): ICD-10-CM

## 2020-01-07 DIAGNOSIS — J44.9 CHRONIC OBSTRUCTIVE PULMONARY DISEASE, UNSPECIFIED COPD TYPE (HCC): Primary | ICD-10-CM

## 2020-01-07 DIAGNOSIS — J20.9 BRONCHITIS, ACUTE, WITH BRONCHOSPASM: ICD-10-CM

## 2020-01-07 PROCEDURE — 99214 OFFICE O/P EST MOD 30 MIN: CPT | Performed by: FAMILY MEDICINE

## 2020-01-07 RX ORDER — ALBUTEROL SULFATE 90 UG/1
2 AEROSOL, METERED RESPIRATORY (INHALATION) 4 TIMES DAILY
Qty: 18 G | Refills: 4 | Status: SHIPPED | OUTPATIENT
Start: 2020-01-07 | End: 2021-09-22

## 2020-01-07 RX ORDER — BENZONATATE 100 MG/1
100 CAPSULE ORAL 3 TIMES DAILY PRN
Qty: 20 CAPSULE | Refills: 0 | Status: SHIPPED | OUTPATIENT
Start: 2020-01-07 | End: 2020-03-03 | Stop reason: SDUPTHER

## 2020-01-07 RX ORDER — AZITHROMYCIN 250 MG/1
TABLET, FILM COATED ORAL
Qty: 6 TABLET | Refills: 0 | Status: SHIPPED | OUTPATIENT
Start: 2020-01-07 | End: 2020-01-12

## 2020-01-07 NOTE — PROGRESS NOTES
Assessment/Plan:    No problem-specific Assessment & Plan notes found for this encounter  Diagnoses and all orders for this visit:    Chronic obstructive pulmonary disease, unspecified COPD type ([facility] )    COPD (chronic obstructive pulmonary disease) (HCC)  -     fluticasone-salmeterol (ADVAIR DISKUS) 500-50 mcg/dose inhaler; Inhale 1 puff 2 (two) times a day Rinse mouth after use  -     albuterol (VENTOLIN HFA) 90 mcg/act inhaler; Inhale 2 puffs 4 (four) times a day  -     benzonatate (TESSALON PERLES) 100 mg capsule; Take 1 capsule (100 mg total) by mouth 3 (three) times a day as needed for cough    Bronchitis, acute, with bronchospasm  -     azithromycin (ZITHROMAX) 250 mg tablet; Take 2 tablets (500 mg total) by mouth daily for 1 day, THEN 1 tablet (250 mg total) daily for 4 days  Subjective:      Patient ID: Daniella Cormier is a 80 y o  female  81 yo  female complains of 2 week history of cough and sore throat  Cough productive of yellow sputum, accompanied by SOB, wheezing, chills  Denies fever, chest pain  Cough   This is a new problem  The current episode started 1 to 4 weeks ago  The problem has been unchanged  The problem occurs hourly  The cough is productive of sputum  Associated symptoms include chills, a sore throat, shortness of breath and sweats  Pertinent negatives include no fever or hemoptysis  The symptoms are aggravated by cold air, exercise and lying down  Risk factors for lung disease include travel  She has tried OTC cough suppressant for the symptoms  The treatment provided mild relief  Her past medical history is significant for COPD  The following portions of the patient's history were reviewed and updated as appropriate: She  has a past medical history of Diverticulosis (2001), Heart failure ([facility] ), History of cystocele, Hypertension, Obesity, and Positive PPD (1999)    She   Patient Active Problem List    Diagnosis Date Noted    Acute pain of right shoulder 11/12/2019    COPD (chronic obstructive pulmonary disease) ([facility] ) 08/12/2019    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    Cognitive impairment 04/18/2019    Dysuria 04/18/2019    Osteoarthritis 12/17/2018    Anal or rectal pain 11/20/2018    Hemorrhoids 09/12/2018    Chronic diastolic congestive heart failure ([facility] ) 09/12/2018    GERD (gastroesophageal reflux disease) 09/12/2018    Cataract 07/23/2018    Malignant neoplasm of overlapping sites of left female breast ([place] [id number] ) 11/10/2016    HLD (hyperlipidemia) 10/31/2016    Aortic stenosis 10/26/2016    Carpal tunnel syndrome on both sides 09/02/2016    Bilateral occipital neuralgia 10/15/2015    Anemia 09/25/2014    Benign essential hypertension 09/25/2014    Glaucoma 09/11/2014     She  has a past surgical history that includes Cholecystectomy (04/2002); Cystocele repair; Colonoscopy; Knee surgery; Hysterectomy; Cardiovascular stress test (2001); and Upper gastrointestinal endoscopy  Her Family history is unknown by patient  She  reports that she has never smoked  She has never used smokeless tobacco  She reports that she drank alcohol  She reports that she has current or past drug history  Current Outpatient Medications   Medication Sig Dispense Refill    acetaminophen (TYLENOL) 650 mg CR tablet 1 tablet as needed       albuterol (VENTOLIN HFA) 90 mcg/act inhaler Inhale 2 puffs 4 (four) times a day 18 g 4    amitriptyline (ELAVIL) 25 mg tablet TAKE ONE TABLET BY MOUTH DAILY AT BEDTIME   atorvastatin (LIPITOR) 20 mg tablet Take 1 tablet (20 mg total) by mouth daily for 90 days 90 tablet 0    azithromycin (ZITHROMAX) 250 mg tablet Take 2 tablets (500 mg total) by mouth daily for 1 day, THEN 1 tablet (250 mg total) daily for 4 days   6 tablet 0    benzonatate (TESSALON PERLES) 100 mg capsule Take 1 capsule (100 mg total) by mouth 3 (three) times a day as needed for cough 20 capsule 0    Calcium Carbonate-Vitamin D (CALCIUM 500 + D) 500-125 MG-UNIT TABS every 24 hours      fluticasone (FLONASE) 50 mcg/act nasal spray 1 spray into each nostril 2 (two) times a day 1 Bottle 5    fluticasone (FLONASE) 50 mcg/act nasal spray 2 sprays into each nostril daily 1 Bottle 3    fluticasone-salmeterol (ADVAIR DISKUS) 500-50 mcg/dose inhaler Inhale 1 puff 2 (two) times a day Rinse mouth after use  1 Inhaler 2    furosemide (LASIX) 40 mg tablet TAKE 1 TABLET BY MOUTH ONCE DAILY  90 tablet 3    hydrochlorothiazide (HYDRODIURIL) 12 5 mg tablet Take 1 tablet (12 5 mg total) by mouth daily 90 tablet 3    hydrocortisone 2 5 % cream Apply topically 4 (four) times a day as needed      lisinopril (ZESTRIL) 40 mg tablet TAKE 1 TABLET BY MOUTH ONCE DAILY  (Patient not taking: TAKE 1 TABLET BY MOUTH ONCE DAILY  ) 90 tablet 0    Misc  Devices (CANE) MISC Four point aluminum cane   timolol (BETIMOL) 0 5 % ophthalmic solution Administer 1 drop to both eyes 2 (two) times a day       TOVIAZ 8 MG TB24 TAKE 1 TABLET BY MOUTH ONCE DAILY  90 tablet 0     No current facility-administered medications for this visit  Current Outpatient Medications on File Prior to Visit   Medication Sig    acetaminophen (TYLENOL) 650 mg CR tablet 1 tablet as needed     amitriptyline (ELAVIL) 25 mg tablet TAKE ONE TABLET BY MOUTH DAILY AT BEDTIME   atorvastatin (LIPITOR) 20 mg tablet Take 1 tablet (20 mg total) by mouth daily for 90 days    Calcium Carbonate-Vitamin D (CALCIUM 500 + D) 500-125 MG-UNIT TABS every 24 hours    fluticasone (FLONASE) 50 mcg/act nasal spray 1 spray into each nostril 2 (two) times a day    fluticasone (FLONASE) 50 mcg/act nasal spray 2 sprays into each nostril daily    furosemide (LASIX) 40 mg tablet TAKE 1 TABLET BY MOUTH ONCE DAILY      hydrochlorothiazide (HYDRODIURIL) 12 5 mg tablet Take 1 tablet (12 5 mg total) by mouth daily    hydrocortisone 2 5 % cream Apply topically 4 (four) times a day as needed    lisinopril (ZESTRIL) 40 mg tablet TAKE 1 TABLET BY MOUTH ONCE DAILY  (Patient not taking: TAKE 1 TABLET BY MOUTH ONCE DAILY  )    Misc  Devices (CANE) MISC Four point aluminum cane   timolol (BETIMOL) 0 5 % ophthalmic solution Administer 1 drop to both eyes 2 (two) times a day     TOVIAZ 8 MG TB24 TAKE 1 TABLET BY MOUTH ONCE DAILY   [DISCONTINUED] ADVAIR DISKUS 500-50 MCG/DOSE inhaler Inhale 1 puff by mouth 2 (two) times a day  Rinse mouth after use   [DISCONTINUED] VENTOLIN  (90 Base) MCG/ACT inhaler inhale 2 puffs by mouth every 4 - 6 hours as needed as needed     No current facility-administered medications on file prior to visit       Review of Systems   Constitutional: Positive for chills and fatigue  Negative for fever  HENT: Positive for sore throat  Respiratory: Positive for cough and shortness of breath  Negative for hemoptysis  All other systems reviewed and are negative  Objective:      /66 (BP Location: Right arm, Patient Position: Sitting, Cuff Size: Standard)   Pulse 72   Temp 97 5 °F (36 4 °C) (Temporal)   Resp 18   Ht 5' 1" (1 549 m)   Wt 90 7 kg (200 lb)   SpO2 98%   BMI 37 79 kg/m²          Physical Exam   Constitutional: She is oriented to person, place, and time  She appears well-developed  HENT:   Head: Normocephalic  Right Ear: External ear normal    Left Ear: External ear normal    Nose: Nose normal    Mouth/Throat: Oropharynx is clear and moist    Eyes: Pupils are equal, round, and reactive to light  Conjunctivae and EOM are normal    Neck: Normal range of motion  Neck supple  No thyromegaly present  Cardiovascular: Normal rate, regular rhythm and normal heart sounds  Pulmonary/Chest: Effort normal  She has rales in the left middle field and the left lower field  Abdominal: Soft  There is no tenderness  There is no rebound and no guarding  Musculoskeletal: Normal range of motion     Neurological: She is alert and oriented to person, place, and time  She has normal reflexes  Skin: Skin is dry  Psychiatric: She has a normal mood and affect  Nursing note and vitals reviewed

## 2020-01-13 ENCOUNTER — OFFICE VISIT (OUTPATIENT)
Dept: FAMILY MEDICINE CLINIC | Facility: CLINIC | Age: 85
End: 2020-01-13

## 2020-01-13 ENCOUNTER — TELEPHONE (OUTPATIENT)
Dept: FAMILY MEDICINE CLINIC | Facility: CLINIC | Age: 85
End: 2020-01-13

## 2020-01-13 VITALS
HEIGHT: 61 IN | OXYGEN SATURATION: 98 % | SYSTOLIC BLOOD PRESSURE: 120 MMHG | RESPIRATION RATE: 18 BRPM | DIASTOLIC BLOOD PRESSURE: 70 MMHG | WEIGHT: 203 LBS | BODY MASS INDEX: 38.33 KG/M2 | TEMPERATURE: 98 F | HEART RATE: 81 BPM

## 2020-01-13 DIAGNOSIS — M25.511 ACUTE PAIN OF RIGHT SHOULDER: Primary | ICD-10-CM

## 2020-01-13 PROCEDURE — 99214 OFFICE O/P EST MOD 30 MIN: CPT | Performed by: FAMILY MEDICINE

## 2020-01-13 RX ORDER — LIDOCAINE 50 MG/G
OINTMENT TOPICAL 2 TIMES DAILY PRN
Qty: 35.44 G | Refills: 0 | Status: SHIPPED | OUTPATIENT
Start: 2020-01-13

## 2020-01-13 RX ORDER — MELOXICAM 7.5 MG/1
7.5 TABLET ORAL DAILY
Qty: 30 TABLET | Refills: 0 | Status: SHIPPED | OUTPATIENT
Start: 2020-01-13 | End: 2020-03-31

## 2020-01-13 NOTE — PROGRESS NOTES
Assessment/Plan:    Acute pain of right shoulder  Review of records shows patient had a shoulder X ray on 11/12/19 which reported:  IMPRESSION:   No acute osseous abnormality  Narrowing of the acromiohumeral interval raising suspicion for chronic rotator cuff tear  Degenerative changes as described  As per records, patient and her family member Ivan Tripp, were informed of X ray results on 11/20/19 and treatment options were discussed  Patient agreed to joint injection and was scheduled for 12/17/19, however no showed the appointment  Patient currently refusing joint injection because she is "in too much pain "  Referred to Ortho  Ice for 15 minutes BID  Meloxicam 7 5 mg for 5 days    Counseled patient on need to schedule follow up appointment with her PCP in order to address all her medical complains  Diagnoses and all orders for this visit:    Acute pain of right shoulder  -     Ambulatory referral to Orthopedic Surgery; Future  -     lidocaine (XYLOCAINE) 5 % ointment; Apply topically 2 (two) times a day as needed for mild pain  -     meloxicam (MOBIC) 7 5 mg tablet; Take 1 tablet (7 5 mg total) by mouth daily          Subjective:      Patient ID: Lucia Lynch is a 80 y o  female  79 yo  female here today for same day appointment for shoulder and arm pain  Patient reports she has been having the pain for 3 days, however upon further discussion and review of records patient has actually been having shoulder pain for about 9 weeks  Patient states shoulder pain has been progressively worse and feels that the last 3 days have been much worse  She admits that because of the pain she does not want to move her R shoulder or lift her arm  She tries not to use her R arm if possible  She describes the pain as constant, 9/10, radiated towards her chest and down her arm to her elbow  She feels she has lost strength in her arm         The following portions of the patient's history were reviewed and updated as appropriate: She  has a past medical history of Diverticulosis (2001), Heart failure ([facility] ), History of cystocele, Hypertension, Obesity, and Positive PPD (1999)  She   Patient Active Problem List    Diagnosis Date Noted    Acute pain of right shoulder 11/12/2019    COPD (chronic obstructive pulmonary disease) ([facility] ) 08/12/2019    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    Cognitive impairment 04/18/2019    Dysuria 04/18/2019    Osteoarthritis 12/17/2018    Anal or rectal pain 11/20/2018    Hemorrhoids 09/12/2018    Chronic diastolic congestive heart failure ([facility] ) 09/12/2018    GERD (gastroesophageal reflux disease) 09/12/2018    Cataract 07/23/2018    Malignant neoplasm of overlapping sites of left female breast ([place] [address] ) 11/10/2016    HLD (hyperlipidemia) 10/31/2016    Aortic stenosis 10/26/2016    Carpal tunnel syndrome on both sides 09/02/2016    Bilateral occipital neuralgia 10/15/2015    Anemia 09/25/2014    Benign essential hypertension 09/25/2014    Glaucoma 09/11/2014     She  has a past surgical history that includes Cholecystectomy (04/2002); Cystocele repair; Colonoscopy; Knee surgery; Hysterectomy; Cardiovascular stress test (2001); and Upper gastrointestinal endoscopy  Her Family history is unknown by patient  She  reports that she has never smoked  She has never used smokeless tobacco  She reports that she drank alcohol  She reports that she has current or past drug history  Current Outpatient Medications   Medication Sig Dispense Refill    acetaminophen (TYLENOL) 650 mg CR tablet 1 tablet as needed       albuterol (VENTOLIN HFA) 90 mcg/act inhaler Inhale 2 puffs 4 (four) times a day 18 g 4    amitriptyline (ELAVIL) 25 mg tablet TAKE ONE TABLET BY MOUTH DAILY AT BEDTIME        atorvastatin (LIPITOR) 20 mg tablet Take 1 tablet (20 mg total) by mouth daily for 90 days 90 tablet 0    benzonatate (TESSALON PERLES) 100 mg capsule Take 1 capsule (100 mg total) by mouth 3 (three) times a day as needed for cough 20 capsule 0    Calcium Carbonate-Vitamin D (CALCIUM 500 + D) 500-125 MG-UNIT TABS every 24 hours      fluticasone (FLONASE) 50 mcg/act nasal spray 1 spray into each nostril 2 (two) times a day 1 Bottle 5    fluticasone (FLONASE) 50 mcg/act nasal spray 2 sprays into each nostril daily 1 Bottle 3    fluticasone-salmeterol (ADVAIR DISKUS) 500-50 mcg/dose inhaler Inhale 1 puff 2 (two) times a day Rinse mouth after use  1 Inhaler 2    furosemide (LASIX) 40 mg tablet TAKE 1 TABLET BY MOUTH ONCE DAILY  90 tablet 3    hydrochlorothiazide (HYDRODIURIL) 12 5 mg tablet Take 1 tablet (12 5 mg total) by mouth daily 90 tablet 3    hydrocortisone 2 5 % cream Apply topically 4 (four) times a day as needed      lidocaine (XYLOCAINE) 5 % ointment Apply topically 2 (two) times a day as needed for mild pain 35 44 g 0    lisinopril (ZESTRIL) 40 mg tablet TAKE 1 TABLET BY MOUTH ONCE DAILY  (Patient not taking: TAKE 1 TABLET BY MOUTH ONCE DAILY  ) 90 tablet 0    meloxicam (MOBIC) 7 5 mg tablet Take 1 tablet (7 5 mg total) by mouth daily 30 tablet 0    Misc  Devices (CANE) MISC Four point aluminum cane   timolol (BETIMOL) 0 5 % ophthalmic solution Administer 1 drop to both eyes 2 (two) times a day       TOVIAZ 8 MG TB24 TAKE 1 TABLET BY MOUTH ONCE DAILY  90 tablet 0     No current facility-administered medications for this visit  Current Outpatient Medications on File Prior to Visit   Medication Sig    acetaminophen (TYLENOL) 650 mg CR tablet 1 tablet as needed     albuterol (VENTOLIN HFA) 90 mcg/act inhaler Inhale 2 puffs 4 (four) times a day    amitriptyline (ELAVIL) 25 mg tablet TAKE ONE TABLET BY MOUTH DAILY AT BEDTIME      atorvastatin (LIPITOR) 20 mg tablet Take 1 tablet (20 mg total) by mouth daily for 90 days    [] azithromycin (ZITHROMAX) 250 mg tablet Take 2 tablets (500 mg total) by mouth daily for 1 day, THEN 1 tablet (250 mg total) daily for 4 days     benzonatate (TESSALON PERLES) 100 mg capsule Take 1 capsule (100 mg total) by mouth 3 (three) times a day as needed for cough    Calcium Carbonate-Vitamin D (CALCIUM 500 + D) 500-125 MG-UNIT TABS every 24 hours    fluticasone (FLONASE) 50 mcg/act nasal spray 1 spray into each nostril 2 (two) times a day    fluticasone (FLONASE) 50 mcg/act nasal spray 2 sprays into each nostril daily    fluticasone-salmeterol (ADVAIR DISKUS) 500-50 mcg/dose inhaler Inhale 1 puff 2 (two) times a day Rinse mouth after use   furosemide (LASIX) 40 mg tablet TAKE 1 TABLET BY MOUTH ONCE DAILY   hydrochlorothiazide (HYDRODIURIL) 12 5 mg tablet Take 1 tablet (12 5 mg total) by mouth daily    hydrocortisone 2 5 % cream Apply topically 4 (four) times a day as needed    lisinopril (ZESTRIL) 40 mg tablet TAKE 1 TABLET BY MOUTH ONCE DAILY  (Patient not taking: TAKE 1 TABLET BY MOUTH ONCE DAILY  )    Misc  Devices (CANE) MISC Four point aluminum cane   timolol (BETIMOL) 0 5 % ophthalmic solution Administer 1 drop to both eyes 2 (two) times a day     TOVIAZ 8 MG TB24 TAKE 1 TABLET BY MOUTH ONCE DAILY  No current facility-administered medications on file prior to visit       Review of Systems   HENT: Positive for sore throat  Respiratory: Positive for cough  Gastrointestinal: Positive for abdominal pain  Musculoskeletal: Positive for arthralgias, back pain and gait problem  All other systems reviewed and are negative  Objective:      /70 (BP Location: Left arm, Patient Position: Sitting, Cuff Size: Large)   Pulse 81   Temp 98 °F (36 7 °C) (Temporal)   Resp 18   Ht 5' 1" (1 549 m)   Wt 92 1 kg (203 lb)   SpO2 98%   BMI 38 36 kg/m²          Physical Exam   Constitutional: She appears well-developed  HENT:   Head: Normocephalic     Right Ear: External ear normal    Left Ear: External ear normal    Nose: Nose normal    Mouth/Throat: Oropharynx is clear and moist    Eyes: Pupils are equal, round, and reactive to light  Conjunctivae and EOM are normal    Neck: Normal range of motion  Neck supple  No thyromegaly present  Cardiovascular: Normal rate, regular rhythm and normal heart sounds  Pulmonary/Chest: Effort normal and breath sounds normal    Abdominal: Soft  There is no tenderness  There is no rebound and no guarding  Musculoskeletal: She exhibits tenderness  Right shoulder: She exhibits decreased range of motion, tenderness and spasm  She exhibits no swelling, no effusion and no crepitus  Neurological: She is alert  She has normal reflexes  Skin: Skin is dry  Psychiatric: She has a normal mood and affect  Nursing note and vitals reviewed

## 2020-01-13 NOTE — ASSESSMENT & PLAN NOTE
Review of records shows patient had a shoulder X ray on 11/12/19 which reported:  IMPRESSION:   No acute osseous abnormality  Narrowing of the acromiohumeral interval raising suspicion for chronic rotator cuff tear  Degenerative changes as described  As per records, patient and her family member Adis Peñaloza, were informed of X ray results on 11/20/19 and treatment options were discussed  Patient agreed to joint injection and was scheduled for 12/17/19, however no showed the appointment     Patient currently refusing joint injection because she is "in too much pain "  Referred to Ortho  Ice for 15 minutes BID  Meloxicam 7 5 mg for 5 days

## 2020-01-13 NOTE — TELEPHONE ENCOUNTER
Pt given all apt info by Maile Peña, before leaving office     Ortho BRA([phone]) is on 1/23/20 at 2:30 pm at [id number], [place]

## 2020-01-23 ENCOUNTER — OFFICE VISIT (OUTPATIENT)
Dept: OBGYN CLINIC | Facility: CLINIC | Age: 85
End: 2020-01-23
Payer: COMMERCIAL

## 2020-01-23 VITALS
WEIGHT: 203 LBS | BODY MASS INDEX: 38.33 KG/M2 | DIASTOLIC BLOOD PRESSURE: 71 MMHG | HEART RATE: 91 BPM | HEIGHT: 61 IN | SYSTOLIC BLOOD PRESSURE: 133 MMHG

## 2020-01-23 DIAGNOSIS — M12.811 RIGHT ROTATOR CUFF TEAR ARTHROPATHY: Primary | ICD-10-CM

## 2020-01-23 DIAGNOSIS — M75.101 RIGHT ROTATOR CUFF TEAR ARTHROPATHY: Primary | ICD-10-CM

## 2020-01-23 PROCEDURE — 99203 OFFICE O/P NEW LOW 30 MIN: CPT | Performed by: ORTHOPAEDIC SURGERY

## 2020-01-23 PROCEDURE — 20610 DRAIN/INJ JOINT/BURSA W/O US: CPT | Performed by: ORTHOPAEDIC SURGERY

## 2020-01-23 PROCEDURE — 1160F RVW MEDS BY RX/DR IN RCRD: CPT | Performed by: ORTHOPAEDIC SURGERY

## 2020-01-23 RX ORDER — LIDOCAINE HYDROCHLORIDE 10 MG/ML
4 INJECTION, SOLUTION INFILTRATION; PERINEURAL
Status: COMPLETED | OUTPATIENT
Start: 2020-01-23 | End: 2020-01-23

## 2020-01-23 RX ORDER — METHYLPREDNISOLONE ACETATE 40 MG/ML
1 INJECTION, SUSPENSION INTRA-ARTICULAR; INTRALESIONAL; INTRAMUSCULAR; SOFT TISSUE
Status: COMPLETED | OUTPATIENT
Start: 2020-01-23 | End: 2020-01-23

## 2020-01-23 RX ADMIN — METHYLPREDNISOLONE ACETATE 1 ML: 40 INJECTION, SUSPENSION INTRA-ARTICULAR; INTRALESIONAL; INTRAMUSCULAR; SOFT TISSUE at 15:15

## 2020-01-23 RX ADMIN — LIDOCAINE HYDROCHLORIDE 4 ML: 10 INJECTION, SOLUTION INFILTRATION; PERINEURAL at 15:15

## 2020-01-23 NOTE — PATIENT INSTRUCTIONS
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 clearly and fully to the patient and he her son through our [place]   This is very difficult to treat because of her age and the severity of the arthritis  For pain relief I did inject the right shoulder joint today with lidocaine and Depo-Medrol to try to decrease both pain and inflammation  I made it clear to them that this will not cure the underlying arthritis nor improve her motion significantly  The only definitive treatment for pain relief is reverse total shoulder arthroplasty ( replacement), a major surgical undertaking, that I do not feel is indicated as the first thing to do in a patient of this age  She may use her arm as her pain permits, letting pain be the warning guide to her activity level  She should put ice in a large trash bag or a bag of frozen peas on the shoulder for 15 minutes, 4 times a day only when needed for pain  She may take her meloxicam for pain or substitute Advil, Aleve, or Tylenol  She should not take multiple medicines together  I will see her back again here in 2 months for clinical re-evaluation  If the pain becomes unbearable then consideration towards surgical intervention may be entertained, if she could medically tolerate it

## 2020-01-23 NOTE — PROGRESS NOTES
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 clearly and fully to the patient and he her son through our [address]   This is very difficult to treat because of her age and the severity of the arthritis  For pain relief I did inject the right shoulder joint today with lidocaine and Depo-Medrol to try to decrease both pain and inflammation  I made it clear to them that this will not cure the underlying arthritis nor improve her motion significantly  The only definitive treatment for pain relief is reverse total shoulder arthroplasty ( replacement), a major surgical undertaking, that I do not feel is indicated as the first thing to do in a patient of this age  She may use her arm as her pain permits, letting pain be the warning guide to her activity level  She should put ice in a large trash bag or a bag of frozen peas on the shoulder for 15 minutes, 4 times a day only when needed for pain  She may take her meloxicam for pain or substitute Advil, Aleve, or Tylenol  She should not take multiple medicines together  I will see her back again here in 2 months for clinical re-evaluation  If the pain becomes unbearable then consideration towards surgical intervention may be entertained, if she could medically tolerate it  HPI:   Patient is an 40-year-old RHD female, Niuean-speaking only, who presents today for consultation regarding right shoulder pain x2 weeks  Her son reports that she was visiting family when she fell into a wall hitting her right shoulder  Since that time she has had pain with motion over shoulder height  He states that her motion was not great prior to this incident, but she never complained of pain    On today's presentation she states that her pain is mostly felt in the lateral aspect of the proximal humerus  She has grinding sensations with motion  She denies any bruising, swelling, numbness, or tingling  She is currently taking meloxicam for pain  PMHx:         Past Medical History:   Diagnosis Date    Diverticulosis 2001    Heart failure ([address] )     History of cystocele     Hypertension     Obesity     Positive PPD 1999    treated       Past Surgical History:   Procedure Laterality Date    CARDIOVASCULAR STRESS TEST  2001    dobutamine stress test    CHOLECYSTECTOMY  04/2002    COLONOSCOPY      CYSTOCELE REPAIR      HYSTERECTOMY      KNEE SURGERY      knee replacement    UPPER GASTROINTESTINAL ENDOSCOPY         Family History   Family history unknown: Yes       Social History     Socioeconomic History    Marital status:       Spouse name: Not on file    Number of children: Not on file    Years of education: Not on file    Highest education level: Not on file   Occupational History    Not on file   Social Needs    Financial resource strain: Not on file    Food insecurity:     Worry: Not on file     Inability: Not on file    Transportation needs:     Medical: Not on file     Non-medical: Not on file   Tobacco Use    Smoking status: Never Smoker    Smokeless tobacco: Never Used   Substance and Sexual Activity    Alcohol use: Not Currently    Drug use: Not Currently    Sexual activity: Not Currently     Partners: Male   Lifestyle    Physical activity:     Days per week: Not on file     Minutes per session: Not on file    Stress: Not on file   Relationships    Social connections:     Talks on phone: Not on file     Gets together: Not on file     Attends Congregation service: Not on file     Active member of club or organization: Not on file     Attends meetings of clubs or organizations: Not on file     Relationship status: Not on file    Intimate partner violence:     Fear of current or ex partner: Not on file     Emotionally abused: Not on file     Physically abused: Not on file     Forced sexual activity: Not on file   Other Topics Concern    Not on file   Social History Narrative    Lives at home with son and 2 grandkids  Current Outpatient Medications   Medication Sig Dispense Refill    acetaminophen (TYLENOL) 650 mg CR tablet 1 tablet as needed       albuterol (VENTOLIN HFA) 90 mcg/act inhaler Inhale 2 puffs 4 (four) times a day 18 g 4    amitriptyline (ELAVIL) 25 mg tablet TAKE ONE TABLET BY MOUTH DAILY AT BEDTIME   atorvastatin (LIPITOR) 20 mg tablet Take 1 tablet (20 mg total) by mouth daily for 90 days 90 tablet 0    benzonatate (TESSALON PERLES) 100 mg capsule Take 1 capsule (100 mg total) by mouth 3 (three) times a day as needed for cough 20 capsule 0    Calcium Carbonate-Vitamin D (CALCIUM 500 + D) 500-125 MG-UNIT TABS every 24 hours      fluticasone (FLONASE) 50 mcg/act nasal spray 1 spray into each nostril 2 (two) times a day 1 Bottle 5    fluticasone (FLONASE) 50 mcg/act nasal spray 2 sprays into each nostril daily 1 Bottle 3    fluticasone-salmeterol (ADVAIR DISKUS) 500-50 mcg/dose inhaler Inhale 1 puff 2 (two) times a day Rinse mouth after use  1 Inhaler 2    furosemide (LASIX) 40 mg tablet TAKE 1 TABLET BY MOUTH ONCE DAILY  90 tablet 3    hydrochlorothiazide (HYDRODIURIL) 12 5 mg tablet Take 1 tablet (12 5 mg total) by mouth daily 90 tablet 3    hydrocortisone 2 5 % cream Apply topically 4 (four) times a day as needed      lidocaine (XYLOCAINE) 5 % ointment Apply topically 2 (two) times a day as needed for mild pain 35 44 g 0    lisinopril (ZESTRIL) 40 mg tablet TAKE 1 TABLET BY MOUTH ONCE DAILY  90 tablet 0    meloxicam (MOBIC) 7 5 mg tablet Take 1 tablet (7 5 mg total) by mouth daily 30 tablet 0    Misc  Devices (CANE) MISC Four point aluminum cane   timolol (BETIMOL) 0 5 % ophthalmic solution Administer 1 drop to both eyes 2 (two) times a day       TOVIAZ 8 MG TB24 TAKE 1 TABLET BY MOUTH ONCE DAILY   90 tablet 0     No current facility-administered medications for this visit  Allergies: No active allergies    ROS:  Positive for musculoskeletal complaints as noted above  The remaining 11/12 systems on the intake sheet that I reviewed were negative  PE:  /71   Pulse 91   Ht 5' 1" (1 549 m)   Wt 92 1 kg (203 lb)   BMI 38 36 kg/m²   Constitutional: The patient was  oriented to person, place, and time  Well-developed and well-nourished  In no acute distress  HEENT: Vision intact  Hearing normal  Swallowing normal   Head: Normocephalic  Cardiovascular: Intact distal pulses  Pulse regular  Pulmonary/Chest: Effort normal  No respiratory distress  Neurological: Alert and oriented to person, place, and time  Skin: Skin is warm  Psychiatric: Normal mood and affect  Ortho Exam:   Examination of right shoulder shows no obvious anatomical deformity  Skin is warm and dry with no signs of erythema, ecchymosis, or infection  She is tender to palpation over the lateral aspect of the proximal humerus  She is also tender over the proximal biceps tendon, and posterior capsule  She has significant palpable crepitus with passive motion  She is able the demonstrate active forward flexion and abduction to 70°, and active external rotation at 0 to barely neutral   She demonstrates good motion of the elbow, wrist, and fingers of the right upper extremity, with no antecubital adenopathy  She exhibits poor strength with shoulder elevation and external rotation  2+ distal radial pulse with brisk capillary refill to the fingers  Sensation light touch intact distally  Studies reviewed:  I personally reviewed right shoulder x-rays as well as the radiologist's report  There is obliteration of the acromial humeral distance with the humeral head abutting underneath the acromion  There is secondary degenerative change at the glenohumeral joint with some flattening of the head and sclerosis noted    There are no loose bodies noted       Large joint arthrocentesis: R glenohumeral  Date/Time: 1/23/2020 3:15 PM  Consent given by: patient  Site marked: site marked  Supporting Documentation  Indications: pain   Procedure Details  Location: shoulder - R glenohumeral  Preparation: Patient was prepped and draped in the usual sterile fashion  Needle size: 22 G  Ultrasound guidance: no  Approach: anterior  Medications administered: 4 mL lidocaine 1 %; 1 mL methylPREDNISolone acetate 40 mg/mL    Patient tolerance: patient tolerated the procedure well with no immediate complications  Dressing:  Sterile dressing applied            Scribe Attestation    I,:   Nikita Erickson am acting as a scribe while in the presence of the attending physician :        I,:   Nneka Torres MD personally performed the services described in this documentation    as scribed in my presence :

## 2020-02-18 DIAGNOSIS — K26.9 DUODENAL ULCER: ICD-10-CM

## 2020-02-18 RX ORDER — SUCRALFATE 1 G/10ML
SUSPENSION ORAL
Refills: 0 | OUTPATIENT
Start: 2020-02-18

## 2020-02-19 NOTE — TELEPHONE ENCOUNTER
Hi,    As per chart review, On GI visit on 11/2019 was noted that patient was not taking Carafate anymore      Thank you,  Shante Swann

## 2020-02-25 ENCOUNTER — HOSPITAL ENCOUNTER (EMERGENCY)
Facility: HOSPITAL | Age: 85
Discharge: HOME | End: 2020-02-25
Attending: EMERGENCY MEDICINE
Payer: COMMERCIAL

## 2020-02-25 ENCOUNTER — APPOINTMENT (EMERGENCY)
Dept: RADIOLOGY | Facility: HOSPITAL | Age: 85
End: 2020-02-25
Payer: COMMERCIAL

## 2020-02-25 ENCOUNTER — APPOINTMENT (EMERGENCY)
Dept: CT IMAGING | Facility: HOSPITAL | Age: 85
End: 2020-02-25
Payer: COMMERCIAL

## 2020-02-25 VITALS
WEIGHT: 188.5 LBS | BODY MASS INDEX: 35.62 KG/M2 | HEART RATE: 84 BPM | OXYGEN SATURATION: 100 % | DIASTOLIC BLOOD PRESSURE: 93 MMHG | SYSTOLIC BLOOD PRESSURE: 159 MMHG | TEMPERATURE: 98.3 F | RESPIRATION RATE: 20 BRPM
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
## 2020-02-25 DIAGNOSIS — S01.01XA LACERATION OF SCALP, INITIAL ENCOUNTER: Primary | ICD-10-CM

## 2020-02-25 DIAGNOSIS — M25.559 HIP PAIN: ICD-10-CM

## 2020-02-25 PROCEDURE — 99284 EMERGENCY DEPT VISIT MOD MDM: CPT

## 2020-02-25 PROCEDURE — 70450 CT HEAD/BRAIN W/O DYE: CPT

## 2020-02-25 PROCEDURE — 90715 TDAP VACCINE 7 YRS/> IM: CPT | Performed by: EMERGENCY MEDICINE

## 2020-02-25 PROCEDURE — 90471 IMMUNIZATION ADMIN: CPT

## 2020-02-25 PROCEDURE — 72125 CT NECK SPINE W/O DYE: CPT

## 2020-02-25 PROCEDURE — 99284 EMERGENCY DEPT VISIT MOD MDM: CPT | Performed by: EMERGENCY MEDICINE

## 2020-02-25 PROCEDURE — 73502 X-RAY EXAM HIP UNI 2-3 VIEWS: CPT

## 2020-02-25 PROCEDURE — 12002 RPR S/N/AX/GEN/TRNK2.6-7.5CM: CPT | Performed by: EMERGENCY MEDICINE

## 2020-02-25 RX ORDER — ACETAMINOPHEN AND CODEINE PHOSPHATE 300; 30 MG/1; MG/1
1 TABLET ORAL EVERY 6 HOURS PRN
Qty: 15 TABLET | Refills: 0 | Status: SHIPPED | OUTPATIENT
Start: 2020-02-25 | End: 2020-03-06

## 2020-02-25 RX ORDER — TRAMADOL HYDROCHLORIDE 50 MG/1
50 TABLET ORAL ONCE
Status: COMPLETED | OUTPATIENT
Start: 2020-02-25 | End: 2020-02-25

## 2020-02-25 RX ADMIN — TRAMADOL HYDROCHLORIDE 50 MG: 50 TABLET, FILM COATED ORAL at 22:36

## 2020-02-25 RX ADMIN — TETANUS TOXOID, REDUCED DIPHTHERIA TOXOID AND ACELLULAR PERTUSSIS VACCINE, ADSORBED 0.5 ML: 5; 2.5; 8; 8; 2.5 SUSPENSION INTRAMUSCULAR at 23:27

## 2020-02-26 NOTE — ED NOTES
5 staples in L side of head for laceration repair  Wound cleansed and gauze/wrap applied to head   Pt taken in wheel chair to [facility]  02/25/20 [id number]

## 2020-03-03 ENCOUNTER — OFFICE VISIT (OUTPATIENT)
Dept: FAMILY MEDICINE CLINIC | Facility: CLINIC | Age: 85
End: 2020-03-03

## 2020-03-03 VITALS
WEIGHT: 199 LBS | RESPIRATION RATE: 18 BRPM | HEIGHT: 61 IN | BODY MASS INDEX: 37.57 KG/M2 | SYSTOLIC BLOOD PRESSURE: 134 MMHG | OXYGEN SATURATION: 97 % | HEART RATE: 101 BPM | TEMPERATURE: 97.5 F | DIASTOLIC BLOOD PRESSURE: 76 MMHG

## 2020-03-03 DIAGNOSIS — Z48.02 ENCOUNTER FOR REMOVAL OF SUTURES: Primary | ICD-10-CM

## 2020-03-03 DIAGNOSIS — J44.9 COPD (CHRONIC OBSTRUCTIVE PULMONARY DISEASE) (HCC): ICD-10-CM

## 2020-03-03 DIAGNOSIS — K21.9 GASTROESOPHAGEAL REFLUX DISEASE, ESOPHAGITIS PRESENCE NOT SPECIFIED: ICD-10-CM

## 2020-03-03 PROCEDURE — 3008F BODY MASS INDEX DOCD: CPT | Performed by: FAMILY MEDICINE

## 2020-03-03 PROCEDURE — 4040F PNEUMOC VAC/ADMIN/RCVD: CPT | Performed by: FAMILY MEDICINE

## 2020-03-03 PROCEDURE — 3078F DIAST BP <80 MM HG: CPT | Performed by: FAMILY MEDICINE

## 2020-03-03 PROCEDURE — 1036F TOBACCO NON-USER: CPT | Performed by: FAMILY MEDICINE

## 2020-03-03 PROCEDURE — 99213 OFFICE O/P EST LOW 20 MIN: CPT | Performed by: FAMILY MEDICINE

## 2020-03-03 PROCEDURE — 3075F SYST BP GE 130 - 139MM HG: CPT | Performed by: FAMILY MEDICINE

## 2020-03-03 RX ORDER — SUCRALFATE ORAL 1 G/10ML
1 SUSPENSION ORAL 4 TIMES DAILY
Qty: 420 ML | Refills: 0 | Status: SHIPPED | OUTPATIENT
Start: 2020-03-03 | End: 2020-04-22

## 2020-03-03 RX ORDER — BENZONATATE 100 MG/1
100 CAPSULE ORAL 3 TIMES DAILY PRN
Qty: 20 CAPSULE | Refills: 0 | Status: SHIPPED | OUTPATIENT
Start: 2020-03-03

## 2020-03-03 NOTE — ASSESSMENT & PLAN NOTE
Patient has Hx of GERD, she indicates today to have been feeling some "gas", abdominal discomfort  She was previously taking Carafate which indicates that provided relief  Physical examination unremarkable   Ordered Carafate for symptoms relief      -Carafate for symptoms relief

## 2020-03-03 NOTE — PROGRESS NOTES
Assessment/Plan:    Encounter for removal of sutures  Patient had a fall on 2/25/2020, evaluated in ED and placed staple suture in area of left side of scalp  Presented today for suture removal  On evaluation, 4 sutures in area of the left scalp were visualized  See attached media on the chart  Area of left side of scalp was cleaned with alcohol swab  First suture was removed without complication, minimal bleeding was noted in the area upon removal of the suture which quickly stopped when applying pressure and cleaned with gauze  Family member in the room got a little hesitant because of the minimal bleeding, and expressed desire to wait a few more days to have the rest of the remaining sutures removed  Explained to the family member to schedule appointment for this Friday, I won't be present in the clinic but she can be scheduled with any of the providers to have the sutures removed  Patient and family member verbalized understanding and all questions were answered     -follow up on Friday 3/6/2020  For remaining 3 staple suture removal    GERD (gastroesophageal reflux disease)  Patient has Hx of GERD, she indicates today to have been feeling some "gas", abdominal discomfort  She was previously taking Carafate which indicates that provided relief  Physical examination unremarkable  Ordered Carafate for symptoms relief      -Carafate for symptoms relief    COPD (chronic obstructive pulmonary disease) (HCC)  Not in exacerbation at the moment  Currently on Advair, Albuterol PRN  She has a Hx of chronic cough, which relieves with has relieved with tessalon Perles  Ordered the Tessalon Perles PRN for cough and will continue to follow up during next visit     -tessalon Perles PRN for cough       Diagnoses and all orders for this visit:    Encounter for removal of sutures    Gastroesophageal reflux disease, esophagitis presence not specified  -     sucralfate (CARAFATE) 1 g/10 mL suspension;  Take 10 mL (1 g total) by mouth 4 (four) times a day    COPD (chronic obstructive pulmonary disease) (HCC)  -     benzonatate (TESSALON PERLES) 100 mg capsule; Take 1 capsule (100 mg total) by mouth 3 (three) times a day as needed for cough          Subjective:      Patient ID: Shashank Guzman is a 80 y o  female  Case of 80year old female who came today for suture removal  Patient had a fall on 2/25/2020 with laceration in left side of scalp, she was evaluated at the ED and laceration was closed with staple suture  Denies any pain, bleeding or discomfort  The following portions of the patient's history were reviewed and updated as appropriate: allergies, past family history, past social history and past surgical history  Review of Systems   Constitutional: Negative for activity change  HENT:        Left scalp laceration closed with staple suture   Respiratory: Negative for cough and shortness of breath  Cardiovascular: Negative for chest pain, palpitations and leg swelling  Gastrointestinal:        Acid reflux, abdominal discomfort   Musculoskeletal: Negative for arthralgias  Skin: Negative for color change, pallor and rash  Neurological: Negative for headaches  Psychiatric/Behavioral: The patient is not nervous/anxious  Objective:      /76 (BP Location: Left arm, Patient Position: Sitting, Cuff Size: Large)   Pulse 101   Temp 97 5 °F (36 4 °C) (Temporal)   Resp 18   Ht 5' 1" (1 549 m)   Wt 90 3 kg (199 lb)   SpO2 97%   BMI 37 60 kg/m²          Physical Exam   Constitutional: She appears well-developed and well-nourished  No distress  HENT:   Left side scalp laceration with 4 staple sutures in place  Well healed, no erythema or edema noted  See media picture attached  Neck: Neck supple  Cardiovascular: Normal rate and normal heart sounds  No murmur heard  Pulmonary/Chest: Effort normal and breath sounds normal  No respiratory distress  She has no wheezes  Abdominal: Soft   Bowel sounds are normal  She exhibits no distension  There is no tenderness  There is no guarding  Musculoskeletal: Normal range of motion  She exhibits no edema, tenderness or deformity  Neurological: She is alert  Skin: Skin is warm  No rash noted  She is not diaphoretic  No erythema  No pallor  Psychiatric: She has a normal mood and affect

## 2020-03-03 NOTE — ASSESSMENT & PLAN NOTE
Patient had a fall on 2/25/2020, evaluated in ED and placed staple suture in area of left side of scalp  Presented today for suture removal  On evaluation, 4 sutures in area of the left scalp were visualized  See attached media on the chart  Area of left side of scalp was cleaned with alcohol swab  First suture was removed without complication, minimal bleeding was noted in the area upon removal of the suture which quickly stopped when applying pressure and cleaned with gauze  Family member in the room got a little hesitant because of the minimal bleeding, and expressed desire to wait a few more days to have the rest of the remaining sutures removed  Explained to the family member to schedule appointment for this Friday, I won't be present in the clinic but she can be scheduled with any of the providers to have the sutures removed   Patient and family member verbalized understanding and all questions were answered     -follow up on Friday 3/6/2020  For remaining 3 staple suture removal

## 2020-03-03 NOTE — ASSESSMENT & PLAN NOTE
Not in exacerbation at the moment  Currently on Advair, Albuterol PRN  She has a Hx of chronic cough, which relieves with has relieved with tessalon Perles   Ordered the Tessalon Perles PRN for cough and will continue to follow up during next visit     -tessalon Perles PRN for cough

## 2020-03-06 ENCOUNTER — OFFICE VISIT (OUTPATIENT)
Dept: FAMILY MEDICINE CLINIC | Facility: CLINIC | Age: 85
End: 2020-03-06

## 2020-03-06 VITALS
HEIGHT: 61 IN | DIASTOLIC BLOOD PRESSURE: 78 MMHG | WEIGHT: 197 LBS | TEMPERATURE: 98.5 F | RESPIRATION RATE: 18 BRPM | BODY MASS INDEX: 37.19 KG/M2 | SYSTOLIC BLOOD PRESSURE: 132 MMHG | OXYGEN SATURATION: 97 % | HEART RATE: 91 BPM

## 2020-03-06 DIAGNOSIS — Z48.02: Primary | ICD-10-CM

## 2020-03-06 PROCEDURE — 3075F SYST BP GE 130 - 139MM HG: CPT | Performed by: FAMILY MEDICINE

## 2020-03-06 PROCEDURE — 4040F PNEUMOC VAC/ADMIN/RCVD: CPT | Performed by: FAMILY MEDICINE

## 2020-03-06 PROCEDURE — 99213 OFFICE O/P EST LOW 20 MIN: CPT | Performed by: FAMILY MEDICINE

## 2020-03-06 PROCEDURE — 1036F TOBACCO NON-USER: CPT | Performed by: FAMILY MEDICINE

## 2020-03-06 PROCEDURE — 3008F BODY MASS INDEX DOCD: CPT | Performed by: FAMILY MEDICINE

## 2020-03-06 PROCEDURE — 3078F DIAST BP <80 MM HG: CPT | Performed by: FAMILY MEDICINE

## 2020-03-06 NOTE — PATIENT INSTRUCTIONS
Cuidado de las grapas quirúrgicas   LO QUE NECESITA SABER:   Las grapas se [place] a menudo para cerrar daniel herida  Las grapas podrían colocarse por 3 a 14 días, dependiendo de la ubicación de la herida  INSTRUCCIONES SOBRE [facility]:   Cuidado de guzman herida:   · Limpieza:      ¨ Usted podría ducharse en 24 horas  No sumerja guzman herida bajo el agua  ¨ Lávese la herida con mucho cuidado con agua tibia y jabón a diario  Luego séquela suavemente  No cubra la herida, a menos que así lo indique guzman médico      ¨ Es probable que usted también necesite limpiarse ;a herida con daniel mezcla de peróxido de hidrógeno y [place]  Pregúntele al médico piter preparar la [place]  ¨ No aplique ningún ungüento o crema en la herida, a menos que así se lo indique guzman médico     · Eleve:      ¨ Descanse el brazo o pierna que tiene la herida sobre almohadas de manera que quede elevado por encima del nivel de guzman corazón [place] esto tan a menudo piter sea posible henry 2 días  [place] le ayudará a disminuir la inflamación y el dolor, además de ayudarle a sanar más rápido  · Minimice las cicatrices:      ¨ Evite que la nanci del sol le pegue en la herida para así evitar que la cicatriz empeore  Acuda a jonas consultas de control con guzman médico según le indicaron  Es probable que usted necesite ir donde guzman médico para que le revise la herida 3 días después de que le hayan colocado las grapas  Pregúntele a guzman médico cuando debe regresar a que le extraigan las grapas  Extracción de grapas:   · Se utilizará un extractor de grapas quirúrgicas  se usará para extraer las grapas  Guzman médico colocará el instrumento por debajo de cada grapa, apretará la manija y luego con mucho cuidado procederá a sacar la grapa  · Cinta médica  se colocará en la herida daniel vez que se quitan las grapas  [place] le ayudará a mantener cerrada la herida  La cinta adhesiva médica se caerá por sí yuniel después de varios días    Pregúntele a guzman médico qué vitaminas y minerales son adecuados para usted  · Usted tiene enrojecimiento, dolor, inflamación o pus que drena de la herida  · Sunshine medicamentos para el dolor no le alivian el dolor  · Usted tiene daniel fiebre de 101 5°F (38 5°C) o más  · [employer] de la herida  · Usted tiene preguntas o inquietudes acerca de banegas condición o cuidado  Busque atención médica de inmediato si:   · Banegas herida se vuelve a abrir  · Usted tiene unas dyan painter en la piel que salen de banegas herida  · Usted tiene dolor o vómito severos  © 2017 [employer] [employer] Information is for End User's use only and may not be sold, redistributed or otherwise used for commercial purposes  All illustrations and images included in CareNotes® are the copyrighted property of A D A M [employer] Inc  or [employer]  Esta información es sólo para uso en educación  Banegas intención no es darle un consejo médico sobre enfermedades o tratamientos  Colsulte con banegas Clarine Sours farmacéutico antes de seguir cualquier régimen médico para saber si es seguro y efectivo para usted

## 2020-03-06 NOTE — PROGRESS NOTES
Chief Complaint   Patient presents with    Suture / Staple Removal     pt here today for suture(staples) removal on her head     Blood Pressure: 132/78, Pulse: 91, Respirations: 18, Temperature: 98 5 °F (36 9 °C), Temp Source: Temporal, SpO2: 97 %, Weight - Scale: 89 4 kg (197 lb), Height: 5' 1" (154 9 cm)    Assessment   1  Well-healed scalp laceration with staples ready for removal     Plan   1  3 staples removed  Patient tolerated procedure well  Follow-up with PCP as previously scheduled  The patient verbalized understanding of findings and treatment plan  We engaged in shared decision-making and treatment options were discussed at length  Return if symptoms worsen or fail to improve  Diagnoses and all orders for this visit:    Encounter for removal of staples    This note has been dictated using [employer] software  It may contain errors, including improperly dictated words  Please contact physician directly for any questions  Suture removal  Date/Time: 3/6/2020 8:44 AM  Performed by: Juana Bass MD  Authorized by: Juana Bass MD     Patient location:  Clinic  Other Assisting Provider: Yes (comment) Chance Brown MS3)    Consent:     Consent obtained:  Verbal    Consent given by:  Patient    Risks discussed:  Bleeding, pain and wound separation  Universal protocol:     Procedure explained and questions answered to patient or proxy's satisfaction: yes      Patient identity confirmed:  Verbally with patient  Location:     Laterality:  Left    Location:  [address] [facility] location:  Scalp  Procedure details: Tools used: Other (comment) (staple removal tool)    Wound appearance:  No sign(s) of infection, good wound healing and clean    Number of staples removed:  3  Post-procedure details:     Post-procedure assessment: petroleum gel applied  Patient tolerance of procedure: Tolerated well, no immediate complications      Subjective   ID: Geraldine SHANNA Carl Marinolisa is a 80 y o  female  Information sources: The patient  Language: [place], Kittitian and translation provided by Vinny Loyola MS3     Reason for visit  1  Removal of staples  She saw her PCP Dr Rayne Lai yesterday and had 1 staple removed but patient was hesitant to proceed with removal of remaining 3  She presents today for removal of the remaining 3  Offers no complaints or hesitancy  2  Patient is not well known to me  Social History  - reports that she has never smoked  She has never used smokeless tobacco   - reports that she drank alcohol    - reports that she has current or past drug history  Objective   Blood Pressure: 132/78, Pulse: 91, Respirations: 18, Temperature: 98 5 °F (36 9 °C), Temp Source: Temporal, SpO2: 97 %, Weight - Scale: 89 4 kg (197 lb), Height: 5' 1" (154 9 cm)    Physical Exam   Constitutional: She is oriented to person, place, and time  She appears well-developed and well-nourished  No distress  HENT:   Head: Normocephalic and atraumatic    3 staples located few inches superior to left ear  Wound is clean, dry, and intact  Eyes: Conjunctivae are normal    Neck: Normal range of motion  Cardiovascular: Normal rate  Pulmonary/Chest: Effort normal  No respiratory distress  Musculoskeletal: Normal range of motion  Neurological: She is alert and oriented to person, place, and time  Coordination normal    Skin: Skin is warm  Psychiatric: She has a normal mood and affect  Her behavior is normal    Nursing note and vitals reviewed      Health Information      The following have been updated: none    No Known Allergies  Current Outpatient Medications:     acetaminophen (TYLENOL) 650 mg CR tablet, 1 tablet as needed , Disp: , Rfl:     acetaminophen-codeine (TYLENOL #3) 300-30 mg per tablet, Take 1 tablet by mouth every 6 (six) hours as needed for moderate pain for up to 10 days, Disp: 15 tablet, Rfl: 0    albuterol (VENTOLIN HFA) 90 mcg/act inhaler, Inhale 2 puffs 4 (four) times a day, Disp: 18 g, Rfl: 4    amitriptyline (ELAVIL) 25 mg tablet, TAKE ONE TABLET BY MOUTH DAILY AT BEDTIME , Disp: , Rfl:     atorvastatin (LIPITOR) 20 mg tablet, Take 1 tablet (20 mg total) by mouth daily for 90 days, Disp: 90 tablet, Rfl: 0    benzonatate (TESSALON PERLES) 100 mg capsule, Take 1 capsule (100 mg total) by mouth 3 (three) times a day as needed for cough, Disp: 20 capsule, Rfl: 0    Calcium Carbonate-Vitamin D (CALCIUM 500 + D) 500-125 MG-UNIT TABS, every 24 hours, Disp: , Rfl:     fluticasone (FLONASE) 50 mcg/act nasal spray, 1 spray into each nostril 2 (two) times a day, Disp: 1 Bottle, Rfl: 5    fluticasone (FLONASE) 50 mcg/act nasal spray, 2 sprays into each nostril daily, Disp: 1 Bottle, Rfl: 3    fluticasone-salmeterol (ADVAIR DISKUS) 500-50 mcg/dose inhaler, Inhale 1 puff 2 (two) times a day Rinse mouth after use , Disp: 1 Inhaler, Rfl: 2    furosemide (LASIX) 40 mg tablet, TAKE 1 TABLET BY MOUTH ONCE DAILY  , Disp: 90 tablet, Rfl: 3    hydrochlorothiazide (HYDRODIURIL) 12 5 mg tablet, Take 1 tablet (12 5 mg total) by mouth daily, Disp: 90 tablet, Rfl: 3    hydrocortisone 2 5 % cream, Apply topically 4 (four) times a day as needed, Disp: , Rfl:     lidocaine (XYLOCAINE) 5 % ointment, Apply topically 2 (two) times a day as needed for mild pain, Disp: 35 44 g, Rfl: 0    lisinopril (ZESTRIL) 40 mg tablet, TAKE 1 TABLET BY MOUTH ONCE DAILY  , Disp: 90 tablet, Rfl: 0    meloxicam (MOBIC) 7 5 mg tablet, Take 1 tablet (7 5 mg total) by mouth daily, Disp: 30 tablet, Rfl: 0    Misc  Devices (CANE) MISC, Four point aluminum cane , Disp: , Rfl:     sucralfate (CARAFATE) 1 g/10 mL suspension, Take 10 mL (1 g total) by mouth 4 (four) times a day, Disp: 420 mL, Rfl: 0    timolol (BETIMOL) 0 5 % ophthalmic solution, Administer 1 drop to both eyes 2 (two) times a day , Disp: , Rfl:     TOVIAZ 8 MG TB24, TAKE 1 TABLET BY MOUTH ONCE DAILY  , Disp: 90 tablet, Rfl: 0    Patient Active Problem List Diagnosis    Anemia    Aortic stenosis    Benign essential hypertension    Bilateral occipital neuralgia    Carpal tunnel syndrome on both sides    Glaucoma    HLD (hyperlipidemia)    Malignant neoplasm of overlapping sites of left female breast ([address] )    Cataract    Hemorrhoids    Chronic diastolic congestive heart failure (HCC)    GERD (gastroesophageal reflux disease)    Anal or rectal pain    Osteoarthritis    Cognitive impairment    Dysuria    COPD (chronic obstructive pulmonary disease) (HCC)    Central perforation of tympanic membrane    Acute pain of right shoulder    Right rotator cuff tear arthropathy    Encounter for removal of sutures     Past Surgical History:   Procedure Laterality Date    CARDIOVASCULAR STRESS TEST  2001    dobutamine stress test    CHOLECYSTECTOMY  04/2002    COLONOSCOPY      CYSTOCELE REPAIR      HYSTERECTOMY      KNEE SURGERY      knee replacement    UPPER GASTROINTESTINAL ENDOSCOPY       Family History   Family history unknown: Yes     Health Maintenance   Topic Date Due    Medicare Annual Wellness Visit (AWV)  1934    BMI: Followup Plan  05/28/1952    Pneumococcal Vaccine: 65+ Years (2 of 2 - PPSV23) 10/28/2017    Fall Risk  09/11/2020    Depression Screening PHQ  09/11/2020    BMI: Adult  03/03/2021    DTaP,Tdap,and Td Vaccines (3 - Td) 02/25/2030    Influenza Vaccine  Completed    Pneumococcal Vaccine: Pediatrics (0 to 5 Years) and At-Risk Patients (6 to 59 Years)  Aged Out    HIB Vaccine  Aged Out    Hepatitis B Vaccine  Aged Out    IPV Vaccine  Aged Out    Hepatitis A Vaccine  Aged Out    Meningococcal ACWY Vaccine  Aged Out    HPV Vaccine  Aged Out

## 2020-03-10 ENCOUNTER — OFFICE VISIT (OUTPATIENT)
Dept: FAMILY MEDICINE CLINIC | Facility: CLINIC | Age: 85
End: 2020-03-10

## 2020-03-10 VITALS
RESPIRATION RATE: 16 BRPM | OXYGEN SATURATION: 94 % | HEART RATE: 96 BPM | BODY MASS INDEX: 37 KG/M2 | HEIGHT: 61 IN | TEMPERATURE: 98 F | SYSTOLIC BLOOD PRESSURE: 126 MMHG | DIASTOLIC BLOOD PRESSURE: 80 MMHG | WEIGHT: 196 LBS

## 2020-03-10 DIAGNOSIS — Z48.02 ENCOUNTER FOR REMOVAL OF SUTURES: Primary | ICD-10-CM

## 2020-03-10 PROCEDURE — 1160F RVW MEDS BY RX/DR IN RCRD: CPT | Performed by: FAMILY MEDICINE

## 2020-03-10 PROCEDURE — 1036F TOBACCO NON-USER: CPT | Performed by: FAMILY MEDICINE

## 2020-03-10 PROCEDURE — 3074F SYST BP LT 130 MM HG: CPT | Performed by: FAMILY MEDICINE

## 2020-03-10 PROCEDURE — 99213 OFFICE O/P EST LOW 20 MIN: CPT | Performed by: FAMILY MEDICINE

## 2020-03-10 PROCEDURE — 3079F DIAST BP 80-89 MM HG: CPT | Performed by: FAMILY MEDICINE

## 2020-03-10 PROCEDURE — 3008F BODY MASS INDEX DOCD: CPT | Performed by: FAMILY MEDICINE

## 2020-03-10 PROCEDURE — 4040F PNEUMOC VAC/ADMIN/RCVD: CPT | Performed by: FAMILY MEDICINE

## 2020-03-10 RX ORDER — TIMOLOL MALEATE 5 MG/ML
SOLUTION/ DROPS OPHTHALMIC
COMMUNITY
Start: 2020-02-18

## 2020-03-10 RX ORDER — RANITIDINE 150 MG/1
TABLET ORAL
COMMUNITY
Start: 2019-12-18 | End: 2020-03-31

## 2020-03-10 RX ORDER — LATANOPROST 50 UG/ML
SOLUTION/ DROPS OPHTHALMIC
COMMUNITY
Start: 2020-02-18

## 2020-03-10 NOTE — PROGRESS NOTES
Assessment/Plan:    Encounter for removal of sutures  Patient had 3 staple suture removal on left side of scalp on Friday 3/6/2020 without any complications  Daughter indicates that yesterday the patient expressed that she was feeling discomfort in the area of the wound and she noticed that had one remaining suture in the area that was almost coming out  Today, the daughter noticed that it was gone  On today's examination, no presence of suture, wound healing well, no erythema, edema or signs of infection noted  Staple suture most likely fell off on its own, Provided counseling to the patient to maintain the area clean and apply neosporin antibiotic ointment as needed  Patient and daughter verbalized understanding and all questions were answered  Diagnoses and all orders for this visit:    Encounter for removal of sutures    Other orders  -     latanoprost (XALATAN) 0 005 % ophthalmic solution  -     timolol (TIMOPTIC) 0 5 % ophthalmic solution  -     ranitidine (ZANTAC) 150 mg tablet          Subjective:      Patient ID: Shashank Guzman is a 80 y o  female  Case of 80year old female who was brought to the clinic for evaluation of suture removal  Patient had 3 staple suture removal on 3/6/2020  Daughter indicates that yesterday patient expressed discomfort in the area of the wound and when she looked at it, noticed one remaining staple present that was almost falling off  Denies headaches, swelling or pain in the area  The following portions of the patient's history were reviewed and updated as appropriate: allergies, past family history, past social history and past surgical history  Review of Systems   Constitutional: Negative for activity change  HENT:        Healing laceration in left side of scalp    Respiratory: Negative for cough and shortness of breath  Cardiovascular: Negative for chest pain, palpitations and leg swelling  Gastrointestinal: Negative for abdominal pain  Musculoskeletal: Negative for arthralgias  Skin: Negative for color change, pallor, rash and wound  Neurological: Negative for headaches  Psychiatric/Behavioral: The patient is not nervous/anxious  Objective:      /80 (BP Location: Left arm, Patient Position: Sitting, Cuff Size: Large)   Pulse 96   Temp 98 °F (36 7 °C) (Temporal)   Resp 16   Ht 5' 1" (1 549 m)   Wt 88 9 kg (196 lb)   SpO2 94%   BMI 37 03 kg/m²          Physical Exam   Constitutional: She is oriented to person, place, and time  She appears well-developed and well-nourished  No distress  HENT:   Head: Atraumatic  Left scalp laceration healing well, no erythema, swelling or signs of infection noted  No presence of staple sutures  Scab in the middle of the well healing laceration, clean  Eyes: EOM are normal    Neck: Neck supple  Cardiovascular: Normal rate and normal heart sounds  No murmur heard  Pulmonary/Chest: Effort normal and breath sounds normal  No respiratory distress  She has no wheezes  Abdominal: Soft  Bowel sounds are normal  She exhibits no distension  Musculoskeletal: Normal range of motion  She exhibits no edema, tenderness or deformity  Neurological: She is alert and oriented to person, place, and time  Skin: Skin is warm  No rash noted  She is not diaphoretic  No erythema  No pallor  Psychiatric: She has a normal mood and affect

## 2020-03-10 NOTE — ASSESSMENT & PLAN NOTE
Patient had 3 staple suture removal on left side of scalp on Friday 3/6/2020 without any complications  Daughter indicates that yesterday the patient expressed that she was feeling discomfort in the area of the wound and she noticed that had one remaining suture in the area that was almost coming out  Today, the daughter noticed that it was gone  On today's examination, no presence of suture, wound healing well, no erythema, edema or signs of infection noted  Staple suture most likely fell off on its own, Provided counseling to the patient to maintain the area clean and apply neosporin antibiotic ointment as needed  Patient and daughter verbalized understanding and all questions were answered

## 2020-03-10 NOTE — PROGRESS NOTES
Progress Note    Name:  Marlon Gutierrez  :  1934  Age:  80 y o  Date of Evaluation: 03/10/20     Scanned in HA chart part 2         Debbie Singh , CCC-A  Clinical Audiologist

## 2020-03-30 DIAGNOSIS — K21.9 GASTROESOPHAGEAL REFLUX DISEASE, ESOPHAGITIS PRESENCE NOT SPECIFIED: Primary | ICD-10-CM

## 2020-03-31 DIAGNOSIS — M25.511 ACUTE PAIN OF RIGHT SHOULDER: ICD-10-CM

## 2020-03-31 RX ORDER — MELOXICAM 7.5 MG/1
TABLET ORAL
Qty: 30 TABLET | Refills: 0 | Status: SHIPPED | OUTPATIENT
Start: 2020-03-31 | End: 2020-04-01

## 2020-03-31 RX ORDER — RANITIDINE 150 MG/1
TABLET ORAL
Qty: 90 TABLET | Refills: 0 | Status: SHIPPED | OUTPATIENT
Start: 2020-03-31 | End: 2020-06-23

## 2020-04-01 ENCOUNTER — OFFICE VISIT (OUTPATIENT)
Dept: URGENT CARE | Age: 85
End: 2020-04-01
Payer: COMMERCIAL

## 2020-04-01 VITALS
BODY MASS INDEX: 37 KG/M2 | TEMPERATURE: 101.9 F | HEIGHT: 61 IN | OXYGEN SATURATION: 93 % | WEIGHT: 196 LBS | HEART RATE: 82 BPM | RESPIRATION RATE: 20 BRPM

## 2020-04-01 DIAGNOSIS — I10 BENIGN ESSENTIAL HYPERTENSION: ICD-10-CM

## 2020-04-01 DIAGNOSIS — E78.2 MIXED HYPERLIPIDEMIA: ICD-10-CM

## 2020-04-01 DIAGNOSIS — K21.9 GASTROESOPHAGEAL REFLUX DISEASE, ESOPHAGITIS PRESENCE NOT SPECIFIED: ICD-10-CM

## 2020-04-01 DIAGNOSIS — R50.9 FEVER, UNSPECIFIED FEVER CAUSE: Primary | ICD-10-CM

## 2020-04-01 DIAGNOSIS — M25.511 ACUTE PAIN OF RIGHT SHOULDER: ICD-10-CM

## 2020-04-01 DIAGNOSIS — J40 BRONCHITIS: ICD-10-CM

## 2020-04-01 DIAGNOSIS — N32.81 OVERACTIVE BLADDER: ICD-10-CM

## 2020-04-01 PROCEDURE — 87635 SARS-COV-2 COVID-19 AMP PRB: CPT | Performed by: PHYSICIAN ASSISTANT

## 2020-04-01 PROCEDURE — 99213 OFFICE O/P EST LOW 20 MIN: CPT | Performed by: PHYSICIAN ASSISTANT

## 2020-04-01 RX ORDER — FESOTERODINE FUMARATE 8 MG/1
TABLET, FILM COATED, EXTENDED RELEASE ORAL
Qty: 90 TABLET | Refills: 3 | OUTPATIENT
Start: 2020-04-01

## 2020-04-01 RX ORDER — ALBUTEROL SULFATE 90 UG/1
2 AEROSOL, METERED RESPIRATORY (INHALATION) EVERY 6 HOURS PRN
Qty: 1 INHALER | Refills: 0 | Status: SHIPPED | OUTPATIENT
Start: 2020-04-01

## 2020-04-01 RX ORDER — AZITHROMYCIN 250 MG/1
TABLET, FILM COATED ORAL
Qty: 6 TABLET | Refills: 0 | Status: SHIPPED | OUTPATIENT
Start: 2020-04-01 | End: 2020-04-06

## 2020-04-01 RX ORDER — FESOTERODINE FUMARATE 8 MG/1
1 TABLET, EXTENDED RELEASE ORAL DAILY
Qty: 90 TABLET | Refills: 0 | Status: SHIPPED | OUTPATIENT
Start: 2020-04-01 | End: 2020-06-23

## 2020-04-01 RX ORDER — LISINOPRIL 40 MG/1
40 TABLET ORAL DAILY
Qty: 90 TABLET | Refills: 0 | Status: SHIPPED | OUTPATIENT
Start: 2020-04-01 | End: 2020-06-23

## 2020-04-01 RX ORDER — LISINOPRIL 40 MG/1
TABLET ORAL
Qty: 90 TABLET | Refills: 2 | OUTPATIENT
Start: 2020-04-01

## 2020-04-01 RX ORDER — MELOXICAM 7.5 MG/1
TABLET ORAL
Qty: 90 TABLET | Refills: 0 | Status: SHIPPED | OUTPATIENT
Start: 2020-04-01 | End: 2020-06-24

## 2020-04-01 RX ORDER — RANITIDINE 150 MG/1
TABLET ORAL
Qty: 180 TABLET | Refills: 1 | OUTPATIENT
Start: 2020-04-01

## 2020-04-01 RX ORDER — ATORVASTATIN CALCIUM 20 MG/1
TABLET, FILM COATED ORAL
Qty: 90 TABLET | Refills: 2 | OUTPATIENT
Start: 2020-04-01

## 2020-04-01 RX ORDER — ATORVASTATIN CALCIUM 20 MG/1
20 TABLET, FILM COATED ORAL DAILY
Qty: 90 TABLET | Refills: 0 | Status: SHIPPED | OUTPATIENT
Start: 2020-04-01 | End: 2020-06-23

## 2020-04-05 ENCOUNTER — TELEPHONE (OUTPATIENT)
Dept: URGENT CARE | Age: 85
End: 2020-04-05

## 2020-04-05 LAB — SARS-COV-2 RNA SPEC QL NAA+PROBE: DETECTED

## 2020-04-10 ENCOUNTER — TELEPHONE (OUTPATIENT)
Dept: FAMILY MEDICINE CLINIC | Facility: CLINIC | Age: 85
End: 2020-04-10

## 2020-04-22 DIAGNOSIS — K21.9 GASTROESOPHAGEAL REFLUX DISEASE, ESOPHAGITIS PRESENCE NOT SPECIFIED: ICD-10-CM

## 2020-04-22 DIAGNOSIS — I10 BENIGN ESSENTIAL HYPERTENSION: ICD-10-CM

## 2020-04-22 RX ORDER — SUCRALFATE 1 G/10ML
SUSPENSION ORAL
Qty: 420 ML | Refills: 0 | Status: SHIPPED | OUTPATIENT
Start: 2020-04-22 | End: 2020-05-19

## 2020-04-23 ENCOUNTER — TELEPHONE (OUTPATIENT)
Dept: FAMILY MEDICINE CLINIC | Facility: CLINIC | Age: 85
End: 2020-04-23

## 2020-04-28 ENCOUNTER — TELEMEDICINE (OUTPATIENT)
Dept: FAMILY MEDICINE CLINIC | Facility: CLINIC | Age: 85
End: 2020-04-28

## 2020-04-28 DIAGNOSIS — Z99.81 REQUIRES SUPPLEMENTAL OXYGEN: ICD-10-CM

## 2020-04-28 DIAGNOSIS — U07.1 COVID-19 VIRUS INFECTION: ICD-10-CM

## 2020-04-28 DIAGNOSIS — K59.00 CONSTIPATION, UNSPECIFIED CONSTIPATION TYPE: Primary | ICD-10-CM

## 2020-04-28 DIAGNOSIS — I50.32 CHRONIC DIASTOLIC CONGESTIVE HEART FAILURE (HCC): ICD-10-CM

## 2020-04-28 DIAGNOSIS — E87.1 HYPONATREMIA: ICD-10-CM

## 2020-04-28 PROCEDURE — 99213 OFFICE O/P EST LOW 20 MIN: CPT | Performed by: FAMILY MEDICINE

## 2020-04-28 RX ORDER — DOCUSATE SODIUM 100 MG/1
100 CAPSULE, LIQUID FILLED ORAL 2 TIMES DAILY
Qty: 60 CAPSULE | Refills: 1 | Status: SHIPPED | OUTPATIENT
Start: 2020-04-28 | End: 2020-09-11 | Stop reason: SDUPTHER

## 2020-04-30 ENCOUNTER — TELEMEDICINE (OUTPATIENT)
Dept: FAMILY MEDICINE CLINIC | Facility: CLINIC | Age: 85
End: 2020-04-30

## 2020-04-30 DIAGNOSIS — U07.1 COVID-19 VIRUS INFECTION: Primary | ICD-10-CM

## 2020-04-30 PROCEDURE — G2012 BRIEF CHECK IN BY MD/QHP: HCPCS | Performed by: FAMILY MEDICINE

## 2020-05-01 DIAGNOSIS — J44.9 COPD (CHRONIC OBSTRUCTIVE PULMONARY DISEASE) (HCC): ICD-10-CM

## 2020-05-04 ENCOUNTER — TELEPHONE (OUTPATIENT)
Dept: FAMILY MEDICINE CLINIC | Facility: CLINIC | Age: 85
End: 2020-05-04

## 2020-05-11 ENCOUNTER — PATIENT OUTREACH (OUTPATIENT)
Dept: FAMILY MEDICINE CLINIC | Facility: CLINIC | Age: 85
End: 2020-05-11

## 2020-05-19 DIAGNOSIS — K21.9 GASTROESOPHAGEAL REFLUX DISEASE, ESOPHAGITIS PRESENCE NOT SPECIFIED: ICD-10-CM

## 2020-05-19 RX ORDER — SUCRALFATE 1 G/10ML
SUSPENSION ORAL
Qty: 420 ML | Refills: 0 | Status: SHIPPED | OUTPATIENT
Start: 2020-05-19 | End: 2020-06-16

## 2020-06-08 ENCOUNTER — TELEPHONE (OUTPATIENT)
Dept: FAMILY MEDICINE CLINIC | Facility: CLINIC | Age: 85
End: 2020-06-08

## 2020-06-09 ENCOUNTER — TELEMEDICINE (OUTPATIENT)
Dept: FAMILY MEDICINE CLINIC | Facility: CLINIC | Age: 85
End: 2020-06-09

## 2020-06-09 DIAGNOSIS — Z00.00 LABORATORY EXAM ORDERED AS PART OF ROUTINE GENERAL MEDICAL EXAMINATION: ICD-10-CM

## 2020-06-09 DIAGNOSIS — J44.9 COPD (CHRONIC OBSTRUCTIVE PULMONARY DISEASE) (HCC): Primary | ICD-10-CM

## 2020-06-09 DIAGNOSIS — U07.1 2019 NOVEL CORONAVIRUS DISEASE (COVID-19): ICD-10-CM

## 2020-06-09 PROCEDURE — 99213 OFFICE O/P EST LOW 20 MIN: CPT | Performed by: FAMILY MEDICINE

## 2020-06-09 PROCEDURE — 3079F DIAST BP 80-89 MM HG: CPT | Performed by: FAMILY MEDICINE

## 2020-06-09 PROCEDURE — 4040F PNEUMOC VAC/ADMIN/RCVD: CPT | Performed by: FAMILY MEDICINE

## 2020-06-09 PROCEDURE — 3074F SYST BP LT 130 MM HG: CPT | Performed by: FAMILY MEDICINE

## 2020-06-09 PROCEDURE — 1160F RVW MEDS BY RX/DR IN RCRD: CPT | Performed by: FAMILY MEDICINE

## 2020-06-09 PROCEDURE — 1036F TOBACCO NON-USER: CPT | Performed by: FAMILY MEDICINE

## 2020-06-10 ENCOUNTER — TELEPHONE (OUTPATIENT)
Dept: FAMILY MEDICINE CLINIC | Facility: CLINIC | Age: 85
End: 2020-06-10

## 2020-06-16 DIAGNOSIS — K21.9 GASTROESOPHAGEAL REFLUX DISEASE, ESOPHAGITIS PRESENCE NOT SPECIFIED: ICD-10-CM

## 2020-06-16 RX ORDER — SUCRALFATE 1 G/10ML
SUSPENSION ORAL
Qty: 420 ML | Refills: 0 | Status: SHIPPED | OUTPATIENT
Start: 2020-06-16 | End: 2020-07-31

## 2020-06-18 ENCOUNTER — TELEPHONE (OUTPATIENT)
Dept: FAMILY MEDICINE CLINIC | Facility: CLINIC | Age: 85
End: 2020-06-18

## 2020-06-18 DIAGNOSIS — R26.9 ABNORMAL GAIT: ICD-10-CM

## 2020-06-18 DIAGNOSIS — M19.90 OSTEOARTHRITIS, UNSPECIFIED OSTEOARTHRITIS TYPE, UNSPECIFIED SITE: Primary | ICD-10-CM

## 2020-06-23 DIAGNOSIS — E78.2 MIXED HYPERLIPIDEMIA: ICD-10-CM

## 2020-06-23 DIAGNOSIS — M25.511 ACUTE PAIN OF RIGHT SHOULDER: ICD-10-CM

## 2020-06-23 DIAGNOSIS — K21.9 GASTROESOPHAGEAL REFLUX DISEASE, ESOPHAGITIS PRESENCE NOT SPECIFIED: ICD-10-CM

## 2020-06-23 DIAGNOSIS — N32.81 OVERACTIVE BLADDER: ICD-10-CM

## 2020-06-23 DIAGNOSIS — I10 BENIGN ESSENTIAL HYPERTENSION: ICD-10-CM

## 2020-06-23 RX ORDER — LISINOPRIL 40 MG/1
TABLET ORAL
Qty: 90 TABLET | Refills: 2 | Status: SHIPPED | OUTPATIENT
Start: 2020-06-23 | End: 2021-03-26

## 2020-06-23 RX ORDER — SUCRALFATE 1 G/10ML
SUSPENSION ORAL
Refills: 1 | OUTPATIENT
Start: 2020-06-23

## 2020-06-23 RX ORDER — RANITIDINE 150 MG/1
TABLET ORAL
Qty: 180 TABLET | Refills: 2 | Status: SHIPPED | OUTPATIENT
Start: 2020-06-23 | End: 2020-06-24 | Stop reason: ALTCHOICE

## 2020-06-23 RX ORDER — MELOXICAM 7.5 MG/1
TABLET ORAL
Qty: 90 TABLET | Refills: 2 | OUTPATIENT
Start: 2020-06-23

## 2020-06-23 RX ORDER — ATORVASTATIN CALCIUM 20 MG/1
TABLET, FILM COATED ORAL
Qty: 90 TABLET | Refills: 2 | Status: SHIPPED | OUTPATIENT
Start: 2020-06-23 | End: 2021-03-26

## 2020-06-23 RX ORDER — FESOTERODINE FUMARATE 8 MG/1
TABLET, FILM COATED, EXTENDED RELEASE ORAL
Qty: 90 TABLET | Refills: 2 | Status: SHIPPED | OUTPATIENT
Start: 2020-06-23 | End: 2021-03-26

## 2020-06-23 NOTE — TELEPHONE ENCOUNTER
Meloxicam was refilled on 4/1/2020 for 90 tabs with 1 refill, that is enough for 6 months which would last until October

## 2020-06-24 DIAGNOSIS — M25.511 ACUTE PAIN OF RIGHT SHOULDER: ICD-10-CM

## 2020-06-24 DIAGNOSIS — K21.9 GASTROESOPHAGEAL REFLUX DISEASE, ESOPHAGITIS PRESENCE NOT SPECIFIED: Primary | ICD-10-CM

## 2020-06-24 DIAGNOSIS — I10 BENIGN ESSENTIAL HYPERTENSION: ICD-10-CM

## 2020-06-24 RX ORDER — HYDROCHLOROTHIAZIDE 12.5 MG/1
12.5 TABLET ORAL DAILY
Qty: 90 TABLET | Refills: 3 | Status: SHIPPED | OUTPATIENT
Start: 2020-06-24 | End: 2020-09-23

## 2020-06-24 RX ORDER — MELOXICAM 7.5 MG/1
TABLET ORAL
Qty: 90 TABLET | Refills: 0 | Status: SHIPPED | OUTPATIENT
Start: 2020-06-24 | End: 2020-09-11 | Stop reason: ALTCHOICE

## 2020-06-24 RX ORDER — FAMOTIDINE 20 MG/1
20 TABLET, FILM COATED ORAL 2 TIMES DAILY
Qty: 30 TABLET | Refills: 2 | Status: SHIPPED | OUTPATIENT
Start: 2020-06-24 | End: 2020-09-23

## 2020-06-26 DIAGNOSIS — I50.9 CHF (CONGESTIVE HEART FAILURE) (HCC): ICD-10-CM

## 2020-06-26 DIAGNOSIS — M25.511 ACUTE PAIN OF RIGHT SHOULDER: ICD-10-CM

## 2020-06-26 RX ORDER — MELOXICAM 7.5 MG/1
TABLET ORAL
Qty: 90 TABLET | Refills: 0 | OUTPATIENT
Start: 2020-06-26

## 2020-07-14 ENCOUNTER — TELEPHONE (OUTPATIENT)
Dept: FAMILY MEDICINE CLINIC | Facility: CLINIC | Age: 85
End: 2020-07-14

## 2020-07-14 NOTE — TELEPHONE ENCOUNTER
Hi,    Yes, I placed the order and it was placed in the fax bin to be faxed to the insurance  I also documented in the televisit note on 6/9/2020 about the needs for the walker and wheelchair  Let me know if there is any other update needed      Thank you,  Genesis Simms

## 2020-07-14 NOTE — TELEPHONE ENCOUNTER
I see it now, you put the documentation under her COVID dx which is why I missed it  It doesn't appear that these scripts were faxed anywhere  Is there a reason they were to go to the insurance and not a DME supplier?  If none, I can fax them with the chart note to [facility]

## 2020-07-14 NOTE — TELEPHONE ENCOUNTER
I see orders for a walker and wheelchair, however I don't see proper documentation of medical necessity of these items in a visit note which are required for her insurance to cover them  Please address at her 7/15 follow up   If you need assistance with the documentation, please let me know

## 2020-07-14 NOTE — TELEPHONE ENCOUNTER
Pt's daughter came in stating if someone can please call about the order that was supposed to be sent about her chair

## 2020-07-15 NOTE — TELEPHONE ENCOUNTER
Per provider, forms were placed in the fax bin Friday, I don't see anything scanned yet, did we fax?

## 2020-07-16 ENCOUNTER — TELEPHONE (OUTPATIENT)
Dept: FAMILY MEDICINE CLINIC | Facility: CLINIC | Age: 85
End: 2020-07-16

## 2020-07-16 ENCOUNTER — OFFICE VISIT (OUTPATIENT)
Dept: OBGYN CLINIC | Facility: MEDICAL CENTER | Age: 85
End: 2020-07-16
Payer: COMMERCIAL

## 2020-07-16 VITALS
WEIGHT: 182 LBS | HEIGHT: 60 IN | SYSTOLIC BLOOD PRESSURE: 129 MMHG | TEMPERATURE: 97.3 F | HEART RATE: 68 BPM | BODY MASS INDEX: 35.73 KG/M2 | DIASTOLIC BLOOD PRESSURE: 74 MMHG

## 2020-07-16 DIAGNOSIS — M75.101 RIGHT ROTATOR CUFF TEAR ARTHROPATHY: Primary | ICD-10-CM

## 2020-07-16 DIAGNOSIS — M12.811 RIGHT ROTATOR CUFF TEAR ARTHROPATHY: Primary | ICD-10-CM

## 2020-07-16 PROCEDURE — 99213 OFFICE O/P EST LOW 20 MIN: CPT | Performed by: ORTHOPAEDIC SURGERY

## 2020-07-16 PROCEDURE — 1160F RVW MEDS BY RX/DR IN RCRD: CPT | Performed by: ORTHOPAEDIC SURGERY

## 2020-07-16 PROCEDURE — 20610 DRAIN/INJ JOINT/BURSA W/O US: CPT | Performed by: ORTHOPAEDIC SURGERY

## 2020-07-16 PROCEDURE — 1036F TOBACCO NON-USER: CPT | Performed by: ORTHOPAEDIC SURGERY

## 2020-07-16 PROCEDURE — 3074F SYST BP LT 130 MM HG: CPT | Performed by: ORTHOPAEDIC SURGERY

## 2020-07-16 PROCEDURE — 3008F BODY MASS INDEX DOCD: CPT | Performed by: ORTHOPAEDIC SURGERY

## 2020-07-16 PROCEDURE — 3078F DIAST BP <80 MM HG: CPT | Performed by: ORTHOPAEDIC SURGERY

## 2020-07-16 PROCEDURE — 4040F PNEUMOC VAC/ADMIN/RCVD: CPT | Performed by: ORTHOPAEDIC SURGERY

## 2020-07-16 RX ORDER — METHYLPREDNISOLONE ACETATE 40 MG/ML
1 INJECTION, SUSPENSION INTRA-ARTICULAR; INTRALESIONAL; INTRAMUSCULAR; SOFT TISSUE
Status: COMPLETED | OUTPATIENT
Start: 2020-07-16 | End: 2020-07-16

## 2020-07-16 RX ORDER — LIDOCAINE HYDROCHLORIDE 10 MG/ML
3 INJECTION, SOLUTION INFILTRATION; PERINEURAL
Status: COMPLETED | OUTPATIENT
Start: 2020-07-16 | End: 2020-07-16

## 2020-07-16 RX ADMIN — LIDOCAINE HYDROCHLORIDE 3 ML: 10 INJECTION, SOLUTION INFILTRATION; PERINEURAL at 11:17

## 2020-07-16 RX ADMIN — METHYLPREDNISOLONE ACETATE 1 ML: 40 INJECTION, SUSPENSION INTRA-ARTICULAR; INTRALESIONAL; INTRAMUSCULAR; SOFT TISSUE at 11:17

## 2020-07-16 NOTE — PROGRESS NOTES
Ortho Sports Medicine Shoulder New Patient Visit     Assesment:   80 y o  female right shoulder rotator cuff arthropathy    Plan:    Conservative treatment:    Ice to shoulder 1-2 times daily, for 20 minutes at a time  Home exercise program for shoulder, including ROM and strenthening  Instructions given to patient of what exercises to perform  Imaging: All imaging from today was reviewed by myself and explained to the patient  Injection:    The risks and benefits of the injection (which include but are not limited to: infection, bleeding,damage to nerve/artery, need for further intervention), as well as the risks and benefits of all alternative treatments were explained and understood  The patient elected to proceed with injection  The procedure was done with aseptic technique, and the patient tolerated the procedure well with no complications  A corticosteroid injection of the glenohumeral joint was performed  Ice to the shoulder 1-2 times daily for 20 minutes, for next 24-48 hrs  Surgery:     No surgery is recommended at this point, continue with conservative treatment plan as noted  Did discuss with the patient that if ultimately she does not get relief from corticosteroid injection that she could consider a reverse total shoulder surgery  She would like to hold off on that at this time  Follow up:    Return in about 3 months (around 10/16/2020) for Recheck  Patient can receive another corticosteroid injection at this appointment  Chief Complaint   Patient presents with    Right Shoulder - Pain       History of Present Illness: The patient is a 80 y o , right hand dominant female whose occupation is retired  Patient was previously seen by Dr Shikha Herbert who has retired at this time and I am taking over the care of this patient  Patient is experiencing right shoulder pain described as dull and achy    Pain is present with motion and it will wake her up at night from sleep  Pain is in the anterior aspect of the shoulder  Dr Martinez York has diagnosed her with right shoulder rotator cuff arthropathy  He has given her a corticosteroid injection 01/23/2020  Patient states that this gave her complete relief for many months  She does admit to completing physical therapy in the past   She states that the injection allowed her to have better mobilization of the shoulder  She does have limited range of motion and feels the shoulder gets stuck and cracks and catches  Pain is improved by rest and injection  Pain is aggravated by overhead activity, reaching back, rotation and lifting   Symptoms include clicking, catching and cracking  The patient has weakness  The patient denies numbness and tingling  The patient has tried rest, ice, NSAIDS, physical therapy and injection  Shoulder Surgical History:  None    Past Medical, Social and Family History:  Past Medical History:   Diagnosis Date    Diverticulosis 2001    Heart failure ([address] )     History of cystocele     Hypertension     Obesity     Positive PPD 1999    treated     Past Surgical History:   Procedure Laterality Date    CARDIOVASCULAR STRESS TEST  2001    dobutamine stress test    CHOLECYSTECTOMY  04/2002    COLONOSCOPY      CYSTOCELE REPAIR      HYSTERECTOMY      KNEE SURGERY      knee replacement    UPPER GASTROINTESTINAL ENDOSCOPY       No Known Allergies  Current Outpatient Medications on File Prior to Visit   Medication Sig Dispense Refill    acetaminophen (TYLENOL) 650 mg CR tablet 1 tablet as needed       albuterol (PROVENTIL HFA,VENTOLIN HFA) 90 mcg/act inhaler Inhale 2 puffs every 6 (six) hours as needed for wheezing 1 Inhaler 0    albuterol (VENTOLIN HFA) 90 mcg/act inhaler Inhale 2 puffs 4 (four) times a day 18 g 4    amitriptyline (ELAVIL) 25 mg tablet TAKE ONE TABLET BY MOUTH DAILY AT BEDTIME   atorvastatin (LIPITOR) 20 mg tablet TAKE 1 TABLET BY MOUTH ONCE DAILY   90 tablet 2  benzonatate (TESSALON PERLES) 100 mg capsule Take 1 capsule (100 mg total) by mouth 3 (three) times a day as needed for cough 20 capsule 0    Calcium Carbonate-Vitamin D (CALCIUM 500 + D) 500-125 MG-UNIT TABS every 24 hours      CARAFATE 1 GM/10ML suspension TAKE 2 TEASPOONFULS (10ML) BY MOUTH FOUR TIMES A  mL 0    docusate sodium (COLACE) 100 mg capsule Take 1 capsule (100 mg total) by mouth 2 (two) times a day 60 capsule 1    famotidine (PEPCID) 20 mg tablet Take 1 tablet (20 mg total) by mouth 2 (two) times a day 30 tablet 2    fluticasone (FLONASE) 50 mcg/act nasal spray 2 sprays into each nostril daily 1 Bottle 3    fluticasone-salmeterol (Advair Diskus) 500-50 mcg/dose inhaler Inhale 1 puff 2 (two) times a day Rinse mouth after use 60 each 0    furosemide (LASIX) 40 mg tablet TAKE 1 TABLET BY MOUTH ONCE DAILY  90 tablet 3    guaiFENesin (ROBITUSSIN) 100 MG/5ML oral liquid Take 10 mL (200 mg total) by mouth 3 (three) times a day as needed for cough 120 mL 0    hydrochlorothiazide (HYDRODIURIL) 12 5 mg tablet Take 1 tablet (12 5 mg total) by mouth daily 90 tablet 3    hydrocortisone 2 5 % cream Apply topically 4 (four) times a day as needed      latanoprost (XALATAN) 0 005 % ophthalmic solution       lidocaine (XYLOCAINE) 5 % ointment Apply topically 2 (two) times a day as needed for mild pain 35 44 g 0    lisinopril (ZESTRIL) 40 mg tablet TAKE 1 TABLET BY MOUTH ONCE DAILY  90 tablet 2    meloxicam (MOBIC) 7 5 mg tablet TAKE 1 TABLET BY MOUTH ONCE DAILY  90 tablet 0    Misc  Devices (CANE) MISC Four point aluminum cane   timolol (BETIMOL) 0 5 % ophthalmic solution Administer 1 drop to both eyes 2 (two) times a day       timolol (TIMOPTIC) 0 5 % ophthalmic solution       TOVIAZ 8 MG TB24 TAKE 1 TABLET BY MOUTH ONCE DAILY   90 tablet 2    fluticasone (FLONASE) 50 mcg/act nasal spray 1 spray into each nostril 2 (two) times a day 1 Bottle 5     No current facility-administered medications on file prior to visit  Social History     Socioeconomic History    Marital status:      Spouse name: Not on file    Number of children: Not on file    Years of education: Not on file    Highest education level: Not on file   Occupational History    Not on file   Social Needs    Financial resource strain: Not on file    Food insecurity:     Worry: Not on file     Inability: Not on file    Transportation needs:     Medical: Not on file     Non-medical: Not on file   Tobacco Use    Smoking status: Never Smoker    Smokeless tobacco: Never Used   Substance and Sexual Activity    Alcohol use: Not Currently    Drug use: Not Currently    Sexual activity: Not Currently     Partners: Male   Lifestyle    Physical activity:     Days per week: Not on file     Minutes per session: Not on file    Stress: Not on file   Relationships    Social connections:     Talks on phone: Not on file     Gets together: Not on file     Attends Christianity service: Not on file     Active member of club or organization: Not on file     Attends meetings of clubs or organizations: Not on file     Relationship status: Not on file    Intimate partner violence:     Fear of current or ex partner: Not on file     Emotionally abused: Not on file     Physically abused: Not on file     Forced sexual activity: Not on file   Other Topics Concern    Not on file   Social History Narrative    Lives at home with son and 2 grandkids  I have reviewed the past medical, surgical, social and family history, medications and allergies as documented in the EMR  Review of systems: ROS is negative other than that noted in the HPI  Constitutional: Negative for fatigue and fever  HENT: Negative for sore throat  Respiratory: Negative for shortness of breath  Cardiovascular: Negative for chest pain  Gastrointestinal: Negative for abdominal pain  Endocrine: Negative for cold intolerance and heat intolerance  Genitourinary: Negative for flank pain  Musculoskeletal: Negative for back pain  Skin: Negative for rash  Allergic/Immunologic: Negative for immunocompromised state  Neurological: Negative for dizziness  Psychiatric/Behavioral: Negative for agitation  Physical Exam:    Blood pressure 129/74, pulse 68, temperature (!) 97 3 °F (36 3 °C), height 5' (1 524 m), weight 82 6 kg (182 lb), not currently breastfeeding      General/Constitutional: NAD, well developed, well nourished  HENT: Normocephalic, atraumatic  CV: Intact distal pulses, regular rate  Resp: No respiratory distress or labored breathing  Lymphatic: No lymphadenopathy palpated  Neuro: Alert and Oriented x 3, no focal deficits  Psych: Normal mood, normal affect, normal judgement, normal behavior  Skin: Warm, dry, no rashes, no erythema      Shoulder focused exam:       RIGHT LEFT    Scapula Atrophy Negative Negative     Winging Negative Negative     Protraction Negative Negative    Rotator cuff SS 5/5 5/5     IS 5/5 5/5     SubS 5/5 5/5    ROM     170     ER0 45 60                   IRb Iliac crest    L5    TTP: AC Positive Negative     Biceps Negative Negative     Coracoid Negative Negative    Special Tests: O'Briens Negative Negative     Juarez-shear Negative Negative     Cross body Adduction Negative Negative     Speeds  Negative Negative     Juan David's Negative Negative     Whipple Negative Negative       Neer Negative Negative     Ramirez Negative Negative    Instability: Apprehension & relocation not tested not tested     Load & shift not tested not tested    Other: Crank Negative Negative                 UE NV Exam: +2 Radial pulses bilaterally  Sensation intact to light touch C5-T1 bilaterally, Radial/median/ulnar nerve motor intact      Bilateral elbow, wrist, and and forearm ROM full, painless with passive ROM, no ttp or crepitance throughout extremities below shoulder joint    Cervical ROM is full without pain, numbness or tingling      Shoulder Imaging    X-rays of the right shoulder were reviewed, which demonstrate severe rotator cuff arthropathy with a high-riding humeral head  Severe osteoarthritis of the glenohumeral and AC joint  I have reviewed the radiology report and agree with their impression      Large joint arthrocentesis: R glenohumeral  Date/Time: 7/16/2020 11:17 AM  Consent given by: patient  Site marked: site marked  Timeout: Immediately prior to procedure a time out was called to verify the correct patient, procedure, equipment, support staff and site/side marked as required   Supporting Documentation  Indications: pain   Procedure Details  Location: shoulder - R glenohumeral  Preparation: Patient was prepped and draped in the usual sterile fashion  Needle size: 22 G  Ultrasound guidance: no  Approach: posterior  Medications administered: 3 mL lidocaine 1 %; 1 mL methylPREDNISolone acetate 40 mg/mL    Patient tolerance: patient tolerated the procedure well with no immediate complications  Dressing:  Sterile dressing applied

## 2020-07-16 NOTE — TELEPHONE ENCOUNTER
Patient came in and saw Dr Leslie Red on 12-29-17 and now is feeling worse.   Does she need to come back in?? Please call [facility], patient cannot get both walker and wheelchair unless she pays out of pocket for the walker and we document in a note these 3 specific things for the insurance to cover the wheelchair:  1  Patient is unable to use walker, cane or crutches  2  Patient requires the wheelchair to assist with ADLs  3  Patient can safely use the wheelchair on their own  The note we sent does not qualify because the above 3 things are not documented  A new visit would be required to document and evaluate which she is in need of unless patient wishes to pay out of pocket for the items  Ericka also checked all areas and no forms were located

## 2020-07-22 ENCOUNTER — TELEMEDICINE (OUTPATIENT)
Dept: FAMILY MEDICINE CLINIC | Facility: CLINIC | Age: 85
End: 2020-07-22

## 2020-07-22 DIAGNOSIS — U07.1 2019 NOVEL CORONAVIRUS DISEASE (COVID-19): ICD-10-CM

## 2020-07-22 PROCEDURE — G2012 BRIEF CHECK IN BY MD/QHP: HCPCS | Performed by: FAMILY MEDICINE

## 2020-07-22 NOTE — ASSESSMENT & PLAN NOTE
-Spoke to patient's son, he states patient is doing much better, she completed physical therapy and is currently ambulating much better  He states that patient does not require wheelchair or walker anymore  Indicated to notify if there is any further need for this in the future   -Patient's appetite is much better these days, she still remains with some residual dry cough which might be also chronic due to secondary COPD and she is very talkative on the phone as well

## 2020-07-22 NOTE — PROGRESS NOTES
Virtual Brief Visit    Assessment/Plan:    Problem List Items Addressed This Visit        Other    2019 novel coronavirus disease (COVID-19)     -Spoke to patient's son, he states patient is doing much better, she completed physical therapy and is currently ambulating much better  He states that patient does not require wheelchair or walker anymore  Indicated to notify if there is any further need for this in the future   -Patient's appetite is much better these days, she still remains with some residual dry cough which might be also chronic due to secondary COPD and she is very talkative on the phone as well  Reason for visit is   Chief Complaint   Patient presents with    Virtual Brief Visit        Encounter provider Mar Ozuna MD    Provider located at [address] [place] [id number] [phone]    Recent Visits  Date Type Provider Dept   07/16/20 Telephone [facility] 9938 recent visits within past 7 days and meeting all other requirements     Today's Visits  Date Type Provider Dept   07/22/20 Telemedicine Mar Ozuna MD  [facility]   Showing today's visits and meeting all other requirements     Future Appointments  No visits were found meeting these conditions  Showing future appointments within next 150 days and meeting all other requirements        After connecting through telephone, the patient was identified by name and date of birth  Geraldine SHANNA Stevensjax Lo was informed that this is a telemedicine visit and that the visit is being conducted through telephone  My office door was closed  The following individuals were in the room with me and the patient informed medical student  She acknowledged consent and understanding of privacy and security of the platform  The patient has agreed to participate and understands she can discontinue the visit at any time      Patient is aware this is a billable service  It was my intent to perform this visit via video technology but the patient was not able to do a video connection so the visit was completed via audio telephone only  Subjective    Geraldine Jo is a 80 y o  female  81 y/o female who was called today via televisit for follow up on chronic conditions and questions about wheelchair and walker  Spoke to patient's son, who indicates that patient is doing much better  She completed physical therapy and is able to ambulate much better at home, regained much of her strength and her appetite is way better  He feels that she does not require the wheelchair or walker anymore  Overall patient doing well, no other symptoms reported  Past Medical History:   Diagnosis Date    Diverticulosis 2001    Heart failure ([address] )     History of cystocele     Hypertension     Obesity     Positive PPD 1999    treated       Past Surgical History:   Procedure Laterality Date    CARDIOVASCULAR STRESS TEST  2001    dobutamine stress test    CHOLECYSTECTOMY  04/2002    COLONOSCOPY      CYSTOCELE REPAIR      HYSTERECTOMY      KNEE SURGERY      knee replacement    UPPER GASTROINTESTINAL ENDOSCOPY         Current Outpatient Medications   Medication Sig Dispense Refill    acetaminophen (TYLENOL) 650 mg CR tablet 1 tablet as needed       albuterol (PROVENTIL HFA,VENTOLIN HFA) 90 mcg/act inhaler Inhale 2 puffs every 6 (six) hours as needed for wheezing 1 Inhaler 0    albuterol (VENTOLIN HFA) 90 mcg/act inhaler Inhale 2 puffs 4 (four) times a day 18 g 4    amitriptyline (ELAVIL) 25 mg tablet TAKE ONE TABLET BY MOUTH DAILY AT BEDTIME   atorvastatin (LIPITOR) 20 mg tablet TAKE 1 TABLET BY MOUTH ONCE DAILY   90 tablet 2    benzonatate (TESSALON PERLES) 100 mg capsule Take 1 capsule (100 mg total) by mouth 3 (three) times a day as needed for cough 20 capsule 0    Calcium Carbonate-Vitamin D (CALCIUM 500 + D) 500-125 MG-UNIT TABS every 24 hours  CARAFATE 1 GM/10ML suspension TAKE 2 TEASPOONFULS (10ML) BY MOUTH FOUR TIMES A  mL 0    docusate sodium (COLACE) 100 mg capsule Take 1 capsule (100 mg total) by mouth 2 (two) times a day 60 capsule 1    famotidine (PEPCID) 20 mg tablet Take 1 tablet (20 mg total) by mouth 2 (two) times a day 30 tablet 2    fluticasone (FLONASE) 50 mcg/act nasal spray 1 spray into each nostril 2 (two) times a day 1 Bottle 5    fluticasone (FLONASE) 50 mcg/act nasal spray 2 sprays into each nostril daily 1 Bottle 3    fluticasone-salmeterol (Advair Diskus) 500-50 mcg/dose inhaler Inhale 1 puff 2 (two) times a day Rinse mouth after use 60 each 0    furosemide (LASIX) 40 mg tablet TAKE 1 TABLET BY MOUTH ONCE DAILY  90 tablet 3    guaiFENesin (ROBITUSSIN) 100 MG/5ML oral liquid Take 10 mL (200 mg total) by mouth 3 (three) times a day as needed for cough 120 mL 0    hydrochlorothiazide (HYDRODIURIL) 12 5 mg tablet Take 1 tablet (12 5 mg total) by mouth daily 90 tablet 3    hydrocortisone 2 5 % cream Apply topically 4 (four) times a day as needed      latanoprost (XALATAN) 0 005 % ophthalmic solution       lidocaine (XYLOCAINE) 5 % ointment Apply topically 2 (two) times a day as needed for mild pain 35 44 g 0    lisinopril (ZESTRIL) 40 mg tablet TAKE 1 TABLET BY MOUTH ONCE DAILY  90 tablet 2    meloxicam (MOBIC) 7 5 mg tablet TAKE 1 TABLET BY MOUTH ONCE DAILY  90 tablet 0    Misc  Devices (CANE) MISC Four point aluminum cane   timolol (BETIMOL) 0 5 % ophthalmic solution Administer 1 drop to both eyes 2 (two) times a day       timolol (TIMOPTIC) 0 5 % ophthalmic solution       TOVIAZ 8 MG TB24 TAKE 1 TABLET BY MOUTH ONCE DAILY  90 tablet 2     No current facility-administered medications for this visit  No Known Allergies    Review of Systems   Constitutional: Negative for fever  Respiratory: Positive for cough ( chronic)  Negative for shortness of breath and wheezing      Cardiovascular: Negative for chest pain  Gastrointestinal: Negative for abdominal pain, nausea and vomiting  Skin: Negative for color change, pallor, rash and wound  Psychiatric/Behavioral: The patient is not nervous/anxious  There were no vitals filed for this visit  I spent 15 minutes directly with the patient during this visit    VIRTUAL VISIT DISCLAIMER    Geraldine OTERO Maurisio Resendiz acknowledges that she has consented to an online visit or consultation  She understands that the online visit is based solely on information provided by her, and that, in the absence of a face-to-face physical evaluation by the physician, the diagnosis she receives is both limited and provisional in terms of accuracy and completeness  This is not intended to replace a full medical face-to-face evaluation by the physician  De Emerson understands and accepts these terms

## 2020-07-23 ENCOUNTER — APPOINTMENT (OUTPATIENT)
Dept: LAB | Facility: HOSPITAL | Age: 85
End: 2020-07-23
Payer: COMMERCIAL

## 2020-07-23 ENCOUNTER — TELEPHONE (OUTPATIENT)
Dept: FAMILY MEDICINE CLINIC | Facility: CLINIC | Age: 85
End: 2020-07-23

## 2020-07-23 DIAGNOSIS — Z00.00 LABORATORY EXAM ORDERED AS PART OF ROUTINE GENERAL MEDICAL EXAMINATION: ICD-10-CM

## 2020-07-23 DIAGNOSIS — E87.1 HYPONATREMIA: ICD-10-CM

## 2020-07-23 LAB
ALBUMIN SERPL BCP-MCNC: 4.2 G/DL (ref 3–5.2)
ALP SERPL-CCNC: 91 U/L (ref 43–122)
ALT SERPL W P-5'-P-CCNC: 14 U/L (ref 9–52)
ANION GAP SERPL CALCULATED.3IONS-SCNC: 7 MMOL/L (ref 5–14)
AST SERPL W P-5'-P-CCNC: 30 U/L (ref 14–36)
BASOPHILS # BLD AUTO: 0.05 THOUSAND/UL (ref 0–0.1)
BASOPHILS NFR MAR MANUAL: 1 % (ref 0–1)
BILIRUB SERPL-MCNC: 0.4 MG/DL
BUN SERPL-MCNC: 30 MG/DL (ref 5–25)
CALCIUM SERPL-MCNC: 9.2 MG/DL (ref 8.4–10.2)
CHLORIDE SERPL-SCNC: 104 MMOL/L (ref 97–108)
CO2 SERPL-SCNC: 28 MMOL/L (ref 22–30)
CREAT SERPL-MCNC: 0.68 MG/DL (ref 0.6–1.2)
EOSINOPHIL # BLD AUTO: 0.91 THOUSAND/UL (ref 0–0.4)
EOSINOPHIL NFR BLD MANUAL: 19 % (ref 0–6)
ERYTHROCYTE [DISTWIDTH] IN BLOOD BY AUTOMATED COUNT: 14.2 %
GFR SERPL CREATININE-BSD FRML MDRD: 92 ML/MIN/1.73SQ M
GLUCOSE P FAST SERPL-MCNC: 102 MG/DL (ref 70–99)
HCT VFR BLD AUTO: 35.1 % (ref 36–46)
HGB BLD-MCNC: 11.7 G/DL (ref 12–16)
LYMPHOCYTES # BLD AUTO: 1.82 THOUSAND/UL (ref 0.5–4)
LYMPHOCYTES # BLD AUTO: 38 % (ref 25–45)
MCH RBC QN AUTO: 30.5 PG (ref 26.8–34.3)
MCHC RBC AUTO-ENTMCNC: 33.3 G/DL (ref 31.4–37.4)
MCV RBC AUTO: 92 FL (ref 80–100)
MONOCYTES # BLD AUTO: 0.29 THOUSAND/UL (ref 0.2–0.9)
MONOCYTES NFR BLD AUTO: 6 % (ref 1–10)
NEUTS SEG # BLD: 1.73 THOUSAND/UL (ref 1.8–7.8)
NEUTS SEG NFR BLD AUTO: 36 %
PLATELET # BLD AUTO: 276 THOUSANDS/UL (ref 150–450)
PLATELET BLD QL SMEAR: ADEQUATE
PMV BLD AUTO: 7.7 FL (ref 8.9–12.7)
POTASSIUM SERPL-SCNC: 4.1 MMOL/L (ref 3.6–5)
PROT SERPL-MCNC: 7.8 G/DL (ref 5.9–8.4)
RBC # BLD AUTO: 3.83 MILLION/UL (ref 4–5.2)
RBC MORPH BLD: NORMAL
SODIUM SERPL-SCNC: 139 MMOL/L (ref 137–147)
TOTAL CELLS COUNTED SPEC: 100
WBC # BLD AUTO: 4.8 THOUSAND/UL (ref 4.31–10.16)

## 2020-07-23 PROCEDURE — 85007 BL SMEAR W/DIFF WBC COUNT: CPT

## 2020-07-23 PROCEDURE — 36415 COLL VENOUS BLD VENIPUNCTURE: CPT

## 2020-07-23 PROCEDURE — 80053 COMPREHEN METABOLIC PANEL: CPT

## 2020-07-23 PROCEDURE — 85027 COMPLETE CBC AUTOMATED: CPT

## 2020-07-23 NOTE — TELEPHONE ENCOUNTER
----- Message from Banner, MD sent at 7/22/2020  7:21 PM EDT -----  Regarding: follow up visit  Hi,    Please schedule a follow up visit for this patient in 3 months with me      Thank you,  Jasmin Has

## 2020-07-30 DIAGNOSIS — K21.9 GASTROESOPHAGEAL REFLUX DISEASE, ESOPHAGITIS PRESENCE NOT SPECIFIED: ICD-10-CM

## 2020-07-31 ENCOUNTER — TELEPHONE (OUTPATIENT)
Dept: FAMILY MEDICINE CLINIC | Facility: CLINIC | Age: 85
End: 2020-07-31

## 2020-07-31 DIAGNOSIS — M19.90 OSTEOARTHRITIS, UNSPECIFIED OSTEOARTHRITIS TYPE, UNSPECIFIED SITE: ICD-10-CM

## 2020-07-31 DIAGNOSIS — I50.32 CHRONIC DIASTOLIC CONGESTIVE HEART FAILURE (HCC): Primary | ICD-10-CM

## 2020-07-31 RX ORDER — SUCRALFATE 1 G/10ML
SUSPENSION ORAL
Qty: 420 ML | Refills: 0 | Status: SHIPPED | OUTPATIENT
Start: 2020-07-31 | End: 2020-09-11 | Stop reason: SDUPTHER

## 2020-07-31 NOTE — TELEPHONE ENCOUNTER
[facility] called regarding the RX they received for the pts compression stockings   States they need new RX completed as DME knee high compression stockings qty 2 20-30MMHG please re order it should be faxed to [phone]

## 2020-08-13 ENCOUNTER — TELEPHONE (OUTPATIENT)
Dept: FAMILY MEDICINE CLINIC | Facility: CLINIC | Age: 85
End: 2020-08-13

## 2020-08-13 NOTE — TELEPHONE ENCOUNTER
Mag Malagon from [facility] is calling reguarding pts elastic stockings  They need a different Dx code in order to be covered by insurance, it would have to be edema or vascular related  She also mentioned to be sure the order is for knee high stockings  Her call back number is [phone] but asks we have someone send her message before calling her extension right away for a faster response time   Fax number [phone] attn Corey Pacheco

## 2020-08-13 NOTE — TELEPHONE ENCOUNTER
Hi,    Is there a specific form that I would have to fill out for this? Or just by placing the order and notifying them is good? Please let me know      Thank you,  Elier Carrizales

## 2020-08-13 NOTE — TELEPHONE ENCOUNTER
Just placing the order and faxing it  [place] did request a phone call to notify her once faxed   Thank you

## 2020-08-25 DIAGNOSIS — I50.32 CHRONIC DIASTOLIC CONGESTIVE HEART FAILURE (HCC): Primary | ICD-10-CM

## 2020-08-26 NOTE — TELEPHONE ENCOUNTER
It appears you electronically prescribed it to [facility] who does not carry these stockings   It would need to go under other orders so I could fax it to [facility]  Thank you

## 2020-08-26 NOTE — TELEPHONE ENCOUNTER
Hi,    I am currently working on nights, my apologies for the delay  I just placed the order for the compression stockings  Can you please reach out to Pancho Pack from [facility] to notify that the order was placed? Please let me know if you need anything else      Thank you,  Manny Mena

## 2020-08-27 DIAGNOSIS — I50.32 CHRONIC DIASTOLIC CONGESTIVE HEART FAILURE (HCC): Primary | ICD-10-CM

## 2020-08-27 NOTE — TELEPHONE ENCOUNTER
Hi,    How can I place it in other orders without having to send it to a pharmacy? Have not done that before so, not quite sure how to place it  Please let me know if you know      Thank you,  Haritha Murray

## 2020-08-27 NOTE — TELEPHONE ENCOUNTER
You would order it the same way you did as the walker or wheel chair, and then we can print it and fax it to [facility]

## 2020-08-28 NOTE — TELEPHONE ENCOUNTER
Hi,    I sent it again, but still appears under medications, not sure why  I attached Peg Dineshman to this message to see if she can assist me with this?     Thank you,  Sayra Landers

## 2020-08-28 NOTE — TELEPHONE ENCOUNTER
The order is correct  Couple of problems though:  1  Your dx code is problematic, you used an unspecified site dx of osteoarthritis  2  What note are we sending that you have documented medical necessity of this item? Most recent notes state she doesn't need either the wheelchair or the walker

## 2020-09-11 ENCOUNTER — OFFICE VISIT (OUTPATIENT)
Dept: FAMILY MEDICINE CLINIC | Facility: CLINIC | Age: 85
End: 2020-09-11

## 2020-09-11 VITALS
BODY MASS INDEX: 38.87 KG/M2 | WEIGHT: 198 LBS | TEMPERATURE: 98 F | RESPIRATION RATE: 16 BRPM | HEART RATE: 86 BPM | HEIGHT: 60 IN | OXYGEN SATURATION: 96 % | DIASTOLIC BLOOD PRESSURE: 68 MMHG | SYSTOLIC BLOOD PRESSURE: 136 MMHG

## 2020-09-11 DIAGNOSIS — K59.00 CONSTIPATION, UNSPECIFIED CONSTIPATION TYPE: ICD-10-CM

## 2020-09-11 DIAGNOSIS — I10 BENIGN ESSENTIAL HYPERTENSION: Primary | ICD-10-CM

## 2020-09-11 DIAGNOSIS — K21.9 GASTROESOPHAGEAL REFLUX DISEASE, ESOPHAGITIS PRESENCE NOT SPECIFIED: ICD-10-CM

## 2020-09-11 DIAGNOSIS — I50.32 CHRONIC DIASTOLIC CONGESTIVE HEART FAILURE (HCC): ICD-10-CM

## 2020-09-11 PROCEDURE — 3725F SCREEN DEPRESSION PERFORMED: CPT | Performed by: FAMILY MEDICINE

## 2020-09-11 PROCEDURE — 99213 OFFICE O/P EST LOW 20 MIN: CPT | Performed by: FAMILY MEDICINE

## 2020-09-11 PROCEDURE — 1101F PT FALLS ASSESS-DOCD LE1/YR: CPT | Performed by: FAMILY MEDICINE

## 2020-09-11 PROCEDURE — 3288F FALL RISK ASSESSMENT DOCD: CPT | Performed by: FAMILY MEDICINE

## 2020-09-11 RX ORDER — SUCRALFATE ORAL 1 G/10ML
SUSPENSION ORAL
Qty: 420 ML | Refills: 0 | Status: SHIPPED | OUTPATIENT
Start: 2020-09-11 | End: 2020-09-23

## 2020-09-11 RX ORDER — DOCUSATE SODIUM 100 MG/1
100 CAPSULE, LIQUID FILLED ORAL 2 TIMES DAILY
Qty: 60 CAPSULE | Refills: 1 | Status: SHIPPED | OUTPATIENT
Start: 2020-09-11

## 2020-09-11 NOTE — ASSESSMENT & PLAN NOTE
-Following with Orthopedic Surgery and treated with joint steroid injections   -Currently feels much better, has next scheduled appointment on 10/2020, encouraged to continue with scheduled appointment and following recommendations    -Spoke to Guilherme Villegas, and verified that patient is not currently using Meloxicam medication anymore  Instructed to not take medication given that it has the potential to be a nephrotoxic medication and given that patient is on multiple medications that act on the kidneys (lisinopril, HCTZ, and lasix) it would be recommended to avoid NSAIDs as possible  Son verbalized understanding

## 2020-09-11 NOTE — ASSESSMENT & PLAN NOTE
Controlled  Within BP goal of <150/90   -currently on: lisinopril 40 mg and HCTZ 12 5 mg   -Encouraged to continue adherence to medications as well as diet

## 2020-09-11 NOTE — PROGRESS NOTES
Assessment/Plan:    GERD (gastroesophageal reflux disease)  -Controlled; currently on Pepcid and sucralfate  Will continue  Benign essential hypertension  Controlled  Within BP goal of <150/90   -currently on: lisinopril 40 mg and HCTZ 12 5 mg   -Encouraged to continue adherence to medications as well as diet  Chronic diastolic congestive heart failure (HCC)  Wt Readings from Last 3 Encounters:   09/11/20 89 8 kg (198 lb)   07/16/20 82 6 kg (182 lb)   04/01/20 88 9 kg (196 lb)       -Currently euvolemic, no leg edema present and using compression stockings  Lungs clear on auscultation and no signs of fluid overload   -last echocardiogram on October, 2019 with EF: 60% and grade 1 diastolic dysfunction    -Currently on Lasix 40 mg daily  CMP on 7/23/2020 with no electrolyte disturbances and stable Creatinine at 0 68   -last appointment with cardiology about 1 year ago (9/2019)  Encouraged to continue with scheduled follow up with Cardiology  Constipation  -Controlled  Currently on docusate and also taking prune juice at home which patient states have been also helpful  Will continue with current treatment  Acute pain of right shoulder  -Following with Orthopedic Surgery and treated with joint steroid injections   -Currently feels much better, has next scheduled appointment on 10/2020, encouraged to continue with scheduled appointment and following recommendations    -Spoke to Gin Glover, and verified that patient is not currently using Meloxicam medication anymore  Instructed to not take medication given that it has the potential to be a nephrotoxic medication and given that patient is on multiple medications that act on the kidneys (lisinopril, HCTZ, and lasix) it would be recommended to avoid NSAIDs as possible  Son verbalized understanding          Diagnoses and all orders for this visit:    Benign essential hypertension    Constipation, unspecified constipation type  -     docusate sodium (COLACE) 100 mg capsule; Take 1 capsule (100 mg total) by mouth 2 (two) times a day    Gastroesophageal reflux disease, esophagitis presence not specified  -     sucralfate (Carafate) 1 g/10 mL suspension; Take 2 teaspoonfuls (10ml) by mouth four times a day    Chronic diastolic congestive heart failure (HCC)          Subjective:      Patient ID: Claire Serrano is a 80 y o  female  Case of 81 y/o female who came today for follow up on chronic conditions  She was accompanied by Son who is caretaker  Son indicates that patient has been doing much better and has recovered well after having COVID  She completed physical therapies as home as well  They would like to discuss lab results and refill of medications  No additional concerns at the moment  The following portions of the patient's history were reviewed and updated as appropriate: allergies, past family history, past social history and past surgical history  Review of Systems   Constitutional: Negative for fever  Respiratory: Negative for cough, shortness of breath and wheezing  Cardiovascular: Negative for chest pain, palpitations and leg swelling  Gastrointestinal: Negative for abdominal pain, diarrhea, nausea and vomiting  Skin: Negative for color change, pallor, rash and wound  Neurological: Negative for headaches  Psychiatric/Behavioral: The patient is not nervous/anxious  Objective:      /68 (BP Location: Right arm, Patient Position: Sitting, Cuff Size: Large)   Pulse 86   Temp 98 °F (36 7 °C) (Temporal)   Resp 16   Ht 5' (1 524 m)   Wt 89 8 kg (198 lb)   SpO2 96%   BMI 38 67 kg/m²          Physical Exam  Constitutional:       General: She is not in acute distress  Appearance: Normal appearance  She is not ill-appearing, toxic-appearing or diaphoretic  HENT:      Head: Atraumatic  Right Ear: Tympanic membrane, ear canal and external ear normal  There is no impacted cerumen        Left Ear: Tympanic membrane, ear canal and external ear normal  There is no impacted cerumen  Mouth/Throat:      Mouth: Mucous membranes are moist       Pharynx: Oropharynx is clear  No oropharyngeal exudate or posterior oropharyngeal erythema  Eyes:      Extraocular Movements: Extraocular movements intact  Neck:      Musculoskeletal: Normal range of motion  Cardiovascular:      Rate and Rhythm: Normal rate and regular rhythm  Pulses: Normal pulses  Heart sounds: Normal heart sounds  No murmur  Pulmonary:      Effort: Pulmonary effort is normal  No respiratory distress  Breath sounds: No wheezing  Abdominal:      General: Abdomen is flat  Bowel sounds are normal  There is no distension  Musculoskeletal: Normal range of motion  General: No swelling, tenderness, deformity or signs of injury  Right lower leg: No edema  Left lower leg: No edema  Comments: Compression stockings in place   Skin:     General: Skin is warm  Coloration: Skin is not jaundiced or pale  Findings: No bruising, erythema, lesion or rash  Neurological:      General: No focal deficit present  Mental Status: She is alert and oriented to person, place, and time     Psychiatric:         Mood and Affect: Mood normal

## 2020-09-11 NOTE — ASSESSMENT & PLAN NOTE
-Controlled  Currently on docusate and also taking prune juice at home which patient states have been also helpful  Will continue with current treatment

## 2020-09-11 NOTE — ASSESSMENT & PLAN NOTE
Wt Readings from Last 3 Encounters:   09/11/20 89 8 kg (198 lb)   07/16/20 82 6 kg (182 lb)   04/01/20 88 9 kg (196 lb)       -Currently euvolemic, no leg edema present and using compression stockings  Lungs clear on auscultation and no signs of fluid overload   -last echocardiogram on October, 2019 with EF: 60% and grade 1 diastolic dysfunction    -Currently on Lasix 40 mg daily  CMP on 7/23/2020 with no electrolyte disturbances and stable Creatinine at 0 68   -last appointment with cardiology about 1 year ago (9/2019)  Encouraged to continue with scheduled follow up with Cardiology

## 2020-09-18 ENCOUNTER — OFFICE VISIT (OUTPATIENT)
Dept: FAMILY MEDICINE CLINIC | Facility: CLINIC | Age: 85
End: 2020-09-18

## 2020-09-18 ENCOUNTER — TELEPHONE (OUTPATIENT)
Dept: FAMILY MEDICINE CLINIC | Facility: CLINIC | Age: 85
End: 2020-09-18

## 2020-09-18 ENCOUNTER — HOSPITAL ENCOUNTER (OUTPATIENT)
Dept: ULTRASOUND IMAGING | Facility: HOSPITAL | Age: 85
Discharge: HOME | End: 2020-09-18
Payer: COMMERCIAL

## 2020-09-18 ENCOUNTER — APPOINTMENT (OUTPATIENT)
Dept: LAB | Facility: HOSPITAL | Age: 85
End: 2020-09-18
Payer: COMMERCIAL

## 2020-09-18 VITALS
HEIGHT: 60 IN | BODY MASS INDEX: 39.07 KG/M2 | TEMPERATURE: 97.6 F | OXYGEN SATURATION: 97 % | DIASTOLIC BLOOD PRESSURE: 74 MMHG | SYSTOLIC BLOOD PRESSURE: 128 MMHG | RESPIRATION RATE: 18 BRPM | HEART RATE: 89 BPM | WEIGHT: 199 LBS

## 2020-09-18 DIAGNOSIS — R10.11 RIGHT UPPER QUADRANT PAIN: ICD-10-CM

## 2020-09-18 DIAGNOSIS — K59.09 OTHER CONSTIPATION: ICD-10-CM

## 2020-09-18 DIAGNOSIS — R10.11 RIGHT UPPER QUADRANT PAIN: Primary | ICD-10-CM

## 2020-09-18 LAB
ALBUMIN SERPL BCP-MCNC: 4.2 G/DL (ref 3–5.2)
ALP SERPL-CCNC: 98 U/L (ref 43–122)
ALT SERPL W P-5'-P-CCNC: 14 U/L (ref 9–52)
ANION GAP SERPL CALCULATED.3IONS-SCNC: 9 MMOL/L (ref 5–14)
AST SERPL W P-5'-P-CCNC: 27 U/L (ref 14–36)
BILIRUB SERPL-MCNC: 0.8 MG/DL
BUN SERPL-MCNC: 26 MG/DL (ref 5–25)
CALCIUM SERPL-MCNC: 9.4 MG/DL (ref 8.4–10.2)
CHLORIDE SERPL-SCNC: 100 MMOL/L (ref 97–108)
CO2 SERPL-SCNC: 29 MMOL/L (ref 22–30)
CREAT SERPL-MCNC: 0.79 MG/DL (ref 0.6–1.2)
GFR SERPL CREATININE-BSD FRML MDRD: 78 ML/MIN/1.73SQ M
GLUCOSE P FAST SERPL-MCNC: 102 MG/DL (ref 70–99)
POTASSIUM SERPL-SCNC: 4.4 MMOL/L (ref 3.6–5)
PROT SERPL-MCNC: 8.2 G/DL (ref 5.9–8.4)
SODIUM SERPL-SCNC: 138 MMOL/L (ref 137–147)

## 2020-09-18 PROCEDURE — 76705 ECHO EXAM OF ABDOMEN: CPT

## 2020-09-18 PROCEDURE — 80053 COMPREHEN METABOLIC PANEL: CPT

## 2020-09-18 PROCEDURE — 1160F RVW MEDS BY RX/DR IN RCRD: CPT | Performed by: FAMILY MEDICINE

## 2020-09-18 PROCEDURE — 36415 COLL VENOUS BLD VENIPUNCTURE: CPT

## 2020-09-18 PROCEDURE — 99213 OFFICE O/P EST LOW 20 MIN: CPT | Performed by: FAMILY MEDICINE

## 2020-09-18 PROCEDURE — 1036F TOBACCO NON-USER: CPT | Performed by: FAMILY MEDICINE

## 2020-09-18 RX ORDER — SENNA AND DOCUSATE SODIUM 50; 8.6 MG/1; MG/1
1 TABLET, FILM COATED ORAL DAILY
Qty: 30 TABLET | Refills: 1 | Status: SHIPPED | OUTPATIENT
Start: 2020-09-18 | End: 2020-12-11 | Stop reason: SDUPTHER

## 2020-09-18 NOTE — ASSESSMENT & PLAN NOTE
- As patient still has her gallbladder will order a STAT right upper quadrant ultrasound in order to rule out gallbladder pathology as well as CMP  - Contacted referral team who were able to get patient an appointment today at 11 am at [place] [id number]  Information relayed to patient and her daughter via  who verbalized understanding

## 2020-09-18 NOTE — PROGRESS NOTES
Assessment/Plan:    Right upper quadrant pain  - As patient still has her gallbladder will order a STAT right upper quadrant ultrasound in order to rule out gallbladder pathology as well as CMP  - Contacted referral team who were able to get patient an appointment today at 11 am at [place] [id number]  Information relayed to patient and her daughter via  who verbalized understanding  Diagnoses and all orders for this visit:    Right upper quadrant pain  -     US right upper quadrant; Future  -     Comprehensive metabolic panel; Future          Subjective:      Patient ID: Claire Serrano is a 80 y o  female  HPI     Geraldine Gallo is a 80year old female with a past medical history of GERD, hypertension  and CHF who presents today accompanied by her daughter Briana Sandhu for a same day visit  Due to language barrier, an  was present during the history-taking and subsequent discussion (and for part of the physical exam) with this patient  Today patient is complaining of progressively worsening right upper quadrant pain which started 2 days ago  Patient states that it is a 9/10 in severity and states that the pain radiates to the back  Patient denies any associated nausea or vomiting but endorses decreased appetite and states that the pain is worsened by food  Patient states that she still has her gallbladder  Patient states that her last bowel movement was yesterday and denies any urinary symptoms  The following portions of the patient's history were reviewed and updated as appropriate: problem list     Review of Systems   Constitutional: Positive for appetite change  HENT: Negative  Eyes: Negative  Respiratory: Negative  Cardiovascular: Negative  Gastrointestinal: Positive for abdominal pain  Negative for constipation, diarrhea, nausea and vomiting  Genitourinary: Negative  Musculoskeletal: Negative  Skin: Negative  Neurological: Negative  Psychiatric/Behavioral: Negative  Objective:      /74 (BP Location: Left arm, Patient Position: Sitting, Cuff Size: Standard)   Pulse 89   Temp 97 6 °F (36 4 °C) (Temporal)   Resp 18   Ht 5' (1 524 m)   Wt 90 3 kg (199 lb)   SpO2 97%   BMI 38 86 kg/m²          Physical Exam  Constitutional:       General: She is not in acute distress  Appearance: She is well-developed  She is obese  She is not diaphoretic  HENT:      Head: Normocephalic and atraumatic  Eyes:      General:         Right eye: No discharge  Left eye: No discharge  Extraocular Movements: Extraocular movements intact  Neck:      Musculoskeletal: Normal range of motion and neck supple  Thyroid: No thyromegaly  Cardiovascular:      Rate and Rhythm: Normal rate  Pulmonary:      Effort: Pulmonary effort is normal  No respiratory distress  Breath sounds: Normal breath sounds  Abdominal:      General: Bowel sounds are decreased  Palpations: There is no mass  Tenderness: There is abdominal tenderness in the right upper quadrant  There is no guarding  Musculoskeletal: Normal range of motion  General: No tenderness or deformity  Skin:     General: Skin is warm  Coloration: Skin is not pale  Findings: No erythema or rash  Neurological:      Mental Status: She is alert and oriented to person, place, and time  Coordination: Coordination normal       Deep Tendon Reflexes: Reflexes are normal and symmetric  Psychiatric:         Behavior: Behavior normal          Thought Content:  Thought content normal          Judgment: Judgment normal

## 2020-09-21 DIAGNOSIS — I10 BENIGN ESSENTIAL HYPERTENSION: ICD-10-CM

## 2020-09-21 DIAGNOSIS — K21.9 GASTROESOPHAGEAL REFLUX DISEASE, ESOPHAGITIS PRESENCE NOT SPECIFIED: ICD-10-CM

## 2020-09-23 RX ORDER — FAMOTIDINE 20 MG/1
TABLET, FILM COATED ORAL
Qty: 180 TABLET | Refills: 0 | Status: SHIPPED | OUTPATIENT
Start: 2020-09-23 | End: 2020-12-28

## 2020-09-23 RX ORDER — HYDROCHLOROTHIAZIDE 12.5 MG/1
12.5 TABLET ORAL DAILY
Qty: 90 TABLET | Refills: 0 | Status: SHIPPED | OUTPATIENT
Start: 2020-09-23 | End: 2020-12-28

## 2020-09-23 RX ORDER — SUCRALFATE ORAL 1 G/10ML
SUSPENSION ORAL
Qty: 420 ML | Refills: 0 | Status: SHIPPED | OUTPATIENT
Start: 2020-09-23 | End: 2020-12-11 | Stop reason: SDUPTHER

## 2020-09-25 ENCOUNTER — OFFICE VISIT (OUTPATIENT)
Dept: CARDIOLOGY CLINIC | Facility: CLINIC | Age: 85
End: 2020-09-25
Payer: COMMERCIAL

## 2020-09-25 VITALS
TEMPERATURE: 99.3 F | RESPIRATION RATE: 16 BRPM | HEART RATE: 84 BPM | HEIGHT: 60 IN | WEIGHT: 196.4 LBS | DIASTOLIC BLOOD PRESSURE: 70 MMHG | SYSTOLIC BLOOD PRESSURE: 134 MMHG | BODY MASS INDEX: 38.56 KG/M2

## 2020-09-25 DIAGNOSIS — E78.2 MIXED HYPERLIPIDEMIA: ICD-10-CM

## 2020-09-25 DIAGNOSIS — I35.0 NONRHEUMATIC AORTIC VALVE STENOSIS: ICD-10-CM

## 2020-09-25 DIAGNOSIS — I10 BENIGN ESSENTIAL HYPERTENSION: Primary | ICD-10-CM

## 2020-09-25 DIAGNOSIS — J44.9 CHRONIC OBSTRUCTIVE PULMONARY DISEASE, UNSPECIFIED COPD TYPE (HCC): ICD-10-CM

## 2020-09-25 DIAGNOSIS — I50.32 CHRONIC DIASTOLIC CONGESTIVE HEART FAILURE (HCC): ICD-10-CM

## 2020-09-25 PROCEDURE — 99214 OFFICE O/P EST MOD 30 MIN: CPT | Performed by: INTERNAL MEDICINE

## 2020-09-25 NOTE — LETTER
September 25, 2020     [address] [address] MD Jazmin  [address] [facility] [address] [address]  [id number]    Patient: Satnam Myers   YOB: 1934   Date of Visit: 9/25/2020       Dear Dr Darrell Bone: Thank you for referring Caitlyn Davis to me for evaluation  Below are my notes for this consultation  If you have questions, please do not hesitate to call me  I look forward to following your patient along with you  Sincerely,        Greyson Car MD        CC: No Recipients  Greyson Car MD  9/25/2020 10:10 AM  Sign when Signing Visit                                             Cardiology Follow Up    Satnam Myers  1934  [id number]  [address] [facility] [address]  [phone]  [phone]    1  Benign essential hypertension     2  Chronic obstructive pulmonary disease, unspecified COPD type ([facility] [address] )     3  Nonrheumatic aortic valve stenosis     4  Chronic diastolic congestive heart failure ([facility] [address] )     5  Mixed hyperlipidemia         Patient was originally referred from [facility] for evaluation of an abnormal echocardiogram  Echocardiogram demonstrated normal LV systolic function with mild-to-moderate aortic stenosis  Other history includes hypertension and hyperlipidemia  02/09/2018 echocardiogram: Mild aortic stenosis, aortic valve velocity 2 7 Meters/sec  Normal LV systolic function, EF 68%  Grade 1 diastolic dysfunction  Normal PA pressures  05/30/2019 lipid profile: Total cholesterol 173, triglycerides 104, HDL 63, LDL calculated 89, non HDL cholesterol 110  10/04/2019 lipid profile: Total cholesterol 170, triglycerides 110, HDL 50, LDL calculated 98, non HDL cholesterol 120    Interval History:  Patient denies chest discomfort or shortness of breath  Her blood pressure is excellent  Her last lipid profile is acceptable        Discussion/Summary:  Return in 6 months    Patient Active Problem List   Diagnosis    Anemia  Aortic stenosis    Benign essential hypertension    Bilateral occipital neuralgia    Carpal tunnel syndrome on both sides    Glaucoma    HLD (hyperlipidemia)    Malignant neoplasm of overlapping sites of left female breast (HCC)    Cataract    Hemorrhoids    Constipation    Chronic diastolic congestive heart failure (HCC)    GERD (gastroesophageal reflux disease)    Anal or rectal pain    Osteoarthritis    Cognitive impairment    Dysuria    COPD (chronic obstructive pulmonary disease) (HCC)    Central perforation of tympanic membrane    Acute pain of right shoulder    Right rotator cuff tear arthropathy    Encounter for removal of sutures    2019 novel coronavirus disease (COVID-19)    Right upper quadrant pain     Past Medical History:   Diagnosis Date    Diverticulosis 2001    Heart failure (HCC)     History of cystocele     Hypertension     Obesity     Positive PPD 1999    treated     Social History     Socioeconomic History    Marital status:       Spouse name: Not on file    Number of children: Not on file    Years of education: Not on file    Highest education level: Not on file   Occupational History    Not on file   Social Needs    Financial resource strain: Not on file    Food insecurity     Worry: Not on file     Inability: Not on file    Transportation needs     Medical: Not on file     Non-medical: Not on file   Tobacco Use    Smoking status: Never Smoker    Smokeless tobacco: Never Used   Substance and Sexual Activity    Alcohol use: Not Currently    Drug use: Not Currently    Sexual activity: Not Currently     Partners: Male   Lifestyle    Physical activity     Days per week: Not on file     Minutes per session: Not on file    Stress: Not on file   Relationships    Social connections     Talks on phone: Not on file     Gets together: Not on file     Attends Religion service: Not on file     Active member of club or organization: Not on file Attends meetings of clubs or organizations: Not on file     Relationship status: Not on file    Intimate partner violence     Fear of current or ex partner: Not on file     Emotionally abused: Not on file     Physically abused: Not on file     Forced sexual activity: Not on file   Other Topics Concern    Not on file   Social History Narrative    Lives at home with son and 2 grandkids  Family History   Family history unknown: Yes     Past Surgical History:   Procedure Laterality Date    CARDIOVASCULAR STRESS TEST  2001    dobutamine stress test    CHOLECYSTECTOMY  04/2002    COLONOSCOPY      CYSTOCELE REPAIR      HYSTERECTOMY      KNEE SURGERY      knee replacement    UPPER GASTROINTESTINAL ENDOSCOPY         Current Outpatient Medications:     acetaminophen (TYLENOL) 650 mg CR tablet, 1 tablet as needed , Disp: , Rfl:     albuterol (PROVENTIL HFA,VENTOLIN HFA) 90 mcg/act inhaler, Inhale 2 puffs every 6 (six) hours as needed for wheezing, Disp: 1 Inhaler, Rfl: 0    albuterol (VENTOLIN HFA) 90 mcg/act inhaler, Inhale 2 puffs 4 (four) times a day, Disp: 18 g, Rfl: 4    amitriptyline (ELAVIL) 25 mg tablet, TAKE ONE TABLET BY MOUTH DAILY AT BEDTIME , Disp: , Rfl:     atorvastatin (LIPITOR) 20 mg tablet, TAKE 1 TABLET BY MOUTH ONCE DAILY  , Disp: 90 tablet, Rfl: 2    benzonatate (TESSALON PERLES) 100 mg capsule, Take 1 capsule (100 mg total) by mouth 3 (three) times a day as needed for cough, Disp: 20 capsule, Rfl: 0    Calcium Carbonate-Vitamin D (CALCIUM 500 + D) 500-125 MG-UNIT TABS, every 24 hours, Disp: , Rfl:     docusate sodium (COLACE) 100 mg capsule, Take 1 capsule (100 mg total) by mouth 2 (two) times a day, Disp: 60 capsule, Rfl: 1    Elastic Bandages & Supports (Medical Compression Stockings) MISC, by Does not apply route daily High knee compression stockings 20-30 MMHG, Disp: 2 each, Rfl: 0    famotidine (PEPCID) 20 mg tablet, TAKE 1 TABLET BY MOUTH TWICE DAILY  , Disp: 180 tablet, Rfl: 0    fluticasone (FLONASE) 50 mcg/act nasal spray, 2 sprays into each nostril daily, Disp: 1 Bottle, Rfl: 3    fluticasone-salmeterol (Advair Diskus) 500-50 mcg/dose inhaler, Inhale 1 puff 2 (two) times a day Rinse mouth after use, Disp: 60 each, Rfl: 0    furosemide (LASIX) 40 mg tablet, TAKE 1 TABLET BY MOUTH ONCE DAILY  , Disp: 90 tablet, Rfl: 3    guaiFENesin (ROBITUSSIN) 100 MG/5ML oral liquid, Take 10 mL (200 mg total) by mouth 3 (three) times a day as needed for cough, Disp: 120 mL, Rfl: 0    hydrochlorothiazide (HYDRODIURIL) 12 5 mg tablet, Take 1 tablet (12 5 mg total) by mouth daily, Disp: 90 tablet, Rfl: 0    hydrocortisone 2 5 % cream, Apply topically 4 (four) times a day as needed, Disp: , Rfl:     latanoprost (XALATAN) 0 005 % ophthalmic solution, , Disp: , Rfl:     lidocaine (XYLOCAINE) 5 % ointment, Apply topically 2 (two) times a day as needed for mild pain, Disp: 35 44 g, Rfl: 0    lisinopril (ZESTRIL) 40 mg tablet, TAKE 1 TABLET BY MOUTH ONCE DAILY  , Disp: 90 tablet, Rfl: 2    Misc  Devices (CANE) MISC, Four point aluminum cane , Disp: , Rfl:     senna-docusate sodium (SENOKOT-S) 8 6-50 mg per tablet, Take 1 tablet by mouth daily, Disp: 30 tablet, Rfl: 1    sucralfate (Carafate) 1 g/10 mL suspension, TAKE 10 ML BY MOUTH 4 TIMES A DAY, Disp: 420 mL, Rfl: 0    timolol (BETIMOL) 0 5 % ophthalmic solution, Administer 1 drop to both eyes 2 (two) times a day , Disp: , Rfl:     timolol (TIMOPTIC) 0 5 % ophthalmic solution, , Disp: , Rfl:     TOVIAZ 8 MG TB24, TAKE 1 TABLET BY MOUTH ONCE DAILY  , Disp: 90 tablet, Rfl: 2    fluticasone (FLONASE) 50 mcg/act nasal spray, 1 spray into each nostril 2 (two) times a day, Disp: 1 Bottle, Rfl: 5  No Known Allergies    No results found for this visit on 09/25/20  Review of Systems:  Review of Systems   Respiratory: Negative for cough, choking, chest tightness, shortness of breath and wheezing      Cardiovascular: Negative for chest pain, palpitations and leg swelling  Musculoskeletal: Positive for gait problem  Skin: Negative for rash  Neurological: Negative for dizziness, tremors, syncope, weakness, light-headedness, numbness and headaches  Psychiatric/Behavioral: Negative for agitation and behavioral problems  The patient is not hyperactive  Physical Exam:  /70   Pulse 84   Temp 99 3 °F (37 4 °C)   Resp 16   Ht 5' (1 524 m)   Wt 89 1 kg (196 lb 6 4 oz)   BMI 38 36 kg/m²   Physical Exam  Constitutional:       General: She is not in acute distress  Appearance: She is well-developed  HENT:      Head: Normocephalic and atraumatic  Neck:      Thyroid: No thyromegaly  Vascular: No carotid bruit or JVD  Cardiovascular:      Rate and Rhythm: Normal rate and regular rhythm  Heart sounds: Normal heart sounds  No murmur  No friction rub  No gallop  Pulmonary:      Effort: No respiratory distress  Breath sounds: No wheezing or rales  Abdominal:      General: There is no distension  Tenderness: There is no abdominal tenderness  Musculoskeletal:      Right lower leg: No edema  Left lower leg: No edema  Skin:     General: Skin is warm and dry  Neurological:      Mental Status: She is alert and oriented to person, place, and time     Psychiatric:         Behavior: Behavior normal          --------------------------------------------------------------------------------  ECHO:   Results for orders placed during the hospital encounter of 10/03/19   Echo complete with contrast if indicated    [address]    Transthoracic Echocardiogram  2D, M-mode, Doppler, and Color Doppler    Study date:  04-Oct-2019    Patient: Julia Cash  MR number: [id number]  Account number: [de-identified]  : 1934  Age: 80 years  Gender: Female  Status: Outpatient  Location: [facility]  Height: 59 in  Weight: 202 6 lb  BP: 124/ 78 mmHg    Indications: Chest pain    Diagnoses: R07 9 - Chest pain, unspecified    Sonographer:  Tariq Orlando RDCS  Primary Physician:  Herminia Abrams MD  Referring Physician:  Herminia Abrams MD  [facility]  [facility]  Interpreting Physician:  Keyur Cruz MD    IMPRESSIONS:  1  Mild concentric left ventricular hypertrophy, normal ventricular systolic function, grade 1 diastolic dysfunction  EF around 60%  2  No other significant chamber hypertrophy enlargement  3  Aortic valve sclerosis and calcification with mild aortic valve stenosis and mild-to-moderate aortic valve regurgitation  4  Mitral annular calcification, trace mitral valve regurgitation  5  Trace to mild tricuspid valve regurgitation  6  Mild pulmonary hypertension  Estimated RVSP/PASP is 37 mmHg  7  No pericardial effusion  Compared to report of previous echocardiogram from December 6, 2018 there is overall no significant change  SUMMARY    LEFT VENTRICLE:  Normal left ventricular cavity size, mildly increased wall thickness, normal left ventricular systolic function, no regional wall motion abnormality  Ejection fraction is estimated as around 60%  Grade 1 diastolic dysfunction  Normal  estimated left atrial pressure  RIGHT VENTRICLE:  Normal right ventricular size and systolic function  Estimated right ventricular systolic pressure is mildly increased, 37 mmHg  Mild pulmonary hypertension  LEFT ATRIUM:  Normal left atrial cavity size  Intact interatrial septum  RIGHT ATRIUM:  Normal right atrial cavity size  MITRAL VALVE:  Mild mitral annular calcification and leaflet sclerosis, adequate leaflet mobility  Trace mitral valve regurgitation  AORTIC VALVE:  Tricuspid aortic valve with moderate sclerosis and focal calcification, most pronounced on the right coronary cusp  There is mild aortic valve stenosis   Peak transaortic velocity is 2 5 m/sec, mean gradient is 17 mmHg and calculated aortic  valve area is 1 0 1 cm2  Mild to moderate aortic valve regurgitation  TRICUSPID VALVE:  Mild tricuspid valve regurgitation  PULMONIC VALVE:  No pulmonic valve regurgitation  AORTA:  Aortic root and proximal ascending aorta are normal in size on 2D imaging  IVC, HEPATIC VEINS:  Inferior vena cava is normal in size and demonstrates appropriate respiratory phasic changes in diameter  PERICARDIUM:  No pericardial effusion  HISTORY: PRIOR HISTORY: Heart failure Risk factors: hypertension and morbid obesity  PROCEDURE: The study was performed in the [address] [address]  This was a routine study  The transthoracic approach was used  The study included complete 2D imaging, M-mode, complete spectral Doppler, and color Doppler  The heart rate was 68  bpm, at the start of the study  Image quality was adequate  LEFT VENTRICLE: Normal left ventricular cavity size, mildly increased wall thickness, normal left ventricular systolic function, no regional wall motion abnormality  Ejection fraction is estimated as around 60%  Grade 1 diastolic  dysfunction  Normal estimated left atrial pressure  RIGHT VENTRICLE: Normal right ventricular size and systolic function  Estimated right ventricular systolic pressure is mildly increased, 37 mmHg  Mild pulmonary hypertension  LEFT ATRIUM: Normal left atrial cavity size  Intact interatrial septum  RIGHT ATRIUM: Normal right atrial cavity size  MITRAL VALVE: Mild mitral annular calcification and leaflet sclerosis, adequate leaflet mobility  Trace mitral valve regurgitation  AORTIC VALVE: Tricuspid aortic valve with moderate sclerosis and focal calcification, most pronounced on the right coronary cusp  There is mild aortic valve stenosis  Peak transaortic velocity is 2 5 m/sec, mean gradient is 17 mmHg and  calculated aortic valve area is 1 0 1 cm2  Mild to moderate aortic valve regurgitation  TRICUSPID VALVE: Mild tricuspid valve regurgitation      PULMONIC VALVE: No pulmonic valve regurgitation  PERICARDIUM: No pericardial effusion  AORTA: Aortic root and proximal ascending aorta are normal in size on 2D imaging  SYSTEMIC VEINS: IVC: Inferior vena cava is normal in size and demonstrates appropriate respiratory phasic changes in diameter  SYSTEM MEASUREMENT TABLES    2D  %FS: 31 49 %  Ao Diam: 2 31 cm  EDV(Teich): 81 57 ml  EF(Teich): 59 73 %  ESV(Teich): 32 85 ml  IVSd: 1 1 cm  LA Area: 14 81 cm2  LA Diam: 3 08 cm  LVEDV MOD A4C: 143 29 ml  LVEF MOD A4C: 66 36 %  LVESV MOD A4C: 48 2 ml  LVIDd: 4 27 cm  LVIDs: 2 92 cm  LVLd A4C: 8 32 cm  LVLs A4C: 6 61 cm  LVOT Diam: 1 97 cm  LVPWd: 1 1 cm  RA Area: 16 11 cm2  RVIDd: 3 23 cm  SV MOD A4C: 95 08 ml  SV(Teich): 48 72 ml    CW  AV Env  Ti: 288 35 ms  AV VTI: 57 24 cm  AV Vmax: 2 53 m/s  AV Vmean: 1 98 m/s  AV maxP 6 mmHg  AV meanP 96 mmHg  TR Vmax: 2 58 m/s  TR maxP 53 mmHg    MM  TAPSE: 2 69 cm    PW  HEIDI (VTI): 1 13 cm2  HEIDI Vmax: 0 95 cm2  AVAI (VTI): 0 cm2/m2  AVAI Vmax: 0 cm2/m2  E': 0 05 m/s  E/E': 12 33  LVOT Env  Ti: 364 48 ms  LVOT VTI: 21 25 cm  LVOT Vmax: 0 79 m/s  LVOT Vmean: 0 58 m/s  LVOT maxP 5 mmHg  LVOT meanP 54 mmHg  LVSI Dopp: 34 75 ml/m2  LVSV Dopp: 64 64 ml  MV A Norm: 1 04 m/s  MV Dec [place]: 2 38 m/s2  MV DecT: 277 81 ms  MV E Norm: 0 66 m/s  MV E/A Ratio: 0 64  MV PHT: 80 57 ms  MVA By PHT: 2 73 cm2    [facility]    Prepared and electronically signed by    Grace Johnston MD  Signed 04-Oct-2019 11:58:29         ======================================================    Lab Results   Component Value Date    WBC 4 80 2020    HGB 11 7 (L) 2020    HCT 35 1 (L) 2020    MCV 92 2020     2020      Lab Results   Component Value Date    SODIUM 138 2020    K 4 4 2020     2020    CO2 29 2020    BUN 26 (H) 2020    CREATININE 0 79 2020    GLUC 101 (H) 10/04/2019 CALCIUM 9 4 09/18/2020      No results found for: HGBA1C   Lab Results   Component Value Date    CHOL 160 05/11/2018     Lab Results   Component Value Date    HDL 50 10/04/2019    HDL 63 (H) 05/30/2019    HDL 65 (H) 09/12/2018     Lab Results   Component Value Date    LDLCALC 98 10/04/2019    LDLCALC 89 05/30/2019    LDLCALC 98 09/12/2018     Lab Results   Component Value Date    TRIG 110 10/04/2019    TRIG 104 05/30/2019    TRIG 123 09/12/2018     No results found for: CHOLHDL   No results found for: INR, PROTIME     Imaging:   I have personally reviewed pertinent reports  Portions of the record may have been created with voice recognition software  Occasional wrong word or "sound a like" substitutions may have occurred due to the inherent limitations of voice recognition software  Read the chart carefully and recognize, using context, where substitutions have occurred

## 2020-09-25 NOTE — PROGRESS NOTES
Cardiology Follow Up    Layton Zurita  1934  [id number]  [address] [facility] [id number] [phone]    1  Benign essential hypertension     2  Chronic obstructive pulmonary disease, unspecified COPD type ([facility] [address] )     3  Nonrheumatic aortic valve stenosis     4  Chronic diastolic congestive heart failure ([facility] [address] )     5  Mixed hyperlipidemia         Patient was originally referred from [facility] for evaluation of an abnormal echocardiogram  Echocardiogram demonstrated normal LV systolic function with mild-to-moderate aortic stenosis  Other history includes hypertension and hyperlipidemia  02/09/2018 echocardiogram: Mild aortic stenosis, aortic valve velocity 2 7 Meters/sec  Normal LV systolic function, EF 05%  Grade 1 diastolic dysfunction  Normal PA pressures  05/30/2019 lipid profile: Total cholesterol 173, triglycerides 104, HDL 63, LDL calculated 89, non HDL cholesterol 110  10/04/2019 lipid profile: Total cholesterol 170, triglycerides 110, HDL 50, LDL calculated 98, non HDL cholesterol 120    Interval History:  Patient denies chest discomfort or shortness of breath  Her blood pressure is excellent  Her last lipid profile is acceptable        Discussion/Summary:  Return in 6 months    Patient Active Problem List   Diagnosis    Anemia    Aortic stenosis    Benign essential hypertension    Bilateral occipital neuralgia    Carpal tunnel syndrome on both sides    Glaucoma    HLD (hyperlipidemia)    Malignant neoplasm of overlapping sites of left female breast ([facility] [address] [facility]    Cataract    Hemorrhoids    Constipation    Chronic diastolic congestive heart failure (HCC)    GERD (gastroesophageal reflux disease)    Anal or rectal pain    Osteoarthritis    Cognitive impairment    Dysuria    COPD (chronic obstructive pulmonary disease) ([facility] [address] [facility]    Central perforation of tympanic membrane  Acute pain of right shoulder    Right rotator cuff tear arthropathy    Encounter for removal of sutures    2019 novel coronavirus disease (COVID-19)    Right upper quadrant pain     Past Medical History:   Diagnosis Date    Diverticulosis 2001    [facility] failure ([facility] [address] [facility]     History of cystocele     Hypertension     Obesity     Positive PPD 1999    treated     Social History     Socioeconomic History    Marital status:      Spouse name: Not on file    Number of children: Not on file    Years of education: Not on file    Highest education level: Not on file   Occupational History    Not on file   Social Needs    Financial resource strain: Not on file    Food insecurity     Worry: Not on file     Inability: Not on file    Transportation needs     Medical: Not on file     Non-medical: Not on file   Tobacco Use    Smoking status: Never Smoker    Smokeless tobacco: Never Used   Substance and Sexual Activity    Alcohol use: Not Currently    Drug use: Not Currently    Sexual activity: Not Currently     Partners: Male   Lifestyle    Physical activity     Days per week: Not on file     Minutes per session: Not on file    Stress: Not on file   Relationships    Social connections     Talks on phone: Not on file     Gets together: Not on file     Attends Gnosticism service: Not on file     Active member of club or organization: Not on file     Attends meetings of clubs or organizations: Not on file     Relationship status: Not on file    Intimate partner violence     Fear of current or ex partner: Not on file     Emotionally abused: Not on file     Physically abused: Not on file     Forced sexual activity: Not on file   Other Topics Concern    Not on file   Social History Narrative    Lives at home with son and 2 grandkids        Family History   Family history unknown: Yes     Past Surgical History:   Procedure Laterality Date    CARDIOVASCULAR STRESS TEST  2001    dobutamine stress test   [address] [place] [address] CHOLECYSTECTOMY  04/2002    COLONOSCOPY      CYSTOCELE REPAIR      HYSTERECTOMY      KNEE SURGERY      knee replacement    UPPER GASTROINTESTINAL ENDOSCOPY         Current Outpatient Medications:     acetaminophen (TYLENOL) 650 mg CR tablet, 1 tablet as needed , Disp: , Rfl:     albuterol (PROVENTIL HFA,VENTOLIN HFA) 90 mcg/act inhaler, Inhale 2 puffs every 6 (six) hours as needed for wheezing, Disp: 1 Inhaler, Rfl: 0    albuterol (VENTOLIN HFA) 90 mcg/act inhaler, Inhale 2 puffs 4 (four) times a day, Disp: 18 g, Rfl: 4    amitriptyline (ELAVIL) 25 mg tablet, TAKE ONE TABLET BY MOUTH DAILY AT BEDTIME , Disp: , Rfl:     atorvastatin (LIPITOR) 20 mg tablet, TAKE 1 TABLET BY MOUTH ONCE DAILY  , Disp: 90 tablet, Rfl: 2    benzonatate (TESSALON PERLES) 100 mg capsule, Take 1 capsule (100 mg total) by mouth 3 (three) times a day as needed for cough, Disp: 20 capsule, Rfl: 0    Calcium Carbonate-Vitamin D (CALCIUM 500 + D) 500-125 MG-UNIT TABS, every 24 hours, Disp: , Rfl:     docusate sodium (COLACE) 100 mg capsule, Take 1 capsule (100 mg total) by mouth 2 (two) times a day, Disp: 60 capsule, Rfl: 1    Elastic Bandages & Supports (Medical Compression Stockings) MISC, by Does not apply route daily High knee compression stockings 20-30 MMHG, Disp: 2 each, Rfl: 0    famotidine (PEPCID) 20 mg tablet, TAKE 1 TABLET BY MOUTH TWICE DAILY  , Disp: 180 tablet, Rfl: 0    fluticasone (FLONASE) 50 mcg/act nasal spray, 2 sprays into each nostril daily, Disp: 1 Bottle, Rfl: 3    fluticasone-salmeterol (Advair Diskus) 500-50 mcg/dose inhaler, Inhale 1 puff 2 (two) times a day Rinse mouth after use, Disp: 60 each, Rfl: 0    furosemide (LASIX) 40 mg tablet, TAKE 1 TABLET BY MOUTH ONCE DAILY  , Disp: 90 tablet, Rfl: 3    guaiFENesin (ROBITUSSIN) 100 MG/5ML oral liquid, Take 10 mL (200 mg total) by mouth 3 (three) times a day as needed for cough, Disp: 120 mL, Rfl: 0    hydrochlorothiazide (HYDRODIURIL) 12 5 mg tablet, Take 1 tablet (12 5 mg total) by mouth daily, Disp: 90 tablet, Rfl: 0    hydrocortisone 2 5 % cream, Apply topically 4 (four) times a day as needed, Disp: , Rfl:     latanoprost (XALATAN) 0 005 % ophthalmic solution, , Disp: , Rfl:     lidocaine (XYLOCAINE) 5 % ointment, Apply topically 2 (two) times a day as needed for mild pain, Disp: 35 44 g, Rfl: 0    lisinopril (ZESTRIL) 40 mg tablet, TAKE 1 TABLET BY MOUTH ONCE DAILY  , Disp: 90 tablet, Rfl: 2    Misc  Devices (CANE) MISC, Four point aluminum cane , Disp: , Rfl:     senna-docusate sodium (SENOKOT-S) 8 6-50 mg per tablet, Take 1 tablet by mouth daily, Disp: 30 tablet, Rfl: 1    sucralfate (Carafate) 1 g/10 mL suspension, TAKE 10 ML BY MOUTH 4 TIMES A DAY, Disp: 420 mL, Rfl: 0    timolol (BETIMOL) 0 5 % ophthalmic solution, Administer 1 drop to both eyes 2 (two) times a day , Disp: , Rfl:     timolol (TIMOPTIC) 0 5 % ophthalmic solution, , Disp: , Rfl:     TOVIAZ 8 MG TB24, TAKE 1 TABLET BY MOUTH ONCE DAILY  , Disp: 90 tablet, Rfl: 2    fluticasone (FLONASE) 50 mcg/act nasal spray, 1 spray into each nostril 2 (two) times a day, Disp: 1 Bottle, Rfl: 5  No Known Allergies    No results found for this visit on 09/25/20  Review of Systems:  Review of Systems   Respiratory: Negative for cough, choking, chest tightness, shortness of breath and wheezing  Cardiovascular: Negative for chest pain, palpitations and leg swelling  Musculoskeletal: Positive for gait problem  Skin: Negative for rash  Neurological: Negative for dizziness, tremors, syncope, weakness, light-headedness, numbness and headaches  Psychiatric/Behavioral: Negative for agitation and behavioral problems  The patient is not hyperactive  Physical Exam:  /70   Pulse 84   Temp 99 3 °F (37 4 °C)   Resp 16   Ht 5' (1 524 m)   Wt 89 1 kg (196 lb 6 4 oz)   BMI 38 36 kg/m²   Physical Exam  Constitutional:       General: She is not in acute distress       Appearance: She is well-developed  HENT:      Head: Normocephalic and atraumatic  Neck:      Thyroid: No thyromegaly  Vascular: No carotid bruit or JVD  Cardiovascular:      Rate and Rhythm: Normal rate and regular rhythm  Heart sounds: Normal heart sounds  No murmur  No friction rub  No gallop  Pulmonary:      Effort: No respiratory distress  Breath sounds: No wheezing or rales  Abdominal:      General: There is no distension  Tenderness: There is no abdominal tenderness  Musculoskeletal:      Right lower leg: No edema  Left lower leg: No edema  Skin:     General: Skin is warm and dry  Neurological:      Mental Status: She is alert and oriented to person, place, and time  Psychiatric:         Behavior: Behavior normal          --------------------------------------------------------------------------------  ECHO:   Results for orders placed during the hospital encounter of 10/03/19   Echo complete with contrast if indicated    [place] [address] [employer] [address]    Transthoracic Echocardiogram  2D, M-mode, Doppler, and Color Doppler    Study date:  04-Oct-2019    Patient: Parth Eugene  MR number: [id number]  Account number: [de-identified]  : 1934  Age: 80 years  Gender: Female  Status: Outpatient  Location: [facility]  Height: 59 in  Weight: 202 6 lb  BP: 124/ 78 mmHg    Indications: Chest pain    Diagnoses: R07 9 - Chest pain, unspecified    Sonographer:  Rah Gonzalez RDCS  Primary Physician:  Marion Gallo MD  Referring Physician:  Marion Gallo MD  Group:  [facility]  Interpreting Physician:  Soraida Gold MD    IMPRESSIONS:  1  Mild concentric left ventricular hypertrophy, normal ventricular systolic function, grade 1 diastolic dysfunction  EF around 60%  2  No other significant chamber hypertrophy enlargement    3  Aortic valve sclerosis and calcification with mild aortic valve stenosis and mild-to-moderate aortic valve regurgitation  4  Mitral annular calcification, trace mitral valve regurgitation  5  Trace to mild tricuspid valve regurgitation  6  Mild pulmonary hypertension  Estimated RVSP/PASP is 37 mmHg  7  No pericardial effusion  Compared to report of previous echocardiogram from December 6, 2018 there is overall no significant change  SUMMARY    LEFT VENTRICLE:  Normal left ventricular cavity size, mildly increased wall thickness, normal left ventricular systolic function, no regional wall motion abnormality  Ejection fraction is estimated as around 60%  Grade 1 diastolic dysfunction  Normal  estimated left atrial pressure  RIGHT VENTRICLE:  Normal right ventricular size and systolic function  Estimated right ventricular systolic pressure is mildly increased, 37 mmHg  Mild pulmonary hypertension  LEFT ATRIUM:  Normal left atrial cavity size  Intact interatrial septum  RIGHT ATRIUM:  Normal right atrial cavity size  MITRAL VALVE:  Mild mitral annular calcification and leaflet sclerosis, adequate leaflet mobility  Trace mitral valve regurgitation  AORTIC VALVE:  Tricuspid aortic valve with moderate sclerosis and focal calcification, most pronounced on the right coronary cusp  There is mild aortic valve stenosis  Peak transaortic velocity is 2 5 m/sec, mean gradient is 17 mmHg and calculated aortic  valve area is 1 0 1 cm2  Mild to moderate aortic valve regurgitation  TRICUSPID VALVE:  Mild tricuspid valve regurgitation  PULMONIC VALVE:  No pulmonic valve regurgitation  AORTA:  Aortic root and proximal ascending aorta are normal in size on 2D imaging  IVC, HEPATIC VEINS:  Inferior vena cava is normal in size and demonstrates appropriate respiratory phasic changes in diameter  PERICARDIUM:  No pericardial effusion  HISTORY: PRIOR HISTORY: Heart failure Risk factors: hypertension and morbid obesity      PROCEDURE: The study was performed in the [facility]  This was a routine study  The transthoracic approach was used  The study included complete 2D imaging, M-mode, complete spectral Doppler, and color Doppler  The heart rate was 68  bpm, at the start of the study  Image quality was adequate  LEFT VENTRICLE: Normal left ventricular cavity size, mildly increased wall thickness, normal left ventricular systolic function, no regional wall motion abnormality  Ejection fraction is estimated as around 60%  Grade 1 diastolic  dysfunction  Normal estimated left atrial pressure  RIGHT VENTRICLE: Normal right ventricular size and systolic function  Estimated right ventricular systolic pressure is mildly increased, 37 mmHg  Mild pulmonary hypertension  LEFT ATRIUM: Normal left atrial cavity size  Intact interatrial septum  RIGHT ATRIUM: Normal right atrial cavity size  MITRAL VALVE: Mild mitral annular calcification and leaflet sclerosis, adequate leaflet mobility  Trace mitral valve regurgitation  AORTIC VALVE: Tricuspid aortic valve with moderate sclerosis and focal calcification, most pronounced on the right coronary cusp  There is mild aortic valve stenosis  Peak transaortic velocity is 2 5 m/sec, mean gradient is 17 mmHg and  calculated aortic valve area is 1 0 1 cm2  Mild to moderate aortic valve regurgitation  TRICUSPID VALVE: Mild tricuspid valve regurgitation  PULMONIC VALVE: No pulmonic valve regurgitation  PERICARDIUM: No pericardial effusion  AORTA: Aortic root and proximal ascending aorta are normal in size on 2D imaging  SYSTEMIC VEINS: IVC: Inferior vena cava is normal in size and demonstrates appropriate respiratory phasic changes in diameter      SYSTEM MEASUREMENT TABLES    2D  %FS: 31 49 %  Ao Diam: 2 31 cm  EDV(Teich): 81 57 ml  EF(Teich): 59 73 %  ESV(Teich): 32 85 ml  IVSd: 1 1 cm  LA Area: 14 81 cm2  LA Diam: 3 08 cm  LVEDV MOD A4C: 143 29 ml  LVEF MOD A4C: 66 36 %  LVESV MOD A4C: 48 2 ml  LVIDd: 4 27 cm  LVIDs: 2 92 cm  LVLd A4C: 8 32 cm  LVLs A4C: 6 61 cm  LVOT Diam: 1 97 cm  LVPWd: 1 1 cm  RA Area: 16 11 cm2  RVIDd: 3 23 cm  SV MOD A4C: 95 08 ml  SV(Teich): 48 72 ml    CW  AV Env  Ti: 288 35 ms  AV VTI: 57 24 cm  AV Vmax: 2 53 m/s  AV Vmean: 1 98 m/s  AV maxP 6 mmHg  AV meanP 96 mmHg  TR Vmax: 2 58 m/s  TR maxP 53 mmHg    MM  TAPSE: 2 69 cm    PW  HEIDI (VTI): 1 13 cm2  HEIDI Vmax: 0 95 cm2  AVAI (VTI): 0 cm2/m2  AVAI Vmax: 0 cm2/m2  E': 0 05 m/s  E/E': 12 33  LVOT Env  Ti: 364 48 ms  LVOT VTI: 21 25 cm  LVOT Vmax: 0 79 m/s  LVOT Vmean: 0 58 m/s  LVOT maxP 5 mmHg  LVOT meanP 54 mmHg  LVSI Dopp: 34 75 ml/m2  LVSV Dopp: 64 64 ml  MV A Norm: 1 04 m/s  MV Dec Dougherty: 2 38 m/s2  MV DecT: 277 81 ms  MV E Norm: 0 66 m/s  MV E/A Ratio: 0 64  MV PHT: 80 57 ms  MVA By PHT: 2 73 cm2    [facility]redited [facility]    Prepared and electronically signed by    Landen Escalera MD  Signed 04-Oct-2019 11:58:29         ======================================================    Lab Results   Component Value Date    WBC 4 80 2020    HGB 11 7 (L) 2020    HCT 35 1 (L) 2020    MCV 92 2020     2020      Lab Results   Component Value Date    SODIUM 138 2020    K 4 4 2020     2020    CO2 29 2020    BUN 26 (H) 2020    CREATININE 0 79 2020    GLUC 101 (H) 10/04/2019    CALCIUM 9 4 2020      No results found for: HGBA1C   Lab Results   Component Value Date    CHOL 160 2018     Lab Results   Component Value Date    HDL 50 10/04/2019    HDL 63 (H) 2019    HDL 65 (H) 2018     Lab Results   Component Value Date    LDLCALC 98 10/04/2019    LDLCALC 89 2019    LDLCALC 98 2018     Lab Results   Component Value Date    TRIG 110 10/04/2019    TRIG 104 2019    TRIG 123 2018     No results found for: CHOLHDL   No results found for: INR, PROTIME     Imaging:   I have personally reviewed pertinent reports  Portions of the record may have been created with voice recognition software  Occasional wrong word or "sound a like" substitutions may have occurred due to the inherent limitations of voice recognition software  Read the chart carefully and recognize, using context, where substitutions have occurred

## 2020-09-29 ENCOUNTER — TELEPHONE (OUTPATIENT)
Dept: FAMILY MEDICINE CLINIC | Facility: CLINIC | Age: 85
End: 2020-09-29

## 2020-09-29 RX ORDER — FUROSEMIDE 40 MG/1
TABLET ORAL
Qty: 90 TABLET | Refills: 0 | Status: SHIPPED | OUTPATIENT
Start: 2020-09-29 | End: 2020-12-28

## 2020-09-29 NOTE — TELEPHONE ENCOUNTER
[facility] called stating patient has been out of medication for a few days already  She needs to take her lasix 40 mg or she will fill up with fluid  Please advice   is out rotation

## 2020-09-29 NOTE — TELEPHONE ENCOUNTER
Son, Lou Toussaint) stopped in to ask if patient needs an appointment in order to refill furosemide (LASIX) 40 mg tablet    Please advice, if not, correct pharmacy on file

## 2020-10-13 ENCOUNTER — TELEPHONE (OUTPATIENT)
Dept: FAMILY MEDICINE CLINIC | Facility: CLINIC | Age: 85
End: 2020-10-13

## 2020-10-19 ENCOUNTER — CLINICAL SUPPORT (OUTPATIENT)
Dept: URGENT CARE | Facility: MEDICAL CENTER | Age: 85
End: 2020-10-19
Payer: COMMERCIAL

## 2020-10-19 ENCOUNTER — TELEPHONE (OUTPATIENT)
Dept: FAMILY MEDICINE CLINIC | Facility: CLINIC | Age: 85
End: 2020-10-19

## 2020-10-19 VITALS
SYSTOLIC BLOOD PRESSURE: 123 MMHG | RESPIRATION RATE: 20 BRPM | BODY MASS INDEX: 38.28 KG/M2 | HEART RATE: 102 BPM | WEIGHT: 196 LBS | OXYGEN SATURATION: 97 % | DIASTOLIC BLOOD PRESSURE: 62 MMHG | TEMPERATURE: 98 F

## 2020-10-19 DIAGNOSIS — Z23 NEED FOR IMMUNIZATION AGAINST INFLUENZA: Primary | ICD-10-CM

## 2020-10-19 PROCEDURE — 90471 IMMUNIZATION ADMIN: CPT

## 2020-10-19 PROCEDURE — 90662 IIV NO PRSV INCREASED AG IM: CPT

## 2020-10-20 ENCOUNTER — TELEPHONE (OUTPATIENT)
Dept: FAMILY MEDICINE CLINIC | Facility: CLINIC | Age: 85
End: 2020-10-20

## 2020-10-22 ENCOUNTER — OFFICE VISIT (OUTPATIENT)
Dept: OBGYN CLINIC | Facility: MEDICAL CENTER | Age: 85
End: 2020-10-22
Payer: COMMERCIAL

## 2020-10-22 VITALS
SYSTOLIC BLOOD PRESSURE: 148 MMHG | WEIGHT: 196 LBS | DIASTOLIC BLOOD PRESSURE: 90 MMHG | TEMPERATURE: 98.2 F | BODY MASS INDEX: 38.48 KG/M2 | HEIGHT: 60 IN | HEART RATE: 88 BPM

## 2020-10-22 DIAGNOSIS — M12.811 RIGHT ROTATOR CUFF TEAR ARTHROPATHY: Primary | ICD-10-CM

## 2020-10-22 DIAGNOSIS — M75.101 RIGHT ROTATOR CUFF TEAR ARTHROPATHY: Primary | ICD-10-CM

## 2020-10-22 PROCEDURE — 1036F TOBACCO NON-USER: CPT | Performed by: ORTHOPAEDIC SURGERY

## 2020-10-22 PROCEDURE — 99213 OFFICE O/P EST LOW 20 MIN: CPT | Performed by: ORTHOPAEDIC SURGERY

## 2020-10-22 PROCEDURE — 1160F RVW MEDS BY RX/DR IN RCRD: CPT | Performed by: ORTHOPAEDIC SURGERY

## 2020-10-22 PROCEDURE — 20610 DRAIN/INJ JOINT/BURSA W/O US: CPT | Performed by: ORTHOPAEDIC SURGERY

## 2020-10-22 PROCEDURE — 3080F DIAST BP >= 90 MM HG: CPT | Performed by: ORTHOPAEDIC SURGERY

## 2020-10-22 RX ORDER — LIDOCAINE HYDROCHLORIDE 10 MG/ML
3 INJECTION, SOLUTION INFILTRATION; PERINEURAL
Status: COMPLETED | OUTPATIENT
Start: 2020-10-22 | End: 2020-10-22

## 2020-10-22 RX ORDER — METHYLPREDNISOLONE ACETATE 40 MG/ML
1 INJECTION, SUSPENSION INTRA-ARTICULAR; INTRALESIONAL; INTRAMUSCULAR; SOFT TISSUE
Status: COMPLETED | OUTPATIENT
Start: 2020-10-22 | End: 2020-10-22

## 2020-10-22 RX ADMIN — METHYLPREDNISOLONE ACETATE 1 ML: 40 INJECTION, SUSPENSION INTRA-ARTICULAR; INTRALESIONAL; INTRAMUSCULAR; SOFT TISSUE at 12:13

## 2020-10-22 RX ADMIN — LIDOCAINE HYDROCHLORIDE 3 ML: 10 INJECTION, SOLUTION INFILTRATION; PERINEURAL at 12:13

## 2020-12-11 ENCOUNTER — OFFICE VISIT (OUTPATIENT)
Dept: FAMILY MEDICINE CLINIC | Facility: CLINIC | Age: 85
End: 2020-12-11

## 2020-12-11 VITALS
BODY MASS INDEX: 39.82 KG/M2 | HEIGHT: 60 IN | TEMPERATURE: 97.3 F | HEART RATE: 87 BPM | RESPIRATION RATE: 18 BRPM | DIASTOLIC BLOOD PRESSURE: 76 MMHG | OXYGEN SATURATION: 97 % | SYSTOLIC BLOOD PRESSURE: 122 MMHG | WEIGHT: 202.8 LBS

## 2020-12-11 DIAGNOSIS — K59.09 OTHER CONSTIPATION: ICD-10-CM

## 2020-12-11 DIAGNOSIS — I10 BENIGN ESSENTIAL HYPERTENSION: ICD-10-CM

## 2020-12-11 DIAGNOSIS — J44.9 COPD (CHRONIC OBSTRUCTIVE PULMONARY DISEASE) (HCC): ICD-10-CM

## 2020-12-11 DIAGNOSIS — K21.9 GASTROESOPHAGEAL REFLUX DISEASE: ICD-10-CM

## 2020-12-11 DIAGNOSIS — R10.11 RIGHT UPPER QUADRANT PAIN: ICD-10-CM

## 2020-12-11 PROCEDURE — 1160F RVW MEDS BY RX/DR IN RCRD: CPT | Performed by: FAMILY MEDICINE

## 2020-12-11 PROCEDURE — 99213 OFFICE O/P EST LOW 20 MIN: CPT | Performed by: FAMILY MEDICINE

## 2020-12-11 RX ORDER — SUCRALFATE ORAL 1 G/10ML
SUSPENSION ORAL
Qty: 420 ML | Refills: 3 | Status: SHIPPED | OUTPATIENT
Start: 2020-12-11 | End: 2021-04-26

## 2020-12-11 RX ORDER — SENNA AND DOCUSATE SODIUM 50; 8.6 MG/1; MG/1
1 TABLET, FILM COATED ORAL DAILY
Qty: 30 TABLET | Refills: 1 | Status: SHIPPED | OUTPATIENT
Start: 2020-12-11 | End: 2021-03-26

## 2020-12-28 DIAGNOSIS — I10 BENIGN ESSENTIAL HYPERTENSION: ICD-10-CM

## 2020-12-28 DIAGNOSIS — K21.9 GASTROESOPHAGEAL REFLUX DISEASE: ICD-10-CM

## 2020-12-28 DIAGNOSIS — I50.9 CHF (CONGESTIVE HEART FAILURE) (HCC): ICD-10-CM

## 2020-12-28 RX ORDER — FAMOTIDINE 40 MG/1
TABLET, FILM COATED ORAL
Qty: 90 TABLET | Refills: 0 | OUTPATIENT
Start: 2020-12-28

## 2020-12-28 RX ORDER — HYDROCHLOROTHIAZIDE 12.5 MG/1
TABLET ORAL
Qty: 90 TABLET | Refills: 0 | Status: SHIPPED | OUTPATIENT
Start: 2020-12-28 | End: 2021-03-26

## 2020-12-28 RX ORDER — FAMOTIDINE 20 MG/1
TABLET, FILM COATED ORAL
Qty: 180 TABLET | Refills: 0 | Status: SHIPPED | OUTPATIENT
Start: 2020-12-28 | End: 2021-03-26

## 2020-12-28 RX ORDER — FUROSEMIDE 40 MG/1
TABLET ORAL
Qty: 90 TABLET | Refills: 0 | Status: SHIPPED | OUTPATIENT
Start: 2020-12-28 | End: 2021-03-26 | Stop reason: SDUPTHER

## 2021-01-28 ENCOUNTER — OFFICE VISIT (OUTPATIENT)
Dept: OBGYN CLINIC | Facility: MEDICAL CENTER | Age: 86
End: 2021-01-28
Payer: COMMERCIAL

## 2021-01-28 VITALS
WEIGHT: 202 LBS | BODY MASS INDEX: 39.45 KG/M2 | SYSTOLIC BLOOD PRESSURE: 144 MMHG | HEART RATE: 98 BPM | DIASTOLIC BLOOD PRESSURE: 70 MMHG

## 2021-01-28 DIAGNOSIS — M12.811 RIGHT ROTATOR CUFF TEAR ARTHROPATHY: Primary | ICD-10-CM

## 2021-01-28 DIAGNOSIS — M75.101 RIGHT ROTATOR CUFF TEAR ARTHROPATHY: Primary | ICD-10-CM

## 2021-01-28 PROCEDURE — 20610 DRAIN/INJ JOINT/BURSA W/O US: CPT | Performed by: ORTHOPAEDIC SURGERY

## 2021-01-28 PROCEDURE — 1036F TOBACCO NON-USER: CPT | Performed by: ORTHOPAEDIC SURGERY

## 2021-01-28 PROCEDURE — 1160F RVW MEDS BY RX/DR IN RCRD: CPT | Performed by: ORTHOPAEDIC SURGERY

## 2021-01-28 PROCEDURE — 99213 OFFICE O/P EST LOW 20 MIN: CPT | Performed by: ORTHOPAEDIC SURGERY

## 2021-01-28 RX ORDER — METHYLPREDNISOLONE ACETATE 40 MG/ML
1 INJECTION, SUSPENSION INTRA-ARTICULAR; INTRALESIONAL; INTRAMUSCULAR; SOFT TISSUE
Status: COMPLETED | OUTPATIENT
Start: 2021-01-28 | End: 2021-01-28

## 2021-01-28 RX ORDER — LIDOCAINE HYDROCHLORIDE 10 MG/ML
3 INJECTION, SOLUTION INFILTRATION; PERINEURAL
Status: COMPLETED | OUTPATIENT
Start: 2021-01-28 | End: 2021-01-28

## 2021-01-28 RX ADMIN — LIDOCAINE HYDROCHLORIDE 3 ML: 10 INJECTION, SOLUTION INFILTRATION; PERINEURAL at 11:06

## 2021-01-28 RX ADMIN — METHYLPREDNISOLONE ACETATE 1 ML: 40 INJECTION, SUSPENSION INTRA-ARTICULAR; INTRALESIONAL; INTRAMUSCULAR; SOFT TISSUE at 11:06

## 2021-01-28 NOTE — PROGRESS NOTES
Ortho Sports Medicine Shoulder Follow Up Visit     Assesment:   80 y o  female right shoulder  Rotator cuff arthropathy    Plan:    Conservative treatment:    Ice to shoulder 1-2 times daily, for 20 minutes at a time  Let pain guide return to activities  Imaging: All imaging from today was reviewed by myself and explained to the patient  Injection:    The risks and benefits of the injection (which include but are not limited to: infection, bleeding,damage to nerve/artery, need for further intervention), as well as the risks and benefits of all alternative treatments were explained and understood  The patient elected to proceed with injection  The procedure was done with aseptic technique, and the patient tolerated the procedure well with no complications  A corticosteroid injection of the subacromial space was performed  Ice to the shoulder 1-2 times daily for 20 minutes, for next 24-48 hrs  Surgery:     No surgery is recommended at this point, continue with conservative treatment plan as noted  Follow up:    Return in about 3 months (around 4/28/2021) for Recheck  Chief Complaint   Patient presents with    Right Shoulder - Follow-up         History of Present Illness: The patient is returns for follow up of Right shoulder pain  She received a corticosteroid injection at her last appointment  She states that this provided her with adequate relief for about 2 months  She is requesting another injection  She did try the home exercise program that we gave her at her last appointment  Pain is improved by rest, ice, NSAIDS, physical therapy and injection  Pain is aggravated by overhead activity and lifting   Symptoms include clicking, catching and locking  The patient denies weakness  The patient has tried rest, ice, NSAIDS, physical therapy and injection          Shoulder Surgical History:  None    Past Medical, Social and Family History:  Past Medical History:   Diagnosis Date    Diverticulosis 2001    Heart failure ([address] )     History of cystocele     Hypertension     Obesity     Positive PPD 1999    treated     Past Surgical History:   Procedure Laterality Date    CARDIOVASCULAR STRESS TEST  2001    dobutamine stress test    CHOLECYSTECTOMY  04/2002    COLONOSCOPY      CYSTOCELE REPAIR      HYSTERECTOMY      KNEE SURGERY      knee replacement    UPPER GASTROINTESTINAL ENDOSCOPY       No Known Allergies  Current Outpatient Medications on File Prior to Visit   Medication Sig Dispense Refill    acetaminophen (TYLENOL) 650 mg CR tablet 1 tablet as needed       albuterol (PROVENTIL HFA,VENTOLIN HFA) 90 mcg/act inhaler Inhale 2 puffs every 6 (six) hours as needed for wheezing 1 Inhaler 0    albuterol (VENTOLIN HFA) 90 mcg/act inhaler Inhale 2 puffs 4 (four) times a day 18 g 4    amitriptyline (ELAVIL) 25 mg tablet TAKE ONE TABLET BY MOUTH DAILY AT BEDTIME   atorvastatin (LIPITOR) 20 mg tablet TAKE 1 TABLET BY MOUTH ONCE DAILY  90 tablet 2    benzonatate (TESSALON PERLES) 100 mg capsule Take 1 capsule (100 mg total) by mouth 3 (three) times a day as needed for cough 20 capsule 0    Calcium Carbonate-Vitamin D (CALCIUM 500 + D) 500-125 MG-UNIT TABS every 24 hours      docusate sodium (COLACE) 100 mg capsule Take 1 capsule (100 mg total) by mouth 2 (two) times a day 60 capsule 1    Elastic Bandages & Supports (Medical Compression Stockings) MISC by Does not apply route daily High knee compression stockings 20-30 MMHG 2 each 0    famotidine (PEPCID) 20 mg tablet TAKE 1 TABLET BY MOUTH TWICE DAILY  180 tablet 0    fluticasone (FLONASE) 50 mcg/act nasal spray 2 sprays into each nostril daily 1 Bottle 3    fluticasone-salmeterol (Advair Diskus) 500-50 mcg/dose inhaler Inhale 1 puff 2 (two) times a day Rinse mouth after use 60 each 2    furosemide (LASIX) 40 mg tablet TAKE 1 TABLET BY MOUTH ONCE DAILY   90 tablet 0    guaiFENesin (ROBITUSSIN) 100 MG/5ML oral liquid Take 10 mL (200 mg total) by mouth 3 (three) times a day as needed for cough 120 mL 0    hydrochlorothiazide (HYDRODIURIL) 12 5 mg tablet TAKE 1 TABLET BY MOUTH ONCE DAILY  90 tablet 0    hydrocortisone 2 5 % cream Apply topically 4 (four) times a day as needed      latanoprost (XALATAN) 0 005 % ophthalmic solution       lidocaine (XYLOCAINE) 5 % ointment Apply topically 2 (two) times a day as needed for mild pain 35 44 g 0    lisinopril (ZESTRIL) 40 mg tablet TAKE 1 TABLET BY MOUTH ONCE DAILY  90 tablet 2    Misc  Devices (CANE) MISC Four point aluminum cane   senna-docusate sodium (SENOKOT-S) 8 6-50 mg per tablet Take 1 tablet by mouth daily 30 tablet 1    sucralfate (Carafate) 1 g/10 mL suspension TAKE 10 ML BY MOUTH 4 TIMES A  mL 3    timolol (BETIMOL) 0 5 % ophthalmic solution Administer 1 drop to both eyes 2 (two) times a day       timolol (TIMOPTIC) 0 5 % ophthalmic solution       TOVIAZ 8 MG TB24 TAKE 1 TABLET BY MOUTH ONCE DAILY  90 tablet 2    fluticasone (FLONASE) 50 mcg/act nasal spray 1 spray into each nostril 2 (two) times a day 1 Bottle 5     No current facility-administered medications on file prior to visit  Social History     Socioeconomic History    Marital status:       Spouse name: Not on file    Number of children: Not on file    Years of education: Not on file    Highest education level: Not on file   Occupational History    Not on file   Social Needs    Financial resource strain: Not on file    Food insecurity     Worry: Not on file     Inability: Not on file    Transportation needs     Medical: Not on file     Non-medical: Not on file   Tobacco Use    Smoking status: Never Smoker    Smokeless tobacco: Never Used   Substance and Sexual Activity    Alcohol use: Not Currently    Drug use: Not Currently    Sexual activity: Not Currently     Partners: Male   Lifestyle    Physical activity     Days per week: Not on file     Minutes per session: Not on file    Stress: Not on file   Relationships    Social connections     Talks on phone: Not on file     Gets together: Not on file     Attends [employer] service: Not on file     Active member of club or organization: Not on file     Attends meetings of clubs or organizations: Not on file     Relationship status: Not on file    Intimate partner violence     Fear of current or ex partner: Not on file     Emotionally abused: Not on file     Physically abused: Not on file     Forced sexual activity: Not on file   Other Topics Concern    Not on file   Social History Narrative    Lives at home with son and 2 grandkids  I have reviewed the past medical, surgical, social and family history, medications and allergies as documented in the EMR  Review of systems: ROS is negative other than that noted in the HPI  Constitutional: Negative for fatigue and fever  Physical Exam:    Blood pressure 144/70, pulse 98, weight 91 6 kg (202 lb), not currently breastfeeding      General/Constitutional: NAD, well developed, well nourished  HENT: Normocephalic, atraumatic  CV: Intact distal pulses, regular rate  Resp: No respiratory distress or labored breathing  Lymphatic: No lymphadenopathy palpated  Neuro: Alert and Oriented x 3, no focal deficits  Psych: Normal mood, normal affect, normal judgement, normal behavior  Skin: Warm, dry, no rashes, no erythema      Shoulder focused exam:       RIGHT LEFT    Scapula Atrophy Negative Negative     Winging Negative Negative     Protraction Negative Negative    Rotator cuff SS 4+/5 5/5     IS 5/5 5/5     SubS 5/5 5/5    ROM     170     ER0 45 60                   IRb Iliac crest    T6    TTP: AC Positive Negative     Biceps Negative Negative     Coracoid Negative Negative    Special Tests: O'Briens Negative Negative     Juarez-shear Negative Negative     Cross body Adduction Negative Negative     Speeds  Negative Negative     Juan David's Negative Negative Whipple Negative Negative       Neer Negative Negative     Ramirez Negative Negative    Instability: Apprehension & relocation not tested not tested     Load & shift not tested not tested    Other: Crank Negative Negative               UE NV Exam: +2 Radial pulses bilaterally  Sensation intact to light touch C5-T1 bilaterally, Radial/median/ulnar nerve motor intact    Cervical ROM is full without pain, numbness or tingling      Shoulder Imaging    No imaging was performed today    Large joint arthrocentesis: R subacromial bursa  Universal Protocol:  Consent given by: patient  Time out: Immediately prior to procedure a "time out" was called to verify the correct patient, procedure, equipment, support staff and site/side marked as required    Timeout called at: 1/28/2021 11:06 AM   Patient understanding: patient states understanding of the procedure being performed  Site marked: the operative site was marked  Patient identity confirmed: verbally with patient    Supporting Documentation  Indications: pain   Procedure Details  Location: shoulder - R subacromial bursa  Preparation: Patient was prepped and draped in the usual sterile fashion  Needle size: 22 G  Ultrasound guidance: no  Approach: posterior  Medications administered: 3 mL lidocaine 1 %; 1 mL methylPREDNISolone acetate 40 mg/mL    Patient tolerance: patient tolerated the procedure well with no immediate complications  Dressing:  Sterile dressing applied

## 2021-02-12 DIAGNOSIS — Z23 ENCOUNTER FOR IMMUNIZATION: ICD-10-CM

## 2021-03-16 ENCOUNTER — TELEPHONE (OUTPATIENT)
Dept: OTHER | Facility: OTHER | Age: 86
End: 2021-03-16

## 2021-03-16 ENCOUNTER — TELEPHONE (OUTPATIENT)
Dept: FAMILY MEDICINE CLINIC | Facility: CLINIC | Age: 86
End: 2021-03-16

## 2021-03-16 NOTE — TELEPHONE ENCOUNTER
Daughter would like to know if it's ok for her mom to get the covid vaccine  Please call daughter  [phone]

## 2021-03-16 NOTE — TELEPHONE ENCOUNTER
PT 's daughter called in requesting a call back about the covid vaccine, again  Please call her back     Cell phone :[phone]

## 2021-03-17 ENCOUNTER — TELEPHONE (OUTPATIENT)
Dept: FAMILY MEDICINE CLINIC | Facility: CLINIC | Age: 86
End: 2021-03-17

## 2021-03-17 NOTE — TELEPHONE ENCOUNTER
Called daughter today in regards of questions of covid vaccine for patient but did not responded  Will send message to clerical staff to call to see when would be a good time to call back

## 2021-03-17 NOTE — TELEPHONE ENCOUNTER
Hi, I just called today but they did not responded to that cell phone number  Can you please call back and ask when would be a good time to call back? Thanks!

## 2021-03-18 NOTE — TELEPHONE ENCOUNTER
She had questions prior to her mom being vaccinated  Pt was already vaccinated so no need to call now

## 2021-03-25 ENCOUNTER — OFFICE VISIT (OUTPATIENT)
Dept: CARDIOLOGY CLINIC | Facility: CLINIC | Age: 86
End: 2021-03-25
Payer: COMMERCIAL

## 2021-03-25 VITALS
HEIGHT: 60 IN | HEART RATE: 91 BPM | SYSTOLIC BLOOD PRESSURE: 120 MMHG | DIASTOLIC BLOOD PRESSURE: 80 MMHG | BODY MASS INDEX: 40.17 KG/M2 | WEIGHT: 204.6 LBS

## 2021-03-25 DIAGNOSIS — I50.32 CHRONIC DIASTOLIC CONGESTIVE HEART FAILURE (HCC): ICD-10-CM

## 2021-03-25 DIAGNOSIS — I10 BENIGN ESSENTIAL HYPERTENSION: ICD-10-CM

## 2021-03-25 DIAGNOSIS — J44.9 CHRONIC OBSTRUCTIVE PULMONARY DISEASE, UNSPECIFIED COPD TYPE (HCC): ICD-10-CM

## 2021-03-25 DIAGNOSIS — I35.0 NONRHEUMATIC AORTIC VALVE STENOSIS: Primary | ICD-10-CM

## 2021-03-25 DIAGNOSIS — E78.2 MIXED HYPERLIPIDEMIA: ICD-10-CM

## 2021-03-25 PROCEDURE — 93000 ELECTROCARDIOGRAM COMPLETE: CPT | Performed by: INTERNAL MEDICINE

## 2021-03-25 PROCEDURE — 99214 OFFICE O/P EST MOD 30 MIN: CPT | Performed by: INTERNAL MEDICINE

## 2021-03-25 PROCEDURE — 1036F TOBACCO NON-USER: CPT | Performed by: INTERNAL MEDICINE

## 2021-03-25 PROCEDURE — 1160F RVW MEDS BY RX/DR IN RCRD: CPT | Performed by: INTERNAL MEDICINE

## 2021-03-25 NOTE — LETTER
March 25, 2021     Sung Walters MD  [address]  [address] [employer]    Patient: Beatriz Jackson   YOB: 1934   Date of Visit: 3/25/2021       Dear Dr Gadiel Hernandez: Thank you for referring Vandana Auguste to me for evaluation  Below are my notes for this consultation  If you have questions, please do not hesitate to call me  I look forward to following your patient along with you  Sincerely,        Deepika Gutiérrez MD        CC: No Recipients  Deepika Gutiérrez MD  3/25/2021 11:45 AM  Sign when Signing Visit                                             Cardiology Follow Up    Beatriz Jackson  1934  [id number]  [address] [facility] [address]  [phone]    1  Nonrheumatic aortic valve stenosis  POCT ECG    Echo complete with contrast if indicated   2  Benign essential hypertension  POCT ECG   3  Chronic diastolic congestive heart failure ([address] )     4  Mixed hyperlipidemia     5  Chronic obstructive pulmonary disease, unspecified COPD type ([address] )     6  BMI 39 0-39 9,adult         Patient was originally referred from [facility] for evaluation of an abnormal echocardiogram  Echocardiogram demonstrated normal LV systolic function with mild-to-moderate aortic stenosis  Other history includes hypertension and hyperlipidemia  02/09/2018 echocardiogram: Mild aortic stenosis, aortic valve velocity 2 7 Meters/sec  Normal LV systolic function, EF 24%  Grade 1 diastolic dysfunction  Normal PA pressures  05/30/2019 lipid profile: Total cholesterol 173, triglycerides 104, HDL 63, LDL calculated 89, non HDL cholesterol 110  10/04/2019 lipid profile: Total cholesterol 170, triglycerides 110, HDL 50, LDL calculated 98, non HDL cholesterol 120    Interval History:   Chilean-speaking woman  Son came along to translate for her  She has aortic stenosis, mild by echocardiogram in 2019   She has no cardiac symptoms  Patient denies chest discomfort or shortness of breath  Patient has no palpitations  Patient denies symptoms of dizziness, lightheadedness or near-syncope/syncope  Patient   Has 1-2+ bilateral leg edema  Patient denies symptoms of orthopnea or paroxysmal nocturnal dyspnea  Discussion/Summary   1  Obtain 2 dimensional echocardiogram for aortic stenosis   2  Return in 6 months    Patient Active Problem List   Diagnosis    Anemia    Aortic stenosis    Benign essential hypertension    Bilateral occipital neuralgia    Carpal tunnel syndrome on both sides    Glaucoma    HLD (hyperlipidemia)    Malignant neoplasm of overlapping sites of left female breast ([facility] [address] [facility]    Cataract    Hemorrhoids    Constipation    Chronic diastolic congestive heart failure (HCC)    GERD (gastroesophageal reflux disease)    Anal or rectal pain    Osteoarthritis    Cognitive impairment    Dysuria    COPD (chronic obstructive pulmonary disease) ([facility] [address] )    Central perforation of tympanic membrane    Acute pain of right shoulder    Right rotator cuff tear arthropathy    Encounter for removal of sutures    2019 novel coronavirus disease (COVID-19)    Right upper quadrant pain    BMI 39 0-39 9,adult     Past Medical History:   Diagnosis Date    Diverticulosis 2001    Heart failure (HCC)     History of cystocele     Hypertension     Obesity     Positive PPD 1999    treated     Social History     Socioeconomic History    Marital status:       Spouse name: Not on file    Number of children: Not on file    Years of education: Not on file    Highest education level: Not on file   Occupational History    Not on file   Social Needs    Financial resource strain: Not on file    Food insecurity     Worry: Not on file     Inability: Not on file    Transportation needs     Medical: Not on file     Non-medical: Not on file   Tobacco Use    Smoking status: Never Smoker    Smokeless tobacco: Never Used Substance and Sexual Activity    Alcohol use: Not Currently    Drug use: Not Currently    Sexual activity: Not Currently     Partners: Male   Lifestyle    Physical activity     Days per week: Not on file     Minutes per session: Not on file    Stress: Not on file   Relationships    Social connections     Talks on phone: Not on file     Gets together: Not on file     Attends Baptism service: Not on file     Active member of club or organization: Not on file     Attends meetings of clubs or organizations: Not on file     Relationship status: Not on file    Intimate partner violence     Fear of current or ex partner: Not on file     Emotionally abused: Not on file     Physically abused: Not on file     Forced sexual activity: Not on file   Other Topics Concern    Not on file   Social History Narrative    Lives at home with son and 2 grandkids  Family History   Family history unknown: Yes     Past Surgical History:   Procedure Laterality Date    CARDIOVASCULAR STRESS TEST  2001    dobutamine stress test    CHOLECYSTECTOMY  04/2002    COLONOSCOPY      CYSTOCELE REPAIR      HYSTERECTOMY      KNEE SURGERY      knee replacement    UPPER GASTROINTESTINAL ENDOSCOPY         Current Outpatient Medications:     acetaminophen (TYLENOL) 650 mg CR tablet, 1 tablet as needed , Disp: , Rfl:     albuterol (PROVENTIL HFA,VENTOLIN HFA) 90 mcg/act inhaler, Inhale 2 puffs every 6 (six) hours as needed for wheezing, Disp: 1 Inhaler, Rfl: 0    albuterol (VENTOLIN HFA) 90 mcg/act inhaler, Inhale 2 puffs 4 (four) times a day, Disp: 18 g, Rfl: 4    amitriptyline (ELAVIL) 25 mg tablet, TAKE ONE TABLET BY MOUTH DAILY AT BEDTIME , Disp: , Rfl:     atorvastatin (LIPITOR) 20 mg tablet, TAKE 1 TABLET BY MOUTH ONCE DAILY  , Disp: 90 tablet, Rfl: 2    benzonatate (TESSALON PERLES) 100 mg capsule, Take 1 capsule (100 mg total) by mouth 3 (three) times a day as needed for cough, Disp: 20 capsule, Rfl: 0    Calcium Carbonate-Vitamin D (CALCIUM 500 + D) 500-125 MG-UNIT TABS, every 24 hours, Disp: , Rfl:     docusate sodium (COLACE) 100 mg capsule, Take 1 capsule (100 mg total) by mouth 2 (two) times a day, Disp: 60 capsule, Rfl: 1    Elastic Bandages & Supports (Medical Compression Stockings) MISC, by Does not apply route daily High knee compression stockings 20-30 MMHG, Disp: 2 each, Rfl: 0    famotidine (PEPCID) 20 mg tablet, TAKE 1 TABLET BY MOUTH TWICE DAILY  , Disp: 180 tablet, Rfl: 0    fluticasone (FLONASE) 50 mcg/act nasal spray, 2 sprays into each nostril daily, Disp: 1 Bottle, Rfl: 3    fluticasone-salmeterol (Advair Diskus) 500-50 mcg/dose inhaler, Inhale 1 puff 2 (two) times a day Rinse mouth after use, Disp: 60 each, Rfl: 2    furosemide (LASIX) 40 mg tablet, TAKE 1 TABLET BY MOUTH ONCE DAILY  , Disp: 90 tablet, Rfl: 0    guaiFENesin (ROBITUSSIN) 100 MG/5ML oral liquid, Take 10 mL (200 mg total) by mouth 3 (three) times a day as needed for cough, Disp: 120 mL, Rfl: 0    hydrochlorothiazide (HYDRODIURIL) 12 5 mg tablet, TAKE 1 TABLET BY MOUTH ONCE DAILY  , Disp: 90 tablet, Rfl: 0    hydrocortisone 2 5 % cream, Apply topically 4 (four) times a day as needed, Disp: , Rfl:     latanoprost (XALATAN) 0 005 % ophthalmic solution, , Disp: , Rfl:     lidocaine (XYLOCAINE) 5 % ointment, Apply topically 2 (two) times a day as needed for mild pain, Disp: 35 44 g, Rfl: 0    lisinopril (ZESTRIL) 40 mg tablet, TAKE 1 TABLET BY MOUTH ONCE DAILY  , Disp: 90 tablet, Rfl: 2    Misc   Devices (CANE) MISC, Four point aluminum cane , Disp: , Rfl:     senna-docusate sodium (SENOKOT-S) 8 6-50 mg per tablet, Take 1 tablet by mouth daily, Disp: 30 tablet, Rfl: 1    sucralfate (Carafate) 1 g/10 mL suspension, TAKE 10 ML BY MOUTH 4 TIMES A DAY, Disp: 420 mL, Rfl: 3    timolol (BETIMOL) 0 5 % ophthalmic solution, Administer 1 drop to both eyes 2 (two) times a day , Disp: , Rfl:     timolol (TIMOPTIC) 0 5 % ophthalmic solution, , Disp: , Rfl:     TOVIAZ 8 MG TB24, TAKE 1 TABLET BY MOUTH ONCE DAILY  , Disp: 90 tablet, Rfl: 2  No Known Allergies    Results for orders placed or performed in visit on 03/25/21   POCT ECG    Narrative     Normal sinus rhythm at a rate of 91 beats per minute  Cannot rule out an old anterior wall myocardial infarction  Abnormal EKG  Review of Systems:  Review of Systems   Respiratory: Negative for cough, choking, chest tightness, shortness of breath and wheezing  Cardiovascular: Negative for chest pain, palpitations and leg swelling  Musculoskeletal: Negative for gait problem  Skin: Negative for rash  Neurological: Negative for dizziness, tremors, syncope, weakness, light-headedness, numbness and headaches  Psychiatric/Behavioral: Negative for agitation and behavioral problems  The patient is not hyperactive  Physical Exam:  /80   Pulse 91   Ht 5' (1 524 m)   Wt 92 8 kg (204 lb 9 6 oz)   BMI 39 96 kg/m²   Physical Exam  Constitutional:       General: She is not in acute distress  Appearance: She is well-developed  HENT:      Head: Normocephalic and atraumatic  Neck:      Thyroid: No thyromegaly  Vascular: No carotid bruit or JVD  Cardiovascular:      Rate and Rhythm: Normal rate and regular rhythm  Heart sounds: Murmur present  No friction rub  No gallop  Comments:  Grade 2/6 early to mid peaking systolic ejection murmur  Pulmonary:      Effort: No respiratory distress  Breath sounds: No wheezing, rhonchi or rales  Abdominal:      General: Bowel sounds are normal       Palpations: Abdomen is soft  Musculoskeletal:      Right lower leg: Edema present  Left lower leg: Edema present  Comments:  1 to 2+ pretibial edema   Skin:     General: Skin is warm and dry  Neurological:      General: No focal deficit present  Mental Status: She is alert and oriented to person, place, and time     Psychiatric:         Mood and Affect: Mood normal          Behavior: Behavior normal        --------------------------------------------------------------------------------  ECHO:   Results for orders placed during the hospital encounter of 10/03/19   Echo complete with contrast if indicated    Narrative [facility] [address]    Transthoracic Echocardiogram  2D, M-mode, Doppler, and Color Doppler    Study date:  04-Oct-2019    Patient: Varghese Boss  MR number: [id number]  Account number: [de-identified]  : 1934  Age: 80 years  Gender: Female  Status: Outpatient  Location: [facility]  Height: 59 in  Weight: 202 6 lb  BP: 124/ 78 mmHg    Indications: Chest pain    Diagnoses: R07 9 - Chest pain, unspecified    Sonographer:  Mia Fraire RDCS  Primary Physician:  Maroi Benedict MD  Referring Physician:  Mario Benedict MD  Group:  [facility]  Interpreting Physician:  Claudia Jimenez MD    IMPRESSIONS:  1  Mild concentric left ventricular hypertrophy, normal ventricular systolic function, grade 1 diastolic dysfunction  EF around 60%  2  No other significant chamber hypertrophy enlargement  3  Aortic valve sclerosis and calcification with mild aortic valve stenosis and mild-to-moderate aortic valve regurgitation  4  Mitral annular calcification, trace mitral valve regurgitation  5  Trace to mild tricuspid valve regurgitation  6  Mild pulmonary hypertension  Estimated RVSP/PASP is 37 mmHg  7  No pericardial effusion  Compared to report of previous echocardiogram from 2018 there is overall no significant change  SUMMARY    LEFT VENTRICLE:  Normal left ventricular cavity size, mildly increased wall thickness, normal left ventricular systolic function, no regional wall motion abnormality  Ejection fraction is estimated as around 60%  Grade 1 diastolic dysfunction  Normal  estimated left atrial pressure      RIGHT VENTRICLE:  Normal right ventricular size and systolic function  Estimated right ventricular systolic pressure is mildly increased, 37 mmHg  Mild pulmonary hypertension  LEFT ATRIUM:  Normal left atrial cavity size  Intact interatrial septum  RIGHT ATRIUM:  Normal right atrial cavity size  MITRAL VALVE:  Mild mitral annular calcification and leaflet sclerosis, adequate leaflet mobility  Trace mitral valve regurgitation  AORTIC VALVE:  Tricuspid aortic valve with moderate sclerosis and focal calcification, most pronounced on the right coronary cusp  There is mild aortic valve stenosis  Peak transaortic velocity is 2 5 m/sec, mean gradient is 17 mmHg and calculated aortic  valve area is 1 0 1 cm2  Mild to moderate aortic valve regurgitation  TRICUSPID VALVE:  Mild tricuspid valve regurgitation  PULMONIC VALVE:  No pulmonic valve regurgitation  AORTA:  Aortic root and proximal ascending aorta are normal in size on 2D imaging  IVC, HEPATIC VEINS:  Inferior vena cava is normal in size and demonstrates appropriate respiratory phasic changes in diameter  PERICARDIUM:  No pericardial effusion  HISTORY: PRIOR HISTORY: Heart failure Risk factors: hypertension and morbid obesity  PROCEDURE: The study was performed in the [place] [address]  This was a routine study  The transthoracic approach was used  The study included complete 2D imaging, M-mode, complete spectral Doppler, and color Doppler  The heart rate was 68  bpm, at the start of the study  Image quality was adequate  LEFT VENTRICLE: Normal left ventricular cavity size, mildly increased wall thickness, normal left ventricular systolic function, no regional wall motion abnormality  Ejection fraction is estimated as around 60%  Grade 1 diastolic  dysfunction  Normal estimated left atrial pressure  RIGHT VENTRICLE: Normal right ventricular size and systolic function  Estimated right ventricular systolic pressure is mildly increased, 37 mmHg   Mild pulmonary hypertension  LEFT ATRIUM: Normal left atrial cavity size  Intact interatrial septum  RIGHT ATRIUM: Normal right atrial cavity size  MITRAL VALVE: Mild mitral annular calcification and leaflet sclerosis, adequate leaflet mobility  Trace mitral valve regurgitation  AORTIC VALVE: Tricuspid aortic valve with moderate sclerosis and focal calcification, most pronounced on the right coronary cusp  There is mild aortic valve stenosis  Peak transaortic velocity is 2 5 m/sec, mean gradient is 17 mmHg and  calculated aortic valve area is 1 0 1 cm2  Mild to moderate aortic valve regurgitation  TRICUSPID VALVE: Mild tricuspid valve regurgitation  PULMONIC VALVE: No pulmonic valve regurgitation  PERICARDIUM: No pericardial effusion  AORTA: Aortic root and proximal ascending aorta are normal in size on 2D imaging  SYSTEMIC VEINS: IVC: Inferior vena cava is normal in size and demonstrates appropriate respiratory phasic changes in diameter  SYSTEM MEASUREMENT TABLES    2D  %FS: 31 49 %  Ao Diam: 2 31 cm  EDV(Teich): 81 57 ml  EF(Teich): 59 73 %  ESV(Teich): 32 85 ml  IVSd: 1 1 cm  LA Area: 14 81 cm2  LA Diam: 3 08 cm  LVEDV MOD A4C: 143 29 ml  LVEF MOD A4C: 66 36 %  LVESV MOD A4C: 48 2 ml  LVIDd: 4 27 cm  LVIDs: 2 92 cm  LVLd A4C: 8 32 cm  LVLs A4C: 6 61 cm  LVOT Diam: 1 97 cm  LVPWd: 1 1 cm  RA Area: 16 11 cm2  RVIDd: 3 23 cm  SV MOD A4C: 95 08 ml  SV(Teich): 48 72 ml    CW  AV Env  Ti: 288 35 ms  AV VTI: 57 24 cm  AV Vmax: 2 53 m/s  AV Vmean: 1 98 m/s  AV maxP 6 mmHg  AV meanP 96 mmHg  TR Vmax: 2 58 m/s  TR maxP 53 mmHg    MM  TAPSE: 2 69 cm    PW  HEIDI (VTI): 1 13 cm2  HEIDI Vmax: 0 95 cm2  AVAI (VTI): 0 cm2/m2  AVAI Vmax: 0 cm2/m2  E': 0 05 m/s  E/E': 12 33  LVOT Env  Ti: 364 48 ms  LVOT VTI: 21 25 cm  LVOT Vmax: 0 79 m/s  LVOT Vmean: 0 58 m/s  LVOT maxP 5 mmHg  LVOT meanP 54 mmHg  LVSI Dopp: 34 75 ml/m2  LVSV Dopp: 64 64 ml  MV A Norm: 1 04 m/s  MV Dec Gilchrist: 2 38 m/s2  MV DecT: 277 81 ms  MV E Norm: 0 66 m/s  MV E/A Ratio: 0 64  MV PHT: 80 57 ms  MVA By PHT: 2 73 cm2    [employer] Accredited Echocardi[facility]    Prepared and electronically signed by    Rasheeda Harris MD  Signed 04-Oct-2019 11:58:29         ======================================================    Lab Results   Component Value Date    WBC 4 80 07/23/2020    HGB 11 7 (L) 07/23/2020    HCT 35 1 (L) 07/23/2020    MCV 92 07/23/2020     07/23/2020      Lab Results   Component Value Date    SODIUM 138 09/18/2020    K 4 4 09/18/2020     09/18/2020    CO2 29 09/18/2020    BUN 26 (H) 09/18/2020    CREATININE 0 79 09/18/2020    GLUC 101 (H) 10/04/2019    CALCIUM 9 4 09/18/2020      No results found for: HGBA1C   Lab Results   Component Value Date    CHOL 160 05/11/2018     Lab Results   Component Value Date    HDL 50 10/04/2019    HDL 63 (H) 05/30/2019    HDL 65 (H) 09/12/2018     Lab Results   Component Value Date    LDLCALC 98 10/04/2019    LDLCALC 89 05/30/2019    LDLCALC 98 09/12/2018     Lab Results   Component Value Date    TRIG 110 10/04/2019    TRIG 104 05/30/2019    TRIG 123 09/12/2018     No results found for: CHOLHDL   No results found for: INR, PROTIME     Imaging:   I have personally reviewed pertinent reports  Portions of the record may have been created with voice recognition software  Occasional wrong word or "sound a like" substitutions may have occurred due to the inherent limitations of voice recognition software  Read the chart carefully and recognize, using context, where substitutions have occurred

## 2021-03-25 NOTE — LETTER
March 25, 2021     Elizabeth Cuenca MD  [address]    Patient: Glendy Ace   YOB: 1934   Date of Visit: 3/25/2021       Dear Dr Hutchinson Precise: Thank you for referring Victor Hugo Omer to me for evaluation  Below are my notes for this consultation  If you have questions, please do not hesitate to call me  I look forward to following your patient along with you  Sincerely,        Alfred Khan MD        CC: No Recipients  Alfred Khan MD  3/25/2021 11:44 AM  Sign when Signing Visit                                             Cardiology Follow Up    Glendy Ace  1934  [id number]  [address] [place] [id number] [phone]    1  Nonrheumatic aortic valve stenosis  POCT ECG    Echo complete with contrast if indicated   2  Benign essential hypertension  POCT ECG   3  Chronic diastolic congestive heart failure ([address] )     4  Mixed hyperlipidemia     5  Chronic obstructive pulmonary disease, unspecified COPD type ([address] )     6  BMI 39 0-39 9,adult         Patient was originally referred from [facility] for evaluation of an abnormal echocardiogram  Echocardiogram demonstrated normal LV systolic function with mild-to-moderate aortic stenosis  Other history includes hypertension and hyperlipidemia  02/09/2018 echocardiogram: Mild aortic stenosis, aortic valve velocity 2 7 Meters/sec  Normal LV systolic function, EF 05%  Grade 1 diastolic dysfunction  Normal PA pressures  05/30/2019 lipid profile: Total cholesterol 173, triglycerides 104, HDL 63, LDL calculated 89, non HDL cholesterol 110  10/04/2019 lipid profile: Total cholesterol 170, triglycerides 110, HDL 50, LDL calculated 98, non HDL cholesterol 120    Interval History:   Georgian-speaking woman  Son came along to translate for her  She has aortic stenosis, mild by echocardiogram in 2019   She has no cardiac symptoms  Patient denies chest discomfort or shortness of breath  Patient has no palpitations  Patient denies symptoms of dizziness, lightheadedness or near-syncope/syncope  Patient   Has 1-2+ bilateral leg edema  Patient denies symptoms of orthopnea or paroxysmal nocturnal dyspnea  Discussion/Summary   1  Obtain 2 dimensional echocardiogram for aortic stenosis   2  Return in 6 months    Patient Active Problem List   Diagnosis    Anemia    Aortic stenosis    Benign essential hypertension    Bilateral occipital neuralgia    Carpal tunnel syndrome on both sides    Glaucoma    HLD (hyperlipidemia)    Malignant neoplasm of overlapping sites of left female breast ([facility]    Cataract    Hemorrhoids    Constipation    Chronic diastolic congestive heart failure (HCC)    GERD (gastroesophageal reflux disease)    Anal or rectal pain    Osteoarthritis    Cognitive impairment    Dysuria    COPD (chronic obstructive pulmonary disease) ([facility] )    Central perforation of tympanic membrane    Acute pain of right shoulder    Right rotator cuff tear arthropathy    Encounter for removal of sutures    2019 novel coronavirus disease (COVID-19)    Right upper quadrant pain    BMI 39 0-39 9,adult     Past Medical History:   Diagnosis Date    Diverticulosis 2001    Heart failure (HCC)     History of cystocele     Hypertension     Obesity     Positive PPD 1999    treated     Social History     Socioeconomic History    Marital status:       Spouse name: Not on file    Number of children: Not on file    Years of education: Not on file    Highest education level: Not on file   Occupational History    Not on file   Social Needs    Financial resource strain: Not on file    Food insecurity     Worry: Not on file     Inability: Not on file    Transportation needs     Medical: Not on file     Non-medical: Not on file   Tobacco Use    Smoking status: Never Smoker    Smokeless tobacco: Never Used Substance and Sexual Activity    Alcohol use: Not Currently    Drug use: Not Currently    Sexual activity: Not Currently     Partners: Male   Lifestyle    Physical activity     Days per week: Not on file     Minutes per session: Not on file    Stress: Not on file   Relationships    Social connections     Talks on phone: Not on file     Gets together: Not on file     Attends Yazdanism service: Not on file     Active member of club or organization: Not on file     Attends meetings of clubs or organizations: Not on file     Relationship status: Not on file    Intimate partner violence     Fear of current or ex partner: Not on file     Emotionally abused: Not on file     Physically abused: Not on file     Forced sexual activity: Not on file   Other Topics Concern    Not on file   Social History Narrative    Lives at home with son and 2 grandkids  Family History   Family history unknown: Yes     Past Surgical History:   Procedure Laterality Date    CARDIOVASCULAR STRESS TEST  2001    dobutamine stress test    CHOLECYSTECTOMY  04/2002    COLONOSCOPY      CYSTOCELE REPAIR      HYSTERECTOMY      KNEE SURGERY      knee replacement    UPPER GASTROINTESTINAL ENDOSCOPY         Current Outpatient Medications:     acetaminophen (TYLENOL) 650 mg CR tablet, 1 tablet as needed , Disp: , Rfl:     albuterol (PROVENTIL HFA,VENTOLIN HFA) 90 mcg/act inhaler, Inhale 2 puffs every 6 (six) hours as needed for wheezing, Disp: 1 Inhaler, Rfl: 0    albuterol (VENTOLIN HFA) 90 mcg/act inhaler, Inhale 2 puffs 4 (four) times a day, Disp: 18 g, Rfl: 4    amitriptyline (ELAVIL) 25 mg tablet, TAKE ONE TABLET BY MOUTH DAILY AT BEDTIME , Disp: , Rfl:     atorvastatin (LIPITOR) 20 mg tablet, TAKE 1 TABLET BY MOUTH ONCE DAILY  , Disp: 90 tablet, Rfl: 2    benzonatate (TESSALON PERLES) 100 mg capsule, Take 1 capsule (100 mg total) by mouth 3 (three) times a day as needed for cough, Disp: 20 capsule, Rfl: 0    Calcium Carbonate-Vitamin D (CALCIUM 500 + D) 500-125 MG-UNIT TABS, every 24 hours, Disp: , Rfl:     docusate sodium (COLACE) 100 mg capsule, Take 1 capsule (100 mg total) by mouth 2 (two) times a day, Disp: 60 capsule, Rfl: 1    Elastic Bandages & Supports (Medical Compression Stockings) MISC, by Does not apply route daily High knee compression stockings 20-30 MMHG, Disp: 2 each, Rfl: 0    famotidine (PEPCID) 20 mg tablet, TAKE 1 TABLET BY MOUTH TWICE DAILY  , Disp: 180 tablet, Rfl: 0    fluticasone (FLONASE) 50 mcg/act nasal spray, 2 sprays into each nostril daily, Disp: 1 Bottle, Rfl: 3    fluticasone-salmeterol (Advair Diskus) 500-50 mcg/dose inhaler, Inhale 1 puff 2 (two) times a day Rinse mouth after use, Disp: 60 each, Rfl: 2    furosemide (LASIX) 40 mg tablet, TAKE 1 TABLET BY MOUTH ONCE DAILY  , Disp: 90 tablet, Rfl: 0    guaiFENesin (ROBITUSSIN) 100 MG/5ML oral liquid, Take 10 mL (200 mg total) by mouth 3 (three) times a day as needed for cough, Disp: 120 mL, Rfl: 0    hydrochlorothiazide (HYDRODIURIL) 12 5 mg tablet, TAKE 1 TABLET BY MOUTH ONCE DAILY  , Disp: 90 tablet, Rfl: 0    hydrocortisone 2 5 % cream, Apply topically 4 (four) times a day as needed, Disp: , Rfl:     latanoprost (XALATAN) 0 005 % ophthalmic solution, , Disp: , Rfl:     lidocaine (XYLOCAINE) 5 % ointment, Apply topically 2 (two) times a day as needed for mild pain, Disp: 35 44 g, Rfl: 0    lisinopril (ZESTRIL) 40 mg tablet, TAKE 1 TABLET BY MOUTH ONCE DAILY  , Disp: 90 tablet, Rfl: 2    Misc   Devices (CANE) MISC, Four point aluminum cane , Disp: , Rfl:     senna-docusate sodium (SENOKOT-S) 8 6-50 mg per tablet, Take 1 tablet by mouth daily, Disp: 30 tablet, Rfl: 1    sucralfate (Carafate) 1 g/10 mL suspension, TAKE 10 ML BY MOUTH 4 TIMES A DAY, Disp: 420 mL, Rfl: 3    timolol (BETIMOL) 0 5 % ophthalmic solution, Administer 1 drop to both eyes 2 (two) times a day , Disp: , Rfl:     timolol (TIMOPTIC) 0 5 % ophthalmic solution, , Disp: , Rfl:     TOVIAZ 8 MG TB24, TAKE 1 TABLET BY MOUTH ONCE DAILY  , Disp: 90 tablet, Rfl: 2  No Known Allergies    Results for orders placed or performed in visit on 03/25/21   POCT ECG    Narrative     Normal sinus rhythm at a rate of 91 beats per minute  Cannot rule out an old anterior wall myocardial infarction  Abnormal EKG  Review of Systems:  Review of Systems   Respiratory: Negative for cough, choking, chest tightness, shortness of breath and wheezing  Cardiovascular: Negative for chest pain, palpitations and leg swelling  Musculoskeletal: Negative for gait problem  Skin: Negative for rash  Neurological: Negative for dizziness, tremors, syncope, weakness, light-headedness, numbness and headaches  Psychiatric/Behavioral: Negative for agitation and behavioral problems  The patient is not hyperactive  Physical Exam:  /80   Pulse 91   Ht 5' (1 524 m)   Wt 92 8 kg (204 lb 9 6 oz)   BMI 39 96 kg/m²   Physical Exam  Constitutional:       General: She is not in acute distress  Appearance: She is well-developed  HENT:      Head: Normocephalic and atraumatic  Neck:      Thyroid: No thyromegaly  Vascular: No carotid bruit or JVD  Cardiovascular:      Rate and Rhythm: Normal rate and regular rhythm  Heart sounds: Murmur present  No friction rub  No gallop  Comments:  Grade 2/6 early to mid peaking systolic ejection murmur  Pulmonary:      Effort: No respiratory distress  Breath sounds: No wheezing, rhonchi or rales  Abdominal:      General: Bowel sounds are normal       Palpations: Abdomen is soft  Musculoskeletal:      Right lower leg: Edema present  Left lower leg: Edema present  Comments:  1 to 2+ pretibial edema   Skin:     General: Skin is warm and dry  Neurological:      General: No focal deficit present  Mental Status: She is alert and oriented to person, place, and time     Psychiatric:         Mood and Affect: Mood normal          Behavior: Behavior normal        --------------------------------------------------------------------------------  ECHO:   Results for orders placed during the hospital encounter of 10/03/19   Echo complete with contrast if indicated    [place] [address] [employer] [address]    Transthoracic Echocardiogram  2D, M-mode, Doppler, and Color Doppler    Study date:  04-Oct-2019    Patient: Adolfo Ferreira  MR number: [id number]  Account number: [de-identified]  : 1934  Age: 80 years  Gender: Female  Status: Outpatient  Location: [facility]  Height: 59 in  Weight: 202 6 lb  BP: 124/ 78 mmHg    Indications: Chest pain    Diagnoses: R07 9 - Chest pain, unspecified    Sonographer:  Monserrat Vann RDCS  Primary Physician:  Alisson Ivy MD  Referring Physician:  Alisson Ivy MD  Group:  [facility]  Interpreting Physician:  Diana Mattson MD    IMPRESSIONS:  1  Mild concentric left ventricular hypertrophy, normal ventricular systolic function, grade 1 diastolic dysfunction  EF around 60%  2  No other significant chamber hypertrophy enlargement  3  Aortic valve sclerosis and calcification with mild aortic valve stenosis and mild-to-moderate aortic valve regurgitation  4  Mitral annular calcification, trace mitral valve regurgitation  5  Trace to mild tricuspid valve regurgitation  6  Mild pulmonary hypertension  Estimated RVSP/PASP is 37 mmHg  7  No pericardial effusion  Compared to report of previous echocardiogram from 2018 there is overall no significant change  SUMMARY    LEFT VENTRICLE:  Normal left ventricular cavity size, mildly increased wall thickness, normal left ventricular systolic function, no regional wall motion abnormality  Ejection fraction is estimated as around 60%  Grade 1 diastolic dysfunction  Normal  estimated left atrial pressure      RIGHT VENTRICLE:  Normal right ventricular size and systolic function  Estimated right ventricular systolic pressure is mildly increased, 37 mmHg  Mild pulmonary hypertension  LEFT ATRIUM:  Normal left atrial cavity size  Intact interatrial septum  RIGHT ATRIUM:  Normal right atrial cavity size  MITRAL VALVE:  Mild mitral annular calcification and leaflet sclerosis, adequate leaflet mobility  Trace mitral valve regurgitation  AORTIC VALVE:  Tricuspid aortic valve with moderate sclerosis and focal calcification, most pronounced on the right coronary cusp  There is mild aortic valve stenosis  Peak transaortic velocity is 2 5 m/sec, mean gradient is 17 mmHg and calculated aortic  valve area is 1 0 1 cm2  Mild to moderate aortic valve regurgitation  TRICUSPID VALVE:  Mild tricuspid valve regurgitation  PULMONIC VALVE:  No pulmonic valve regurgitation  AORTA:  Aortic root and proximal ascending aorta are normal in size on 2D imaging  IVC, HEPATIC VEINS:  Inferior vena cava is normal in size and demonstrates appropriate respiratory phasic changes in diameter  PERICARDIUM:  No pericardial effusion  HISTORY: PRIOR HISTORY: Heart failure Risk factors: hypertension and morbid obesity  PROCEDURE: The study was performed in the [place] [address]  This was a routine study  The transthoracic approach was used  The study included complete 2D imaging, M-mode, complete spectral Doppler, and color Doppler  The heart rate was 68  bpm, at the start of the study  Image quality was adequate  LEFT VENTRICLE: Normal left ventricular cavity size, mildly increased wall thickness, normal left ventricular systolic function, no regional wall motion abnormality  Ejection fraction is estimated as around 60%  Grade 1 diastolic  dysfunction  Normal estimated left atrial pressure  RIGHT VENTRICLE: Normal right ventricular size and systolic function  Estimated right ventricular systolic pressure is mildly increased, 37 mmHg   Mild pulmonary hypertension  LEFT ATRIUM: Normal left atrial cavity size  Intact interatrial septum  RIGHT ATRIUM: Normal right atrial cavity size  MITRAL VALVE: Mild mitral annular calcification and leaflet sclerosis, adequate leaflet mobility  Trace mitral valve regurgitation  AORTIC VALVE: Tricuspid aortic valve with moderate sclerosis and focal calcification, most pronounced on the right coronary cusp  There is mild aortic valve stenosis  Peak transaortic velocity is 2 5 m/sec, mean gradient is 17 mmHg and  calculated aortic valve area is 1 0 1 cm2  Mild to moderate aortic valve regurgitation  TRICUSPID VALVE: Mild tricuspid valve regurgitation  PULMONIC VALVE: No pulmonic valve regurgitation  PERICARDIUM: No pericardial effusion  AORTA: Aortic root and proximal ascending aorta are normal in size on 2D imaging  SYSTEMIC VEINS: IVC: Inferior vena cava is normal in size and demonstrates appropriate respiratory phasic changes in diameter  SYSTEM MEASUREMENT TABLES    2D  %FS: 31 49 %  Ao Diam: 2 31 cm  EDV(Teich): 81 57 ml  EF(Teich): 59 73 %  ESV(Teich): 32 85 ml  IVSd: 1 1 cm  LA Area: 14 81 cm2  LA Diam: 3 08 cm  LVEDV MOD A4C: 143 29 ml  LVEF MOD A4C: 66 36 %  LVESV MOD A4C: 48 2 ml  LVIDd: 4 27 cm  LVIDs: 2 92 cm  LVLd A4C: 8 32 cm  LVLs A4C: 6 61 cm  LVOT Diam: 1 97 cm  LVPWd: 1 1 cm  RA Area: 16 11 cm2  RVIDd: 3 23 cm  SV MOD A4C: 95 08 ml  SV(Teich): 48 72 ml    CW  AV Env  Ti: 288 35 ms  AV VTI: 57 24 cm  AV Vmax: 2 53 m/s  AV Vmean: 1 98 m/s  AV maxP 6 mmHg  AV meanP 96 mmHg  TR Vmax: 2 58 m/s  TR maxP 53 mmHg    MM  TAPSE: 2 69 cm    PW  HEIDI (VTI): 1 13 cm2  HEIDI Vmax: 0 95 cm2  AVAI (VTI): 0 cm2/m2  AVAI Vmax: 0 cm2/m2  E': 0 05 m/s  E/E': 12 33  LVOT Env  Ti: 364 48 ms  LVOT VTI: 21 25 cm  LVOT Vmax: 0 79 m/s  LVOT Vmean: 0 58 m/s  LVOT maxP 5 mmHg  LVOT meanP 54 mmHg  LVSI Dopp: 34 75 ml/m2  LVSV Dopp: 64 64 ml  MV A Norm: 1 04 m/s  MV Dec Ogle: 2 38 m/s2  MV DecT: 277 81 ms  MV E Norm: 0 66 m/s  MV E/A Ratio: 0 64  MV PHT: 80 57 ms  MVA By PHT: 2 73 cm2    [employer] Accredited [facility]    Prepared and electronically signed by    Samuel Tobar MD  Signed 04-Oct-2019 11:58:29         ======================================================    Lab Results   Component Value Date    WBC 4 80 07/23/2020    HGB 11 7 (L) 07/23/2020    HCT 35 1 (L) 07/23/2020    MCV 92 07/23/2020     07/23/2020      Lab Results   Component Value Date    SODIUM 138 09/18/2020    K 4 4 09/18/2020     09/18/2020    CO2 29 09/18/2020    BUN 26 (H) 09/18/2020    CREATININE 0 79 09/18/2020    GLUC 101 (H) 10/04/2019    CALCIUM 9 4 09/18/2020      No results found for: HGBA1C   Lab Results   Component Value Date    CHOL 160 05/11/2018     Lab Results   Component Value Date    HDL 50 10/04/2019    HDL 63 (H) 05/30/2019    HDL 65 (H) 09/12/2018     Lab Results   Component Value Date    LDLCALC 98 10/04/2019    LDLCALC 89 05/30/2019    LDLCALC 98 09/12/2018     Lab Results   Component Value Date    TRIG 110 10/04/2019    TRIG 104 05/30/2019    TRIG 123 09/12/2018     No results found for: CHOLHDL   No results found for: INR, PROTIME     Imaging:   {Results Review Statement:75106}      Portions of the record may have been created with voice recognition software  Occasional wrong word or "sound a like" substitutions may have occurred due to the inherent limitations of voice recognition software  Read the chart carefully and recognize, using context, where substitutions have occurred

## 2021-03-25 NOTE — PROGRESS NOTES
Cardiology Follow Up    Monique Gama  1934  [id number]  [address] [facility] [address]  [phone]  [phone]    1  Nonrheumatic aortic valve stenosis  POCT ECG    Echo complete with contrast if indicated   2  Benign essential hypertension  POCT ECG   3  Chronic diastolic congestive heart failure ([address] )     4  Mixed hyperlipidemia     5  Chronic obstructive pulmonary disease, unspecified COPD type ([facility] )     6  BMI 39 0-39 9,adult         Patient was originally referred from [facility] for evaluation of an abnormal echocardiogram  Echocardiogram demonstrated normal LV systolic function with mild-to-moderate aortic stenosis  Other history includes hypertension and hyperlipidemia  02/09/2018 echocardiogram: Mild aortic stenosis, aortic valve velocity 2 7 Meters/sec  Normal LV systolic function, EF 78%  Grade 1 diastolic dysfunction  Normal PA pressures  05/30/2019 lipid profile: Total cholesterol 173, triglycerides 104, HDL 63, LDL calculated 89, non HDL cholesterol 110  10/04/2019 lipid profile: Total cholesterol 170, triglycerides 110, HDL 50, LDL calculated 98, non HDL cholesterol 120    Interval History:   Ethiopian-speaking woman  Son came along to translate for her  She has aortic stenosis, mild by echocardiogram in 2019  She has no cardiac symptoms  Patient denies chest discomfort or shortness of breath  Patient has no palpitations  Patient denies symptoms of dizziness, lightheadedness or near-syncope/syncope  Patient   Has 1-2+ bilateral leg edema  Patient denies symptoms of orthopnea or paroxysmal nocturnal dyspnea  Discussion/Summary   1  Obtain 2 dimensional echocardiogram for aortic stenosis   2   Return in 6 months    Patient Active Problem List   Diagnosis    Anemia    Aortic stenosis    Benign essential hypertension    Bilateral occipital neuralgia    Carpal tunnel syndrome on both sides    Glaucoma    HLD (hyperlipidemia)    Malignant neoplasm of overlapping sites of left female breast ([facility]    Cataract    Hemorrhoids    Constipation    Chronic diastolic congestive heart failure (HCC)    GERD (gastroesophageal reflux disease)    Anal or rectal pain    Osteoarthritis    Cognitive impairment    Dysuria    COPD (chronic obstructive pulmonary disease) (HCC)    Central perforation of tympanic membrane    Acute pain of right shoulder    Right rotator cuff tear arthropathy    Encounter for removal of sutures    2019 novel coronavirus disease (COVID-19)    Right upper quadrant pain    BMI 39 0-39 9,adult     Past Medical History:   Diagnosis Date    Diverticulosis 2001    [facility]     History of cystocele     Hypertension     Obesity     Positive PPD 1999    treated     Social History     Socioeconomic History    Marital status:       Spouse name: Not on file    Number of children: Not on file    Years of education: Not on file    Highest education level: Not on file   Occupational History    Not on file   Social Needs    Financial resource strain: Not on file    Food insecurity     Worry: Not on file     Inability: Not on file    Transportation needs     Medical: Not on file     Non-medical: Not on file   Tobacco Use    Smoking status: Never Smoker    Smokeless tobacco: Never Used   Substance and Sexual Activity    Alcohol use: Not Currently    Drug use: Not Currently    Sexual activity: Not Currently     Partners: Male   Lifestyle    Physical activity     Days per week: Not on file     Minutes per session: Not on file    Stress: Not on file   Relationships    Social connections     Talks on phone: Not on file     Gets together: Not on file     Attends Jain service: Not on file     Active member of club or organization: Not on file     Attends meetings of clubs or organizations: Not on file     Relationship status: Not on file  Intimate partner violence     Fear of current or ex partner: Not on file     Emotionally abused: Not on file     Physically abused: Not on file     Forced sexual activity: Not on file   Other Topics Concern    Not on file   Social History Narrative    Lives at home with son and 2 grandkids  Family History   Family history unknown: Yes     Past Surgical History:   Procedure Laterality Date    CARDIOVASCULAR STRESS TEST  2001    dobutamine stress test    CHOLECYSTECTOMY  04/2002    COLONOSCOPY      CYSTOCELE REPAIR      HYSTERECTOMY      KNEE SURGERY      knee replacement    UPPER GASTROINTESTINAL ENDOSCOPY         Current Outpatient Medications:     acetaminophen (TYLENOL) 650 mg CR tablet, 1 tablet as needed , Disp: , Rfl:     albuterol (PROVENTIL HFA,VENTOLIN HFA) 90 mcg/act inhaler, Inhale 2 puffs every 6 (six) hours as needed for wheezing, Disp: 1 Inhaler, Rfl: 0    albuterol (VENTOLIN HFA) 90 mcg/act inhaler, Inhale 2 puffs 4 (four) times a day, Disp: 18 g, Rfl: 4    amitriptyline (ELAVIL) 25 mg tablet, TAKE ONE TABLET BY MOUTH DAILY AT BEDTIME , Disp: , Rfl:     atorvastatin (LIPITOR) 20 mg tablet, TAKE 1 TABLET BY MOUTH ONCE DAILY  , Disp: 90 tablet, Rfl: 2    benzonatate (TESSALON PERLES) 100 mg capsule, Take 1 capsule (100 mg total) by mouth 3 (three) times a day as needed for cough, Disp: 20 capsule, Rfl: 0    Calcium Carbonate-Vitamin D (CALCIUM 500 + D) 500-125 MG-UNIT TABS, every 24 hours, Disp: , Rfl:     docusate sodium (COLACE) 100 mg capsule, Take 1 capsule (100 mg total) by mouth 2 (two) times a day, Disp: 60 capsule, Rfl: 1    Elastic Bandages & Supports (Medical Compression Stockings) MISC, by Does not apply route daily High knee compression stockings 20-30 MMHG, Disp: 2 each, Rfl: 0    famotidine (PEPCID) 20 mg tablet, TAKE 1 TABLET BY MOUTH TWICE DAILY  , Disp: 180 tablet, Rfl: 0    fluticasone (FLONASE) 50 mcg/act nasal spray, 2 sprays into each nostril daily, Disp: 1 Bottle, Rfl: 3    fluticasone-salmeterol (Advair Diskus) 500-50 mcg/dose inhaler, Inhale 1 puff 2 (two) times a day Rinse mouth after use, Disp: 60 each, Rfl: 2    furosemide (LASIX) 40 mg tablet, TAKE 1 TABLET BY MOUTH ONCE DAILY  , Disp: 90 tablet, Rfl: 0    guaiFENesin (ROBITUSSIN) 100 MG/5ML oral liquid, Take 10 mL (200 mg total) by mouth 3 (three) times a day as needed for cough, Disp: 120 mL, Rfl: 0    hydrochlorothiazide (HYDRODIURIL) 12 5 mg tablet, TAKE 1 TABLET BY MOUTH ONCE DAILY  , Disp: 90 tablet, Rfl: 0    hydrocortisone 2 5 % cream, Apply topically 4 (four) times a day as needed, Disp: , Rfl:     latanoprost (XALATAN) 0 005 % ophthalmic solution, , Disp: , Rfl:     lidocaine (XYLOCAINE) 5 % ointment, Apply topically 2 (two) times a day as needed for mild pain, Disp: 35 44 g, Rfl: 0    lisinopril (ZESTRIL) 40 mg tablet, TAKE 1 TABLET BY MOUTH ONCE DAILY  , Disp: 90 tablet, Rfl: 2    Misc  Devices (CANE) MISC, Four point aluminum cane , Disp: , Rfl:     senna-docusate sodium (SENOKOT-S) 8 6-50 mg per tablet, Take 1 tablet by mouth daily, Disp: 30 tablet, Rfl: 1    sucralfate (Carafate) 1 g/10 mL suspension, TAKE 10 ML BY MOUTH 4 TIMES A DAY, Disp: 420 mL, Rfl: 3    timolol (BETIMOL) 0 5 % ophthalmic solution, Administer 1 drop to both eyes 2 (two) times a day , Disp: , Rfl:     timolol (TIMOPTIC) 0 5 % ophthalmic solution, , Disp: , Rfl:     TOVIAZ 8 MG TB24, TAKE 1 TABLET BY MOUTH ONCE DAILY  , Disp: 90 tablet, Rfl: 2  No Known Allergies    Results for orders placed or performed in visit on 03/25/21   POCT ECG    Narrative     Normal sinus rhythm at a rate of 91 beats per minute  Cannot rule out an old anterior wall myocardial infarction  Abnormal EKG  Review of Systems:  Review of Systems   Respiratory: Negative for cough, choking, chest tightness, shortness of breath and wheezing  Cardiovascular: Negative for chest pain, palpitations and leg swelling     Musculoskeletal: Negative for gait problem  Skin: Negative for rash  Neurological: Negative for dizziness, tremors, syncope, weakness, light-headedness, numbness and headaches  Psychiatric/Behavioral: Negative for agitation and behavioral problems  The patient is not hyperactive  Physical Exam:  /80   Pulse 91   Ht 5' (1 524 m)   Wt 92 8 kg (204 lb 9 6 oz)   BMI 39 96 kg/m²   Physical Exam  Constitutional:       General: She is not in acute distress  Appearance: She is well-developed  HENT:      Head: Normocephalic and atraumatic  Neck:      Thyroid: No thyromegaly  Vascular: No carotid bruit or JVD  Cardiovascular:      Rate and Rhythm: Normal rate and regular rhythm  Heart sounds: Murmur present  No friction rub  No gallop  Comments:  Grade 2/6 early to mid peaking systolic ejection murmur  Pulmonary:      Effort: No respiratory distress  Breath sounds: No wheezing, rhonchi or rales  Abdominal:      General: Bowel sounds are normal       Palpations: Abdomen is soft  Musculoskeletal:      Right lower leg: Edema present  Left lower leg: Edema present  Comments:  1 to 2+ pretibial edema   Skin:     General: Skin is warm and dry  Neurological:      General: No focal deficit present  Mental Status: She is alert and oriented to person, place, and time     Psychiatric:         Mood and Affect: Mood normal          Behavior: Behavior normal        --------------------------------------------------------------------------------  ECHO:   Results for orders placed during the hospital encounter of 10/03/19   Echo complete with contrast if indicated    Narrative [employer] [address]    Transthoracic Echocardiogram  2D, M-mode, Doppler, and Color Doppler    Study date:  04-Oct-2019    Patient: Manuela López  MR number: [id number]  Account number: [de-identified]  : 1934  Age: 80 years  Gender: Female  Status: Outpatient  Location: [facility]  Height: 59 in  Weight: 202 6 lb  BP: 124/ 78 mmHg    Indications: Chest pain    Diagnoses: R07 9 - Chest pain, unspecified    Sonographer:  Caty Molina RDCS  Primary Physician:  Ashutosh Stinson MD  Referring Physician:  Ashutosh Stinson MD  Group:  [facility]  Interpreting Physician:  Bernard Cisneros MD    IMPRESSIONS:  1  Mild concentric left ventricular hypertrophy, normal ventricular systolic function, grade 1 diastolic dysfunction  EF around 60%  2  No other significant chamber hypertrophy enlargement  3  Aortic valve sclerosis and calcification with mild aortic valve stenosis and mild-to-moderate aortic valve regurgitation  4  Mitral annular calcification, trace mitral valve regurgitation  5  Trace to mild tricuspid valve regurgitation  6  Mild pulmonary hypertension  Estimated RVSP/PASP is 37 mmHg  7  No pericardial effusion  Compared to report of previous echocardiogram from December 6, 2018 there is overall no significant change  SUMMARY    LEFT VENTRICLE:  Normal left ventricular cavity size, mildly increased wall thickness, normal left ventricular systolic function, no regional wall motion abnormality  Ejection fraction is estimated as around 60%  Grade 1 diastolic dysfunction  Normal  estimated left atrial pressure  RIGHT VENTRICLE:  Normal right ventricular size and systolic function  Estimated right ventricular systolic pressure is mildly increased, 37 mmHg  Mild pulmonary hypertension  LEFT ATRIUM:  Normal left atrial cavity size  Intact interatrial septum  RIGHT ATRIUM:  Normal right atrial cavity size  MITRAL VALVE:  Mild mitral annular calcification and leaflet sclerosis, adequate leaflet mobility  Trace mitral valve regurgitation  AORTIC VALVE:  Tricuspid aortic valve with moderate sclerosis and focal calcification, most pronounced on the right coronary cusp  There is mild aortic valve stenosis  Peak transaortic velocity is 2 5 m/sec, mean gradient is 17 mmHg and calculated aortic  valve area is 1 0 1 cm2  Mild to moderate aortic valve regurgitation  TRICUSPID VALVE:  Mild tricuspid valve regurgitation  PULMONIC VALVE:  No pulmonic valve regurgitation  AORTA:  Aortic root and proximal ascending aorta are normal in size on 2D imaging  IVC, HEPATIC VEINS:  Inferior vena cava is normal in size and demonstrates appropriate respiratory phasic changes in diameter  PERICARDIUM:  No pericardial effusion  HISTORY: PRIOR HISTORY: Heart failure Risk factors: hypertension and morbid obesity  PROCEDURE: The study was performed in the [place] [address]  This was a routine study  The transthoracic approach was used  The study included complete 2D imaging, M-mode, complete spectral Doppler, and color Doppler  The heart rate was 68  bpm, at the start of the study  Image quality was adequate  LEFT VENTRICLE: Normal left ventricular cavity size, mildly increased wall thickness, normal left ventricular systolic function, no regional wall motion abnormality  Ejection fraction is estimated as around 60%  Grade 1 diastolic  dysfunction  Normal estimated left atrial pressure  RIGHT VENTRICLE: Normal right ventricular size and systolic function  Estimated right ventricular systolic pressure is mildly increased, 37 mmHg  Mild pulmonary hypertension  LEFT ATRIUM: Normal left atrial cavity size  Intact interatrial septum  RIGHT ATRIUM: Normal right atrial cavity size  MITRAL VALVE: Mild mitral annular calcification and leaflet sclerosis, adequate leaflet mobility  Trace mitral valve regurgitation  AORTIC VALVE: Tricuspid aortic valve with moderate sclerosis and focal calcification, most pronounced on the right coronary cusp  There is mild aortic valve stenosis  Peak transaortic velocity is 2 5 m/sec, mean gradient is 17 mmHg and  calculated aortic valve area is 1 0 1 cm2   Mild to moderate aortic valve regurgitation  TRICUSPID VALVE: Mild tricuspid valve regurgitation  PULMONIC VALVE: No pulmonic valve regurgitation  PERICARDIUM: No pericardial effusion  AORTA: Aortic root and proximal ascending aorta are normal in size on 2D imaging  SYSTEMIC VEINS: IVC: Inferior vena cava is normal in size and demonstrates appropriate respiratory phasic changes in diameter  SYSTEM MEASUREMENT TABLES    2D  %FS: 31 49 %  Ao Diam: 2 31 cm  EDV(Teich): 81 57 ml  EF(Teich): 59 73 %  ESV(Teich): 32 85 ml  IVSd: 1 1 cm  LA Area: 14 81 cm2  LA Diam: 3 08 cm  LVEDV MOD A4C: 143 29 ml  LVEF MOD A4C: 66 36 %  LVESV MOD A4C: 48 2 ml  LVIDd: 4 27 cm  LVIDs: 2 92 cm  LVLd A4C: 8 32 cm  LVLs A4C: 6 61 cm  LVOT Diam: 1 97 cm  LVPWd: 1 1 cm  RA Area: 16 11 cm2  RVIDd: 3 23 cm  SV MOD A4C: 95 08 ml  SV(Teich): 48 72 ml    CW  AV Env  Ti: 288 35 ms  AV VTI: 57 24 cm  AV Vmax: 2 53 m/s  AV Vmean: 1 98 m/s  AV maxP 6 mmHg  AV meanP 96 mmHg  TR Vmax: 2 58 m/s  TR maxP 53 mmHg    MM  TAPSE: 2 69 cm    PW  HEIDI (VTI): 1 13 cm2  HEIDI Vmax: 0 95 cm2  AVAI (VTI): 0 cm2/m2  AVAI Vmax: 0 cm2/m2  E': 0 05 m/s  E/E': 12 33  LVOT Env  Ti: 364 48 ms  LVOT VTI: 21 25 cm  LVOT Vmax: 0 79 m/s  LVOT Vmean: 0 58 m/s  LVOT maxP 5 mmHg  LVOT meanP 54 mmHg  LVSI Dopp: 34 75 ml/m2  LVSV Dopp: 64 64 ml  MV A Norm: 1 04 m/s  MV Dec Humphreys: 2 38 m/s2  MV DecT: 277 81 ms  MV E Norm: 0 66 m/s  MV E/A Ratio: 0 64  MV PHT: 80 57 ms  MVA By PHT: 2 73 cm2    [facility]    Prepared and electronically signed by    Kenna Kan MD  Signed 04-Oct-2019 11:58:29         ======================================================    Lab Results   Component Value Date    WBC 4 80 2020    HGB 11 7 (L) 2020    HCT 35 1 (L) 2020    MCV 92 2020     2020      Lab Results   Component Value Date    SODIUM 138 2020    K 4 4 2020     2020    CO2 29 09/18/2020    BUN 26 (H) 09/18/2020    CREATININE 0 79 09/18/2020    GLUC 101 (H) 10/04/2019    CALCIUM 9 4 09/18/2020      No results found for: HGBA1C   Lab Results   Component Value Date    CHOL 160 05/11/2018     Lab Results   Component Value Date    HDL 50 10/04/2019    HDL 63 (H) 05/30/2019    HDL 65 (H) 09/12/2018     Lab Results   Component Value Date    LDLCALC 98 10/04/2019    LDLCALC 89 05/30/2019    LDLCALC 98 09/12/2018     Lab Results   Component Value Date    TRIG 110 10/04/2019    TRIG 104 05/30/2019    TRIG 123 09/12/2018     No results found for: CHOLHDL   No results found for: INR, PROTIME     Imaging:   I have personally reviewed pertinent reports  Portions of the record may have been created with voice recognition software  Occasional wrong word or "sound a like" substitutions may have occurred due to the inherent limitations of voice recognition software  Read the chart carefully and recognize, using context, where substitutions have occurred

## 2021-03-26 DIAGNOSIS — N32.81 OVERACTIVE BLADDER: ICD-10-CM

## 2021-03-26 DIAGNOSIS — I50.9 CHF (CONGESTIVE HEART FAILURE) (HCC): ICD-10-CM

## 2021-03-26 DIAGNOSIS — E78.2 MIXED HYPERLIPIDEMIA: ICD-10-CM

## 2021-03-26 DIAGNOSIS — R10.11 RIGHT UPPER QUADRANT PAIN: ICD-10-CM

## 2021-03-26 DIAGNOSIS — I10 BENIGN ESSENTIAL HYPERTENSION: ICD-10-CM

## 2021-03-26 DIAGNOSIS — K21.9 GASTROESOPHAGEAL REFLUX DISEASE: ICD-10-CM

## 2021-03-26 DIAGNOSIS — K59.09 OTHER CONSTIPATION: ICD-10-CM

## 2021-03-26 RX ORDER — FESOTERODINE FUMARATE 8 MG/1
TABLET, FILM COATED, EXTENDED RELEASE ORAL
Qty: 90 TABLET | Refills: 1 | Status: SHIPPED | OUTPATIENT
Start: 2021-03-26 | End: 2021-09-15

## 2021-03-26 RX ORDER — FUROSEMIDE 40 MG/1
40 TABLET ORAL DAILY
Qty: 90 TABLET | Refills: 1 | Status: SHIPPED | OUTPATIENT
Start: 2021-03-26 | End: 2021-07-19 | Stop reason: SDUPTHER

## 2021-03-26 RX ORDER — ATORVASTATIN CALCIUM 20 MG/1
TABLET, FILM COATED ORAL
Qty: 90 TABLET | Refills: 1 | Status: SHIPPED | OUTPATIENT
Start: 2021-03-26 | End: 2021-09-15

## 2021-03-26 RX ORDER — HYDROCHLOROTHIAZIDE 12.5 MG/1
TABLET ORAL
Qty: 90 TABLET | Refills: 1 | Status: SHIPPED | OUTPATIENT
Start: 2021-03-26 | End: 2021-07-19 | Stop reason: SDUPTHER

## 2021-03-26 RX ORDER — LISINOPRIL 40 MG/1
TABLET ORAL
Qty: 90 TABLET | Refills: 1 | Status: SHIPPED | OUTPATIENT
Start: 2021-03-26 | End: 2021-07-19 | Stop reason: SDUPTHER

## 2021-03-26 RX ORDER — ASPIRIN 81 MG
TABLET, DELAYED RELEASE (ENTERIC COATED) ORAL
Qty: 30 TABLET | Refills: 2 | Status: SHIPPED | OUTPATIENT
Start: 2021-03-26 | End: 2021-06-14

## 2021-03-26 RX ORDER — FAMOTIDINE 20 MG/1
TABLET, FILM COATED ORAL
Qty: 180 TABLET | Refills: 2 | Status: SHIPPED | OUTPATIENT
Start: 2021-03-26 | End: 2021-07-19 | Stop reason: SDUPTHER

## 2021-04-15 ENCOUNTER — OFFICE VISIT (OUTPATIENT)
Dept: OTOLARYNGOLOGY | Facility: CLINIC | Age: 86
End: 2021-04-15
Payer: COMMERCIAL

## 2021-04-15 ENCOUNTER — OFFICE VISIT (OUTPATIENT)
Dept: AUDIOLOGY | Facility: CLINIC | Age: 86
End: 2021-04-15
Payer: COMMERCIAL

## 2021-04-15 VITALS
HEART RATE: 72 BPM | HEIGHT: 61 IN | WEIGHT: 204 LBS | RESPIRATION RATE: 16 BRPM | BODY MASS INDEX: 38.51 KG/M2 | DIASTOLIC BLOOD PRESSURE: 80 MMHG | TEMPERATURE: 98.3 F | SYSTOLIC BLOOD PRESSURE: 130 MMHG

## 2021-04-15 DIAGNOSIS — H91.93 BILATERAL HEARING LOSS, UNSPECIFIED HEARING LOSS TYPE: ICD-10-CM

## 2021-04-15 DIAGNOSIS — H93.19 TINNITUS, UNSPECIFIED LATERALITY: ICD-10-CM

## 2021-04-15 DIAGNOSIS — H74.8X2 TYPE A-S TYMPANOGRAM, LEFT: Primary | ICD-10-CM

## 2021-04-15 DIAGNOSIS — H90.3 ASYMMETRICAL SENSORINEURAL HEARING LOSS: Primary | ICD-10-CM

## 2021-04-15 DIAGNOSIS — J30.1 SEASONAL ALLERGIC RHINITIS DUE TO POLLEN: ICD-10-CM

## 2021-04-15 PROCEDURE — 1160F RVW MEDS BY RX/DR IN RCRD: CPT | Performed by: OTOLARYNGOLOGY

## 2021-04-15 PROCEDURE — 92567 TYMPANOMETRY: CPT | Performed by: AUDIOLOGIST

## 2021-04-15 PROCEDURE — 99213 OFFICE O/P EST LOW 20 MIN: CPT | Performed by: OTOLARYNGOLOGY

## 2021-04-15 PROCEDURE — 1036F TOBACCO NON-USER: CPT | Performed by: OTOLARYNGOLOGY

## 2021-04-15 PROCEDURE — 92556 SPEECH AUDIOMETRY COMPLETE: CPT | Performed by: AUDIOLOGIST

## 2021-04-15 NOTE — PROGRESS NOTES
Layton Zurita 80 y o  female MRN: [id number]  Unit/Bed#:  Encounter: [id number]            History of Present Illness     Reason for Visit[de-identified] hearing loss    HPI: Layton Zurita is a 80y o  year old female who was last seen on 11/21/2019  She does have  Sensorineural hearing loss, asymmetric  MRI [facility] 2015: Normal posterior fossa  No evidence of a vestibular schwannoma  Mildly increased T2 signal abnormalities in the cerebral white matter which are most likely secondary to small vessel disease  She had hearing aids fitted in 11/2019  CT scan head on   10/25/20  No intracranial mass, mass effect or midline shift  No CT signs of acute infarction  No acute parenchymal hemorrhage  She lost hearing aids and recently on admission to the hospital for [facility], she reports she is very upset with the ongoing tinnitus, and her difficulty hearing  Review of Systems   Constitutional: Negative  HENT: Positive for hearing loss and tinnitus  Eyes: Negative  Respiratory: Negative  Cardiovascular: Negative  Gastrointestinal: Negative  Endocrine: Negative  Genitourinary: Negative  Musculoskeletal: Negative  Skin: Negative  Allergic/Immunologic: Negative  Neurological: Negative  Hematological: Negative  Psychiatric/Behavioral: Negative        Revision of Systems:    Complete review done, only positive for the symptoms described in the H&P section above      Historical Information   Past Medical History:   Diagnosis Date    Diverticulosis 2001    Heart failure ([address] )     History of cystocele     Hypertension     Obesity     Positive PPD 1999    treated     Past Surgical History:   Procedure Laterality Date    CARDIOVASCULAR STRESS TEST  2001    dobutamine stress test    CHOLECYSTECTOMY  04/2002    COLONOSCOPY      CYSTOCELE REPAIR      HYSTERECTOMY      KNEE SURGERY      knee replacement    UPPER GASTROINTESTINAL ENDOSCOPY       Social History Social History     Substance and Sexual Activity   Alcohol Use Not Currently     Social History     Substance and Sexual Activity   Drug Use Not Currently     Social History     Tobacco Use   Smoking Status Never Smoker   Smokeless Tobacco Never Used     Family History:   Family History   Family history unknown: Yes       Meds/Allergies   No current facility-administered medications for this visit  No Known Allergies    Objective       Physical Exam   Blood pressure 130/80, pulse 72, temperature 98 3 °F (36 8 °C), temperature source Temporal, resp  rate 16, height 5' 1" (1 549 m), weight 92 5 kg (204 lb), not currently breastfeeding  Constitutional: Oriented to person, place, and time  Well-developed and well-nourished, no apparent distress, non-toxic appearance  Cooperative, able to hear and answer questions without difficulty  Voice: Normal voice quality  Head: Normocephalic, atraumatic  No scars, masses or lesions  Face: Symmetric, no edema, no sinus tenderness  Eyes: Vision grossly intact, extra-ocular movement intact  Right Ear: External ear normal   Auditory canal clear  Tympanic membrane with myringosclerosis with old perforation healed  No fluid present  No post-auricular erythema or tenderness  Left Ear: External ear normal   Auditory canal clear  Tympanic membrane well-appearing, without retraction or scarring  No fluid present  No post-auricular erythema or tenderness  Nose: Septum mild left deviation, mucosa appears dry  Turbinates normal size, no edema  No polyps or masses, no discharge evident  Oral cavity:  Lips normal, no mucosal lesions  Edentulous, uses superior and inferior dentures  Gingiva normal in appearance  Mucosa moist,  Tongue mobile, floor of mouth normal   Hard palate unremarkable  No masses or lesions  Oropharynx: Uvula is midline, sot palate normal   Unremarkable oropharyngeal inlet  Tonsils unremarkable  Posterior pharyngeal wall clear   No masses or lesions  Salivary glands:  Parotid glands and submandibular glands symmetric, no enlargement or tenderness  Neck: Normal laryngeal elevation with swallow  Trachea midline  No masses or lesions  No palpable adenopathy  Thyroid: normal in size, and consistency, unremarkable without tenderness or palpable nodules  Pulmonary/Chest: Normal effort and rate  No respiratory distress  Musculoskeletal: Normal range of motion  Neurological: Cranial nerves 2-12 intact  Skin: Skin is warm and dry  Psychiatric: Normal mood and affect  Lab Results: CBC: No results found for: WBC, HGB, HCT, MCV, PLT, ADJUSTEDWBC, MCH, MCHC, RDW, MPV, NRBC, CMP: No results found for: NA, K, CL, CO2, ANIONGAP, BUN, CREATININE, GLUCOSE, CALCIUM, AST, ALT, ALKPHOS, PROT, BILITOT, EGFR  Imaging Studies: I have personally reviewed images on the PACS and reports on Care everywhere as described on H&P    Audiology:  [facility] 09/12/2018 reveals asymmetric sensorineural hearing loss pure tone average of 60 decibels right and 35 decibels left hand with SRT at 50 and 35 decibels respectively    Today only able to do the speech audiometry, reveals SRT is at 40 and 60 decibels  Patient reports she is too fatigued to complete the exam     Assessment:  1  Asymmetrical sensorineural hearing loss     2  Tinnitus, unspecified laterality     3   Seasonal allergic rhinitis due to pollen           Plan:  Patient needs new hearing aids, refer to [facility] for evaluation and fitting new hearing aids

## 2021-04-19 ENCOUNTER — TRANSCRIBE ORDERS (OUTPATIENT)
Dept: ADMINISTRATIVE | Facility: HOSPITAL | Age: 86
End: 2021-04-19

## 2021-04-19 ENCOUNTER — HOSPITAL ENCOUNTER (OUTPATIENT)
Dept: RADIOLOGY | Facility: HOSPITAL | Age: 86
Discharge: HOME | End: 2021-04-19
Attending: PODIATRIST
Payer: COMMERCIAL

## 2021-04-19 DIAGNOSIS — M20.11 HALLUX VALGUS (ACQUIRED), RIGHT FOOT: ICD-10-CM

## 2021-04-19 DIAGNOSIS — M20.12 HALLUX VALGUS (ACQUIRED), LEFT FOOT: ICD-10-CM

## 2021-04-19 DIAGNOSIS — M20.12 HALLUX VALGUS (ACQUIRED), LEFT FOOT: Primary | ICD-10-CM

## 2021-04-19 PROCEDURE — 73630 X-RAY EXAM OF FOOT: CPT

## 2021-04-26 DIAGNOSIS — K21.9 GASTROESOPHAGEAL REFLUX DISEASE: ICD-10-CM

## 2021-04-26 RX ORDER — SUCRALFATE ORAL 1 G/10ML
SUSPENSION ORAL
Qty: 420 ML | Refills: 0 | Status: SHIPPED | OUTPATIENT
Start: 2021-04-26 | End: 2021-06-03

## 2021-04-29 ENCOUNTER — HOSPITAL ENCOUNTER (OUTPATIENT)
Dept: NON INVASIVE DIAGNOSTICS | Facility: HOSPITAL | Age: 86
Discharge: HOME | End: 2021-04-29
Payer: COMMERCIAL

## 2021-04-29 DIAGNOSIS — I35.0 NONRHEUMATIC AORTIC VALVE STENOSIS: ICD-10-CM

## 2021-04-29 PROCEDURE — 93306 TTE W/DOPPLER COMPLETE: CPT

## 2021-04-29 PROCEDURE — 93306 TTE W/DOPPLER COMPLETE: CPT | Performed by: INTERNAL MEDICINE

## 2021-05-13 ENCOUNTER — TELEPHONE (OUTPATIENT)
Dept: CARDIOLOGY CLINIC | Facility: CLINIC | Age: 86
End: 2021-05-13

## 2021-05-13 NOTE — TELEPHONE ENCOUNTER
----- Message from Curtis Montoya MD sent at 5/13/2021  6:59 AM EDT -----    Please call patient and talked to son who speaks [place]  Tell him that his a recent echocardiogram demonstrates that her problem with  her aortic valve continues to be mild and has not changed much since  2019  We will continue to watch    A next appointment is 6 months since her last appointment   ----- Message -----  From: Interface, Radiology Results In  Sent: 4/29/2021   4:31 PM EDT  To: Curtis Montoya MD

## 2021-05-17 ENCOUNTER — TELEPHONE (OUTPATIENT)
Dept: CARDIOLOGY CLINIC | Facility: CLINIC | Age: 86
End: 2021-05-17

## 2021-05-17 NOTE — TELEPHONE ENCOUNTER
----- Message from Jose Guadalupe Alvarez MD sent at 5/13/2021  6:59 AM EDT -----    Please call patient and talked to son who speaks [place]  Tell him that his a recent echocardiogram demonstrates that her problem with  her aortic valve continues to be mild and has not changed much since  2019  We will continue to watch    A next appointment is 6 months since her last appointment   ----- Message -----  From: Interface, Radiology Results In  Sent: 4/29/2021   4:31 PM EDT  To: Jose Guadalupe Alvarez MD

## 2021-05-20 ENCOUNTER — OFFICE VISIT (OUTPATIENT)
Dept: OBGYN CLINIC | Facility: MEDICAL CENTER | Age: 86
End: 2021-05-20
Payer: COMMERCIAL

## 2021-05-20 VITALS
DIASTOLIC BLOOD PRESSURE: 75 MMHG | HEIGHT: 61 IN | WEIGHT: 204 LBS | HEART RATE: 91 BPM | BODY MASS INDEX: 38.51 KG/M2 | SYSTOLIC BLOOD PRESSURE: 122 MMHG

## 2021-05-20 DIAGNOSIS — M75.101 RIGHT ROTATOR CUFF TEAR ARTHROPATHY: Primary | ICD-10-CM

## 2021-05-20 DIAGNOSIS — M12.811 RIGHT ROTATOR CUFF TEAR ARTHROPATHY: Primary | ICD-10-CM

## 2021-05-20 PROCEDURE — 20610 DRAIN/INJ JOINT/BURSA W/O US: CPT | Performed by: ORTHOPAEDIC SURGERY

## 2021-05-20 PROCEDURE — 99213 OFFICE O/P EST LOW 20 MIN: CPT | Performed by: ORTHOPAEDIC SURGERY

## 2021-05-20 PROCEDURE — 1036F TOBACCO NON-USER: CPT | Performed by: ORTHOPAEDIC SURGERY

## 2021-05-20 PROCEDURE — 1160F RVW MEDS BY RX/DR IN RCRD: CPT | Performed by: ORTHOPAEDIC SURGERY

## 2021-05-20 RX ADMIN — METHYLPREDNISOLONE ACETATE 1 ML: 40 INJECTION, SUSPENSION INTRA-ARTICULAR; INTRALESIONAL; INTRAMUSCULAR; SOFT TISSUE at 11:52

## 2021-05-20 RX ADMIN — LIDOCAINE HYDROCHLORIDE 3 ML: 10 INJECTION, SOLUTION INFILTRATION; PERINEURAL at 11:52

## 2021-05-20 NOTE — PROGRESS NOTES
Ortho Sports Medicine Shoulder Visit     Assesment:   right shoulder with rotator cuff arthropathy    Plan:    The patient continues to find relief from periodic subacromial steroid injections  The patient does not wish to pursue total shoulder arthroplasty  She should follow up in 3 months for possible repeat subacromial steroid injection  Conservative treatment:    Ice to shoulder 1-2 times daily, for 20 minutes at a time  Imaging: All imaging from today was reviewed by myself and explained to the patient  Injection:    No Injection planned at this time  A corticosteroid injection of the subacromial space was performed  Surgery:     No surgery is recommended at this point, continue with conservative treatment plan as noted  History of Present Illness: The patient is returns for follow up of right shoulder pain  She is s/p right subacromial steroid injection with about 2 weeks of relief, 1/28/2021  Today she complains of generalized right shoulder pain and stiffness  Overhead movements aggravate while rest alleviates  Pain is improved by rest and ice  Pain is aggravated by overhead activity and reaching back  The patient denies weakness  The patient has tried rest, ice and injection  I have reviewed the past medical, surgical, social and family history, medications and allergies as documented in the EMR  Review of systems: ROS is negative other than that noted in the HPI  Constitutional: Negative for fatigue and fever     Cardiovascular: Negative for chest pain  Pulmonary: negative for shortness of breath    PMH/PSH:  Past Medical History:   Diagnosis Date    Diverticulosis 2001    Heart failure ([address] )     History of cystocele     Hypertension     Obesity     Positive PPD 1999    treated     Past Surgical History:   Procedure Laterality Date    CARDIOVASCULAR STRESS TEST  2001    dobutamine stress test    CHOLECYSTECTOMY  04/2002    COLONOSCOPY      CYSTOCELE REPAIR      HYSTERECTOMY      KNEE SURGERY      knee replacement    UPPER GASTROINTESTINAL ENDOSCOPY          Physical Exam:    Blood pressure 122/75, pulse 91, height 5' 1" (1 549 m), weight 92 5 kg (204 lb), not currently breastfeeding  General/Constitutional: NAD, well developed, well nourished  HENT: Normocephalic, atraumatic  CV: Intact distal pulses, regular rate  Resp: No respiratory distress or labored breathing  Lymphatic: No lymphadenopathy palpated  Neuro: Alert and Oriented x 3, no focal deficits  Psych: Normal mood, normal affect, normal judgement, normal behavior  Skin: Warm, dry, no rashes, no erythema     Shoulder Exam (focused): Shoulder focused exam:       RIGHT LEFT    Scapula Atrophy Negative Negative     Winging Negative Negative     Protraction Negative Negative    Rotator cuff SS 4+/5 5/5/5     IS 5/5/5 5/5/5     SubS 5/5/5 5/5/5    ROM  170     ER0 45 60     ER90 90 90     IR90 40 40     IRb Iliac crest  T6    TTP: AC Positive  Negative     Biceps Negative Negative     Coracoid Negative Negative    Special Tests: O'Briens Negative Negative     Juarez-shear Negative Negative     Cross body Adduction Negative Negative     Speeds  Negative Negative     Juan David's Negative Negative     Whipple Negative Negative       Neer Negative Negative     Ramirez Negative Negative    Instability: Apprehension & relocation not tested not tested     Load & shift not tested not tested    Other: Crank Negative Negative               UE NV Exam: +2 Radial pulses bilaterally  Sensation intact to light touch C5-T1 bilaterally, Radial/median/ulnar nerve motor intact    Cervical ROM is full without pain, numbness or tingling      Shoulder Imaging      None performed today     Large joint arthrocentesis: R subacromial bursa  Universal Protocol:  Consent: Verbal consent obtained    Risks and benefits: risks, benefits and alternatives were discussed  Consent given by: patient  Time out: Immediately prior to procedure a "time out" was called to verify the correct patient, procedure, equipment, support staff and site/side marked as required    Timeout called at: 5/20/2021 11:50 AM   Patient understanding: patient states understanding of the procedure being performed  Site marked: the operative site was marked  Patient identity confirmed: verbally with patient    Supporting Documentation  Indications: pain   Procedure Details  Location: shoulder - R subacromial bursa  Preparation: Patient was prepped and draped in the usual sterile fashion  Needle size: 22 G  Ultrasound guidance: no  Approach: posterior  Medications administered: 1 mL methylPREDNISolone acetate 40 mg/mL; 3 mL lidocaine 1 %    Patient tolerance: patient tolerated the procedure well with no immediate complications  Dressing:  Sterile dressing applied            Scribe Attestation    I,:  Tani Lopez am acting as a scribe while in the presence of the attending physician :       I,:  Shannan Merino DO personally performed the services described in this documentation    as scribed in my presence :

## 2021-05-21 RX ORDER — LIDOCAINE HYDROCHLORIDE 10 MG/ML
3 INJECTION, SOLUTION INFILTRATION; PERINEURAL
Status: COMPLETED | OUTPATIENT
Start: 2021-05-20 | End: 2021-05-20

## 2021-05-21 RX ORDER — METHYLPREDNISOLONE ACETATE 40 MG/ML
1 INJECTION, SUSPENSION INTRA-ARTICULAR; INTRALESIONAL; INTRAMUSCULAR; SOFT TISSUE
Status: COMPLETED | OUTPATIENT
Start: 2021-05-20 | End: 2021-05-20

## 2021-06-03 DIAGNOSIS — K21.9 GASTROESOPHAGEAL REFLUX DISEASE: ICD-10-CM

## 2021-06-03 RX ORDER — SUCRALFATE ORAL 1 G/10ML
SUSPENSION ORAL
Qty: 420 ML | Refills: 0 | Status: SHIPPED | OUTPATIENT
Start: 2021-06-03 | End: 2021-07-14

## 2021-06-07 ENCOUNTER — VBI (OUTPATIENT)
Dept: ADMINISTRATIVE | Facility: OTHER | Age: 86
End: 2021-06-07

## 2021-06-11 DIAGNOSIS — R10.11 RIGHT UPPER QUADRANT PAIN: ICD-10-CM

## 2021-06-11 DIAGNOSIS — K59.09 OTHER CONSTIPATION: ICD-10-CM

## 2021-06-14 RX ORDER — ASPIRIN 81 MG
TABLET, DELAYED RELEASE (ENTERIC COATED) ORAL
Qty: 90 TABLET | Refills: 0 | Status: SHIPPED | OUTPATIENT
Start: 2021-06-14 | End: 2021-09-14

## 2021-07-14 DIAGNOSIS — K21.9 GASTROESOPHAGEAL REFLUX DISEASE: ICD-10-CM

## 2021-07-14 RX ORDER — SUCRALFATE ORAL 1 G/10ML
SUSPENSION ORAL
Qty: 420 ML | Refills: 0 | Status: SHIPPED | OUTPATIENT
Start: 2021-07-14 | End: 2021-07-19 | Stop reason: SDUPTHER

## 2021-07-19 ENCOUNTER — OFFICE VISIT (OUTPATIENT)
Dept: FAMILY MEDICINE CLINIC | Facility: CLINIC | Age: 86
End: 2021-07-19

## 2021-07-19 VITALS
BODY MASS INDEX: 40.59 KG/M2 | OXYGEN SATURATION: 97 % | HEART RATE: 104 BPM | TEMPERATURE: 98 F | HEIGHT: 61 IN | SYSTOLIC BLOOD PRESSURE: 120 MMHG | RESPIRATION RATE: 16 BRPM | WEIGHT: 215 LBS | DIASTOLIC BLOOD PRESSURE: 62 MMHG

## 2021-07-19 DIAGNOSIS — M79.675 PAIN OF TOE OF LEFT FOOT: ICD-10-CM

## 2021-07-19 DIAGNOSIS — I10 BENIGN ESSENTIAL HYPERTENSION: ICD-10-CM

## 2021-07-19 DIAGNOSIS — K21.9 GASTROESOPHAGEAL REFLUX DISEASE, UNSPECIFIED WHETHER ESOPHAGITIS PRESENT: Primary | ICD-10-CM

## 2021-07-19 DIAGNOSIS — I50.9 CHF (CONGESTIVE HEART FAILURE) (HCC): ICD-10-CM

## 2021-07-19 DIAGNOSIS — E78.2 MIXED HYPERLIPIDEMIA: ICD-10-CM

## 2021-07-19 DIAGNOSIS — U07.1 2019 NOVEL CORONAVIRUS DISEASE (COVID-19): ICD-10-CM

## 2021-07-19 DIAGNOSIS — K21.9 GASTROESOPHAGEAL REFLUX DISEASE: ICD-10-CM

## 2021-07-19 DIAGNOSIS — R41.3 MEMORY LOSS: ICD-10-CM

## 2021-07-19 DIAGNOSIS — J44.9 CHRONIC OBSTRUCTIVE PULMONARY DISEASE, UNSPECIFIED COPD TYPE (HCC): ICD-10-CM

## 2021-07-19 PROCEDURE — 1160F RVW MEDS BY RX/DR IN RCRD: CPT | Performed by: FAMILY MEDICINE

## 2021-07-19 PROCEDURE — 1036F TOBACCO NON-USER: CPT | Performed by: FAMILY MEDICINE

## 2021-07-19 PROCEDURE — 99213 OFFICE O/P EST LOW 20 MIN: CPT | Performed by: FAMILY MEDICINE

## 2021-07-19 PROCEDURE — 3074F SYST BP LT 130 MM HG: CPT | Performed by: FAMILY MEDICINE

## 2021-07-19 PROCEDURE — 3078F DIAST BP <80 MM HG: CPT | Performed by: FAMILY MEDICINE

## 2021-07-19 RX ORDER — SUCRALFATE ORAL 1 G/10ML
SUSPENSION ORAL
Qty: 420 ML | Refills: 3 | Status: SHIPPED | OUTPATIENT
Start: 2021-07-19 | End: 2021-10-25 | Stop reason: ALTCHOICE

## 2021-07-19 RX ORDER — FAMOTIDINE 20 MG/1
20 TABLET, FILM COATED ORAL 2 TIMES DAILY
Qty: 180 TABLET | Refills: 2 | Status: SHIPPED | OUTPATIENT
Start: 2021-07-19 | End: 2021-10-25 | Stop reason: ALTCHOICE

## 2021-07-19 RX ORDER — HYDROCHLOROTHIAZIDE 12.5 MG/1
12.5 TABLET ORAL DAILY
Qty: 90 TABLET | Refills: 1 | Status: SHIPPED | OUTPATIENT
Start: 2021-07-19 | End: 2022-03-15

## 2021-07-19 RX ORDER — LISINOPRIL 40 MG/1
40 TABLET ORAL DAILY
Qty: 90 TABLET | Refills: 1 | Status: SHIPPED | OUTPATIENT
Start: 2021-07-19 | End: 2022-03-15

## 2021-07-19 RX ORDER — ASPIRIN 81 MG/1
81 TABLET ORAL
COMMUNITY
End: 2022-02-28

## 2021-07-19 RX ORDER — FUROSEMIDE 40 MG/1
40 TABLET ORAL DAILY
Qty: 90 TABLET | Refills: 1 | Status: SHIPPED | OUTPATIENT
Start: 2021-07-19 | End: 2022-03-15

## 2021-07-19 NOTE — PROGRESS NOTES
Assessment/Plan:    No problem-specific Assessment & Plan notes found for this encounter  {Assess/PlanSmartLinks:36611}      Subjective:      Patient ID: Timmy Paniagua is a 80 y o  female  HTn, CHF, cardio f/u    3-4 weeks- right toe ingrowen- 2 weeks ago- followed by ankle and foot- vascular bhanu-- has appointment today at 2 PM   Draining fluid-- peroxide and neosporin    Some kind of memory loss- soemtimes loses stuff, difficultiy remmebr ing words since getting the covi virus, about 1 yr      {Common ambulatory SmartLinks:43208}    Review of Systems      Objective:      /62 (BP Location: Right arm, Patient Position: Sitting, Cuff Size: Large)   Pulse 104   Temp 98 °F (36 7 °C) (Temporal)   Resp 16   Ht 5' 1" (1 549 m)   Wt 97 5 kg (215 lb)   SpO2 97%   BMI 40 62 kg/m²          Physical Exam      BMI Counseling: Body mass index is 40 62 kg/m²   The BMI {VB BMI Counselin}

## 2021-07-19 NOTE — ASSESSMENT & PLAN NOTE
Blood Pressure: 120/62     -controlled, currently on: lisinopril 40 mg, HCTZ 12 5 mg daily  -follows with cardiology, continue follow ups as scheduled

## 2021-07-19 NOTE — ASSESSMENT & PLAN NOTE
-daughter reports some worsening in patient's memory, since having COVID back last year  -has noticed that patient has been getting more forgetful, has sometimes difficulty remembering words and has been repeating things on/off  -discussed with daughter that will schedule appointment for memory loss assessment  -also will send referral to geriatrics for evaluation and recommendations as well

## 2021-07-19 NOTE — ASSESSMENT & PLAN NOTE
-reported pain in left toe, unknown when onset but has been growing in size, associated with some period with suppuration  -see media in chart   -daughter reports that has been using OTC peroxide as well as neosporin cream with some mild improvement  -had appointment with ankle and foot clinic, had imaging studies done and has appointment today at 2 PM, unable to see workup, not in Epic  -encouraged to attend to appointment today with foot drs for recommendations  -avoid shoes that apply pressure/ rub the area

## 2021-07-19 NOTE — PROGRESS NOTES
Assessment/Plan:    GERD (gastroesophageal reflux disease)  -currently on: Pepcid and sucralfate  -daughter indicates patient has been eating more meals during nighttime, has noticed exacerbation of symptoms  -has followed with GI in the past, now on PRN basis  -recommended to follow up with GI since symptoms have returned  -encouraged adherence to medications as well as avoid irritants, specially at night     COPD (chronic obstructive pulmonary disease) ([facility])  -stable, not in exacerbation  -using inhaler advair  -chronic cough, unchanged from prior  -robitussin PRN for cough  -lungs clear on auscultation    Benign essential hypertension  Blood Pressure: 120/62     -controlled, currently on: lisinopril 40 mg, HCTZ 12 5 mg daily  -follows with cardiology, continue follow ups as scheduled     Chronic diastolic congestive heart failure ([address] )  Wt Readings from Last 3 Encounters:   07/19/21 97 5 kg (215 lb)   05/20/21 92 5 kg (204 lb)   04/15/21 92 5 kg (204 lb)       -has noticed increase in weight, daughter reports some indiscretions with diet  -currently on Lasix 40 mg daily  -euvolemic on exam today, no dyspnea reported  -physical examination with lungs clear on auscultation   -residual b/l lower extremity edema, also noticed in prior cardiology visit as well  -follow up BMP    HLD (hyperlipidemia)  -on atorvastatin 20 mg daily, will continue    Memory loss  -daughter reports some worsening in patient's memory, since having COVID back last year  -has noticed that patient has been getting more forgetful, has sometimes difficulty remembering words and has been repeating things on/off  -discussed with daughter that will schedule appointment for memory loss assessment  -also will send referral to geriatrics for evaluation and recommendations as well    Pain of toe of left foot  -reported pain in left toe, unknown when onset but has been growing in size, associated with some period with suppuration  -see media in chart -daughter reports that has been using OTC peroxide as well as neosporin cream with some mild improvement  -had appointment with ankle and foot clinic, had imaging studies done and has appointment today at 2 PM, unable to see workup, not in Epic  -encouraged to attend to appointment today with foot drs for recommendations  -avoid shoes that apply pressure/ rub the area       Diagnoses and all orders for this visit:    Gastroesophageal reflux disease, unspecified whether esophagitis present    Gastroesophageal reflux disease  -     famotidine (PEPCID) 20 mg tablet; Take 1 tablet (20 mg total) by mouth 2 (two) times a day  -     sucralfate (CARAFATE) 1 g/10 mL suspension; Take 2 teaspoonfuls (10 ml) by mouth four times a day as needed  -     Ambulatory referral to Gastroenterology; Future    2019 novel coronavirus disease (COVID-19)  -     guaiFENesin (ROBITUSSIN) 100 MG/5ML oral liquid; Take 10 mL (200 mg total) by mouth 3 (three) times a day as needed for cough    Benign essential hypertension  -     hydrochlorothiazide (HYDRODIURIL) 12 5 mg tablet; Take 1 tablet (12 5 mg total) by mouth daily  -     lisinopril (ZESTRIL) 40 mg tablet; Take 1 tablet (40 mg total) by mouth daily  -     CBC and differential; Future  -     Comprehensive metabolic panel; Future  -     Lipid panel; Future    CHF (congestive heart failure) ([facility])  -     furosemide (LASIX) 40 mg tablet; Take 1 tablet (40 mg total) by mouth daily    Memory loss  -     Ambulatory referral to Geriatrics; Future    BMI 40 0-44 9, adult ([facility])    Chronic obstructive pulmonary disease, unspecified COPD type [facility]    Mixed hyperlipidemia    Pain of toe of left foot    Other orders  -     aspirin (ECOTRIN LOW STRENGTH) 81 mg EC tablet; Take 81 mg by mouth          Subjective:      Patient ID: Marcelino Jansen is a 80 y o  female  Case of 79 y/o female who came today for follow up  She is accompanied by daughter and granddaugther today   They indicate that patient has had left toe pain, has slowly growing 'bump', brought patient to foot  where workup was done and has follow up visit with them today afternoon  Daughter also reports concerns that patient has been getting more forgetful, noticed this since having COVID back last year, associated with periods of forgetting stuff and some names as well  The following portions of the patient's history were reviewed and updated as appropriate: allergies, current medications, past family history, past medical history, past social history, past surgical history and problem list     Review of Systems   Constitutional: Negative for fever  Eyes: Negative for visual disturbance  Respiratory: Positive for cough ( chronic)  Negative for shortness of breath and wheezing  Cardiovascular: Negative for chest pain, palpitations and leg swelling  Gastrointestinal: Negative for abdominal pain, diarrhea, nausea and vomiting  Musculoskeletal:        Shoulder pain- chronic  Left toe pain   Skin: Negative for color change, pallor, rash and wound  Neurological: Negative for headaches  Psychiatric/Behavioral: The patient is not nervous/anxious  Objective:      /62 (BP Location: Right arm, Patient Position: Sitting, Cuff Size: Large)   Pulse 104   Temp 98 °F (36 7 °C) (Temporal)   Resp 16   Ht 5' 1" (1 549 m)   Wt 97 5 kg (215 lb)   SpO2 97%   BMI 40 62 kg/m²          Physical Exam  Vitals reviewed  Constitutional:       General: She is not in acute distress  Appearance: Normal appearance  She is not ill-appearing, toxic-appearing or diaphoretic  Eyes:      Extraocular Movements: Extraocular movements intact  Cardiovascular:      Rate and Rhythm: Normal rate and regular rhythm  Pulses: Normal pulses  Heart sounds: Normal heart sounds  No murmur heard  Pulmonary:      Effort: Pulmonary effort is normal  No respiratory distress  Breath sounds: Normal breath sounds  No wheezing  Abdominal:      General: Abdomen is flat  Bowel sounds are normal  There is no distension  Palpations: Abdomen is soft  Tenderness: There is no abdominal tenderness  Musculoskeletal:         General: Swelling ( left toe swelling, see media in chart) present  No tenderness or deformity  Normal range of motion  Cervical back: Normal range of motion  Right lower leg: No edema  Left lower leg: No edema  Skin:     General: Skin is warm  Coloration: Skin is not jaundiced or pale  Findings: No bruising, erythema, lesion or rash  Neurological:      General: No focal deficit present  Mental Status: She is alert and oriented to person, place, and time  Mental status is at baseline     Psychiatric:         Mood and Affect: Mood normal

## 2021-07-19 NOTE — ASSESSMENT & PLAN NOTE
-stable, not in exacerbation  -using inhaler advair  -chronic cough, unchanged from prior  -robitussin PRN for cough  -lungs clear on auscultation

## 2021-07-19 NOTE — ASSESSMENT & PLAN NOTE
Wt Readings from Last 3 Encounters:   07/19/21 97 5 kg (215 lb)   05/20/21 92 5 kg (204 lb)   04/15/21 92 5 kg (204 lb)       -has noticed increase in weight, daughter reports some indiscretions with diet  -currently on Lasix 40 mg daily  -euvolemic on exam today, no dyspnea reported  -physical examination with lungs clear on auscultation   -residual b/l lower extremity edema, also noticed in prior cardiology visit as well  -follow up BMP

## 2021-08-06 ENCOUNTER — OFFICE VISIT (OUTPATIENT)
Dept: FAMILY MEDICINE CLINIC | Facility: CLINIC | Age: 86
End: 2021-08-06

## 2021-08-06 VITALS
DIASTOLIC BLOOD PRESSURE: 72 MMHG | SYSTOLIC BLOOD PRESSURE: 124 MMHG | WEIGHT: 211 LBS | HEIGHT: 61 IN | HEART RATE: 103 BPM | BODY MASS INDEX: 39.84 KG/M2 | RESPIRATION RATE: 18 BRPM | TEMPERATURE: 97.8 F | OXYGEN SATURATION: 98 %

## 2021-08-06 DIAGNOSIS — R41.3 MEMORY LOSS: Primary | ICD-10-CM

## 2021-08-06 PROCEDURE — 99213 OFFICE O/P EST LOW 20 MIN: CPT | Performed by: FAMILY MEDICINE

## 2021-08-06 PROCEDURE — 3078F DIAST BP <80 MM HG: CPT | Performed by: FAMILY MEDICINE

## 2021-08-06 PROCEDURE — 3074F SYST BP LT 130 MM HG: CPT | Performed by: FAMILY MEDICINE

## 2021-08-06 NOTE — PROGRESS NOTES
Assessment/Plan:    Memory loss  -grand-daughter and daughter in room reports patient's mental status has remained about the same, mostly at times repetitive of words and forgets some stuff but overall remains active, performs ADL's as bathing (sometimes with assistance), ambulates with walker  Does not cook, family members cook for her   -mini mental exam performed today at the office, score of 20/30  -in the mini mental scale score, mild dementia suspected   -discussed with family members in regards of above results, will send lab work to rule out medical causes for cognitive impairment: CBC, CMP, TSH, vitamin B12, folate, RPR  -can consider imaging study with MRI neuro quantitative exam, will have to verify with insurance approval vs evaluation with geriatrics first for follow up   -referral for geriatrics placed for further evaluation and assessment with more detailed examination and recommendations  -patient has excellent support system at home, Sons, daughters and grand-daughters assist patient on a daily basis  -encouraged importance of maintain patient active, avoid risks for falls, have patient accompanied at all times, avoid cooking by herself  -will follow up after lab work to continue to discuss potential options for adjunctive therapy such as: donepezil vs namenda, discussed with family members that cognitive impairment potentially can continue to decrease over time and these type of medications can potentially slow risk of progression but does not prevent condition from progressing, family members verbalized understanding  Diagnoses and all orders for this visit:    Memory loss  -     TSH, 3rd generation with Free T4 reflex; Future  -     Vitamin B12/Folate, Serum Panel; Future  -     RPR; Future  -     Ambulatory referral to Geriatrics; Future          Subjective:      Patient ID: Shantal Ferguson is a 80 y o  female  Case of 81 y/o female who came today for follow up for memory loss  Accompanied today by one of her daughters and grand-daughters  They report that patient mental status remains about the same, have noticed that sometimes tends to repeat a lot the words and sometimes sporadically forgets some of the names  She is able to perform ADL's bathing (sometimes with assistance), ambulates with a walker, does not cook (family members cook for her)  They also report that patient has been following with Podiatry for Bunion left foot, has been getting wound dressing changes and treatment at podiatry  The following portions of the patient's history were reviewed and updated as appropriate: allergies, current medications, past family history, past medical history, past social history, past surgical history and problem list     Review of Systems   Constitutional: Negative for fever  Eyes: Negative for visual disturbance  Respiratory: Negative for cough, shortness of breath and wheezing  Cardiovascular: Negative for chest pain, palpitations and leg swelling  Gastrointestinal: Negative for abdominal pain, diarrhea, nausea and vomiting  Musculoskeletal:        Left foot bunion    Skin: Negative for color change, pallor, rash and wound  Neurological: Negative for headaches  Psychiatric/Behavioral: The patient is not nervous/anxious  Memory loss         Objective:      /72 (BP Location: Right arm, Patient Position: Sitting, Cuff Size: Large)   Pulse 103   Temp 97 8 °F (36 6 °C) (Temporal)   Resp 18   Ht 5' 1" (1 549 m)   Wt 95 7 kg (211 lb)   SpO2 98%   BMI 39 87 kg/m²          Physical Exam  Vitals reviewed  Constitutional:       General: She is not in acute distress  Appearance: Normal appearance  She is not ill-appearing, toxic-appearing or diaphoretic  Eyes:      Extraocular Movements: Extraocular movements intact  Cardiovascular:      Rate and Rhythm: Normal rate and regular rhythm  Pulses: Normal pulses        Heart sounds: Normal heart sounds  No murmur heard  Pulmonary:      Effort: Pulmonary effort is normal  No respiratory distress  Breath sounds: Normal breath sounds  No wheezing  Abdominal:      General: Abdomen is flat  Bowel sounds are normal  There is no distension  Palpations: Abdomen is soft  Tenderness: There is no abdominal tenderness  Musculoskeletal:         General: No swelling, tenderness, deformity or signs of injury  Normal range of motion  Right lower leg: No edema  Left lower leg: No edema  Comments: Left foot covered in dressing, no suppuration noticed, clean    Skin:     General: Skin is warm  Coloration: Skin is not jaundiced or pale  Findings: No bruising, erythema, lesion or rash  Neurological:      General: No focal deficit present  Mental Status: She is alert and oriented to person, place, and time  Mental status is at baseline     Psychiatric:         Mood and Affect: Mood normal

## 2021-08-06 NOTE — ASSESSMENT & PLAN NOTE
-grand-daughter and daughter in room reports patient's mental status has remained about the same, mostly at times repetitive of words and forgets some stuff but overall remains active, performs ADL's as bathing (sometimes with assistance), ambulates with walker  Does not cook, family members cook for her   -mini mental exam performed today at the office, score of 20/30  -in the mini mental scale score, mild dementia suspected   -discussed with family members in regards of above results, will send lab work to rule out medical causes for cognitive impairment: CBC, CMP, TSH, vitamin B12, folate, RPR  -can consider imaging study with MRI neuro quantitative exam, will have to verify with insurance approval vs evaluation with geriatrics first for follow up   -referral for geriatrics placed for further evaluation and assessment with more detailed examination and recommendations  -patient has excellent support system at home, Sons, daughters and grand-daughters assist patient on a daily basis  -encouraged importance of maintain patient active, avoid risks for falls, have patient accompanied at all times, avoid cooking by herself  -will follow up after lab work to continue to discuss potential options for adjunctive therapy such as: donepezil vs namenda, discussed with family members that cognitive impairment potentially can continue to decrease over time and these type of medications can potentially slow risk of progression but does not prevent condition from progressing, family members verbalized understanding

## 2021-08-10 ENCOUNTER — APPOINTMENT (OUTPATIENT)
Dept: LAB | Facility: HOSPITAL | Age: 86
End: 2021-08-10
Payer: COMMERCIAL

## 2021-08-10 DIAGNOSIS — R41.3 MEMORY LOSS: ICD-10-CM

## 2021-08-10 DIAGNOSIS — I10 BENIGN ESSENTIAL HYPERTENSION: ICD-10-CM

## 2021-08-10 LAB
ALBUMIN SERPL BCP-MCNC: 4.3 G/DL (ref 3–5.2)
ALP SERPL-CCNC: 79 U/L (ref 43–122)
ALT SERPL W P-5'-P-CCNC: 18 U/L
ANION GAP SERPL CALCULATED.3IONS-SCNC: 11 MMOL/L (ref 5–14)
AST SERPL W P-5'-P-CCNC: 29 U/L (ref 14–36)
BILIRUB SERPL-MCNC: 0.56 MG/DL
BUN SERPL-MCNC: 29 MG/DL (ref 5–25)
CALCIUM SERPL-MCNC: 9.4 MG/DL (ref 8.4–10.2)
CHLORIDE SERPL-SCNC: 102 MMOL/L (ref 97–108)
CHOLEST SERPL-MCNC: 183 MG/DL
CO2 SERPL-SCNC: 30 MMOL/L (ref 22–30)
CREAT SERPL-MCNC: 0.82 MG/DL (ref 0.6–1.2)
EOSINOPHIL # BLD AUTO: 0.6 THOUSAND/UL (ref 0–0.4)
EOSINOPHIL NFR BLD MANUAL: 13 % (ref 0–6)
ERYTHROCYTE [DISTWIDTH] IN BLOOD BY AUTOMATED COUNT: 14 %
FOLATE SERPL-MCNC: 19.9 NG/ML (ref 3.1–17.5)
GFR SERPL CREATININE-BSD FRML MDRD: 65 ML/MIN/1.73SQ M
GLUCOSE P FAST SERPL-MCNC: 101 MG/DL (ref 70–99)
HCT VFR BLD AUTO: 34.3 % (ref 36–46)
HDLC SERPL-MCNC: 69 MG/DL
HGB BLD-MCNC: 11.6 G/DL (ref 12–16)
LDLC SERPL CALC-MCNC: 89 MG/DL
LYMPHOCYTES # BLD AUTO: 1.75 THOUSAND/UL (ref 0.5–4)
LYMPHOCYTES # BLD AUTO: 38 % (ref 25–45)
MCH RBC QN AUTO: 30.5 PG (ref 26.8–34.3)
MCHC RBC AUTO-ENTMCNC: 33.7 G/DL (ref 31.4–37.4)
MCV RBC AUTO: 91 FL (ref 80–100)
MONOCYTES # BLD AUTO: 0.46 THOUSAND/UL (ref 0.2–0.9)
MONOCYTES NFR BLD AUTO: 10 % (ref 1–10)
NEUTS SEG # BLD: 1.79 THOUSAND/UL (ref 1.8–7.8)
NEUTS SEG NFR BLD AUTO: 39 %
NONHDLC SERPL-MCNC: 114 MG/DL
PLATELET # BLD AUTO: 201 THOUSANDS/UL (ref 150–450)
PLATELET BLD QL SMEAR: ADEQUATE
PMV BLD AUTO: 8.2 FL (ref 8.9–12.7)
POTASSIUM SERPL-SCNC: 4.5 MMOL/L (ref 3.6–5)
PROT SERPL-MCNC: 7.5 G/DL (ref 5.9–8.4)
RBC # BLD AUTO: 3.79 MILLION/UL (ref 4–5.2)
RBC MORPH BLD: NORMAL
SODIUM SERPL-SCNC: 143 MMOL/L (ref 137–147)
TOTAL CELLS COUNTED SPEC: 100
TRIGL SERPL-MCNC: 126 MG/DL
TSH SERPL DL<=0.05 MIU/L-ACNC: 1.72 UIU/ML (ref 0.47–4.68)
VIT B12 SERPL-MCNC: 435 PG/ML (ref 100–900)
WBC # BLD AUTO: 4.6 THOUSAND/UL (ref 4.31–10.16)

## 2021-08-10 PROCEDURE — 80053 COMPREHEN METABOLIC PANEL: CPT

## 2021-08-10 PROCEDURE — 85007 BL SMEAR W/DIFF WBC COUNT: CPT

## 2021-08-10 PROCEDURE — 86592 SYPHILIS TEST NON-TREP QUAL: CPT

## 2021-08-10 PROCEDURE — 84443 ASSAY THYROID STIM HORMONE: CPT

## 2021-08-10 PROCEDURE — 82607 VITAMIN B-12: CPT

## 2021-08-10 PROCEDURE — 82746 ASSAY OF FOLIC ACID SERUM: CPT

## 2021-08-10 PROCEDURE — 85027 COMPLETE CBC AUTOMATED: CPT

## 2021-08-10 PROCEDURE — 36415 COLL VENOUS BLD VENIPUNCTURE: CPT

## 2021-08-10 PROCEDURE — 80061 LIPID PANEL: CPT

## 2021-08-11 LAB — RPR SER QL: NORMAL

## 2021-08-12 ENCOUNTER — HOSPITAL ENCOUNTER (OUTPATIENT)
Dept: RADIOLOGY | Facility: HOSPITAL | Age: 86
Discharge: HOME | End: 2021-08-12
Attending: PODIATRIST
Payer: COMMERCIAL

## 2021-08-12 DIAGNOSIS — Z01.89 ENCOUNTER FOR OTHER SPECIFIED SPECIAL EXAMINATIONS: ICD-10-CM

## 2021-08-12 PROCEDURE — 71046 X-RAY EXAM CHEST 2 VIEWS: CPT

## 2021-08-18 ENCOUNTER — CONSULT (OUTPATIENT)
Dept: FAMILY MEDICINE CLINIC | Facility: CLINIC | Age: 86
End: 2021-08-18

## 2021-08-18 VITALS
TEMPERATURE: 97.1 F | DIASTOLIC BLOOD PRESSURE: 78 MMHG | RESPIRATION RATE: 20 BRPM | SYSTOLIC BLOOD PRESSURE: 126 MMHG | HEART RATE: 72 BPM | BODY MASS INDEX: 39.76 KG/M2 | WEIGHT: 210.6 LBS | HEIGHT: 61 IN | OXYGEN SATURATION: 96 %

## 2021-08-18 DIAGNOSIS — Z01.818 PRE-OP EVALUATION: Primary | ICD-10-CM

## 2021-08-18 PROCEDURE — 1036F TOBACCO NON-USER: CPT | Performed by: FAMILY MEDICINE

## 2021-08-18 PROCEDURE — 3078F DIAST BP <80 MM HG: CPT | Performed by: FAMILY MEDICINE

## 2021-08-18 PROCEDURE — 99213 OFFICE O/P EST LOW 20 MIN: CPT | Performed by: FAMILY MEDICINE

## 2021-08-18 PROCEDURE — 3074F SYST BP LT 130 MM HG: CPT | Performed by: FAMILY MEDICINE

## 2021-08-18 NOTE — ASSESSMENT & PLAN NOTE
Patient is optimized for planned surgery detailed below  Surgical Procedure risk category: Patient specific operative risk categegory: low risk  RCRI 1 point = class II risk 6%  Functional Capacity: unable to assess due to use of wheel chair  BP well controlled, 126/78, on HCTZ 12 5mg daily, lisinopril 40 mg daily, lasix 40 mg daily  On Aspirin 81 mg daily  Recent echocardiogram 4/29/2021; EF 60%,   Last EKG (3/25/2021)  Normal sinus rhythm at a rate of 91 beats per minute   Cannot rule out an old anterior wall myocardial infarction  CMP unremarkable for any electrolyte imbalance; Cr/GFR 0 82/65    -Cleared for surgery

## 2021-08-18 NOTE — PROGRESS NOTES
Assessment/Plan:    Pre-op evaluation  Patient is optimized for planned surgery detailed below  Surgical Procedure risk category: Patient specific operative risk categegory: low risk  RCRI 1 point = class II risk 6%  Functional Capacity: unable to assess due to use of wheel chair  BP well controlled, 126/78, on HCTZ 12 5mg daily, lisinopril 40 mg daily, lasix 40 mg daily  On Aspirin 81 mg daily  Recent echocardiogram 4/29/2021; EF 60%,   Last EKG (3/25/2021)  Normal sinus rhythm at a rate of 91 beats per minute  Cannot rule out an old anterior wall myocardial infarction  CMP unremarkable for any electrolyte imbalance; Cr/GFR 0 82/65    -Cleared for surgery       Diagnoses and all orders for this visit:    Pre-op evaluation          Subjective:      Patient ID: Dotty Wayne is a 80 y o  female  HPI  Patient is an 80year old female, accompanied by her son today, here for pre-op clearance for podiatric surgery (bunionectomy of left foot - hallux) on Wednesday 8/25/2021  She has hx of mild aortic stenosis, HTN, chronic diastolic heart failure and COPD, bilateral knee replacements  Not on any anticoagulation therapy  Patient denies chest discomfort or shortness of breath  Patient has no palpitations  Patient denies symptoms of dizziness, lightheadedness or near-syncope/syncope      The following portions of the patient's history were reviewed and updated as appropriate: allergies, current medications, past family history, past medical history, past social history, past surgical history and problem list     Review of Systems   Constitutional: Negative for activity change and fever  Respiratory: Negative for cough, shortness of breath and wheezing  Cardiovascular: Negative for chest pain, palpitations and leg swelling  Gastrointestinal: Negative for abdominal pain  Genitourinary: Negative for difficulty urinating, dysuria, frequency and hematuria  Musculoskeletal: Positive for gait problem     Skin: Negative for pallor  Neurological: Negative for dizziness  Psychiatric/Behavioral: Negative for behavioral problems  Objective:      /78 (BP Location: Left arm, Patient Position: Sitting, Cuff Size: Adult)   Pulse 72   Temp (!) 97 1 °F (36 2 °C) (Temporal)   Resp 20   Ht 5' 1" (1 549 m)   Wt 95 5 kg (210 lb 9 6 oz)   SpO2 96%   BMI 39 79 kg/m²          Physical Exam  Vitals reviewed  Constitutional:       General: She is not in acute distress  Appearance: Normal appearance  Eyes:      Conjunctiva/sclera: Conjunctivae normal    Neck:      Thyroid: No thyromegaly  Cardiovascular:      Rate and Rhythm: Normal rate and regular rhythm  Heart sounds: Normal heart sounds  No murmur heard  Pulmonary:      Effort: Pulmonary effort is normal  No respiratory distress  Breath sounds: No wheezing  Abdominal:      General: Bowel sounds are normal       Palpations: Abdomen is soft  Tenderness: There is no abdominal tenderness  Musculoskeletal:         General: No swelling  Cervical back: Neck supple  Right lower leg: Edema present  Left lower leg: Edema present  Comments: 2+ lower extremity edema b/l   Lymphadenopathy:      Cervical: No cervical adenopathy  Skin:     General: Skin is warm  Capillary Refill: Capillary refill takes less than 2 seconds  Neurological:      Mental Status: She is alert

## 2021-08-23 ENCOUNTER — ANESTHESIA EVENT (OUTPATIENT)
Dept: PERIOP | Facility: HOSPITAL | Age: 86
End: 2021-08-23
Payer: COMMERCIAL

## 2021-08-23 ENCOUNTER — TELEPHONE (OUTPATIENT)
Dept: GERIATRICS | Age: 86
End: 2021-08-23

## 2021-08-23 PROBLEM — R51.9 NONINTRACTABLE HEADACHE: Status: ACTIVE | Noted: 2021-08-23

## 2021-08-23 PROBLEM — H53.9 VISUAL DISORDER: Status: ACTIVE | Noted: 2021-08-23

## 2021-08-23 NOTE — ANESTHESIA PREPROCEDURE EVALUATION
Procedure:  ALFARO BUNIONECTOMY, EXCISION OF WOUND (Left Foot)  REMOVAL HARDWARE FOOT (Left Foot)    Relevant Problems   ANESTHESIA  old and current charts reviewed   notes read      CARDIO  AS is mild   mild pulm htn per ECHO  no interval change in ECHO  no S/S of acute CHF   (+) Aortic stenosis   (+) Benign essential hypertension   (+) Chronic diastolic congestive heart failure (HCC)   (+) HLD (hyperlipidemia)      ENDO  obesity      GI/HEPATIC   (+) GERD (gastroesophageal reflux disease)      GYN  PM  left breast CA  LN sx unknown  will avoid left arm   (+) Malignant neoplasm of overlapping sites of left female breast (HCC)      HEMATOLOGY  N/N anemia   (+) Anemia      MUSCULOSKELETAL  b/l TKRs   severe right shoulder arthropathy   (+) Osteoarthritis      NEURO/PSYCH  glaucoma hx   (+) Nonintractable headache   (+) Visual disorder   (-) CVA (cerebral vascular accident) ([facility] [address] )      PULMONARY  no pulm exacerbation   (+) COPD (chronic obstructive pulmonary disease) (HCC)   (-) Oxygen dependent   (-) Sleep apnea   (-) Smoking   (-) URI (upper respiratory infection)        Physical Exam    Airway       Dental       Cardiovascular  Cardiovascular exam normal    Pulmonary  Pulmonary exam normal     Other Findings        Anesthesia Plan  ASA Score- 3     Anesthesia Type- IV sedation with anesthesia with ASA Monitors  Additional Monitors:   Airway Plan:           Plan Factors-Exercise tolerance (METS): >4 METS  Chart reviewed  EKG reviewed  Imaging results reviewed  Existing labs reviewed  Patient summary reviewed  Patient is not a current smoker  Patient not instructed to abstain from smoking on day of procedure  Patient did not smoke on day of surgery  Obstructive sleep apnea risk education given perioperatively  Induction- intravenous  Postoperative Plan- Plan for postoperative opioid use  Informed Consent- Anesthetic plan and risks discussed with patient    I personally reviewed this patient with the CRNA  Discussed and agreed on the Anesthesia Plan with the CRNA  Rosella Goldmann Procedure:  ALFARO BUNIONECTOMY, EXCISION OF WOUND (Left Foot)  REMOVAL HARDWARE FOOT (Left Foot)    Relevant Problems   ANESTHESIA  old and current charts reviewed   notes read      CARDIO  AS is mild   mild pulm htn per ECHO  no interval change in ECHO  no S/S of acute CHF   (+) Aortic stenosis   (+) Benign essential hypertension   (+) Chronic diastolic congestive heart failure (HCC)   (+) HLD (hyperlipidemia)      ENDO  obesity      GI/HEPATIC   (+) GERD (gastroesophageal reflux disease)      GYN  PM  left breast CA  LN sx unknown  will avoid left arm   (+) Malignant neoplasm of overlapping sites of left female breast (HCC)      HEMATOLOGY  N/N anemia   (+) Anemia      MUSCULOSKELETAL  b/l TKRs   severe right shoulder arthropathy   (+) Osteoarthritis      NEURO/PSYCH  glaucoma hx   (+) Nonintractable headache   (+) Visual disorder   (-) CVA (cerebral vascular accident) ([facility] [address] )      PULMONARY  no pulm exacerbation   (+) COPD (chronic obstructive pulmonary disease) (HCC)   (-) Oxygen dependent   (-) Sleep apnea   (-) Smoking   (-) URI (upper respiratory infection)        Physical Exam    Airway    Mallampati score: II  TM Distance: >3 FB  Neck ROM: limited     Dental   upper dentures and lower dentures,     Cardiovascular  Cardiovascular exam normal    Pulmonary  Pulmonary exam normal     Other Findings        Anesthesia Plan  ASA Score- 3     Anesthesia Type- IV sedation with anesthesia with ASA Monitors  Additional Monitors:   Airway Plan:           Plan Factors-Exercise tolerance (METS): >4 METS  Chart reviewed  EKG reviewed  Imaging results reviewed  Existing labs reviewed  Patient summary reviewed  Patient is not a current smoker  Patient not instructed to abstain from smoking on day of procedure  Patient did not smoke on day of surgery  Obstructive sleep apnea risk education given perioperatively      Induction- intravenous  Postoperative Plan- Plan for postoperative opioid use  Informed Consent- Anesthetic plan and risks discussed with patient, son and spouse  I personally reviewed this patient with the CRNA  Discussed and agreed on the Anesthesia Plan with the CRNA  Deysi Rosas

## 2021-08-23 NOTE — TELEPHONE ENCOUNTER
[place] [address]  [phone]    Telephone Intake: Geriatric Assessment     Referral source:Dr Braden Salazar who is scheduling/relationship to patient: Jennifer Barrios (grand daughter)  Caller's phone number: [phone]              Is there a Power of  in place/If so, who? No,     Reason for referral: Family member concerns and Provider concerns regarding memory concerns, behavior changes/concerns and mood concerns  What is the goal of visit? Is there anything family could do better to provide for patient     Has the patient been seen by a Neurologist or Geriatrician? No  Has the patient ever been diagnosed with dementia? No      Preferred language? Yakut (does not speak English)  Highest education level? 6th grade  Does the patient wear glasses? Yes   Does the patient use hearing aids? No (does not use them, she lost them)     Does the patient and/or caregiver have access for a virtual visit (computer or smart phone, working audio and video)? Yes     Caller was informed:    Please make sure the patient is accompanied by someone who knows them well / a caregiver / family member to participate in this appointment  If a patient plans to attend the assessment alone, please route a message to the assigned provider   Primary number on file will be called to confirm this appointment  Who will accompany the patient? Grand daughterNicole    Office packet mailed out to: Tika Barlow [phone]    NOTE FOR : If the patient was recently hospitalized, please route intake to assigned provider for chart review

## 2021-08-23 NOTE — LETTER
August 23, 2021    Dear Pilo Flores,    Thank you for choosing [facility]  We assist in the needs of older adults and their families  Our goal is to help aging adults in maintaining a healthy, safe and independent lifestyle and to work with their primary care physician to coordinate care  There are many reasons appointments are scheduled with us  Some patients are seeking a baseline assessment as part of medical history; other patients might have a specific concern regarding cognition, multiple medications, or overall health concerns  What to Expect  Two, possibly, three visits:     Initial Visit is approximately one hour  The patient and representative/family meet with the doctor, possibly a resident/fellow, and the   In addition to medical issues, patient goals, quality of life, home safety, and greatest areas of concern are addressed  If necessary, additional testing may be ordered such as bloodwork, imaging, and/or a computerized cognitive evaluation  A physical therapy/occupation therapy evaluation may also be ordered  Conference visit is approximately one hour  The patient and representative/family review an individualized care plan targeting patient's goals/concerns/issues and are provided with recommendations and resources  [facility] is a teaching institution and there will be residents and fellows as a part of your visits with us       Please bring the following to your initial appointment:      A mask without a valve (to be worn by everyone entering the building)    Eye glasses and/or hearing aids (as applicable)    Enclosed forms (please complete and bring to appointment)    Advance directives (a living will and/or durable power of ) if established    Medication list (including vitamins or supplements you are currently taking)       DATE of INITIAL VISIT: Monday, December 20, 2021 at 10:00 AM    Please arrive at least 15 minutes before scheduled appointment time  When you park, please call our office at [phone] to let us know you have arrived  We will conduct check-in by telephone  One person may accompany you to your appointment  DATE of CARE CONFERENCE: Monday, January 24, 2021 at 11:00 AM    For Video Conference: [phone] office will call 30-45 minutes prior to appointment complete check in, review medications and allergies, as well as ask for vitals  If you can take the patient's blood pressure, pulse, temperature, and weight on the day of this appointment, it would be appreciated  For In-office Conference: One person may accompany the patient to appointment  Others are welcome to join by phone or video  Please let us know which you prefer so we can provide a conference phone number or a video link  PLEASE NOTE: Due to the extensive and comprehensive nature of the visit, if you are more than 15 minutes late we will need to reschedule the appointment  Thank you again for choosing [facility]  We look forward to meeting you!

## 2021-08-25 ENCOUNTER — HOSPITAL ENCOUNTER (OUTPATIENT)
Facility: HOSPITAL | Age: 86
Setting detail: OUTPATIENT SURGERY
Discharge: HOME | End: 2021-08-25
Attending: PODIATRIST | Admitting: PODIATRIST
Payer: COMMERCIAL

## 2021-08-25 ENCOUNTER — APPOINTMENT (OUTPATIENT)
Dept: RADIOLOGY | Facility: HOSPITAL | Age: 86
End: 2021-08-25
Payer: COMMERCIAL

## 2021-08-25 ENCOUNTER — ANESTHESIA (OUTPATIENT)
Dept: PERIOP | Facility: HOSPITAL | Age: 86
End: 2021-08-25
Payer: COMMERCIAL

## 2021-08-25 VITALS
HEART RATE: 69 BPM | HEIGHT: 61 IN | TEMPERATURE: 96 F | SYSTOLIC BLOOD PRESSURE: 110 MMHG | RESPIRATION RATE: 20 BRPM | WEIGHT: 210 LBS | BODY MASS INDEX: 39.65 KG/M2 | DIASTOLIC BLOOD PRESSURE: 58 MMHG | OXYGEN SATURATION: 96 %

## 2021-08-25 PROCEDURE — 73620 X-RAY EXAM OF FOOT: CPT

## 2021-08-25 PROCEDURE — 73630 X-RAY EXAM OF FOOT: CPT

## 2021-08-25 PROCEDURE — C1713 ANCHOR/SCREW BN/BN,TIS/BN: HCPCS | Performed by: PODIATRIST

## 2021-08-25 DEVICE — K-WIRE 1.6MM X 9IN DBL END TROCAR PT 3-SIDED STRL: Type: IMPLANTABLE DEVICE | Status: FUNCTIONAL

## 2021-08-25 RX ORDER — PROPOFOL 10 MG/ML
INJECTION, EMULSION INTRAVENOUS AS NEEDED
Status: DISCONTINUED | OUTPATIENT
Start: 2021-08-25 | End: 2021-08-25

## 2021-08-25 RX ORDER — OXYCODONE HYDROCHLORIDE 5 MG/1
5 TABLET ORAL EVERY 4 HOURS PRN
Status: DISCONTINUED | OUTPATIENT
Start: 2021-08-25 | End: 2021-08-25 | Stop reason: HOSPADM

## 2021-08-25 RX ORDER — DEXAMETHASONE SODIUM PHOSPHATE 4 MG/ML
4 INJECTION, SOLUTION INTRA-ARTICULAR; INTRALESIONAL; INTRAMUSCULAR; INTRAVENOUS; SOFT TISSUE ONCE AS NEEDED
Status: DISCONTINUED | OUTPATIENT
Start: 2021-08-25 | End: 2021-08-25 | Stop reason: HOSPADM

## 2021-08-25 RX ORDER — DEXAMETHASONE SODIUM PHOSPHATE 4 MG/ML
INJECTION, SOLUTION INTRA-ARTICULAR; INTRALESIONAL; INTRAMUSCULAR; INTRAVENOUS; SOFT TISSUE AS NEEDED
Status: DISCONTINUED | OUTPATIENT
Start: 2021-08-25 | End: 2021-08-25

## 2021-08-25 RX ORDER — CEFAZOLIN SODIUM 2 G/50ML
2000 SOLUTION INTRAVENOUS ONCE
Status: DISCONTINUED | OUTPATIENT
Start: 2021-08-25 | End: 2021-08-25 | Stop reason: HOSPADM

## 2021-08-25 RX ORDER — LIDOCAINE HYDROCHLORIDE 10 MG/ML
INJECTION, SOLUTION EPIDURAL; INFILTRATION; INTRACAUDAL; PERINEURAL AS NEEDED
Status: DISCONTINUED | OUTPATIENT
Start: 2021-08-25 | End: 2021-08-25

## 2021-08-25 RX ORDER — FENTANYL CITRATE 50 UG/ML
INJECTION, SOLUTION INTRAMUSCULAR; INTRAVENOUS AS NEEDED
Status: DISCONTINUED | OUTPATIENT
Start: 2021-08-25 | End: 2021-08-25

## 2021-08-25 RX ORDER — PROMETHAZINE HYDROCHLORIDE 25 MG/ML
12.5 INJECTION, SOLUTION INTRAMUSCULAR; INTRAVENOUS ONCE AS NEEDED
Status: DISCONTINUED | OUTPATIENT
Start: 2021-08-25 | End: 2021-08-25 | Stop reason: HOSPADM

## 2021-08-25 RX ORDER — DIPHENHYDRAMINE HYDROCHLORIDE 50 MG/ML
12.5 INJECTION INTRAMUSCULAR; INTRAVENOUS ONCE AS NEEDED
Status: DISCONTINUED | OUTPATIENT
Start: 2021-08-25 | End: 2021-08-25 | Stop reason: HOSPADM

## 2021-08-25 RX ORDER — ONDANSETRON 2 MG/ML
INJECTION INTRAMUSCULAR; INTRAVENOUS AS NEEDED
Status: DISCONTINUED | OUTPATIENT
Start: 2021-08-25 | End: 2021-08-25

## 2021-08-25 RX ORDER — PROPOFOL 10 MG/ML
INJECTION, EMULSION INTRAVENOUS CONTINUOUS PRN
Status: DISCONTINUED | OUTPATIENT
Start: 2021-08-25 | End: 2021-08-25

## 2021-08-25 RX ORDER — FENTANYL CITRATE/PF 50 MCG/ML
25 SYRINGE (ML) INJECTION
Status: DISCONTINUED | OUTPATIENT
Start: 2021-08-25 | End: 2021-08-25 | Stop reason: HOSPADM

## 2021-08-25 RX ORDER — SODIUM CHLORIDE, SODIUM LACTATE, POTASSIUM CHLORIDE, CALCIUM CHLORIDE 600; 310; 30; 20 MG/100ML; MG/100ML; MG/100ML; MG/100ML
75 INJECTION, SOLUTION INTRAVENOUS CONTINUOUS
Status: DISCONTINUED | OUTPATIENT
Start: 2021-08-25 | End: 2021-08-25 | Stop reason: HOSPADM

## 2021-08-25 RX ORDER — CEFAZOLIN SODIUM 2 G/50ML
SOLUTION INTRAVENOUS AS NEEDED
Status: DISCONTINUED | OUTPATIENT
Start: 2021-08-25 | End: 2021-08-25

## 2021-08-25 RX ORDER — CHLORHEXIDINE GLUCONATE 0.12 MG/ML
15 RINSE ORAL ONCE
Status: COMPLETED | OUTPATIENT
Start: 2021-08-25 | End: 2021-08-25

## 2021-08-25 RX ORDER — FENTANYL CITRATE/PF 50 MCG/ML
12.5 SYRINGE (ML) INJECTION
Status: DISCONTINUED | OUTPATIENT
Start: 2021-08-25 | End: 2021-08-25 | Stop reason: HOSPADM

## 2021-08-25 RX ORDER — MAGNESIUM HYDROXIDE 1200 MG/15ML
LIQUID ORAL AS NEEDED
Status: DISCONTINUED | OUTPATIENT
Start: 2021-08-25 | End: 2021-08-25 | Stop reason: HOSPADM

## 2021-08-25 RX ORDER — SODIUM CHLORIDE, SODIUM LACTATE, POTASSIUM CHLORIDE, CALCIUM CHLORIDE 600; 310; 30; 20 MG/100ML; MG/100ML; MG/100ML; MG/100ML
INJECTION, SOLUTION INTRAVENOUS CONTINUOUS PRN
Status: DISCONTINUED | OUTPATIENT
Start: 2021-08-25 | End: 2021-08-25

## 2021-08-25 RX ORDER — KETOROLAC TROMETHAMINE 30 MG/ML
INJECTION, SOLUTION INTRAMUSCULAR; INTRAVENOUS AS NEEDED
Status: DISCONTINUED | OUTPATIENT
Start: 2021-08-25 | End: 2021-08-25

## 2021-08-25 RX ORDER — MEPERIDINE HYDROCHLORIDE 25 MG/ML
12.5 INJECTION INTRAMUSCULAR; INTRAVENOUS; SUBCUTANEOUS
Status: DISCONTINUED | OUTPATIENT
Start: 2021-08-25 | End: 2021-08-25 | Stop reason: HOSPADM

## 2021-08-25 RX ADMIN — FENTANYL CITRATE 50 MCG: 50 INJECTION, SOLUTION INTRAMUSCULAR; INTRAVENOUS at 07:29

## 2021-08-25 RX ADMIN — FENTANYL CITRATE 50 MCG: 50 INJECTION, SOLUTION INTRAMUSCULAR; INTRAVENOUS at 07:34

## 2021-08-25 RX ADMIN — PROPOFOL 40 MG: 10 INJECTION, EMULSION INTRAVENOUS at 07:34

## 2021-08-25 RX ADMIN — ONDANSETRON 4 MG: 2 INJECTION INTRAMUSCULAR; INTRAVENOUS at 07:37

## 2021-08-25 RX ADMIN — DEXAMETHASONE SODIUM PHOSPHATE 4 MG: 4 INJECTION, SOLUTION INTRAMUSCULAR; INTRAVENOUS at 07:37

## 2021-08-25 RX ADMIN — PROPOFOL 75 MCG/KG/MIN: 10 INJECTION, EMULSION INTRAVENOUS at 07:36

## 2021-08-25 RX ADMIN — KETOROLAC TROMETHAMINE 15 MG: 30 INJECTION, SOLUTION INTRAMUSCULAR at 08:21

## 2021-08-25 RX ADMIN — SODIUM CHLORIDE, SODIUM LACTATE, POTASSIUM CHLORIDE, AND CALCIUM CHLORIDE 75 ML/HR: .6; .31; .03; .02 INJECTION, SOLUTION INTRAVENOUS at 06:43

## 2021-08-25 RX ADMIN — CEFAZOLIN SODIUM 2000 MG: 2 SOLUTION INTRAVENOUS at 07:25

## 2021-08-25 RX ADMIN — LIDOCAINE HYDROCHLORIDE 50 MG: 10 INJECTION, SOLUTION EPIDURAL; INFILTRATION; INTRACAUDAL; PERINEURAL at 07:34

## 2021-08-25 RX ADMIN — SODIUM CHLORIDE, SODIUM LACTATE, POTASSIUM CHLORIDE, AND CALCIUM CHLORIDE: .6; .31; .03; .02 INJECTION, SOLUTION INTRAVENOUS at 06:43

## 2021-08-25 RX ADMIN — CHLORHEXIDINE GLUCONATE 15 ML: 1.2 RINSE ORAL at 06:31

## 2021-08-25 NOTE — ANESTHESIA POSTPROCEDURE EVALUATION
Post-Op Assessment Note    CV Status:  Stable    Pain management: adequate     Mental Status:  Alert and awake   Hydration Status:  Euvolemic   PONV Controlled:  Controlled   Airway Patency:  Patent      Post Op Vitals Reviewed: Yes      Staff: Anesthesiologist, CRNA         No complications documented      /68 (08/25/21 0831)    Temp (!) 97 4 °F (36 3 °C) (08/25/21 0831)    Pulse 68 (08/25/21 0831)   Resp 16 (08/25/21 0831)    SpO2 96 % (08/25/21 0831) Pt called and said he is having a very hard time sleeping he is only gets about 3 hours of  Sleep  The over the counter stuff for sleeping is not working  Would like to get   something to sleep  [phone]

## 2021-08-25 NOTE — DISCHARGE SUMMARY
Discharge Summary Outpatient Procedure Podiatry -   Nuvia Laughlin 80 y o  female MRN: [id number]  Unit/Bed#: OR POOL Encounter: [id number]    Admission Date: 8/25/2021     Admitting Diagnosis: Hallux valgus (acquired), left foot [M20 12]  Pain due to internal orthopedic prosthetic devices, implants and grafts, initial encounter [facility]) Mickey Menchaca    Discharge Diagnosis: same    Procedures Performed: ALFARO BUNIONECTOMY, EXCISION OF WOUND:   REMOVAL HARDWARE FOOT: 89787 (CPT®)    Complications: none    Condition at Discharge: stable    Discharge instructions/Information to patient and family:   See after visit summary for information provided to patient and family  Provisions for Follow-Up Care/Important appointments:  See after visit summary for information related to follow-up care and any pertinent home health orders  Discharge Medications:  See after visit summary for reconciled discharge medications provided to patient and family

## 2021-08-25 NOTE — NURSING NOTE
OOB with assistance, surgical shoe on, voided QS  Written and verbal instructions given to pt's son, who verbalizes an understanding  Pt has no complaints

## 2021-08-25 NOTE — OP NOTE
OPERATIVE REPORT - Podiatry  PATIENT NAME: Natalee Baldwin    :  1934  MRN: [id number]  Pt Location:  OR ROOM 12    SURGERY DATE: 2021    Surgeon(s) and Role:     * Omkar Duke DPM - Primary     * Wenceslao Aguilar DPM - Assisting    Pre-op Diagnosis:  Hallux valgus (acquired), left foot [M20 12]  Pain due to internal orthopedic prosthetic devices, implants and grafts, initial encounter ([facility] [address] ) Marge Cagle    Post-Op Diagnosis Codes:     * Hallux valgus (acquired), left foot [M20 12]     * Pain due to internal orthopedic prosthetic devices, implants and grafts, initial encounter ([facility] [address] ) [T84 [id number]]    Procedure(s) (LRB):  ALFARO BUNIONECTOMY, EXCISION OF WOUND (Left)  REMOVAL HARDWARE FOOT (Left)    Specimen(s):  * No specimens in log *    Estimated Blood Loss:   Minimal    Drains:  * No LDAs found *    Anesthesia Type:   IV Sedation with Anesthesia with 16 ml of 1% Lidocaine and 0 5% Bupivacaine in a 1:1 mixture    Hemostasis:  Pneumatic ankle tourniquet set at 250 mmHg for 35 mins  Direct compression, electrocautery    Materials:  Implant Name Type Inv  Item Serial No   Lot No  LRB No  Used Action   K-WIRE 1 6MM X 9IN DBL END TROCAR PT 3-SIDED STRL - [id number] Wire K-WIRE 1 6MM X 9IN DBL END TROCAR PT 3-SIDED STRL  BRASSELER [place] [id number] Left 1 Implanted     3-0 Vicryl  3-0 nylon    Injectables:  None    Operative Findings:  Consistent with Diagnosis    Complications:   None    Procedure and Technique:     Under mild sedation, the patient was brought into the operating room and placed on the operating room table in the supine position  IV sedation was achieved by anesthesia team and a universal timeout was performed where all parties are in agreement of correct patient, correct procedure and correct site  A pneumatic tourniquet was then placed over the patient's left lower extremity with ample padding   A ramirez block was performed consisting of 16 ml of 1% Lidocaine and 0 5% Bupivacaine in a 1:1 mixture  The foot was then prepped and draped in the usual aseptic manner  An esmarch bandage was used to exsangunate the foot and the pneumatic tourniquet was then inflated to 250 mmHg  A prominent ulcerative lesion was noted on the dorsal medial aspect 1st metatarsal   An ellipse was drawn out over the lesion and this was excised utilizing a scalpel in a 3:1 fashion  The proximal and distal aspect of the lips was extended linearly to reveal the metatarsophalangeal joint  Any bleeders were cauterized as needed  The extensor tendon was identified and retracted laterally  A linear capsulotomy was performed directly over the 1st metatarsophalangeal joint  The medial and lateral collateral ligaments of the capsule were excised and the soft tissue attachments of the medial hypertrophy of the 1st metatarsal head were reflected  Dissection continued distally to the base of the 1st proximal phalanx and soft tissue adhesions and capsule reflected off of the proximal 1/3 of the base of the proximal phalanx  Attention was then directed back to the dorsal aspect of the 1st metatarsal head  A single screw was noted on the distal dorsal aspect of the 1st metatarsal head  The appropriate star  was utilized to explant the screw  This was then passed to the back table  Thereafter, the proximal 1/4 the base of the 1st proximal phalanx was resected by means of a sagittal saw  The sagittal saw was then used to resect the medial hypertrophy of the 1st metatarsal head  Any residual prominences surrounding the 1st metatarsal head were resected by means of sagittal saw and rongeur  The surgical site was irrigated with copious amounts sterile saline  A 0 062 K-wire was then advanced in antegrade fashion through the hallux  After proper positioning was maintained, the K-wire was then retrograded across the 1st metatarsophalangeal joint into the shaft of the 1st metatarsal under C-arm fluoroscopy    Final images were obtained utilizing C-arm  The surgical incision was irrigated with copious amounts of normal sterile saline  The periosteal and capsular structures were reapproximated using 3-0 Vicryl  Subcutaneous closure was obtained utilizing 3-0 Vicryl  Skin edges were reapproximated and closure was obtained utilizing 3-0 nylon  The foot was then cleansed and dried  The incision site was dressed with Betadine-soaked Adaptic, gauze  This was then covered with a Kerlix and a Coban wrap  The tourniquet was deflated at approximately 35 min and normal hyperemic response was noted to all digits  The patient tolerated the procedure and anesthesia well without immediate complications and transferred to PACU with vital signs stable  Dr Malena Singh was present during the entire procedure and participated in all key aspects  SIGNATURE: Victor Hugo Jerez DPM  DATE: August 25, 2021  TIME: 8:35 AM      Portions of the record may have been created with voice recognition software  Occasional wrong word or "sound a like" substitutions may have occurred due to the inherent limitations of voice recognition software  Read the chart carefully and recognize, using context, where substitutions have occurred

## 2021-08-25 NOTE — ANESTHESIA POSTPROCEDURE EVALUATION
Post-Op Assessment Note    CV Status:  Stable  Pain Score: 0    Pain management: adequate     Mental Status:  Alert and awake   Hydration Status:  Stable   PONV Controlled:  None   Airway Patency:  Patent      Post Op Vitals Reviewed: Yes      Staff: CRNA         No complications documented      /68 (08/25/21 0831)    Temp (!) 97 4 °F (36 3 °C) (08/25/21 0831)    Pulse 68 (08/25/21 0831)   Resp 16 (08/25/21 0831)    SpO2 96 % (08/25/21 0831)

## 2021-08-25 NOTE — DISCHARGE INSTRUCTIONS
[facility]  Dr Esme Alvarado Instructions    1  Take your prescribed medication as directed  2  Upon arrival at home, lie down and elevate your surgical foot on 2 pillows  3  Remain quiet, off your feet as much as possible, for the first 24-48 hours  This is when your feet first swell and may become painful  After 48 hours you may begin limited walking following these restrictions:   Weightbear as tolerated to surgical foot in post operative shoe  4  Drink large quantities of water  Consume no alcohol  Continue a well-balanced diet  5  Report any unusual discomfort or fever to this office  6  A limited amount of discomfort and swelling is to be expected  In some cases the skin may take on a bruised appearance  The surgical solution that was applied to your foot prior to the operation is dark in color and the operation site may appear to be oozing when it actually is not  7  A slight amount of blood is to be expected, and is no cause for alarm  Do not remove the dressings  If there is active bleeding and if the bleeding persists, add additional gauze to the bandage, apply direct pressure, elevate your feet and call this office  8  Do not get the dressings wet  As regular bathing may be inconvenient, sponge baths are recommended  9  When anesthesia wears off and if any discomfort should be present, apply an ice pack directly over the operated area for 15 minute intervals for several hours or until the pain leaves  (USE IN EXCESS OF 15 MINUTES COULD CAUSE FROSTBITE)  Do not use hot water bags or electric pads  A convenient icepack can be made by placing ice cubes in a plastic bag and covering this with a towel  10  If necessary, take a mild laxative before retiring  11  Wear your special open shoes anytime you put weight on your foot, even if it is just to walk to the bathroom and back  It will probably be 2 or 3 weeks before you will be permitted to try regular shoes    12  Having performed the operation, we are interested in a prompt recovery  Please cooperate by following the above instructions  13  Please call to confirm your post-op appointment or call with any other questions

## 2021-08-25 NOTE — H&P
History and physical exam completed  No interval changes in health  Pt and son wish to proceed with planned procedure

## 2021-08-31 ENCOUNTER — HOSPITAL ENCOUNTER (OUTPATIENT)
Dept: RADIOLOGY | Facility: HOSPITAL | Age: 86
Discharge: HOME | End: 2021-08-31
Attending: PODIATRIST
Payer: COMMERCIAL

## 2021-08-31 DIAGNOSIS — M20.12 ACQUIRED HALLUX VALGUS OF LEFT FOOT: ICD-10-CM

## 2021-08-31 PROCEDURE — 73630 X-RAY EXAM OF FOOT: CPT

## 2021-09-14 ENCOUNTER — HOSPITAL ENCOUNTER (OUTPATIENT)
Dept: RADIOLOGY | Facility: HOSPITAL | Age: 86
Discharge: HOME | End: 2021-09-14
Attending: PODIATRIST
Payer: COMMERCIAL

## 2021-09-14 DIAGNOSIS — K59.09 OTHER CONSTIPATION: ICD-10-CM

## 2021-09-14 DIAGNOSIS — R10.11 RIGHT UPPER QUADRANT PAIN: ICD-10-CM

## 2021-09-14 DIAGNOSIS — E78.2 MIXED HYPERLIPIDEMIA: ICD-10-CM

## 2021-09-14 DIAGNOSIS — M20.12 HALLUX VALGUS (ACQUIRED), LEFT FOOT: ICD-10-CM

## 2021-09-14 DIAGNOSIS — J44.9 COPD (CHRONIC OBSTRUCTIVE PULMONARY DISEASE) (HCC): ICD-10-CM

## 2021-09-14 DIAGNOSIS — N32.81 OVERACTIVE BLADDER: ICD-10-CM

## 2021-09-14 PROCEDURE — 73630 X-RAY EXAM OF FOOT: CPT

## 2021-09-14 RX ORDER — ASPIRIN 81 MG
TABLET, DELAYED RELEASE (ENTERIC COATED) ORAL
Qty: 90 TABLET | Refills: 0 | Status: SHIPPED | OUTPATIENT
Start: 2021-09-14 | End: 2021-12-15

## 2021-09-15 ENCOUNTER — OFFICE VISIT (OUTPATIENT)
Dept: FAMILY MEDICINE CLINIC | Facility: CLINIC | Age: 86
End: 2021-09-15

## 2021-09-15 VITALS
BODY MASS INDEX: 39.68 KG/M2 | OXYGEN SATURATION: 97 % | HEIGHT: 61 IN | DIASTOLIC BLOOD PRESSURE: 70 MMHG | RESPIRATION RATE: 18 BRPM | SYSTOLIC BLOOD PRESSURE: 116 MMHG | HEART RATE: 86 BPM | TEMPERATURE: 97.6 F

## 2021-09-15 DIAGNOSIS — R11.0 NAUSEA: Primary | ICD-10-CM

## 2021-09-15 PROCEDURE — 3074F SYST BP LT 130 MM HG: CPT | Performed by: PHYSICIAN ASSISTANT

## 2021-09-15 PROCEDURE — 3078F DIAST BP <80 MM HG: CPT | Performed by: PHYSICIAN ASSISTANT

## 2021-09-15 PROCEDURE — 99214 OFFICE O/P EST MOD 30 MIN: CPT | Performed by: PHYSICIAN ASSISTANT

## 2021-09-15 PROCEDURE — 1160F RVW MEDS BY RX/DR IN RCRD: CPT | Performed by: PHYSICIAN ASSISTANT

## 2021-09-15 RX ORDER — ATORVASTATIN CALCIUM 20 MG/1
TABLET, FILM COATED ORAL
Qty: 90 TABLET | Refills: 0 | Status: SHIPPED | OUTPATIENT
Start: 2021-09-15 | End: 2021-12-15

## 2021-09-15 RX ORDER — FESOTERODINE FUMARATE 8 MG/1
TABLET, FILM COATED, EXTENDED RELEASE ORAL
Qty: 90 TABLET | Refills: 0 | Status: SHIPPED | OUTPATIENT
Start: 2021-09-15 | End: 2021-12-15

## 2021-09-15 RX ORDER — METOCLOPRAMIDE 5 MG/1
5 TABLET ORAL 4 TIMES DAILY
Qty: 24 TABLET | Refills: 0 | Status: SHIPPED | OUTPATIENT
Start: 2021-09-15 | End: 2022-06-27

## 2021-09-15 NOTE — PROGRESS NOTES
Assessment/Plan:    No problem-specific Assessment & Plan notes found for this encounter  Diagnoses and all orders for this visit:    Nausea  -     metoclopramide (REGLAN) 5 mg tablet; Take 1 tablet (5 mg total) by mouth 4 (four) times a day        Son translating  Subjective:      Patient ID: Shantal Ferguson is a 80 y o  female  Same day visit  O F  is a 81 y/o F presenting to the office c/o RUQ abdominal discomfort x 2 weeks after starting a new antibiotic  The discomfort began shortly after she underwent surgery 2 weeks ago for a bunion of the L foot  The pain is described as "strong" and is worse after eating meals  She reports it is improved after having bowel movements  She currently takes Senna and Pepcid which help  The pain does not radiate beyond the anterior RUQ and does spread into the back  She admits to feeling nausea which comes on with eating, constipation and decreased appetite  She denies vomiting, diarrhea, recent sickness, hematochezia, chest pain, or SOB  NKDA  Abdominal Pain  This is a new problem  The current episode started 1 to 4 weeks ago  The onset quality is gradual  The problem occurs daily  The problem has been unchanged  The pain is located in the RUQ  The pain is moderate  The abdominal pain does not radiate  Associated symptoms include anorexia and constipation  Pertinent negatives include no belching, diarrhea, dysuria, fever, frequency, headaches, hematochezia, hematuria, melena, myalgias, nausea or vomiting  The pain is aggravated by eating, movement and palpation  The pain is relieved by bowel movements  She has tried H2 blockers for the symptoms  The treatment provided mild relief  Her past medical history is significant for abdominal surgery, GERD and PUD         The following portions of the patient's history were reviewed and updated as appropriate: past social history, past surgical history and problem list     Review of Systems   Constitutional: Negative for fever  Gastrointestinal: Positive for abdominal pain, anorexia and constipation  Negative for diarrhea, hematochezia, melena, nausea and vomiting  Genitourinary: Negative for dysuria, frequency and hematuria  Musculoskeletal: Negative for back pain and myalgias  Neurological: Negative for weakness, numbness and headaches  Objective:      /70 (BP Location: Left arm, Patient Position: Sitting, Cuff Size: Large)   Pulse 86   Temp 97 6 °F (36 4 °C) (Temporal)   Resp 18   Ht 5' 1" (1 549 m)   SpO2 97%   BMI 39 68 kg/m²          Physical Exam  Constitutional:       General: She is not in acute distress  Appearance: Normal appearance  She is obese  She is not ill-appearing  HENT:      Head: Normocephalic and atraumatic  Mouth/Throat:      Mouth: Mucous membranes are dry  Cardiovascular:      Rate and Rhythm: Normal rate and regular rhythm  Heart sounds: Normal heart sounds  No murmur heard  No friction rub  No gallop  Pulmonary:      Effort: Pulmonary effort is normal  No respiratory distress  Breath sounds: Normal breath sounds  No wheezing, rhonchi or rales  Abdominal:      General: Bowel sounds are normal  There is no distension  Palpations: Abdomen is soft  Tenderness: There is abdominal tenderness (mild epigastric)  There is no guarding or rebound  Neurological:      Mental Status: She is alert

## 2021-09-20 ENCOUNTER — OFFICE VISIT (OUTPATIENT)
Dept: OBGYN CLINIC | Facility: MEDICAL CENTER | Age: 86
End: 2021-09-20
Payer: COMMERCIAL

## 2021-09-20 VITALS
WEIGHT: 210 LBS | SYSTOLIC BLOOD PRESSURE: 118 MMHG | HEIGHT: 61 IN | BODY MASS INDEX: 39.65 KG/M2 | DIASTOLIC BLOOD PRESSURE: 77 MMHG | HEART RATE: 77 BPM

## 2021-09-20 DIAGNOSIS — M75.101 RIGHT ROTATOR CUFF TEAR ARTHROPATHY: Primary | ICD-10-CM

## 2021-09-20 DIAGNOSIS — M12.811 RIGHT ROTATOR CUFF TEAR ARTHROPATHY: Primary | ICD-10-CM

## 2021-09-20 PROCEDURE — 1160F RVW MEDS BY RX/DR IN RCRD: CPT | Performed by: ORTHOPAEDIC SURGERY

## 2021-09-20 PROCEDURE — 99213 OFFICE O/P EST LOW 20 MIN: CPT | Performed by: ORTHOPAEDIC SURGERY

## 2021-09-20 PROCEDURE — 20610 DRAIN/INJ JOINT/BURSA W/O US: CPT | Performed by: ORTHOPAEDIC SURGERY

## 2021-09-20 PROCEDURE — 1036F TOBACCO NON-USER: CPT | Performed by: ORTHOPAEDIC SURGERY

## 2021-09-20 RX ORDER — LIDOCAINE HYDROCHLORIDE 10 MG/ML
3 INJECTION, SOLUTION INFILTRATION; PERINEURAL
Status: COMPLETED | OUTPATIENT
Start: 2021-09-20 | End: 2021-09-20

## 2021-09-20 RX ORDER — METHYLPREDNISOLONE ACETATE 40 MG/ML
1 INJECTION, SUSPENSION INTRA-ARTICULAR; INTRALESIONAL; INTRAMUSCULAR; SOFT TISSUE
Status: COMPLETED | OUTPATIENT
Start: 2021-09-20 | End: 2021-09-20

## 2021-09-20 RX ADMIN — LIDOCAINE HYDROCHLORIDE 3 ML: 10 INJECTION, SOLUTION INFILTRATION; PERINEURAL at 12:44

## 2021-09-20 RX ADMIN — METHYLPREDNISOLONE ACETATE 1 ML: 40 INJECTION, SUSPENSION INTRA-ARTICULAR; INTRALESIONAL; INTRAMUSCULAR; SOFT TISSUE at 12:44

## 2021-09-20 NOTE — PROGRESS NOTES
Ortho Sports Medicine Shoulder Follow Up Visit     Assesment:   80 y o  female right shoulder Rotator cuff arthropathy    Plan:    Conservative treatment:    Ice to shoulder 1-2 times daily, for 20 minutes at a time  Home exercise program for shoulder, including ROM and strenthening  Instructions given to patient of what exercises to perform  Let pain guide return to activities  Imaging: All imaging from today was reviewed by myself and explained to the patient  Injection:    The risks and benefits of the injection (which include but are not limited to: infection, bleeding,damage to nerve/artery, need for further intervention), as well as the risks and benefits of all alternative treatments were explained and understood  The patient elected to proceed with injection  The procedure was done with aseptic technique, and the patient tolerated the procedure well with no complications  A corticosteroid injection of the subacromial space was performed  The patient should take 1-2 days off of activity, with gradual return to activity as tolerated  Ice to the shoulder 1-2 times daily for 20 minutes, for next 24-48 hrs  Surgery:     No surgery is recommended at this point, continue with conservative treatment plan as noted  Follow up:    Return in about 3 months (around 12/20/2021) for right shoulder  Chief Complaint   Patient presents with    Right Shoulder - Follow-up         History of Present Illness: The patient is returns for follow up of  Right shoulder  Since the prior visit, She reports minimal improvement the cortisone injections periodically  Pain is improved by rest, ice, NSAIDS and injection  Pain is aggravated by overhead activity and reaching back  The patient has weakness  The patient has tried rest, NSAIDS and injection          Shoulder Surgical History:  None    Past Medical, Social and Family History:  Past Medical History:   Diagnosis Date    Diverticulosis 2001    Heart failure (HCC)     History of cystocele     Hypertension     Obesity     Positive PPD 1999    treated     Past Surgical History:   Procedure Laterality Date    BUNIONECTOMY Left 8/25/2021    Procedure: Dawkins Lux, EXCISION OF WOUND;  Surgeon: Angeli Novak DPM;  Location: [address] [place] [address] OR;  Service: Podiatry    CARDIOVASCULAR STRESS TEST  2001    dobutamine stress test    CHOLECYSTECTOMY  04/2002    COLONOSCOPY      CYSTOCELE REPAIR      HYSTERECTOMY      KNEE SURGERY      knee replacement    WA REMOVAL DEEP IMPLANT Left 8/25/2021    Procedure: REMOVAL HARDWARE FOOT;  Surgeon: Angeli Novak DPM;  Location: [address] [place] [address] OR;  Service: Podiatry    UPPER GASTROINTESTINAL ENDOSCOPY       No Known Allergies  Current Outpatient Medications on File Prior to Visit   Medication Sig Dispense Refill    Advair Diskus 500-50 MCG/DOSE inhaler INHALE 1 PUFF BY MOUTH 2 TIMES A DAY  RINSE MOIUTH AFTER USE 60 blister 0    albuterol (PROVENTIL HFA,VENTOLIN HFA) 90 mcg/act inhaler Inhale 2 puffs every 6 (six) hours as needed for wheezing 1 Inhaler 0    albuterol (VENTOLIN HFA) 90 mcg/act inhaler Inhale 2 puffs 4 (four) times a day 18 g 4    amitriptyline (ELAVIL) 25 mg tablet TAKE ONE TABLET BY MOUTH DAILY AT BEDTIME   aspirin (ECOTRIN LOW STRENGTH) 81 mg EC tablet Take 81 mg by mouth      atorvastatin (LIPITOR) 20 mg tablet TAKE 1 TABLET BY MOUTH ONCE DAILY   90 tablet 0    benzonatate (TESSALON PERLES) 100 mg capsule Take 1 capsule (100 mg total) by mouth 3 (three) times a day as needed for cough 20 capsule 0    Calcium Carbonate-Vitamin D (CALCIUM 500 + D) 500-125 MG-UNIT TABS every 24 hours      docusate sodium (COLACE) 100 mg capsule Take 1 capsule (100 mg total) by mouth 2 (two) times a day 60 capsule 1    Elastic Bandages & Supports (Medical Compression Stockings) MISC by Does not apply route daily High knee compression stockings 20-30 MMHG 2 each 0    famotidine (PEPCID) 20 mg tablet Take 1 tablet (20 mg total) by mouth 2 (two) times a day 180 tablet 2    fluticasone (FLONASE) 50 mcg/act nasal spray 2 sprays into each nostril daily 1 Bottle 3    furosemide (LASIX) 40 mg tablet Take 1 tablet (40 mg total) by mouth daily 90 tablet 1    guaiFENesin (ROBITUSSIN) 100 MG/5ML oral liquid Take 10 mL (200 mg total) by mouth 3 (three) times a day as needed for cough 120 mL 0    hydrochlorothiazide (HYDRODIURIL) 12 5 mg tablet Take 1 tablet (12 5 mg total) by mouth daily 90 tablet 1    hydrocortisone 2 5 % cream Apply topically 4 (four) times a day as needed      latanoprost (XALATAN) 0 005 % ophthalmic solution       lidocaine (XYLOCAINE) 5 % ointment Apply topically 2 (two) times a day as needed for mild pain 35 44 g 0    lisinopril (ZESTRIL) 40 mg tablet Take 1 tablet (40 mg total) by mouth daily 90 tablet 1    metoclopramide (REGLAN) 5 mg tablet Take 1 tablet (5 mg total) by mouth 4 (four) times a day 24 tablet 0    Misc  Devices (CANE) MISC Four point aluminum cane   Senna Plus 8 6-50 MG per tablet TAKE ONE TABLET BY MOUTH EVERY DAY 90 tablet 0    sucralfate (CARAFATE) 1 g/10 mL suspension Take 2 teaspoonfuls (10 ml) by mouth four times a day as needed 420 mL 3    timolol (BETIMOL) 0 5 % ophthalmic solution Administer 1 drop to both eyes 2 (two) times a day       timolol (TIMOPTIC) 0 5 % ophthalmic solution       Toviaz 8 MG TB24 TAKE 1 TABLET BY MOUTH ONCE DAILY  90 tablet 0     No current facility-administered medications on file prior to visit  Social History     Socioeconomic History    Marital status:       Spouse name: Not on file    Number of children: Not on file    Years of education: Not on file    Highest education level: Not on file   Occupational History    Not on file   Tobacco Use    Smoking status: Never Smoker    Smokeless tobacco: Never Used   Vaping Use    Vaping Use: Never used   Substance and Sexual Activity    Alcohol use: Not Currently    Drug use: Not Currently    Sexual activity: Not Currently     Partners: Male   Other Topics Concern    Not on file   Social History Narrative    Lives at home with son and 2 grandkids  Social Determinants of Health     Financial Resource Strain:     Difficulty of Paying Living Expenses:    Food Insecurity:     Worried About Running Out of Food in the Last Year:     [address] in the Last Year:    Transportation Needs:     Lack of Transportation (Medical):  Lack of Transportation (Non-Medical):    Physical Activity:     Days of Exercise per Week:     Minutes of Exercise per Session:    Stress:     Feeling of Stress :    Social Connections:     Frequency of Communication with Friends and Family:     Frequency of Social Gatherings with Friends and Family:     Attends [employer] Services:     Active Member of Clubs or Organizations:     Attends Club or Organization Meetings:     Marital Status:    Intimate Partner Violence:     Fear of Current or Ex-Partner:     Emotionally Abused:     Physically Abused:     Sexually Abused:        I have reviewed the past medical, surgical, social and family history, medications and allergies as documented in the EMR  Review of systems: ROS is negative other than that noted in the HPI  Constitutional: Negative for fatigue and fever  Physical Exam:    Blood pressure 118/77, pulse 77, height 5' 1" (1 549 m), weight 95 3 kg (210 lb), not currently breastfeeding      General/Constitutional: NAD, well developed, well nourished  HENT: Normocephalic, atraumatic  CV: Intact distal pulses, regular rate  Resp: No respiratory distress or labored breathing  Lymphatic: No lymphadenopathy palpated  Neuro: Alert and Oriented x 3, no focal deficits  Psych: Normal mood, normal affect, normal judgement, normal behavior  Skin: Warm, dry, no rashes, no erythema      Shoulder focused exam:       RIGHT LEFT    Scapula Atrophy Negative Negative     Winging Negative Negative     Protraction Negative Negative    Rotator cuff SS Deferred due to pain 5/5     IS Deferred due to pain 5/5     SubS Deferred due to pain 5/5    ROM FF 80 active    150     ER0 45 60     ER90 90    90     IR90 40    40     IRb iliac crest    T6    TTP: AC Negative Negative     Biceps Negative Negative     Coracoid Negative Negative    Special Tests: O'Briens Negative Negative     Juarez-shear Negative Negative     Cross body Adduction Negative Negative     Speeds  Negative Negative     Juan David's Negative Negative     Whipple Negative Negative       Neer Negative Negative     Ramirez Negative Negative    Instability: Apprehension & relocation not tested not tested     Load & shift not tested not tested    Other: Crank Negative Negative               UE NV Exam: +2 Radial pulses bilaterally  Sensation intact to light touch C5-T1 bilaterally, Radial/median/ulnar nerve motor intact    Cervical ROM is full without pain, numbness or tingling    Large joint arthrocentesis: R subacromial bursa  Universal Protocol:  Consent: Verbal consent obtained  Risks and benefits: risks, benefits and alternatives were discussed  Consent given by: patient  Time out: Immediately prior to procedure a "time out" was called to verify the correct patient, procedure, equipment, support staff and site/side marked as required    Timeout called at: 9/20/2021 12:42 PM   Patient understanding: patient states understanding of the procedure being performed  Site marked: the operative site was marked  Patient identity confirmed: verbally with patient    Supporting Documentation  Indications: pain   Procedure Details  Location: shoulder - R subacromial bursa  Preparation: Patient was prepped and draped in the usual sterile fashion  Needle size: 22 G  Ultrasound guidance: no  Approach: posterolateral  Medications administered: 3 mL lidocaine 1 %; 1 mL methylPREDNISolone acetate 40 mg/mL    Patient tolerance: patient tolerated the procedure well with no immediate complications  Dressing:  Sterile dressing applied          Shoulder Imaging    No imaging was performed today      Scribe Attestation    I,:  Candance Brownie am acting as a scribe while in the presence of the attending physician :       I,:  Kristen Ochoa,  personally performed the services described in this documentation    as scribed in my presence :

## 2021-09-22 ENCOUNTER — OFFICE VISIT (OUTPATIENT)
Dept: FAMILY MEDICINE CLINIC | Facility: CLINIC | Age: 86
End: 2021-09-22

## 2021-09-22 VITALS
BODY MASS INDEX: 38.89 KG/M2 | DIASTOLIC BLOOD PRESSURE: 60 MMHG | HEIGHT: 61 IN | WEIGHT: 206 LBS | SYSTOLIC BLOOD PRESSURE: 130 MMHG | RESPIRATION RATE: 16 BRPM | OXYGEN SATURATION: 95 % | HEART RATE: 88 BPM | TEMPERATURE: 97 F

## 2021-09-22 DIAGNOSIS — K21.9 GASTROESOPHAGEAL REFLUX DISEASE WITHOUT ESOPHAGITIS: Primary | ICD-10-CM

## 2021-09-22 PROBLEM — R30.0 DYSURIA: Status: RESOLVED | Noted: 2019-04-18 | Resolved: 2021-01-01

## 2021-09-22 PROCEDURE — 3078F DIAST BP <80 MM HG: CPT | Performed by: FAMILY MEDICINE

## 2021-09-22 PROCEDURE — 99213 OFFICE O/P EST LOW 20 MIN: CPT | Performed by: FAMILY MEDICINE

## 2021-09-22 PROCEDURE — 3075F SYST BP GE 130 - 139MM HG: CPT | Performed by: FAMILY MEDICINE

## 2021-09-22 PROCEDURE — 1160F RVW MEDS BY RX/DR IN RCRD: CPT | Performed by: FAMILY MEDICINE

## 2021-09-22 PROCEDURE — 1036F TOBACCO NON-USER: CPT | Performed by: FAMILY MEDICINE

## 2021-09-22 NOTE — ASSESSMENT & PLAN NOTE
-Continue Famotidine 20 mg BID; continue sucralfate  -Bring medications for review with PCP  -Reviewed the causes of heartburn    -Discussed importance of diet and lifestyle modifications to control GERD symptoms  -Avoid things that worsen heartburn (ex: caffeine, tomato based products, spicy foods, tobacco, alcohol, tight fitting clothing    -Discussed importance of eating small, frequent meals instead of large meals    -Elevate head of the bed and do not lay down for 2-3 hours following a meal    -Follow up for medicare annual visit in October, already scheduled

## 2021-09-22 NOTE — PATIENT INSTRUCTIONS
GERD (Gastroesophageal Reflux Disease)   WHAT YOU NEED TO KNOW:   Gastroesophageal reflux disease (GERD) is reflux that occurs more than twice a week for a few weeks  Reflux means acid and food in the stomach back up into the esophagus  It usually causes heartburn and other symptoms  GERD can cause other health problems over time if it is not treated  DISCHARGE INSTRUCTIONS:   Call your local emergency number (911 in the [address] [facility]) if:   · You have severe chest pain and sudden trouble breathing  Return to the emergency department if:   · You have trouble breathing after you vomit  · You have trouble swallowing, or pain with swallowing  · Your bowel movements are black, bloody, or tarry-looking  · Your vomit looks like coffee grounds or has blood in it  Call your doctor or gastroenterologist if:   · You feel full and cannot burp or vomit  · You vomit large amounts, or you vomit often  · You are losing weight without trying  · Your symptoms get worse or do not improve with treatment  · You have questions or concerns about your condition or care  Medicines:   · Medicines  are used to decrease stomach acid  Medicine may also be used to help your lower esophageal sphincter and stomach contract (tighten) more  · Take your medicine as directed  Contact your healthcare provider if you think your medicine is not helping or if you have side effects  Tell him of her if you are allergic to any medicine  Keep a list of the medicines, vitamins, and herbs you take  Include the amounts, and when and why you take them  Bring the list or the pill bottles to follow-up visits  Carry your medicine list with you in case of an emergency  Manage GERD:       · Do not have foods or drinks that may increase heartburn  These include chocolate, peppermint, fried or fatty foods, drinks that contain caffeine, or carbonated drinks (soda)   Other foods include spicy foods, onions, tomatoes, and tomato-based foods  Do not have foods or drinks that can irritate your esophagus, such as citrus fruits, juices, and alcohol  · Do not eat large meals  When you eat a lot of food at one time, your stomach needs more acid to digest it  Eat 6 small meals each day instead of 3 large ones, and eat slowly  Do not eat meals 2 to 3 hours before bedtime  · Elevate the head of your bed  Place 6-inch blocks under the head of your bed frame  You may also use more than one pillow under your head and shoulders while you sleep  · Maintain a healthy weight  If you are overweight, weight loss may help relieve symptoms of GERD  · Do not smoke  Smoking weakens the lower esophageal sphincter and increases the risk of GERD  Ask your healthcare provider for information if you currently smoke and need help to quit  E-cigarettes or smokeless tobacco still contain nicotine  Talk to your healthcare provider before you use these products  · Do not wear clothing that is tight around your waist   Tight clothing can put pressure on your stomach and cause or worsen GERD symptoms  Follow up with your doctor or gastroenterologist as directed:  Write down your questions so you remember to ask them during your visits  © Copyright [employer] 2021 Information is for End User's use only and may not be sold, redistributed or otherwise used for commercial purposes  All illustrations and images included in CareNotes® are the copyrighted property of [employer]  or Mariam Yen  The above information is an  only  It is not intended as medical advice for individual conditions or treatments  Talk to your doctor, nurse or pharmacist before following any medical regimen to see if it is safe and effective for you

## 2021-09-22 NOTE — PROGRESS NOTES
Assessment/Plan:    GERD (gastroesophageal reflux disease)  -Continue Famotidine 20 mg BID; continue sucralfate  -Bring medications for review with PCP  -Reviewed the causes of heartburn    -Discussed importance of diet and lifestyle modifications to control GERD symptoms  -Avoid things that worsen heartburn (ex: caffeine, tomato based products, spicy foods, tobacco, alcohol, tight fitting clothing    -Discussed importance of eating small, frequent meals instead of large meals    -Elevate head of the bed and do not lay down for 2-3 hours following a meal    -Follow up for medicare annual visit in October, already scheduled         Diagnoses and all orders for this visit:    Gastroesophageal reflux disease without esophagitis          Subjective:      Patient ID: Jimmy Chan is a 80 y o  female  HPI  80year old female  hx of mild aortic stenosis, HTN, chronic diastolic heart failure and COPD, bilateral knee replacements, for RUQ pain follow up  Seen a week ago for same  She reports feeling much better today  Nausea has resolved since starting reglan last week  She reports 1-2 soft bowel movements per day, without melena, diarrhea, or vomiting  She denies CP, SOB  The following portions of the patient's history were reviewed and updated as appropriate: allergies, current medications, past family history, past medical history, past social history, past surgical history and problem list     Review of Systems   Constitutional: Negative for activity change and fever  HENT: Positive for sore throat  Respiratory: Negative for cough, shortness of breath and wheezing  Cardiovascular: Negative for chest pain, palpitations and leg swelling  Gastrointestinal: Negative for abdominal pain, constipation, diarrhea, nausea and vomiting  Endocrine: Negative for polydipsia and polyuria  Genitourinary: Negative for difficulty urinating, dysuria, frequency and hematuria     Musculoskeletal: Negative for gait problem and neck pain  Skin: Negative for pallor  Neurological: Negative for dizziness  Psychiatric/Behavioral: Negative for behavioral problems  Objective:      /60 (BP Location: Left arm, Patient Position: Sitting, Cuff Size: Large)   Pulse 88   Temp (!) 97 °F (36 1 °C) (Temporal)   Resp 16   Ht 5' 1" (1 549 m)   Wt 93 4 kg (206 lb)   SpO2 95%   Breastfeeding No   BMI 38 92 kg/m²        Physical Exam  Vitals reviewed  Constitutional:       General: She is not in acute distress  Appearance: She is obese  Comments: Sitting in wheel chair   HENT:      Nose: No congestion  Mouth/Throat:      Mouth: Mucous membranes are moist       Pharynx: No oropharyngeal exudate or posterior oropharyngeal erythema  Neck:      Thyroid: No thyromegaly  Cardiovascular:      Rate and Rhythm: Normal rate and regular rhythm  Heart sounds: Normal heart sounds  No murmur heard  Pulmonary:      Effort: Pulmonary effort is normal  No respiratory distress  Breath sounds: Normal breath sounds  Abdominal:      General: Bowel sounds are normal  There is no distension  Palpations: Abdomen is soft  Tenderness: There is no abdominal tenderness  There is no right CVA tenderness, left CVA tenderness, guarding or rebound  Hernia: No hernia is present  Musculoskeletal:         General: No swelling  Cervical back: No tenderness  Right lower leg: No edema  Left lower leg: No edema  Lymphadenopathy:      Cervical: No cervical adenopathy  Skin:     General: Skin is warm  Capillary Refill: Capillary refill takes less than 2 seconds  Neurological:      Mental Status: She is alert     Psychiatric:         Behavior: Behavior normal

## 2021-10-05 ENCOUNTER — OFFICE VISIT (OUTPATIENT)
Dept: GASTROENTEROLOGY | Facility: CLINIC | Age: 86
End: 2021-10-05
Payer: COMMERCIAL

## 2021-10-05 VITALS
BODY MASS INDEX: 38.71 KG/M2 | OXYGEN SATURATION: 100 % | DIASTOLIC BLOOD PRESSURE: 74 MMHG | HEART RATE: 75 BPM | HEIGHT: 61 IN | WEIGHT: 205 LBS | TEMPERATURE: 98.4 F | SYSTOLIC BLOOD PRESSURE: 115 MMHG

## 2021-10-05 DIAGNOSIS — K21.9 GASTROESOPHAGEAL REFLUX DISEASE: ICD-10-CM

## 2021-10-05 DIAGNOSIS — R10.10 UPPER ABDOMINAL PAIN: Primary | ICD-10-CM

## 2021-10-05 DIAGNOSIS — K64.9 HEMORRHOIDS, UNSPECIFIED HEMORRHOID TYPE: ICD-10-CM

## 2021-10-05 PROCEDURE — 99214 OFFICE O/P EST MOD 30 MIN: CPT | Performed by: PHYSICIAN ASSISTANT

## 2021-10-05 RX ORDER — HYDROCORTISONE 25 MG/G
CREAM TOPICAL 2 TIMES DAILY
Qty: 28 G | Refills: 3 | Status: SHIPPED | OUTPATIENT
Start: 2021-10-05 | End: 2022-04-28

## 2021-10-05 RX ORDER — OMEPRAZOLE 40 MG/1
40 CAPSULE, DELAYED RELEASE ORAL DAILY
Qty: 30 CAPSULE | Refills: 3 | Status: SHIPPED | OUTPATIENT
Start: 2021-10-05 | End: 2021-12-02 | Stop reason: SDUPTHER

## 2021-10-05 RX ORDER — ALUMINA, MAGNESIA, AND SIMETHICONE 2400; 2400; 240 MG/30ML; MG/30ML; MG/30ML
10 SUSPENSION ORAL EVERY 6 HOURS PRN
Qty: 355 ML | Refills: 0 | Status: SHIPPED | OUTPATIENT
Start: 2021-10-05

## 2021-10-25 ENCOUNTER — OFFICE VISIT (OUTPATIENT)
Dept: FAMILY MEDICINE CLINIC | Facility: CLINIC | Age: 86
End: 2021-10-25

## 2021-10-25 VITALS
HEIGHT: 61 IN | TEMPERATURE: 98 F | OXYGEN SATURATION: 88 % | RESPIRATION RATE: 16 BRPM | BODY MASS INDEX: 39.46 KG/M2 | WEIGHT: 209 LBS | HEART RATE: 88 BPM | DIASTOLIC BLOOD PRESSURE: 80 MMHG | SYSTOLIC BLOOD PRESSURE: 116 MMHG

## 2021-10-25 DIAGNOSIS — K21.9 GASTROESOPHAGEAL REFLUX DISEASE WITHOUT ESOPHAGITIS: ICD-10-CM

## 2021-10-25 DIAGNOSIS — I10 BENIGN ESSENTIAL HYPERTENSION: ICD-10-CM

## 2021-10-25 DIAGNOSIS — I50.32 CHRONIC DIASTOLIC CONGESTIVE HEART FAILURE (HCC): ICD-10-CM

## 2021-10-25 DIAGNOSIS — K64.9 HEMORRHOIDS, UNSPECIFIED HEMORRHOID TYPE: ICD-10-CM

## 2021-10-25 PROCEDURE — 3074F SYST BP LT 130 MM HG: CPT | Performed by: FAMILY MEDICINE

## 2021-10-25 PROCEDURE — 1160F RVW MEDS BY RX/DR IN RCRD: CPT | Performed by: FAMILY MEDICINE

## 2021-10-25 PROCEDURE — 3078F DIAST BP <80 MM HG: CPT | Performed by: FAMILY MEDICINE

## 2021-10-25 PROCEDURE — 99213 OFFICE O/P EST LOW 20 MIN: CPT | Performed by: FAMILY MEDICINE

## 2021-10-26 ENCOUNTER — CONSULT (OUTPATIENT)
Dept: GERIATRICS | Age: 86
End: 2021-10-26
Payer: COMMERCIAL

## 2021-10-26 VITALS
OXYGEN SATURATION: 95 % | WEIGHT: 208.6 LBS | SYSTOLIC BLOOD PRESSURE: 102 MMHG | HEART RATE: 93 BPM | TEMPERATURE: 96.9 F | BODY MASS INDEX: 39.41 KG/M2 | RESPIRATION RATE: 18 BRPM | DIASTOLIC BLOOD PRESSURE: 58 MMHG

## 2021-10-26 DIAGNOSIS — H91.90 HEARING LOSS, UNSPECIFIED HEARING LOSS TYPE, UNSPECIFIED LATERALITY: ICD-10-CM

## 2021-10-26 DIAGNOSIS — R26.81 GAIT INSTABILITY: ICD-10-CM

## 2021-10-26 DIAGNOSIS — E78.2 MIXED HYPERLIPIDEMIA: ICD-10-CM

## 2021-10-26 DIAGNOSIS — K59.00 CONSTIPATION, UNSPECIFIED CONSTIPATION TYPE: ICD-10-CM

## 2021-10-26 DIAGNOSIS — I35.0 NONRHEUMATIC AORTIC VALVE STENOSIS: ICD-10-CM

## 2021-10-26 DIAGNOSIS — R41.3 MEMORY LOSS: ICD-10-CM

## 2021-10-26 DIAGNOSIS — H40.9 GLAUCOMA, UNSPECIFIED GLAUCOMA TYPE, UNSPECIFIED LATERALITY: ICD-10-CM

## 2021-10-26 DIAGNOSIS — M54.81 BILATERAL OCCIPITAL NEURALGIA: ICD-10-CM

## 2021-10-26 DIAGNOSIS — I50.32 CHRONIC DIASTOLIC CONGESTIVE HEART FAILURE (HCC): ICD-10-CM

## 2021-10-26 DIAGNOSIS — I10 BENIGN ESSENTIAL HYPERTENSION: ICD-10-CM

## 2021-10-26 DIAGNOSIS — F03.90 DEMENTIA WITHOUT BEHAVIORAL DISTURBANCE, UNSPECIFIED DEMENTIA TYPE (HCC): Primary | ICD-10-CM

## 2021-10-26 DIAGNOSIS — J44.9 CHRONIC OBSTRUCTIVE PULMONARY DISEASE, UNSPECIFIED COPD TYPE (HCC): ICD-10-CM

## 2021-10-26 PROCEDURE — 99205 OFFICE O/P NEW HI 60 MIN: CPT | Performed by: STUDENT IN AN ORGANIZED HEALTH CARE EDUCATION/TRAINING PROGRAM

## 2021-10-26 PROCEDURE — 1160F RVW MEDS BY RX/DR IN RCRD: CPT | Performed by: STUDENT IN AN ORGANIZED HEALTH CARE EDUCATION/TRAINING PROGRAM

## 2021-10-26 PROCEDURE — 1036F TOBACCO NON-USER: CPT | Performed by: STUDENT IN AN ORGANIZED HEALTH CARE EDUCATION/TRAINING PROGRAM

## 2021-10-27 PROBLEM — H91.90 HEARING LOSS: Status: ACTIVE | Noted: 2021-10-27

## 2021-10-30 ENCOUNTER — HOSPITAL ENCOUNTER (OUTPATIENT)
Dept: CT IMAGING | Facility: HOSPITAL | Age: 86
Discharge: HOME | End: 2021-10-30
Payer: COMMERCIAL

## 2021-10-30 DIAGNOSIS — R10.10 UPPER ABDOMINAL PAIN: ICD-10-CM

## 2021-10-30 PROCEDURE — 71260 CT THORAX DX C+: CPT

## 2021-10-30 PROCEDURE — 74177 CT ABD & PELVIS W/CONTRAST: CPT

## 2021-10-30 PROCEDURE — G1004 CDSM NDSC: HCPCS

## 2021-10-30 RX ADMIN — IOHEXOL 100 ML: 350 INJECTION, SOLUTION INTRAVENOUS at 09:26

## 2021-11-02 ENCOUNTER — TELEPHONE (OUTPATIENT)
Dept: GASTROENTEROLOGY | Facility: CLINIC | Age: 86
End: 2021-11-02

## 2021-11-02 ENCOUNTER — TELEPHONE (OUTPATIENT)
Dept: FAMILY MEDICINE CLINIC | Facility: CLINIC | Age: 86
End: 2021-11-02

## 2021-11-03 ENCOUNTER — TELEPHONE (OUTPATIENT)
Dept: GASTROENTEROLOGY | Facility: CLINIC | Age: 86
End: 2021-11-03

## 2021-11-04 ENCOUNTER — TELEPHONE (OUTPATIENT)
Dept: GASTROENTEROLOGY | Facility: CLINIC | Age: 86
End: 2021-11-04

## 2021-11-11 ENCOUNTER — TELEPHONE (OUTPATIENT)
Dept: GASTROENTEROLOGY | Facility: CLINIC | Age: 86
End: 2021-11-11

## 2021-11-15 ENCOUNTER — HOSPITAL ENCOUNTER (OUTPATIENT)
Dept: MRI IMAGING | Facility: HOSPITAL | Age: 86
Discharge: HOME | End: 2021-11-15
Attending: STUDENT IN AN ORGANIZED HEALTH CARE EDUCATION/TRAINING PROGRAM
Payer: COMMERCIAL

## 2021-11-15 ENCOUNTER — TELEPHONE (OUTPATIENT)
Dept: FAMILY MEDICINE CLINIC | Facility: CLINIC | Age: 86
End: 2021-11-15

## 2021-11-15 DIAGNOSIS — F03.90 DEMENTIA WITHOUT BEHAVIORAL DISTURBANCE, UNSPECIFIED DEMENTIA TYPE (HCC): ICD-10-CM

## 2021-11-15 PROCEDURE — G1004 CDSM NDSC: HCPCS

## 2021-11-15 PROCEDURE — 70551 MRI BRAIN STEM W/O DYE: CPT

## 2021-11-18 ENCOUNTER — TELEPHONE (OUTPATIENT)
Dept: FAMILY MEDICINE CLINIC | Facility: CLINIC | Age: 86
End: 2021-11-18

## 2021-11-23 ENCOUNTER — TELEMEDICINE (OUTPATIENT)
Dept: GERIATRICS | Age: 86
End: 2021-11-23
Payer: COMMERCIAL

## 2021-11-23 DIAGNOSIS — K59.00 CONSTIPATION, UNSPECIFIED CONSTIPATION TYPE: ICD-10-CM

## 2021-11-23 DIAGNOSIS — I10 BENIGN ESSENTIAL HYPERTENSION: ICD-10-CM

## 2021-11-23 DIAGNOSIS — E78.2 MIXED HYPERLIPIDEMIA: ICD-10-CM

## 2021-11-23 DIAGNOSIS — R26.81 GAIT INSTABILITY: ICD-10-CM

## 2021-11-23 DIAGNOSIS — F03.90 DEMENTIA WITHOUT BEHAVIORAL DISTURBANCE, UNSPECIFIED DEMENTIA TYPE (HCC): Primary | ICD-10-CM

## 2021-11-23 DIAGNOSIS — H91.90 HEARING LOSS, UNSPECIFIED HEARING LOSS TYPE, UNSPECIFIED LATERALITY: ICD-10-CM

## 2021-11-23 DIAGNOSIS — I50.32 CHRONIC DIASTOLIC CONGESTIVE HEART FAILURE (HCC): ICD-10-CM

## 2021-11-23 PROCEDURE — 99215 OFFICE O/P EST HI 40 MIN: CPT | Performed by: STUDENT IN AN ORGANIZED HEALTH CARE EDUCATION/TRAINING PROGRAM

## 2021-11-23 PROCEDURE — 1160F RVW MEDS BY RX/DR IN RCRD: CPT | Performed by: STUDENT IN AN ORGANIZED HEALTH CARE EDUCATION/TRAINING PROGRAM

## 2021-11-29 ENCOUNTER — OFFICE VISIT (OUTPATIENT)
Dept: FAMILY MEDICINE CLINIC | Facility: CLINIC | Age: 86
End: 2021-11-29

## 2021-11-29 VITALS
WEIGHT: 206 LBS | SYSTOLIC BLOOD PRESSURE: 138 MMHG | RESPIRATION RATE: 16 BRPM | HEART RATE: 91 BPM | TEMPERATURE: 98 F | DIASTOLIC BLOOD PRESSURE: 80 MMHG | OXYGEN SATURATION: 92 % | BODY MASS INDEX: 38.89 KG/M2 | HEIGHT: 61 IN

## 2021-11-29 DIAGNOSIS — M67.442 GANGLION CYST OF JOINT OF FINGER OF LEFT HAND: Primary | ICD-10-CM

## 2021-11-29 PROBLEM — M89.8X9 BONY GROWTH: Status: RESOLVED | Noted: 2021-11-29 | Resolved: 2021-11-29

## 2021-11-29 PROBLEM — M89.8X9 BONY GROWTH: Status: ACTIVE | Noted: 2021-01-01

## 2021-11-29 PROCEDURE — 99213 OFFICE O/P EST LOW 20 MIN: CPT | Performed by: FAMILY MEDICINE

## 2021-11-29 PROCEDURE — 3075F SYST BP GE 130 - 139MM HG: CPT | Performed by: FAMILY MEDICINE

## 2021-11-29 PROCEDURE — 3079F DIAST BP 80-89 MM HG: CPT | Performed by: FAMILY MEDICINE

## 2021-11-29 PROCEDURE — 1160F RVW MEDS BY RX/DR IN RCRD: CPT | Performed by: FAMILY MEDICINE

## 2021-11-30 ENCOUNTER — HOSPITAL ENCOUNTER (OUTPATIENT)
Dept: RADIOLOGY | Facility: HOSPITAL | Age: 86
Discharge: HOME | End: 2021-11-30
Payer: COMMERCIAL

## 2021-11-30 DIAGNOSIS — M67.442 GANGLION CYST OF JOINT OF FINGER OF LEFT HAND: ICD-10-CM

## 2021-11-30 PROCEDURE — 73130 X-RAY EXAM OF HAND: CPT

## 2021-12-02 ENCOUNTER — OFFICE VISIT (OUTPATIENT)
Dept: GASTROENTEROLOGY | Facility: CLINIC | Age: 86
End: 2021-12-02
Payer: COMMERCIAL

## 2021-12-02 VITALS
BODY MASS INDEX: 39.23 KG/M2 | HEIGHT: 61 IN | TEMPERATURE: 98.8 F | SYSTOLIC BLOOD PRESSURE: 120 MMHG | WEIGHT: 207.8 LBS | DIASTOLIC BLOOD PRESSURE: 80 MMHG

## 2021-12-02 DIAGNOSIS — K21.9 GASTROESOPHAGEAL REFLUX DISEASE WITHOUT ESOPHAGITIS: Primary | ICD-10-CM

## 2021-12-02 DIAGNOSIS — K64.9 HEMORRHOIDS, UNSPECIFIED HEMORRHOID TYPE: ICD-10-CM

## 2021-12-02 DIAGNOSIS — K59.00 CONSTIPATION, UNSPECIFIED CONSTIPATION TYPE: ICD-10-CM

## 2021-12-02 DIAGNOSIS — R10.10 UPPER ABDOMINAL PAIN: ICD-10-CM

## 2021-12-02 DIAGNOSIS — K62.89 ANAL OR RECTAL PAIN: ICD-10-CM

## 2021-12-02 PROCEDURE — 99214 OFFICE O/P EST MOD 30 MIN: CPT | Performed by: INTERNAL MEDICINE

## 2021-12-02 PROCEDURE — 1160F RVW MEDS BY RX/DR IN RCRD: CPT | Performed by: INTERNAL MEDICINE

## 2021-12-02 RX ORDER — OMEPRAZOLE 40 MG/1
40 CAPSULE, DELAYED RELEASE ORAL DAILY
Qty: 90 CAPSULE | Refills: 3 | Status: SHIPPED | OUTPATIENT
Start: 2021-12-02

## 2021-12-14 ENCOUNTER — TELEPHONE (OUTPATIENT)
Dept: OTHER | Facility: OTHER | Age: 86
End: 2021-12-14

## 2021-12-15 DIAGNOSIS — R10.11 RIGHT UPPER QUADRANT PAIN: ICD-10-CM

## 2021-12-15 DIAGNOSIS — N32.81 OVERACTIVE BLADDER: ICD-10-CM

## 2021-12-15 DIAGNOSIS — E78.2 MIXED HYPERLIPIDEMIA: ICD-10-CM

## 2021-12-15 DIAGNOSIS — K59.09 OTHER CONSTIPATION: ICD-10-CM

## 2021-12-15 DIAGNOSIS — R11.0 NAUSEA: ICD-10-CM

## 2021-12-15 RX ORDER — ASPIRIN 81 MG
TABLET, DELAYED RELEASE (ENTERIC COATED) ORAL
Qty: 90 TABLET | Refills: 0 | Status: SHIPPED | OUTPATIENT
Start: 2021-12-15 | End: 2022-03-15

## 2021-12-15 RX ORDER — ATORVASTATIN CALCIUM 20 MG/1
TABLET, FILM COATED ORAL
Qty: 90 TABLET | Refills: 0 | Status: SHIPPED | OUTPATIENT
Start: 2021-12-15 | End: 2022-03-15

## 2021-12-15 RX ORDER — FESOTERODINE FUMARATE 8 MG/1
TABLET, FILM COATED, EXTENDED RELEASE ORAL
Qty: 90 TABLET | Refills: 0 | Status: SHIPPED | OUTPATIENT
Start: 2021-12-15 | End: 2022-03-15

## 2021-12-16 ENCOUNTER — OFFICE VISIT (OUTPATIENT)
Dept: CARDIOLOGY CLINIC | Facility: CLINIC | Age: 86
End: 2021-12-16
Payer: COMMERCIAL

## 2021-12-16 ENCOUNTER — TELEPHONE (OUTPATIENT)
Dept: GERIATRICS | Age: 86
End: 2021-12-16

## 2021-12-16 VITALS
DIASTOLIC BLOOD PRESSURE: 70 MMHG | WEIGHT: 203.4 LBS | SYSTOLIC BLOOD PRESSURE: 120 MMHG | BODY MASS INDEX: 38.4 KG/M2 | HEART RATE: 84 BPM | HEIGHT: 61 IN | OXYGEN SATURATION: 93 %

## 2021-12-16 DIAGNOSIS — I35.0 NONRHEUMATIC AORTIC VALVE STENOSIS: Primary | ICD-10-CM

## 2021-12-16 DIAGNOSIS — I50.32 CHRONIC DIASTOLIC CONGESTIVE HEART FAILURE (HCC): ICD-10-CM

## 2021-12-16 DIAGNOSIS — I10 BENIGN ESSENTIAL HYPERTENSION: ICD-10-CM

## 2021-12-16 DIAGNOSIS — E78.2 MIXED HYPERLIPIDEMIA: ICD-10-CM

## 2021-12-16 PROCEDURE — 99214 OFFICE O/P EST MOD 30 MIN: CPT | Performed by: INTERNAL MEDICINE

## 2021-12-16 PROCEDURE — 1160F RVW MEDS BY RX/DR IN RCRD: CPT | Performed by: INTERNAL MEDICINE

## 2021-12-16 PROCEDURE — 1036F TOBACCO NON-USER: CPT | Performed by: INTERNAL MEDICINE

## 2021-12-17 DIAGNOSIS — M19.042 PRIMARY OSTEOARTHRITIS OF LEFT HAND: Primary | ICD-10-CM

## 2021-12-17 DIAGNOSIS — M67.442 GANGLION CYST OF JOINT OF FINGER OF LEFT HAND: ICD-10-CM

## 2021-12-17 RX ORDER — ACETAMINOPHEN 500 MG
1000 TABLET ORAL EVERY 6 HOURS PRN
Qty: 60 TABLET | Refills: 3 | Status: SHIPPED | OUTPATIENT
Start: 2021-12-17

## 2021-12-17 RX ORDER — IBUPROFEN 600 MG/1
600 TABLET ORAL EVERY 6 HOURS PRN
Qty: 30 TABLET | Refills: 0 | Status: SHIPPED | OUTPATIENT
Start: 2021-12-17 | End: 2022-04-29 | Stop reason: ALTCHOICE

## 2021-12-20 ENCOUNTER — TELEPHONE (OUTPATIENT)
Dept: FAMILY MEDICINE CLINIC | Facility: CLINIC | Age: 86
End: 2021-12-20

## 2021-12-20 NOTE — TELEPHONE ENCOUNTER
PCP [address] care delivered FORM RECEIVED VIA FAX AND PLACED IN PCP FOLDER TO BE DELIVERED AT ASSIGNED TIMES        Supplies

## 2021-12-21 RX ORDER — METOCLOPRAMIDE 5 MG/1
TABLET ORAL
Qty: 24 TABLET | Refills: 0 | OUTPATIENT
Start: 2021-12-21

## 2021-12-22 ENCOUNTER — TELEPHONE (OUTPATIENT)
Dept: PSYCHIATRY | Facility: CLINIC | Age: 86
End: 2021-12-22

## 2021-12-22 DIAGNOSIS — R11.0 NAUSEA: ICD-10-CM

## 2021-12-22 RX ORDER — METOCLOPRAMIDE 5 MG/1
5 TABLET ORAL 4 TIMES DAILY
Qty: 24 TABLET | Refills: 0 | OUTPATIENT
Start: 2021-12-22

## 2021-12-23 ENCOUNTER — TELEPHONE (OUTPATIENT)
Dept: FAMILY MEDICINE CLINIC | Facility: CLINIC | Age: 86
End: 2021-12-23

## 2021-12-30 ENCOUNTER — TELEPHONE (OUTPATIENT)
Dept: FAMILY MEDICINE CLINIC | Facility: CLINIC | Age: 86
End: 2021-12-30

## 2022-01-01 ENCOUNTER — HOME CARE VISIT (OUTPATIENT)
Dept: HOME HOSPICE | Facility: HOSPICE | Age: 87
End: 2022-01-01
Payer: MEDICARE

## 2022-01-01 ENCOUNTER — HOME CARE VISIT (OUTPATIENT)
Dept: HOME HEALTH SERVICES | Facility: HOME HEALTHCARE | Age: 87
End: 2022-01-01
Payer: MEDICARE

## 2022-01-01 ENCOUNTER — HOSPICE ADMISSION (OUTPATIENT)
Dept: HOME HOSPICE | Facility: HOSPICE | Age: 87
End: 2022-01-01
Payer: MEDICARE

## 2022-01-01 ENCOUNTER — TELEPHONE (OUTPATIENT)
Dept: FAMILY MEDICINE CLINIC | Facility: CLINIC | Age: 87
End: 2022-01-01

## 2022-01-01 VITALS
DIASTOLIC BLOOD PRESSURE: 70 MMHG | TEMPERATURE: 98 F | RESPIRATION RATE: 22 BRPM | SYSTOLIC BLOOD PRESSURE: 110 MMHG | HEART RATE: 112 BPM

## 2022-01-01 PROCEDURE — 10330064 ALIGNER, FOAM BODY SM (8/CS)

## 2022-01-01 PROCEDURE — G0156 HHCP-SVS OF AIDE,EA 15 MIN: HCPCS

## 2022-01-01 PROCEDURE — T2042 HOSPICE ROUTINE HOME CARE: HCPCS

## 2022-01-01 PROCEDURE — 10330064 UNDERPAD, REUSE 36X36 1DZ     BECKCL

## 2022-01-01 PROCEDURE — G0299 HHS/HOSPICE OF RN EA 15 MIN: HCPCS

## 2022-01-01 PROCEDURE — 10330064 BRIEF, WINGS ADLT 2XLG GRN WAIST TO 69"

## 2022-01-01 PROCEDURE — G0155 HHCP-SVS OF CSW,EA 15 MIN: HCPCS

## 2022-01-01 PROCEDURE — 10330057 MEDICATION, GENERAL

## 2022-01-01 PROCEDURE — G0151 HHCP-SERV OF PT,EA 15 MIN: HCPCS

## 2022-01-01 PROCEDURE — 10330064

## 2022-01-01 PROCEDURE — 10330087 HSPC SERVICE INTENSITY ADD-ON

## 2022-01-03 ENCOUNTER — TELEPHONE (OUTPATIENT)
Dept: FAMILY MEDICINE CLINIC | Facility: CLINIC | Age: 87
End: 2022-01-03

## 2022-01-03 NOTE — TELEPHONE ENCOUNTER
PCP SIGNATURE NEEDED FOR home care delivered ( adult diapers)   FORM RECEIVED VIA FAX AND PLACED IN PCP FOLDER TO BE DELIVERED AT ASSIGNED TIMES

## 2022-01-06 ENCOUNTER — OFFICE VISIT (OUTPATIENT)
Dept: OBGYN CLINIC | Facility: MEDICAL CENTER | Age: 87
End: 2022-01-06
Payer: MEDICARE

## 2022-01-06 VITALS
HEIGHT: 61 IN | DIASTOLIC BLOOD PRESSURE: 67 MMHG | SYSTOLIC BLOOD PRESSURE: 97 MMHG | WEIGHT: 210 LBS | BODY MASS INDEX: 39.65 KG/M2

## 2022-01-06 DIAGNOSIS — M12.811 RIGHT ROTATOR CUFF TEAR ARTHROPATHY: Primary | ICD-10-CM

## 2022-01-06 DIAGNOSIS — M75.101 RIGHT ROTATOR CUFF TEAR ARTHROPATHY: Primary | ICD-10-CM

## 2022-01-06 PROCEDURE — 99213 OFFICE O/P EST LOW 20 MIN: CPT | Performed by: ORTHOPAEDIC SURGERY

## 2022-01-06 PROCEDURE — 20610 DRAIN/INJ JOINT/BURSA W/O US: CPT | Performed by: ORTHOPAEDIC SURGERY

## 2022-01-06 RX ORDER — TRIAMCINOLONE ACETONIDE 40 MG/ML
20 INJECTION, SUSPENSION INTRA-ARTICULAR; INTRAMUSCULAR
Status: COMPLETED | OUTPATIENT
Start: 2022-01-06 | End: 2022-01-06

## 2022-01-06 RX ADMIN — TRIAMCINOLONE ACETONIDE 20 MG: 40 INJECTION, SUSPENSION INTRA-ARTICULAR; INTRAMUSCULAR at 19:45

## 2022-01-06 NOTE — PROGRESS NOTES
Ortho Sports Medicine Shoulder Follow Up Visit     Assesment:   80 y o  female right shoulder Rotator cuff arthropathy    Plan:    Conservative treatment:    Ice to shoulder 1-2 times daily, for 20 minutes at a time  Home exercise program for shoulder, including ROM and strenthening  Instructions given to patient of what exercises to perform  Let pain guide return to activities  Imaging: All imaging from today was reviewed by myself and explained to the patient  Injection:    The risks and benefits of the injection (which include but are not limited to: infection, bleeding,damage to nerve/artery, need for further intervention), as well as the risks and benefits of all alternative treatments were explained and understood  The patient elected to proceed with injection  The procedure was done with aseptic technique, and the patient tolerated the procedure well with no complications  A corticosteroid injection of the subacromial space was performed  The patient should take 1-2 days off of activity, with gradual return to activity as tolerated  Ice to the shoulder 1-2 times daily for 20 minutes, for next 24-48 hrs  Surgery:     No surgery is recommended at this point, continue with conservative treatment plan as noted  Follow up:    Return in about 3 months (around 4/6/2022)  Chief Complaint   Patient presents with    Right Shoulder - Follow-up         History of Present Illness: The patient is returns for follow up of  Right shoulder  Since the prior visit, She reports about 2 months of improvement with the injections  She would like another today since they do help for a bit  No new injuries since the last visit  Pain is improved by rest, ice, NSAIDS and injection  Pain is aggravated by overhead activity and reaching back  The patient has weakness  The patient has tried rest, NSAIDS and injection          Shoulder Surgical History:  None    Past Medical, Social and Family History:  Past Medical History:   Diagnosis Date    Diverticulosis 2001    Heart failure ([address] )     History of cystocele     Hypertension     Obesity     Positive PPD 1999    treated     Past Surgical History:   Procedure Laterality Date    BUNIONECTOMY Left 8/25/2021    Procedure: [place], EXCISION OF WOUND;  Surgeon: Lc Taylor DPM;  Location: [address] [place] [address] OR;  Service: Podiatry    CARDIOVASCULAR STRESS TEST  2001    dobutamine stress test    CHOLECYSTECTOMY  04/2002    COLONOSCOPY      CYSTOCELE REPAIR      HYSTERECTOMY      KNEE SURGERY      knee replacement    ID REMOVAL DEEP IMPLANT Left 8/25/2021    Procedure: REMOVAL HARDWARE FOOT;  Surgeon: Lc Taylor DPM;  Location: [address] [place] [address] OR;  Service: Podiatry    UPPER GASTROINTESTINAL ENDOSCOPY       No Known Allergies  Current Outpatient Medications on File Prior to Visit   Medication Sig Dispense Refill    acetaminophen (TYLENOL) 500 mg tablet Take 2 tablets (1,000 mg total) by mouth every 6 (six) hours as needed for mild pain 60 tablet 3    Advair Diskus 500-50 MCG/DOSE inhaler INHALE 1 PUFF BY MOUTH 2 TIMES A DAY  RINSE MOIUTH AFTER USE 60 blister 0    albuterol (PROVENTIL HFA,VENTOLIN HFA) 90 mcg/act inhaler Inhale 2 puffs every 6 (six) hours as needed for wheezing 1 Inhaler 0    aluminum-magnesium hydroxide-simethicone (MAALOX MAX) 400-400-40 MG/5ML suspension Take 10 mL by mouth every 6 (six) hours as needed for indigestion or heartburn (abdominal pain) 355 mL 0    amitriptyline (ELAVIL) 25 mg tablet TAKE ONE TABLET BY MOUTH DAILY AT BEDTIME   aspirin (ECOTRIN LOW STRENGTH) 81 mg EC tablet Take 81 mg by mouth      atorvastatin (LIPITOR) 20 mg tablet TAKE 1 TABLET BY MOUTH ONCE DAILY   90 tablet 0    benzonatate (TESSALON PERLES) 100 mg capsule Take 1 capsule (100 mg total) by mouth 3 (three) times a day as needed for cough 20 capsule 0    Calcium Carbonate-Vitamin D (CALCIUM 500 + D) 500-125 MG-UNIT TABS every 24 hours      Diclofenac Sodium (VOLTAREN) 1 % Apply 2 g topically 4 (four) times a day 100 g 0    docusate sodium (COLACE) 100 mg capsule Take 1 capsule (100 mg total) by mouth 2 (two) times a day 60 capsule 1    Elastic Bandages & Supports (Medical Compression Stockings) MISC by Does not apply route daily High knee compression stockings 20-30 MMHG 2 each 0    fluticasone (FLONASE) 50 mcg/act nasal spray 2 sprays into each nostril daily 1 Bottle 3    furosemide (LASIX) 40 mg tablet Take 1 tablet (40 mg total) by mouth daily 90 tablet 1    guaiFENesin (ROBITUSSIN) 100 MG/5ML oral liquid Take 10 mL (200 mg total) by mouth 3 (three) times a day as needed for cough 120 mL 0    hydrochlorothiazide (HYDRODIURIL) 12 5 mg tablet Take 1 tablet (12 5 mg total) by mouth daily 90 tablet 1    hydrocortisone (ANUSOL-HC) 2 5 % rectal cream Apply topically 2 (two) times a day To rectal area 28 g 3    ibuprofen (MOTRIN) 600 mg tablet Take 1 tablet (600 mg total) by mouth every 6 (six) hours as needed for mild pain 30 tablet 0    latanoprost (XALATAN) 0 005 % ophthalmic solution       lidocaine (XYLOCAINE) 5 % ointment Apply topically 2 (two) times a day as needed for mild pain 35 44 g 0    lisinopril (ZESTRIL) 40 mg tablet Take 1 tablet (40 mg total) by mouth daily 90 tablet 1    metoclopramide (REGLAN) 5 mg tablet Take 1 tablet (5 mg total) by mouth 4 (four) times a day 24 tablet 0    Misc  Devices (CANE) MISC Four point aluminum cane   omeprazole (PriLOSEC) 40 MG capsule Take 1 capsule (40 mg total) by mouth daily Half an hour before breakfast 90 capsule 3    Senna Plus 8 6-50 MG per tablet TAKE ONE TABLET BY MOUTH EVERY DAY 90 tablet 0    timolol (BETIMOL) 0 5 % ophthalmic solution Administer 1 drop to both eyes 2 (two) times a day       timolol (TIMOPTIC) 0 5 % ophthalmic solution       Toviaz 8 MG TB24 TAKE 1 TABLET BY MOUTH ONCE DAILY   90 tablet 0     No current facility-administered medications on file prior to visit  Social History     Socioeconomic History    Marital status:      Spouse name: Not on file    Number of children: Not on file    Years of education: Not on file    Highest education level: Not on file   Occupational History    Not on file   Tobacco Use    Smoking status: Never Smoker    Smokeless tobacco: Never Used   Vaping Use    Vaping Use: Never used   Substance and Sexual Activity    Alcohol use: Not Currently    Drug use: Not Currently    Sexual activity: Not Currently     Partners: Male   Other Topics Concern    Not on file   Social History Narrative    Lives at home with son and 2 grandkids  Social Determinants of Health     Financial Resource Strain: Unknown    Difficulty of Paying Living Expenses: Patient refused   Food Insecurity: Unknown    Worried About Running Out of Food in the Last Year: Patient refused    Ran Out of Food in the Last Year: Patient refused   Transportation Needs: Unknown    Lack of Transportation (Medical): Patient refused    Lack of Transportation (Non-Medical): Patient refused   Physical Activity: Not on file   Stress: Not on file   Social Connections: Not on file   Intimate Partner Violence: Not on file   Housing Stability: Not on file       I have reviewed the past medical, surgical, social and family history, medications and allergies as documented in the EMR  Review of systems: ROS is negative other than that noted in the HPI  Constitutional: Negative for fatigue and fever  Physical Exam:    Blood pressure 97/67, height 5' 1" (1 549 m), weight 95 3 kg (210 lb), not currently breastfeeding      General/Constitutional: NAD, well developed, well nourished  HENT: Normocephalic, atraumatic  CV: Intact distal pulses, regular rate  Resp: No respiratory distress or labored breathing  Lymphatic: No lymphadenopathy palpated  Neuro: Alert and Oriented x 3, no focal deficits  Psych: Normal mood, normal affect, normal judgement, normal behavior  Skin: Warm, dry, no rashes, no erythema      Shoulder focused exam:       RIGHT LEFT    Scapula Atrophy Negative Negative     Winging Negative Negative     Protraction Negative Negative    Rotator cuff SS Deferred due to pain 5/5     IS Deferred due to pain 5/5     SubS Deferred due to pain 5/5    ROM FF 80 active, 150 passive    150     ER0 45 60     ER90 90    90     IR90 40    40     IRb iliac crest    T6    TTP: AC Negative Negative     Biceps Negative Negative     Coracoid Negative Negative    Special Tests: O'Briens Negative Negative     Juarez-shear Negative Negative     Cross body Adduction Negative Negative     Speeds  Negative Negative     Juan David's Negative Negative     Whipple Negative Negative       Neer Negative Negative     Ramirez Negative Negative    Instability: Apprehension & relocation not tested not tested     Load & shift not tested not tested    Other: Crank Negative Negative               UE NV Exam: +2 Radial pulses bilaterally  Sensation intact to light touch C5-T1 bilaterally, Radial/median/ulnar nerve motor intact    Cervical ROM is full without pain, numbness or tingling    Large joint arthrocentesis: R subacromial bursa  Procedure Details  Location: shoulder - R subacromial bursa  Needle size: 22 G  Approach: lateral  Medications administered: 20 mg triamcinolone acetonide 40 mg/mL    Patient tolerance: patient tolerated the procedure well with no immediate complications  Dressing:  Sterile dressing applied        Shoulder Imaging    No imaging was performed today

## 2022-01-14 ENCOUNTER — TELEPHONE (OUTPATIENT)
Dept: FAMILY MEDICINE CLINIC | Facility: CLINIC | Age: 87
End: 2022-01-14

## 2022-01-14 ENCOUNTER — EVALUATION (OUTPATIENT)
Dept: SPEECH THERAPY | Facility: REHABILITATION | Age: 87
End: 2022-01-14
Payer: MEDICARE

## 2022-01-14 DIAGNOSIS — R48.8 OTHER SYMBOLIC DYSFUNCTIONS: Primary | ICD-10-CM

## 2022-01-14 DIAGNOSIS — F03.90 DEMENTIA WITHOUT BEHAVIORAL DISTURBANCE, UNSPECIFIED DEMENTIA TYPE (HCC): ICD-10-CM

## 2022-01-14 PROCEDURE — 92507 TX SP LANG VOICE COMM INDIV: CPT

## 2022-01-14 PROCEDURE — 92523 SPEECH SOUND LANG COMPREHEN: CPT

## 2022-01-14 NOTE — PROGRESS NOTES
Speech-Language Pathology Initial Evaluation    Today's date: 2022  Patients name: Claire Serrano  : 1934  MRN: [id number]  Safety measures: NKA, reduced recall    Referring provider: Su Macias MD    Encounter Diagnosis     ICD-10-CM    1  Other symbolic dysfunctions  Y30 4    2  Dementia without behavioral disturbance, unspecified dementia type ([address] )  F03 90 Ambulatory referral to Speech Therapy        Visit tracking:  -Referring provider: Epic  -Billing guidelines: CMS  -Visit #1  -Insurance: Opal Stapleton due 2022     Subjective comments: Patient presents to session with family support (granddaughter and son), son is full time caregiver    How did the patient hear about us? Physician    Patient's goal(s): assess cog/ling    Reason for referral: Change in cognitive status  Prior functional status: Communication appropriate and efficient in most situations  Minimal difficulty with self-monitoring, self-correction needed  Clinically complex situations: Mental/behavioral diagnosis affecting rate of recovery    History: Patient is a 80 y o  female who was referred to outpatient skilled Speech Therapy services for a cognitive-linguistic and speech-language evaluation  Pt with medical history significant for Dementia resulting in other symbolic dysfunctions at this time  Pt was previously able to communicate and recall information within envirionment with support however now presents with increased confusion/disorientation, reduced recall, reduced word finding acuracy impacting functionality within envirionment  Pt requires skilled SLP interventions to reduce risk of further decline in function, risk for miscommunication of wants/needs, reduced participation in conversations within environment and additional associated complications         Hearing: reduced hearing R side, used to have HA does not wear, WFL for testing  Vision: WFL with glasses    Home environment/lifestyle: Lives at home with son and family support  Highest level of education: 6 years of school, went to get diploma later on  Vocational status: now retired    Mental status: Alert   Behavior status: Cooperative  Communication modalities: Verbal  Rehabilitation prognosis: [address] rehab potential to reach the established goals    Assessments    The [address] ([address]) examination is a 30-point screening questionnaire that tests for orientation, memory, attention, and executive functions  It is used to detect dementia and mild neurocognitive disorder (MNCD)  During todays administration of the test, pt achieved a total score of 5/30 points, which is suggestive of "dementia"  This correlates with known diagnosis  Patient with 2/5 IM recall, 1/3 orientation accuracy, named 6 concrete items in 1 minute, 25% following direction accuracy, <25% accuracy mental manipulation tasks, 25% STM recall, 0/5 DM recall  Goals    Short-term Goals:     1  Patient will complete concrete and abstract categorization tasks to 70% accuracy to facilitate improved generative naming skills and working memory, to be achieved in 4-6 weeks  2  Patient/family will demonstrate use of trained internal and external memory aids and compensatory strategies with 75% accuracy provided cues to facilitate increased recall of routine, personal information, and recent events, to be achieved in 4-6 weeks  3  Patient will participate in continued therapeutic trials in 90% of opportunities for continued assessment and recommendations for carryover and functional tasks at home, to be achieved in 4-6 weeks  4  Patient will improve long term memory retrieval and recall of information during reminiscing tasks with 80% accuracy, to be achieved in 4-6 weeks       5  Patient will demonstrate ability to follow 2 step directions with 80% accuracy provided cues in order to facilitate improved ability to follow directions within functional living environment to be achieved in 4-6 weeks        3  Patient will improve attention to task with 80% accuracy provided cues as needed to allow for improved task completion within environment, to be achieved in 4-6 weeks  Long-term Goals:      1  Patient will improve functional cognitive-linguistic skills with the implementation of cues and external aids, by discharge  2  Patient will demonstrate adequate memory/reasoning/judgment to perform desired activities in a supervised environment by discharge  Impressions/Recommendations    Impressions:   Patient presents with moderate to severe cognitive linguistic difficulties at this time, characterized by reduced IM/DM/STM recall, reduced mental manipulation accuracy, reduced generative naming accuracy, reduced orientation accuracy, reduced ability to follow 1 and 2 step directions impacting functionality within envirionment at this time  Patient with score of 5/30 on SLUMs examination this date  Family able to provide translation as needed as patient is primarily [address] speaking  Family reports increased episodes of disorientation (night/day, etc ), losing items within environment, etc   Patient would benefit from skilled SLP interventions at this time in order to facilitate improved recall of information, word finding accuracy/naming accuracy, attention to task and ability to follow directions and to train patient/caregivers in strategies in order to facilitate improved communication, recall, ability to follow direcitons allowing for improved functionality within environment         Recommendations:  -Patient would benefit from outpatient skilled Speech Therapy services: Speech/ language therapy and Cognitive-Linguistic therapy    -Frequency: 1x weekly  -Duration: 4-6 weeks    -Intervention certification from: 6/21/3211  -Intervention certification to:  1/40/4956      Speech and language tx completed this date:    SLP trained family/caregivers on communication recommendations in order to facilitate improved functionality within environment  Education on 1 step direction use, continue to keep engaged in simple tasks within home environment, conversation, etc  Education on recommendations for reorientation as needed  Family verbalized understanding and states will utilize within home environment  Family interested in physical therapy referral at this time  Requested this date

## 2022-01-17 ENCOUNTER — TELEPHONE (OUTPATIENT)
Dept: SPEECH THERAPY | Facility: REHABILITATION | Age: 87
End: 2022-01-17

## 2022-01-17 ENCOUNTER — TELEPHONE (OUTPATIENT)
Dept: FAMILY MEDICINE CLINIC | Facility: CLINIC | Age: 87
End: 2022-01-17

## 2022-01-17 NOTE — TELEPHONE ENCOUNTER
[facility]  form received by fax on 01/17/2022  to be completed by PCP  Copy made and placed in PCP folder  Forms to be delivered to PCP mailbox at assigned time

## 2022-01-25 ENCOUNTER — TELEPHONE (OUTPATIENT)
Dept: FAMILY MEDICINE CLINIC | Facility: CLINIC | Age: 87
End: 2022-01-25

## 2022-01-25 NOTE — TELEPHONE ENCOUNTER
You can call & give verbal order like I stated in previous message  That way you do not have to fill out a form & they do not have to make pt wait for supplies

## 2022-01-25 NOTE — TELEPHONE ENCOUNTER
[facility] delivered wants to know the status of the form for incontinence supplies  She also stated you can give a verbal order by the phone if you call them directly  They have tim waiting

## 2022-01-26 ENCOUNTER — TELEPHONE (OUTPATIENT)
Dept: FAMILY MEDICINE CLINIC | Facility: CLINIC | Age: 87
End: 2022-01-26

## 2022-01-26 NOTE — TELEPHONE ENCOUNTER
[facility]  form received by FAX on 01/25/2022  to be completed by PCP  Copy made and placed in PCP folder  Forms to be delivered to PCP mailbox at assigned time      URINARY INCONTINENCE SUPPLY

## 2022-01-27 ENCOUNTER — TELEPHONE (OUTPATIENT)
Dept: FAMILY MEDICINE CLINIC | Facility: CLINIC | Age: 87
End: 2022-01-27

## 2022-01-27 ENCOUNTER — OFFICE VISIT (OUTPATIENT)
Dept: FAMILY MEDICINE CLINIC | Facility: CLINIC | Age: 87
End: 2022-01-27

## 2022-01-27 VITALS
TEMPERATURE: 97.1 F | BODY MASS INDEX: 37.57 KG/M2 | RESPIRATION RATE: 18 BRPM | HEART RATE: 93 BPM | HEIGHT: 61 IN | SYSTOLIC BLOOD PRESSURE: 130 MMHG | DIASTOLIC BLOOD PRESSURE: 80 MMHG | OXYGEN SATURATION: 99 % | WEIGHT: 199 LBS

## 2022-01-27 DIAGNOSIS — Z23 INFLUENZA VACCINE NEEDED: ICD-10-CM

## 2022-01-27 DIAGNOSIS — F03.90 DEMENTIA WITHOUT BEHAVIORAL DISTURBANCE, UNSPECIFIED DEMENTIA TYPE (HCC): ICD-10-CM

## 2022-01-27 DIAGNOSIS — K21.9 GASTROESOPHAGEAL REFLUX DISEASE WITHOUT ESOPHAGITIS: ICD-10-CM

## 2022-01-27 DIAGNOSIS — R26.81 GAIT INSTABILITY: ICD-10-CM

## 2022-01-27 DIAGNOSIS — M12.811 RIGHT ROTATOR CUFF TEAR ARTHROPATHY: ICD-10-CM

## 2022-01-27 DIAGNOSIS — K64.9 HEMORRHOIDS, UNSPECIFIED HEMORRHOID TYPE: ICD-10-CM

## 2022-01-27 DIAGNOSIS — C50.812 MALIGNANT NEOPLASM OF OVERLAPPING SITES OF LEFT FEMALE BREAST, UNSPECIFIED ESTROGEN RECEPTOR STATUS (HCC): ICD-10-CM

## 2022-01-27 DIAGNOSIS — M75.101 RIGHT ROTATOR CUFF TEAR ARTHROPATHY: ICD-10-CM

## 2022-01-27 DIAGNOSIS — I10 BENIGN ESSENTIAL HYPERTENSION: Primary | ICD-10-CM

## 2022-01-27 DIAGNOSIS — I50.32 CHRONIC DIASTOLIC CONGESTIVE HEART FAILURE (HCC): ICD-10-CM

## 2022-01-27 PROCEDURE — 90662 IIV NO PRSV INCREASED AG IM: CPT | Performed by: FAMILY MEDICINE

## 2022-01-27 PROCEDURE — G0008 ADMIN INFLUENZA VIRUS VAC: HCPCS | Performed by: FAMILY MEDICINE

## 2022-01-27 PROCEDURE — 3075F SYST BP GE 130 - 139MM HG: CPT | Performed by: FAMILY MEDICINE

## 2022-01-27 PROCEDURE — 3079F DIAST BP 80-89 MM HG: CPT | Performed by: FAMILY MEDICINE

## 2022-01-27 PROCEDURE — 99213 OFFICE O/P EST LOW 20 MIN: CPT | Performed by: FAMILY MEDICINE

## 2022-01-27 NOTE — PROGRESS NOTES
Assessment/Plan:    GERD (gastroesophageal reflux disease)  -following with GI  -symptoms controlled with: PPI daily  -avoidance of food irritants encouraged    Hemorrhoids  -following with GI  -Anusol cream PRN  -no constipation reported recently, continue with dietary modifications    Benign essential hypertension  Blood Pressure: 130/80     -controlled  -on: hctz 12 5 mg and lisinopril 40 mg daily  -renal function stable on last CMP on 8/2021  -continue with current medications  -notify if low BP to consider adjustment in medications as warranted    Chronic diastolic congestive heart failure ([facility] )  Wt Readings from Last 3 Encounters:   01/27/22 90 3 kg (199 lb)   01/06/22 95 3 kg (210 lb)   12/23/21 95 3 kg (210 lb)       -following with cardiology  -weight continues to improve  -leg swelling much improved  -on: lasix 40 mg daily        Dementia ([facility] )  -following with geriatrics  -has good support at home  -fall precautions  -monitor chronic conditions for optimization and continue to review and optimize medications    Right rotator cuff tear arthropathy  -following with orthopedics  -receiving steroid injections in shoulder     Gait instability  -gait instability upon walking  -needs assistance from family members  -uses roller walker at home, needs request for new one  -no recent falls reported, fall precautions encouraged  -DME order placed    Influenza vaccine needed  -due for vaccine,provided today    Malignant neoplasm of overlapping sites of left female breast ([facility] )  -left lumpectomy on 2013 with radiotherapy for 5 years  -following with surgical oncology and hematology oncology at [address] [place] [address] [address]  -last mammogram on 2020: 'post treatment changes on the left breast, no signs of malignancy'  -CT chest on 2021: 'post treatment changes in left breast with some nodular soft tissue in operative bed'  -has upcoming follow ups scheduled with hematology and surgical oncology on 2/2022       Diagnoses and all orders for this visit: Benign essential hypertension    Influenza vaccine needed  -     influenza vaccine, high-dose, PF 0 7 mL (FLUZONE HIGH-DOSE)    Gait instability  -     Durable Medical Equipment    Gastroesophageal reflux disease without esophagitis    Right rotator cuff tear arthropathy  -     Durable Medical Equipment    Hemorrhoids, unspecified hemorrhoid type    Chronic diastolic congestive heart failure (HCC)    Dementia without behavioral disturbance, unspecified dementia type ([facility] )    Malignant neoplasm of overlapping sites of left female breast, unspecified estrogen receptor status ([facility] )          Subjective:      Patient ID: Joss Villegas is a 80 y o  female  Case of 79 y/o female who came today for follow up, is accompanied by Son  He reports that patient has been doing well overall, has had some weight loss and leg swelling is much improved  Followed with cardiology, geriatrics as well  He reports that patient needs new order for roller walker, she has gait instability and needs constant supervision by family members at home  No recent falls reported  Son  Also reports that patient has some urine incontinence at times, using diapers at nigthtime  The following portions of the patient's history were reviewed and updated as appropriate: allergies, current medications, past family history, past medical history, past social history, past surgical history and problem list     Review of Systems   Constitutional: Negative for fever  Respiratory: Negative for cough, shortness of breath and wheezing  Cardiovascular: Negative for chest pain, palpitations and leg swelling  Gastrointestinal: Negative for abdominal pain, constipation, diarrhea, nausea and vomiting  Musculoskeletal: Negative for back pain  Gait unstable   Skin: Negative for color change, pallor, rash and wound  Neurological: Negative for headaches  Psychiatric/Behavioral: The patient is not nervous/anxious  Objective:      /80 (BP Location: Right arm, Patient Position: Sitting, Cuff Size: Large)   Pulse 93   Temp (!) 97 1 °F (36 2 °C)   Resp 18   Ht 5' 1" (1 549 m)   Wt 90 3 kg (199 lb)   SpO2 99%   Breastfeeding No   BMI 37 60 kg/m²          Physical Exam  Vitals reviewed  Constitutional:       General: She is not in acute distress  Appearance: Normal appearance  She is not ill-appearing, toxic-appearing or diaphoretic  HENT:      Nose: Nose normal  No congestion  Mouth/Throat:      Mouth: Mucous membranes are moist       Pharynx: Oropharynx is clear  No oropharyngeal exudate or posterior oropharyngeal erythema  Eyes:      Extraocular Movements: Extraocular movements intact  Cardiovascular:      Rate and Rhythm: Normal rate and regular rhythm  Pulses: Normal pulses  Heart sounds: Normal heart sounds  No murmur heard  Pulmonary:      Effort: Pulmonary effort is normal  No respiratory distress  Breath sounds: Normal breath sounds  No wheezing  Abdominal:      General: Abdomen is flat  Bowel sounds are normal  There is no distension  Palpations: Abdomen is soft  Tenderness: There is no abdominal tenderness  Musculoskeletal:         General: No swelling, deformity or signs of injury  Right lower leg: No edema  Left lower leg: No edema  Comments: Able to walk with assistance, gait noticed   Skin:     General: Skin is warm  Coloration: Skin is not jaundiced or pale  Findings: No bruising, erythema, lesion or rash  Neurological:      Mental Status: She is alert  Mental status is at baseline     Psychiatric:         Mood and Affect: Mood normal

## 2022-01-27 NOTE — TELEPHONE ENCOUNTER
Processed via online portal  They will contact pt directly to complete the process once the order is electronically signed by Dr Anabell Cowan

## 2022-01-28 NOTE — ASSESSMENT & PLAN NOTE
Blood Pressure: 130/80     -controlled  -on: hctz 12 5 mg and lisinopril 40 mg daily  -renal function stable on last CMP on 8/2021  -continue with current medications  -notify if low BP to consider adjustment in medications as warranted

## 2022-01-28 NOTE — ASSESSMENT & PLAN NOTE
Wt Readings from Last 3 Encounters:   01/27/22 90 3 kg (199 lb)   01/06/22 95 3 kg (210 lb)   12/23/21 95 3 kg (210 lb)       -following with cardiology  -weight continues to improve  -leg swelling much improved  -on: lasix 40 mg daily

## 2022-01-28 NOTE — ASSESSMENT & PLAN NOTE
-following with geriatrics  -has good support at home  -fall precautions  -monitor chronic conditions for optimization and continue to review and optimize medications

## 2022-01-28 NOTE — ASSESSMENT & PLAN NOTE
-left lumpectomy on 2013 with radiotherapy for 5 years  -following with surgical oncology and hematology oncology at [facility]  -last mammogram on 2020: 'post treatment changes on the left breast, no signs of malignancy'  -CT chest on 2021: 'post treatment changes in left breast with some nodular soft tissue in operative bed'  -has upcoming follow ups scheduled with hematology and surgical oncology on 2/2022

## 2022-01-28 NOTE — ASSESSMENT & PLAN NOTE
-gait instability upon walking  -needs assistance from family members  -uses roller walker at home, needs request for new one  -no recent falls reported, fall precautions encouraged  -DME order placed

## 2022-01-28 NOTE — ASSESSMENT & PLAN NOTE
-following with GI  -Anusol cream PRN  -no constipation reported recently, continue with dietary modifications

## 2022-02-01 ENCOUNTER — TELEPHONE (OUTPATIENT)
Dept: FAMILY MEDICINE CLINIC | Facility: CLINIC | Age: 87
End: 2022-02-01

## 2022-02-01 NOTE — TELEPHONE ENCOUNTER
[facility],[address] delivered FORM RECEIVED VIA FAX AND PLACED IN PCP FOLDER TO BE DELIVERED AT ASSIGNED TIMES      [address] [facility] Codes [phone] and  Dr. Nela Guallpa at bedside patient states she needs to push and is found to be .   Charge RN called for assistance as well as NICU team.

## 2022-02-02 ENCOUNTER — OFFICE VISIT (OUTPATIENT)
Dept: SPEECH THERAPY | Facility: REHABILITATION | Age: 87
End: 2022-02-02
Payer: MEDICARE

## 2022-02-02 DIAGNOSIS — R48.8 OTHER SYMBOLIC DYSFUNCTIONS: Primary | ICD-10-CM

## 2022-02-02 DIAGNOSIS — F03.90 DEMENTIA WITHOUT BEHAVIORAL DISTURBANCE, UNSPECIFIED DEMENTIA TYPE (HCC): ICD-10-CM

## 2022-02-02 PROCEDURE — 92507 TX SP LANG VOICE COMM INDIV: CPT

## 2022-02-02 NOTE — PROGRESS NOTES
Daily Speech Treatment Note    Today's date: 2022  Patients name: Daquan Navarro  : 1934  MRN: [id number]  Safety measures: NKA, reduced recall  Referring provider: Juan Sierra MD    Encounter Diagnosis     ICD-10-CM    1  Other symbolic dysfunctions  A43 0    2  Dementia without behavioral disturbance, unspecified dementia type ([facility] [address] )  F03 90        Visit tracking:  -Referring provider: Epic  -Billing guidelines: CMS  -Visit #2/4 visits (4 visits authorized)  -Insurance: aMrcy Pastor  -RE due 2022     Subjective/Behavioral:  -Patient presents to session with son who provided translation    Objective/Assessment:  -Patient's family member/caregiver was present during today's session  Short-term goals:    1  Patient will complete concrete and abstract categorization tasks to 70% accuracy to facilitate improved generative naming skills and working memory, to be achieved in 4-6 weeks  Patient completed concrete level add to the category tasks where patient was asked to name an additional item that belongs in the same category for 3 items presented,  provided moderate to max cues for improved accuracy in order to facilitate improved word finding accuracy allowing for improved conversational word finding  Patient required max cues for category identification  2  Patient/family will demonstrate use of trained internal and external memory aids and compensatory strategies with 75% accuracy provided cues to facilitate increased recall of routine, personal information, and recent events, to be achieved in 4-6 weeks  Family trained in recommendations for within home environment for improved communication accuracy, cues to encourage circumlocution to help identify target word  Family verbalized understanding       3  Patient will participate in continued therapeutic trials in 90% of opportunities for continued assessment and recommendations for carryover and functional tasks at home, to be achieved in 4-6 weeks  SLP facilitated trial of word search activity to facilitate improved organization of information/ability to follow directions/attend to task to identify tasks appropriate for patient to target within home program  Patient required max cues this date for completion of simple level word search task this date  4  Patient will improve long term memory retrieval and recall of information during reminiscing tasks with 80% accuracy, to be achieved in 4-6 weeks  5  Patient will demonstrate ability to follow 2 step directions with 80% accuracy provided cues in order to facilitate improved ability to follow directions within functional living environment to be achieved in 4-6 weeks        3  Patient will improve attention to task with 80% accuracy provided cues as needed to allow for improved task completion within environment, to be achieved in 4-6 weeks  Plan:  -Patient was provided with home exercises/activities to target goals in plan of care at the end of today's session   -Continue with current plan of care

## 2022-02-07 ENCOUNTER — TELEPHONE (OUTPATIENT)
Dept: FAMILY MEDICINE CLINIC | Facility: CLINIC | Age: 87
End: 2022-02-07

## 2022-02-07 ENCOUNTER — APPOINTMENT (OUTPATIENT)
Dept: SPEECH THERAPY | Facility: REHABILITATION | Age: 87
End: 2022-02-07
Payer: MEDICARE

## 2022-02-08 NOTE — TELEPHONE ENCOUNTER
Form completed 02/07/2022,  faxed to [phone], confirmation received, Form scanned into patient chart and placed in accordion

## 2022-02-09 ENCOUNTER — EVALUATION (OUTPATIENT)
Dept: PHYSICAL THERAPY | Facility: REHABILITATION | Age: 87
End: 2022-02-09
Payer: MEDICARE

## 2022-02-09 DIAGNOSIS — R26.81 GAIT INSTABILITY: Primary | ICD-10-CM

## 2022-02-09 DIAGNOSIS — F03.90 DEMENTIA WITHOUT BEHAVIORAL DISTURBANCE, UNSPECIFIED DEMENTIA TYPE (HCC): ICD-10-CM

## 2022-02-09 PROCEDURE — 97163 PT EVAL HIGH COMPLEX 45 MIN: CPT

## 2022-02-09 PROCEDURE — 97112 NEUROMUSCULAR REEDUCATION: CPT

## 2022-02-09 NOTE — PROGRESS NOTES
PT Evaluation     Name: Satnam Myers  Date: 22  : 1934  Referring Provider: Maria Del Rosario Braga MD  AUTHORIZATION:   Insurance: Payor: Yumiko Ivy MEDICARE [facility] REP / Plan: Refugio De Leónntkristal / Product Type: Medicare HMO /   1 of 16 visits through , PN due 3/09        SUBJECTIVE:  HPI: Satnam Myers is a 80 y o  female referred to outpatient physical therapy for the following diagnosis   Encounter Diagnoses   Name Primary?  Dementia without behavioral disturbance, unspecified dementia type ([facility] [address] ) Yes    Gait instability           Patients son reports she has not fallen because he holds on to her  "We do not leave her alone"  "I am scared to let her fall"  Son is the primary caregiver and assists with all adls  He lives with her and takes he to all apointments  Home Environment    Lives with:  lives with other family member   Home set-up: Stairs within the house - must climb to acces bedroom or bathroom       Pain Level: Right Shoulder Pain 6/10 (h/o RCT without repair)    Past Medical History:   Diagnosis Date    Diverticulosis     Heart failure ([facility] [address] )     History of cystocele     Hypertension     Obesity     Positive PPD 1999    treated       Current Outpatient Medications:     acetaminophen (TYLENOL) 500 mg tablet, Take 2 tablets (1,000 mg total) by mouth every 6 (six) hours as needed for mild pain, Disp: 60 tablet, Rfl: 3    Advair Diskus 500-50 MCG/DOSE inhaler, INHALE 1 PUFF BY MOUTH 2 TIMES A DAY  RINSE MOIUTH AFTER USE, Disp: 60 blister, Rfl: 0    albuterol (PROVENTIL HFA,VENTOLIN HFA) 90 mcg/act inhaler, Inhale 2 puffs every 6 (six) hours as needed for wheezing, Disp: 1 Inhaler, Rfl: 0    aluminum-magnesium hydroxide-simethicone (MAALOX MAX) 400-400-40 MG/5ML suspension, Take 10 mL by mouth every 6 (six) hours as needed for indigestion or heartburn (abdominal pain), Disp: 355 mL, Rfl: 0    amitriptyline (ELAVIL) 25 mg tablet, TAKE ONE TABLET BY MOUTH DAILY AT BEDTIME , Disp: , Rfl:     aspirin (ECOTRIN LOW STRENGTH) 81 mg EC tablet, Take 81 mg by mouth, Disp: , Rfl:     atorvastatin (LIPITOR) 20 mg tablet, TAKE 1 TABLET BY MOUTH ONCE DAILY  , Disp: 90 tablet, Rfl: 0    benzonatate (TESSALON PERLES) 100 mg capsule, Take 1 capsule (100 mg total) by mouth 3 (three) times a day as needed for cough, Disp: 20 capsule, Rfl: 0    Calcium Carbonate-Vitamin D (CALCIUM 500 + D) 500-125 MG-UNIT TABS, every 24 hours, Disp: , Rfl:     Diclofenac Sodium (VOLTAREN) 1 %, Apply 2 g topically 4 (four) times a day, Disp: 100 g, Rfl: 0    docusate sodium (COLACE) 100 mg capsule, Take 1 capsule (100 mg total) by mouth 2 (two) times a day, Disp: 60 capsule, Rfl: 1    Elastic Bandages & Supports (Medical Compression Stockings) MISC, by Does not apply route daily High knee compression stockings 20-30 MMHG, Disp: 2 each, Rfl: 0    fluticasone (FLONASE) 50 mcg/act nasal spray, 2 sprays into each nostril daily, Disp: 1 Bottle, Rfl: 3    furosemide (LASIX) 40 mg tablet, Take 1 tablet (40 mg total) by mouth daily, Disp: 90 tablet, Rfl: 1    guaiFENesin (ROBITUSSIN) 100 MG/5ML oral liquid, Take 10 mL (200 mg total) by mouth 3 (three) times a day as needed for cough, Disp: 120 mL, Rfl: 0    hydrochlorothiazide (HYDRODIURIL) 12 5 mg tablet, Take 1 tablet (12 5 mg total) by mouth daily, Disp: 90 tablet, Rfl: 1    hydrocortisone (ANUSOL-HC) 2 5 % rectal cream, Apply topically 2 (two) times a day To rectal area, Disp: 28 g, Rfl: 3    ibuprofen (MOTRIN) 600 mg tablet, Take 1 tablet (600 mg total) by mouth every 6 (six) hours as needed for mild pain, Disp: 30 tablet, Rfl: 0    latanoprost (XALATAN) 0 005 % ophthalmic solution, , Disp: , Rfl:     lidocaine (XYLOCAINE) 5 % ointment, Apply topically 2 (two) times a day as needed for mild pain, Disp: 35 44 g, Rfl: 0    lisinopril (ZESTRIL) 40 mg tablet, Take 1 tablet (40 mg total) by mouth daily, Disp: 90 tablet, Rfl: 1   metoclopramide (REGLAN) 5 mg tablet, Take 1 tablet (5 mg total) by mouth 4 (four) times a day, Disp: 24 tablet, Rfl: 0    Misc  Devices (CANE) MISC, Four point aluminum cane , Disp: , Rfl:     omeprazole (PriLOSEC) 40 MG capsule, Take 1 capsule (40 mg total) by mouth daily Half an hour before breakfast, Disp: 90 capsule, Rfl: 3    Senna Plus 8 6-50 MG per tablet, TAKE ONE TABLET BY MOUTH EVERY DAY, Disp: 90 tablet, Rfl: 0    timolol (BETIMOL) 0 5 % ophthalmic solution, Administer 1 drop to both eyes 2 (two) times a day , Disp: , Rfl:     timolol (TIMOPTIC) 0 5 % ophthalmic solution, , Disp: , Rfl:     Toviaz 8 MG TB24, TAKE 1 TABLET BY MOUTH ONCE DAILY  , Disp: 90 tablet, Rfl: 0    Patient-Specific Functional Scale   Task is scored 0 (unable to perform activity) to 10 (ability to perform activity independently)  Activity 2/09    1  Negotiate steps  3    2     Walking without assistance  3    3    "Getting up" 5        OBJECTIVE: HR 92 Spo2 94% 100/64 BP      AROM : right shoulder  Flexion 25                                           Abd                                           Ext rotation  -30               Right elbow 60                         Extension 10                            B Hip flex   85                         extension -20                         abd 25                  Knee flexion 95                            ext 15      MS: Hip flex: 3+/5                 Extension 3-/5          Knee flex 3+5/ through available range (s/p tkr)                    Knee  4-/5          Ankle 3+/5  Oculomotor & Coordination    Smooth pursuit Normal   Fingertip to nose Normal      Core Movement Tasks Description of task    Sitting Abnormal - posterior lean , sacral sitting   Sitting to Standing Posterior losses of balance, Unable without use of arms and Poor control in descent   Standing Other: increased base of support, reaches towrds unsteady surfaces   Walking Lacks heel strike, Lacks knee extension during stance phase gait, Decreased hip extension, Decreased trunk rotation and Decreased arm swing   Step-up Requires use of railing(s) and Posterior trunk lean unable to perform indepenendtly   Reach, grasp, manipulate Decreased reaching overhead functional reach 2"         Mobility Measures 2/9/2022   Assistive device used? Rolling Walker for 6 MWT only   5 Time Sit to Stand  (17" chair, arms across chest) 36  seconds   3 Meter Timed  Up & Go 43 seconds   Walking speed   23 meters/second   MCTSIB  (see below) 12/120   6 Minute Walk Test (100 foot circular course) 1 35' feet  Ending heart rate 98 beats/minute   Patient-Reported Outcome Measure: Patient Specific Functional Scale (PSFS)   FOTO 11/30    41         Modified Clinical Test of Sensory Interaction on Balance   12 eyes open firm surface   0 eyes closed firm surface   0 eyes open foam surface   0 eyes closed foam surface      Assessment:  Patient  is an 80 y o  female who presents to outpatient physical therapy with decreased strength, balance, endurance, and gait deviations including those listed in observation section  Pt is a high fall risk per multiple outcome measures  Her family helps her with all transfers and ambulation and report deterioration of functional status  Patient has a history of dementia, B TKR and left shoulder replacement and Right RCT  Cristal Cousin will benefit from skilled outpatient physical therapy to address the above impairments in order to improve patient independence and safety in home and the community as well as family education to promote patient safe and increased mobility      STG's:     - Patient improves EC task of MCSTIB for improved static balance within 4 weeks    - Patient  and son is independent with initial home exercise program for improvements at home within 2 weeks    - Patient ambulates for 10 consecutive min with appropriate vital sign response for improved endurance within  4 weeks   - Patient will improve tie in 5 x sit to stand by  10 seconds for increased Muscle strength   - patient will report < 3/10 pain right shoulder during ambulation with walker   - Patient's son will provide appropriate level of assistance during transfers and ambulatio  LTG's:   - Patient improves distance ambulated on 6MWT by 30% within 8 weeks for improved endurance   - Patient decreased time to complete TUG by 20 for decreased risk of falls within 12 weeks   - Patient improves gait speed to within 20% of age matched norms within  12 weeks   - Patient improves score on Functional reach by 4 inches for improved dynamic balance    PLAN OF CARE:  Patient will benefit from physical therapy 1-2 times per week for 3 months  Neuromuscular re-education, therapeutic exercises, manual therapy and therapeutic activities as outlined in grids            Precautions: Dementia, Right RCT, LEFT TSR, B TKR    Specialty Treatment Diary  2/9/22     Home exercise program Family education in encouraging increased independence     Walking/endurance 6 MWT     Static and dynamic balance FR  MCTSIB       Strengthening 5x sit to stand     Stretching                Satya He, PT  2/9/2022

## 2022-02-10 ENCOUNTER — OFFICE VISIT (OUTPATIENT)
Dept: PHYSICAL THERAPY | Facility: REHABILITATION | Age: 87
End: 2022-02-10
Payer: MEDICARE

## 2022-02-10 DIAGNOSIS — F03.90 DEMENTIA WITHOUT BEHAVIORAL DISTURBANCE, UNSPECIFIED DEMENTIA TYPE (HCC): Primary | ICD-10-CM

## 2022-02-10 DIAGNOSIS — R26.81 GAIT INSTABILITY: ICD-10-CM

## 2022-02-10 PROCEDURE — 97110 THERAPEUTIC EXERCISES: CPT

## 2022-02-10 PROCEDURE — 97140 MANUAL THERAPY 1/> REGIONS: CPT

## 2022-02-10 NOTE — PROGRESS NOTES
Daily Note        Name: Karin Jimenez  Date: 02/10/22  : 1934  Referring Provider: Val Gilford, MD  AUTHORIZATION:   Insurance: Payor: Jesus Castaneda MEDICARE [facility] REP / Plan: Romina Sebastiansaravanan / Product Type: Medicare HMO /   1 of 16 visits through , PN due 3/09        SUBJECTIVE:  HPI: Karin Jimenez is a 80 y o  female referred to outpatient physical therapy for the following diagnosis   Encounter Diagnoses   Name Primary?  Dementia without behavioral disturbance, unspecified dementia type ([address] ) Yes    Gait instability           Son reports he has been letting her do little more    Home Environment    Lives with:  lives with other family member   Home set-up: One level apartment 4steps to enter from garage       Pain Level: Right Shoulder Pain 6/10 (h/o RCT without repair)    Past Medical History:   Diagnosis Date    Diverticulosis     Heart failure ([facility] [address] )     History of cystocele     Hypertension     Obesity     Positive PPD     treated       Current Outpatient Medications:     acetaminophen (TYLENOL) 500 mg tablet, Take 2 tablets (1,000 mg total) by mouth every 6 (six) hours as needed for mild pain, Disp: 60 tablet, Rfl: 3    Advair Diskus 500-50 MCG/DOSE inhaler, INHALE 1 PUFF BY MOUTH 2 TIMES A DAY  RINSE MOIUTH AFTER USE, Disp: 60 blister, Rfl: 0    albuterol (PROVENTIL HFA,VENTOLIN HFA) 90 mcg/act inhaler, Inhale 2 puffs every 6 (six) hours as needed for wheezing, Disp: 1 Inhaler, Rfl: 0    aluminum-magnesium hydroxide-simethicone (MAALOX MAX) 400-400-40 MG/5ML suspension, Take 10 mL by mouth every 6 (six) hours as needed for indigestion or heartburn (abdominal pain), Disp: 355 mL, Rfl: 0    amitriptyline (ELAVIL) 25 mg tablet, TAKE ONE TABLET BY MOUTH DAILY AT BEDTIME , Disp: , Rfl:     aspirin (ECOTRIN LOW STRENGTH) 81 mg EC tablet, Take 81 mg by mouth, Disp: , Rfl:     atorvastatin (LIPITOR) 20 mg tablet, TAKE 1 TABLET BY MOUTH ONCE DAILY , Disp: 90 tablet, Rfl: 0    benzonatate (TESSALON PERLES) 100 mg capsule, Take 1 capsule (100 mg total) by mouth 3 (three) times a day as needed for cough, Disp: 20 capsule, Rfl: 0    Calcium Carbonate-Vitamin D (CALCIUM 500 + D) 500-125 MG-UNIT TABS, every 24 hours, Disp: , Rfl:     Diclofenac Sodium (VOLTAREN) 1 %, Apply 2 g topically 4 (four) times a day, Disp: 100 g, Rfl: 0    docusate sodium (COLACE) 100 mg capsule, Take 1 capsule (100 mg total) by mouth 2 (two) times a day, Disp: 60 capsule, Rfl: 1    Elastic Bandages & Supports (Medical Compression Stockings) MISC, by Does not apply route daily High knee compression stockings 20-30 MMHG, Disp: 2 each, Rfl: 0    fluticasone (FLONASE) 50 mcg/act nasal spray, 2 sprays into each nostril daily, Disp: 1 Bottle, Rfl: 3    furosemide (LASIX) 40 mg tablet, Take 1 tablet (40 mg total) by mouth daily, Disp: 90 tablet, Rfl: 1    guaiFENesin (ROBITUSSIN) 100 MG/5ML oral liquid, Take 10 mL (200 mg total) by mouth 3 (three) times a day as needed for cough, Disp: 120 mL, Rfl: 0    hydrochlorothiazide (HYDRODIURIL) 12 5 mg tablet, Take 1 tablet (12 5 mg total) by mouth daily, Disp: 90 tablet, Rfl: 1    hydrocortisone (ANUSOL-HC) 2 5 % rectal cream, Apply topically 2 (two) times a day To rectal area, Disp: 28 g, Rfl: 3    ibuprofen (MOTRIN) 600 mg tablet, Take 1 tablet (600 mg total) by mouth every 6 (six) hours as needed for mild pain, Disp: 30 tablet, Rfl: 0    latanoprost (XALATAN) 0 005 % ophthalmic solution, , Disp: , Rfl:     lidocaine (XYLOCAINE) 5 % ointment, Apply topically 2 (two) times a day as needed for mild pain, Disp: 35 44 g, Rfl: 0    lisinopril (ZESTRIL) 40 mg tablet, Take 1 tablet (40 mg total) by mouth daily, Disp: 90 tablet, Rfl: 1    metoclopramide (REGLAN) 5 mg tablet, Take 1 tablet (5 mg total) by mouth 4 (four) times a day, Disp: 24 tablet, Rfl: 0    Misc   Devices (CANE) MISC, Four point aluminum cane , Disp: , Rfl:    omeprazole (PriLOSEC) 40 MG capsule, Take 1 capsule (40 mg total) by mouth daily Half an hour before breakfast, Disp: 90 capsule, Rfl: 3    Senna Plus 8 6-50 MG per tablet, TAKE ONE TABLET BY MOUTH EVERY DAY, Disp: 90 tablet, Rfl: 0    timolol (BETIMOL) 0 5 % ophthalmic solution, Administer 1 drop to both eyes 2 (two) times a day , Disp: , Rfl:     timolol (TIMOPTIC) 0 5 % ophthalmic solution, , Disp: , Rfl:     Toviaz 8 MG TB24, TAKE 1 TABLET BY MOUTH ONCE DAILY  , Disp: 90 tablet, Rfl: 0    Patient-Specific Functional Scale   Task is scored 0 (unable to perform activity) to 10 (ability to perform activity independently)  Activity 2/09    1  Negotiate steps  3    2  Walking without assistance  3    3    "Getting up" 5        OBJECTIVE: HR 92 Spo2 94% 100/64 BP      Assessment:  Patient with increased familiarity of the clinic  Improved participation with decreased cues during activities and exercises  She was more verbal and engaged in activities  Son voices increased activity within our clinic that at home  She will continue to bnenfit from skilled Pt to improve mobility, strength and decrease risk of falls  PLAN OF CARE:  Patient will benefit from physical therapy 1-2 times per week for 3 months  Neuromuscular re-education, therapeutic exercises, manual therapy and therapeutic activities as outlined in grids  Precautions: Dementia, Right RCT, LEFT TSR, B TKR    Specialty Treatment Diary  2/9/22 2/10    Home exercise program Family education in encouraging increased independence Hep developed and written copy issued to client's son  Discussed welcoming  2nd caregiver for education and training      Walking/endurance 6 ' x 2  100' x 2    Static and dynamic balance FR  MCTSIB   Backwards walking 25' x 2   Standing reaching activities   walk/turns    Strengthening 5x sit to stand 5x sit to stand  Shoulder flex  With 1 lb bar               Horizontal abd               Forward projection  2 x 10 with assistance     Biceps curl  2 x 10 1#    Heel raises  2 x 10  Mini squats 2 x10   Hip extension  2 x10      Stretching  AArom RUE/ flex/abs rotation within pain free range    There activity  Walker management in bathroom, reaching for soap dispenser and weight shifting to neutral  Required tactile /cg to regain balance in new environment  Access Code: [email or web address]  URL: [email or web address]  Date: 02/10/2022  Prepared by: Bertram Friendly    Exercises  · Sentado a parado - 1 x daily - 7 x weekly - 2 sets - 1 reps  · Minisentadilla con apoyo en encimera - 1 x daily - 7 x weekly - 2 sets - 10 reps  · Levantamiento de Talones de Pie - 1 x daily - 7 x weekly - 3 sets - 10 reps  · Abducci&oacute;n de cadera de pie con apoyo en encimera - 1 x daily - 7 x weekly - 2 sets - 10 reps  · Marcha de Flexión de [employer] - 1 x daily - 7 x weekly - 2 sets - 10 reps  · Extensi&oacute;n de cadera de pie con apoyo en encimera - 1 x daily - 7 x weekly - 2 sets - 10 reps    STG's:     - Patient improves EC task of MCSTIB for improved static balance within 4 weeks    - Patient  and son is independent with initial home exercise program for improvements at home within 2 weeks    - Patient ambulates for 10 consecutive min with appropriate vital sign response for improved endurance within  4 weeks   - Patient will improve tie in 5 x sit to stand by  10 seconds for increased Muscle strength   - patient will report < 3/10 pain right shoulder during ambulation with walker   - Patient's son will provide appropriate level of assistance during transfers and ambulatio  LTG's:   - Patient improves distance ambulated on 6MWT by 30% within 8 weeks for improved endurance   - Patient decreased time to complete TUG by 20 for decreased risk of falls within 12 weeks   - Patient improves gait speed to within 20% of age matched norms within  12 weeks   - Patient improves score on Functional reach by 4 inches for improved dynamic balance      Lefty Isabel, PT  2/10/2022

## 2022-02-16 ENCOUNTER — OFFICE VISIT (OUTPATIENT)
Dept: SPEECH THERAPY | Facility: REHABILITATION | Age: 87
End: 2022-02-16
Payer: MEDICARE

## 2022-02-16 DIAGNOSIS — F03.90 DEMENTIA WITHOUT BEHAVIORAL DISTURBANCE, UNSPECIFIED DEMENTIA TYPE (HCC): ICD-10-CM

## 2022-02-16 DIAGNOSIS — R48.8 OTHER SYMBOLIC DYSFUNCTIONS: Primary | ICD-10-CM

## 2022-02-16 PROCEDURE — 92507 TX SP LANG VOICE COMM INDIV: CPT

## 2022-02-16 NOTE — PROGRESS NOTES
Daily Speech Treatment Note    Today's date: 2022  Patients name: Lena Jorgensen  : 1934  MRN: [id number]  Safety measures: NKA, reduced recall  Referring provider: Jennifer Vallecillo MD    Encounter Diagnosis     ICD-10-CM    1  Other symbolic dysfunctions  H87 3    2  Dementia without behavioral disturbance, unspecified dementia type ([facility] [address] )  F03 90        Visit tracking:  -Referring provider: Epic  -Billing guidelines: CMS  -Visit #3/4 visits (4 visits authorized)  -Insurance: Marcelo Lozoya  -RE due 2022     Subjective/Behavioral:    -Patient presents to session with son who provided translation    Objective/Assessment:  -Patient's family member/caregiver was present during today's session  Short-term goals:    1  Patient will complete concrete and abstract categorization tasks to 70% accuracy to facilitate improved generative naming skills and working memory, to be achieved in 4-6 weeks  Pt asked to name pictures of real objects presented, pt with 70%  accuracy on initial try independently , able to improve to 90% provided moderate verbal descriptive and phonemic cues  2  Patient/family will demonstrate use of trained internal and external memory aids and compensatory strategies with 75% accuracy provided cues to facilitate increased recall of routine, personal information, and recent events, to be achieved in 4-6 weeks  Family trained in recommendations for within home environment for improved communication accuracy, following directions, cues to encourage circumlocution to help identify target word  Family verbalized understanding, required mild to mod cues to utilize throughout session  3  Patient will participate in continued therapeutic trials in 90% of opportunities for continued assessment and recommendations for carryover and functional tasks at home, to be achieved in 4-6 weeks      Patient trialed with card sorting task this date to facilitate improved organization of information, following directions, and to help identify tasks appropriate for home carryover outside of skilled SLP interventions  Patient required moderate to max cues, able to reduce to min-mild as task progressed  Patient reports enjoying task this date  4  Patient will improve long term memory retrieval and recall of information during reminiscing tasks with 80% accuracy, to be achieved in 4-6 weeks  5  Patient will demonstrate ability to follow 2 step directions with 80% accuracy provided cues in order to facilitate improved ability to follow directions within functional living environment to be achieved in 4-6 weeks       Patient required max cues for 2 step directions, demonstrating 75% accuracy with 1 step directions, improving to 100% provided mild to mod cues  3   Patient will improve attention to task with 80% accuracy provided cues as needed to allow for improved task completion within environment, to be achieved in 4-6 weeks  Facilitation of attention to task this date, patient able to complete task with 80% accuracy improving to 100% accuracy provided mild level verbal cues  Plan:  -Patient was provided with home exercises/activities to target goals in plan of care at the end of today's session   -Continue with current plan of care

## 2022-02-17 ENCOUNTER — OFFICE VISIT (OUTPATIENT)
Dept: PHYSICAL THERAPY | Facility: REHABILITATION | Age: 87
End: 2022-02-17
Payer: MEDICARE

## 2022-02-17 DIAGNOSIS — R26.81 GAIT INSTABILITY: ICD-10-CM

## 2022-02-17 DIAGNOSIS — F03.90 DEMENTIA WITHOUT BEHAVIORAL DISTURBANCE, UNSPECIFIED DEMENTIA TYPE (HCC): Primary | ICD-10-CM

## 2022-02-17 PROCEDURE — 97140 MANUAL THERAPY 1/> REGIONS: CPT

## 2022-02-17 PROCEDURE — 97110 THERAPEUTIC EXERCISES: CPT

## 2022-02-17 NOTE — PROGRESS NOTES
Daily Note        Name: Layton Zurita  Date: 22  : 1934  Referring Provider: Jayme Howard MD  AUTHORIZATION:   Insurance: Payor: Fatimah Mendez MEDICARE [facility] REP / Plan: Raj Sharri / Product Type: Medicare HMO /   3 of 16 visits through , PN due 3/09        SUBJECTIVE:  HPI: Layton Zurita is a 80 y o  female referred to outpatient physical therapy for the following diagnosis   Encounter Diagnoses   Name Primary? 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)  Patient-Specific Functional Scale   Task is scored 0 (unable to perform activity) to 10 (ability to perform activity independently)  Activity     1  Negotiate steps  3    2  Walking without assistance  3    3    "Getting up" 5        OBJECTIVE: HR89 Spo2 95%  Assessment:  Patient is making progress with quality of gait pattiern  Requires assistance for proper positioning and body mechanics during exercise program and negotiation of environmental barriers    She is very cooperative and will continue to benefit from skilled PT intervention    PLAN OF CARE:  Patient will benefit from physical therapy 1-2 times per week for 3 months  Neuromuscular re-education, therapeutic exercises, manual therapy and therapeutic activities as outlined in grids  Precautions: Dementia, Right RCT, LEFT TSR, B TKR    Specialty Treatment Diary  2/9/22 2/10 2/17    Home exercise program Family education in encouraging increased independence Hep developed and written copy issued to client's son  Discussed welcoming  2nd caregiver for education and training       Walking/endurance 6 ' x 2  100' x 2 200' x 3  100' x 2    Static and dynamic balance FR  MCTSIB   Backwards walking 25' x 2   Standing reaching activities walk/turns Backwards walking with the walker    Side stepping nv ll bars balance act   Strengthening 5x sit to stand 5x sit to stand  Shoulder flex  With 1 lb bar               Horizontal abd               Forward projection  2 x 10 with assistance     Biceps curl  2 x 10 1#    Heel raises  2 x 10  Mini squats 2 x10   Hip extension  2 x10   5x sit to stand x 2  Shoulder flex  With 2 lb bar               Horizontal abd                Forward projection  2 x 10 with less assistance, tactile facilitation as for trunk at neutral for abdominaal engagement    Biceps curl  2 x 10 2#AA for proper technique and encouraging trunk neutral    Heel raises  2 x 10  Mini squats 2 x10   Hip extension  2 x10    Stretching/Manual  AArom RUE/ flex/abs rotation within pain free range AArom RUE/ flex/abs rotation within pain free range    There activity  Walker management in bathroom, reaching for soap dispenser and weight shifting to neutral  Required tactile /cg to regain balance in new environment  Bathroom management  Weight shifting for reaching for soap/ paper towels etc    Access Code: [email or web address]  URL: [email or web address]  Date: 02/10/2022  Prepared by: Cricket Powell    Exercises  · Sentado a parado - 1 x daily - 7 x weekly - 2 sets - 1 reps  · Minisentadilla con apoyo en encimera - 1 x daily - 7 x weekly - 2 sets - 10 reps  · Levantamiento de Talones de Pie - 1 x daily - 7 x weekly - 3 sets - 10 reps  · Abducci&oacute;n de cadera de pie con apoyo en encimera - 1 x daily - 7 x weekly - 2 sets - 10 reps  · Marcha de Flexión de [employer] - 1 x daily - 7 x weekly - 2 sets - 10 reps  · Extensi&oacute;n de cadera de pie con apoyo en encimera - 1 x daily - 7 x weekly - 2 sets - 10 reps    STG's:     - Patient improves EC task of MCSTIB for improved static balance within 4 weeks    - Patient  and son is independent with initial home exercise program for improvements at home within 2 weeks    - Patient ambulates for 10 consecutive min with appropriate vital sign response for improved endurance within  4 weeks   - Patient will improve tie in 5 x sit to stand by  10 seconds for increased Muscle strength   - patient will report < 3/10 pain right shoulder during ambulation with walker   - Patient's son will provide appropriate level of assistance during transfers and ambulatio  LTG's:   - Patient improves distance ambulated on 6MWT by 30% within 8 weeks for improved endurance   - Patient decreased time to complete TUG by 20 for decreased risk of falls within 12 weeks   - Patient improves gait speed to within 20% of age matched norms within  12 weeks   - Patient improves score on Functional reach by 4 inches for improved dynamic balance      Kathi Jay, PT  2/17/2022

## 2022-02-22 ENCOUNTER — OFFICE VISIT (OUTPATIENT)
Dept: PHYSICAL THERAPY | Facility: REHABILITATION | Age: 87
End: 2022-02-22
Payer: MEDICARE

## 2022-02-22 DIAGNOSIS — F03.90 DEMENTIA WITHOUT BEHAVIORAL DISTURBANCE, UNSPECIFIED DEMENTIA TYPE (HCC): Primary | ICD-10-CM

## 2022-02-22 DIAGNOSIS — R26.81 GAIT INSTABILITY: ICD-10-CM

## 2022-02-22 PROCEDURE — 97112 NEUROMUSCULAR REEDUCATION: CPT

## 2022-02-22 PROCEDURE — 97110 THERAPEUTIC EXERCISES: CPT

## 2022-02-22 PROCEDURE — 97140 MANUAL THERAPY 1/> REGIONS: CPT

## 2022-02-22 NOTE — PROGRESS NOTES
Daily Note        Name: De Emerson  Date: 22  : 1934  Referring Provider: Gamal Davis MD  AUTHORIZATION:   Insurance: Payor: Laritchieephraim Antonia MEDICARE [facility] REP / Plan: Gwen Du / Product Type: Medicare HMO /   4of 16 visits through , PN due 3/09        SUBJECTIVE:  HPI: De Emerson is a 80 y o  female referred to outpatient physical therapy for the following diagnosis   Encounter Diagnoses   Name Primary? 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)  Patient-Specific Functional Scale   Task is scored 0 (unable to perform activity) to 10 (ability to perform activity independently)  Activity     1  Negotiate steps  3    2  Walking without assistance  3    3    "Getting up" 5        OBJECTIVE: HR89 Spo2 95%  Assessment:  Patient is making progress with quality of gait pattiern  Requires assistance for proper positioning and body mechanics during exercise program and negotiation of environmental barriers    She is very cooperative and will continue to benefit from skilled PT intervention    PLAN OF CARE:  Patient will benefit from physical therapy 1-2 times per week for 3 months  Neuromuscular re-education, therapeutic exercises, manual therapy and therapeutic activities as outlined in grids  Precautions: Dementia, Right RCT, LEFT TSR, B TKR    Specialty Treatment Diary  2/9/22 2/10 2/17 2/22   Home exercise program Family education in encouraging increased independence Hep developed and written copy issued to client's son  Discussed welcoming  2nd caregiver for education and training  Walking/endurance 6 ' x 2  100' x 2 200' x 3  100' x 2 200' x 2  100'  solostep amb withtout device forwards/ sideways 1 UE hand held    Walk/turn walk10'     Static and dynamic balance FR  MCTSIB   Backwards walking 25' x 2   Standing reaching activities   walk/turns Backwards walking with the walker    Side stepping ll bars    Backwards walking 15' x 4  Sidestepping 1 UE supports 15' x 3         Strengthening 5x sit to stand 5x sit to stand  Shoulder flex  With 1 lb bar               Horizontal abd               Forward projection  2 x 10 with assistance     Biceps curl  2 x 10 1#    Heel raises  2 x 10  Mini squats 2 x10   Hip extension  2 x10   5x sit to stand x 2  Shoulder flex  With 2 lb bar               Horizontal abd                Forward projection  2 x 10 with less assistance, tactile facilitation as for trunk at neutral for abdominaal engagement    Biceps curl  2 x 10 2#AA for proper technique and encouraging trunk neutral    Heel raises  2 x 10  Mini squats 2 x10   Hip extension  2 x10 Sit to stand 5' x 3 sets throughout session    Standing reaching B UE    Heel raises  2 x 10  Mini squats 2 x10   Hip extension  2 x10   Stretching/Manual  AArom RUE/ flex/abs rotation within pain free range AArom RUE/ flex/abs rotation within pain free range PROM RUE flex/rota/january/ext  B shoulder assessment: no change in baseline ROM  Points of tenderness left posterior hear and shoulder Upper trap Right >Left  Performed soft tissue Mob with good response decreased pain 2/10   There activity  Walker management in bathroom, reaching for soap dispenser and weight shifting to neutral  Required tactile /cg to regain balance in new environment   Bathroom management  Weight shifting for reaching for soap/ paper towels etc Bathroom management  Weight shifting for reaching for soap/ paper towels etc, walker manangement   Access Code: [email or web address]  URL: [email or web address]  Date: 02/10/2022  Prepared by: David Bravo    Exercises  · Sentado a parado - 1 x daily - 7 x weekly - 2 sets - 1 reps  · Minisentadilla con apoyo en encimera - 1 x daily - 7 x weekly - 2 sets - 10 reps  · Levantamiento de Talones de Pie - 1 x daily - 7 x weekly - 3 sets - 10 reps  · Abducci&oacute;n de cadera de pie con apoyo en encimera - 1 x daily - 7 x weekly - 2 sets - 10 reps  · Marcha de Flexión de [employer] - 1 x daily - 7 x weekly - 2 sets - 10 reps  · Extensi&oacute;n de cadera de pie con apoyo en encimera - 1 x daily - 7 x weekly - 2 sets - 10 reps    STG's:     - Patient improves EC task of MCSTIB for improved static balance within 4 weeks    - Patient  and son is independent with initial home exercise program for improvements at home within 2 weeks    - Patient ambulates for 10 consecutive min with appropriate vital sign response for improved endurance within  4 weeks   - Patient will improve tie in 5 x sit to stand by  10 seconds for increased Muscle strength   - patient will report < 3/10 pain right shoulder during ambulation with walker   - Patient's son will provide appropriate level of assistance during transfers and ambulatio  LTG's:   - Patient improves distance ambulated on 6MWT by 30% within 8 weeks for improved endurance   - Patient decreased time to complete TUG by 20 for decreased risk of falls within 12 weeks   - Patient improves gait speed to within 20% of age matched norms within  12 weeks   - Patient improves score on Functional reach by 4 inches for improved dynamic balance      David Bravo, PT  2/22/2022

## 2022-02-23 ENCOUNTER — APPOINTMENT (OUTPATIENT)
Dept: PHYSICAL THERAPY | Facility: REHABILITATION | Age: 87
End: 2022-02-23
Payer: MEDICARE

## 2022-02-23 ENCOUNTER — APPOINTMENT (OUTPATIENT)
Dept: SPEECH THERAPY | Facility: REHABILITATION | Age: 87
End: 2022-02-23
Payer: MEDICARE

## 2022-02-24 ENCOUNTER — EVALUATION (OUTPATIENT)
Dept: SPEECH THERAPY | Facility: REHABILITATION | Age: 87
End: 2022-02-24
Payer: MEDICARE

## 2022-02-24 DIAGNOSIS — F03.90 DEMENTIA WITHOUT BEHAVIORAL DISTURBANCE, UNSPECIFIED DEMENTIA TYPE (HCC): ICD-10-CM

## 2022-02-24 DIAGNOSIS — R48.8 OTHER SYMBOLIC DYSFUNCTIONS: Primary | ICD-10-CM

## 2022-02-24 PROCEDURE — 92507 TX SP LANG VOICE COMM INDIV: CPT

## 2022-02-24 NOTE — PROGRESS NOTES
Speech-Language Pathology Re-Evaluation    Today's date: 2022  Patients name: Lena Jorgensen  : 1934  MRN: [id number]  Safety measures: NKA, reduced recall  Referring provider: Jennifer Vallecillo MD    Encounter Diagnosis     ICD-10-CM    1  Other symbolic dysfunctions  M16 0    2  Dementia without behavioral disturbance, unspecified dementia type ([facility] [address] )  F03 90        Visit tracking:  -Referring provider: Epic  -Billing guidelines: CMS  -Visit #4/4 visits (4 visits authorized)  -Insurance: Marcelo Lozoya  -RE due 2022    Subjective comments: Patient reports to session with son who translated throughout session  Son reports and is demonstrating some implementation of communication recommendations  At end of session, son reports new onset of swallowing concerns  Due to time constraints, unable to complete dysphagia evaluation this date  Patient would benefit from dysphagia evaluation to reduce risk of aspiration and related complications  Will plan to address in future sessions  Patient's goal(s): improve memory and naming    Assessments    *Patient named 4 5 concrete category members (animals, fruit) in 60 sec (norm=15+)  -- BELOW AVERAGE    1 step direction accuracy: Patient required mod to max cues this date for one step direction activities (card sorting), reducing to min to moderate cues as session progressed  Orientation: Patient demonstrated 100% accuracy with month and day, needed minimal cueing when stating year  Below are results from IE as informal assessment completed this date due to patient cognitive status: The [facility] Mental Status (UMS) examination is a 30-point screening questionnaire that tests for orientation, memory, attention, and executive functions  It is used to detect dementia and mild neurocognitive disorder (MNCD)   During todays administration of the test, pt achieved a total score of 5/30 points, which is suggestive of "dementia"  This correlates with known diagnosis  Patient with 2/5 IM recall, 1/3 orientation accuracy, named 6 concrete items in 1 minute, 25% following direction accuracy, <25% accuracy mental manipulation tasks, 25% STM recall, 0/5 DM recall  Goals   Short-term Goals:     1  Patient will complete concrete and abstract categorization tasks to 70% accuracy provided cues as needed to facilitate improved generative naming skills and working memory, to be achieved in 4-6 weeks --CONTINUE    To facilitate improved generative naming skills and working memory, patient demonstrated 75% success during confrontational naming tasks, improving when given min verbal cues  2  Patient/family will demonstrate use of trained internal and external memory aids and compensatory strategies with 75% accuracy provided cues to facilitate increased recall of routine, personal information, and recent events, to be achieved in 4-6 weeks  --DISCONTINUE    3  Patient will participate in continued therapeutic trials in 90% of opportunities for continued assessment and recommendations for carryover and functional tasks at home, to be achieved in 4-6 weeks --CONTINUE    4  Patient will improve long term memory retrieval and recall of information during reminiscing tasks with 80% accuracy, to be achieved in 4-6 weeks  --DISCONTINUE    5  Patient will demonstrate ability to follow 1 step directions with 80% accuracy provided cues in order to facilitate improved ability to follow directions within functional living environment to be achieved in 4-6 weeks  --CONTINUE--GOAL MODIFIED    6  Patient will improve attention to task with 80% accuracy provided cues as needed to allow for improved task completion within environment, to be achieved in 4-6 weeks  --CONTINUE    Patient improved attention to task with 75% accuracy, improving when given mod verbal cues, or improved task completion within environment  Long-term Goals:    1   Patient will improve functional cognitive-linguistic skills with the implementation of cues and external aids, by discharge  2  Patient will demonstrate adequate memory/reasoning/judgment to perform desired activities in a supervised environment by discharge  Impressions/Recommendations    Impressions:   -Patient presents with moderate to severe cognitive linguistic difficulties at this time, characterized by reduced IM/DM/STM recall, reduced mental manipulation accuracy, reduced generative naming accuracy, reduced orientation accuracy, reduced ability to follow 1 and 2 step directions impacting functionality within envirionment at this time  Patient and family would benefit from additional training in strategies for improved task completion within environment, improved communication success during functional daily tasks and to promote meaningful conversations within environment  Patient's family member has improved with use of recommendations, however would still benefit from additional training to optimize patient's function within environment  Patient would benefit from continued skilled SLP interventions at this time in order to facilitate improved word finding accuracy/naming accuracy, attention to task and ability to follow directions and to train patient/caregivers in strategies in order to facilitate improved communication, ability to follow directions allowing for improved functionality within environment, reducing frequency of communication breakdowns  **Patient would also benefit from dysphagia evaluation as son reports at end of session, patient with difficulties noted during PO consumption  Unable to complete this date due to time constraints       Recommendations:  -Patient would benefit from outpatient skilled Speech Therapy services : Speech/ language therapy and Cognitive-Linguistic therapy    -Frequency: 1-2x weekly  -Duration: 4-6 weeks    -Intervention certification from: 2/24/2022  -Intervention certification to: 6/9/0907    Patient was treated by Lashell Reed, graduate SLP student, and was under the direct supervision of SHIRLEY Morocho , [id number] [facility]

## 2022-02-28 ENCOUNTER — OFFICE VISIT (OUTPATIENT)
Dept: FAMILY MEDICINE CLINIC | Facility: CLINIC | Age: 87
End: 2022-02-28

## 2022-02-28 ENCOUNTER — HOSPITAL ENCOUNTER (OUTPATIENT)
Dept: RADIOLOGY | Facility: HOSPITAL | Age: 87
Discharge: HOME | End: 2022-02-28
Payer: MEDICARE

## 2022-02-28 VITALS
OXYGEN SATURATION: 98 % | DIASTOLIC BLOOD PRESSURE: 70 MMHG | TEMPERATURE: 97.6 F | RESPIRATION RATE: 18 BRPM | HEART RATE: 107 BPM | SYSTOLIC BLOOD PRESSURE: 128 MMHG | BODY MASS INDEX: 37.57 KG/M2 | WEIGHT: 199 LBS | HEIGHT: 61 IN

## 2022-02-28 DIAGNOSIS — T14.8XXA BRUISE: Primary | ICD-10-CM
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
## 2022-02-28 PROCEDURE — 70260 X-RAY EXAM OF SKULL: CPT

## 2022-02-28 PROCEDURE — 99213 OFFICE O/P EST LOW 20 MIN: CPT | Performed by: FAMILY MEDICINE

## 2022-02-28 PROCEDURE — 3074F SYST BP LT 130 MM HG: CPT | Performed by: FAMILY MEDICINE

## 2022-02-28 PROCEDURE — 3078F DIAST BP <80 MM HG: CPT | Performed by: FAMILY MEDICINE

## 2022-02-28 NOTE — PROGRESS NOTES
Assessment/Plan:    Fall  -Recent fall with minor head trauma  -No neurological deficits  -Not on anticoagulation  -At her baseline state of cognition  -Recommend using a walker when she ambulates  -Continue physical therapy outpatient  -X-ray of skull to rule out fracture per family's request          Return if symptoms worsen or fail to improve  There are no Patient Instructions on file for this visit  Diagnoses and all orders for this visit:    Fall, initial encounter  -     Cancel: XR skull < 4 vw; Future          Subjective:     Geraldine OTERO Burleigh Cabot is a 80 y o  female who  has a past medical history of Diverticulosis, Heart failure ([address] ), History of cystocele, Hypertension, Obesity, and Positive PPD  who presented to the office today for evaluation for recent fall  She is accompanied by her son today  She Tripped and Fell over 1 week ago  She  [place] backwards and landed on buttocks first and then fell on the back of her head  She stated that she has been having pain in the back of her skull that is improving  According to son patient was to get an x-ray of her skull for reassurance  As per her son, she is not on blood thinner, she has no focal neurological weakness, no changes in her behavior, no nausea or vomiting  She ambulates with a walker at baseline  Son is unsure if she was using her walker at the time that she fell  HPI      The following portions of the patient's history were reviewed and updated as appropriate: allergies, current medications, past family history, past medical history, past social history, past surgical history and problem list     Current Outpatient Medications on File Prior to Visit   Medication Sig Dispense Refill    acetaminophen (TYLENOL) 500 mg tablet Take 2 tablets (1,000 mg total) by mouth every 6 (six) hours as needed for mild pain 60 tablet 3    Advair Diskus 500-50 MCG/DOSE inhaler INHALE 1 PUFF BY MOUTH 2 TIMES A DAY  RINSE MOIUTH AFTER USE 60 blister 0    albuterol (PROVENTIL HFA,VENTOLIN HFA) 90 mcg/act inhaler Inhale 2 puffs every 6 (six) hours as needed for wheezing 1 Inhaler 0    aluminum-magnesium hydroxide-simethicone (MAALOX MAX) 400-400-40 MG/5ML suspension Take 10 mL by mouth every 6 (six) hours as needed for indigestion or heartburn (abdominal pain) 355 mL 0    amitriptyline (ELAVIL) 25 mg tablet TAKE ONE TABLET BY MOUTH DAILY AT BEDTIME   atorvastatin (LIPITOR) 20 mg tablet TAKE 1 TABLET BY MOUTH ONCE DAILY   90 tablet 0    benzonatate (TESSALON PERLES) 100 mg capsule Take 1 capsule (100 mg total) by mouth 3 (three) times a day as needed for cough 20 capsule 0    Calcium Carbonate-Vitamin D (CALCIUM 500 + D) 500-125 MG-UNIT TABS every 24 hours      Diclofenac Sodium (VOLTAREN) 1 % Apply 2 g topically 4 (four) times a day 100 g 0    docusate sodium (COLACE) 100 mg capsule Take 1 capsule (100 mg total) by mouth 2 (two) times a day 60 capsule 1    Elastic Bandages & Supports (Medical Compression Stockings) MISC by Does not apply route daily High knee compression stockings 20-30 MMHG 2 each 0    fluticasone (FLONASE) 50 mcg/act nasal spray 2 sprays into each nostril daily 1 Bottle 3    furosemide (LASIX) 40 mg tablet Take 1 tablet (40 mg total) by mouth daily 90 tablet 1    guaiFENesin (ROBITUSSIN) 100 MG/5ML oral liquid Take 10 mL (200 mg total) by mouth 3 (three) times a day as needed for cough 120 mL 0    hydrochlorothiazide (HYDRODIURIL) 12 5 mg tablet Take 1 tablet (12 5 mg total) by mouth daily 90 tablet 1    hydrocortisone (ANUSOL-HC) 2 5 % rectal cream Apply topically 2 (two) times a day To rectal area 28 g 3    ibuprofen (MOTRIN) 600 mg tablet Take 1 tablet (600 mg total) by mouth every 6 (six) hours as needed for mild pain 30 tablet 0    latanoprost (XALATAN) 0 005 % ophthalmic solution       lidocaine (XYLOCAINE) 5 % ointment Apply topically 2 (two) times a day as needed for mild pain 35 44 g 0    lisinopril (ZESTRIL) 40 mg tablet Take 1 tablet (40 mg total) by mouth daily 90 tablet 1    metoclopramide (REGLAN) 5 mg tablet Take 1 tablet (5 mg total) by mouth 4 (four) times a day 24 tablet 0    Misc  Devices (CANE) MISC Four point aluminum cane   omeprazole (PriLOSEC) 40 MG capsule Take 1 capsule (40 mg total) by mouth daily Half an hour before breakfast 90 capsule 3    Senna Plus 8 6-50 MG per tablet TAKE ONE TABLET BY MOUTH EVERY DAY 90 tablet 0    timolol (BETIMOL) 0 5 % ophthalmic solution Administer 1 drop to both eyes 2 (two) times a day       timolol (TIMOPTIC) 0 5 % ophthalmic solution       Toviaz 8 MG TB24 TAKE 1 TABLET BY MOUTH ONCE DAILY  90 tablet 0    [DISCONTINUED] aspirin (ECOTRIN LOW STRENGTH) 81 mg EC tablet Take 81 mg by mouth       No current facility-administered medications on file prior to visit  Review of Systems   Constitutional: Negative for chills, fatigue and fever  Respiratory: Negative for cough, shortness of breath and wheezing  Cardiovascular: Negative for chest pain, palpitations and leg swelling  Gastrointestinal: Negative for diarrhea, nausea and vomiting  Musculoskeletal: Negative for arthralgias and neck pain  Gait unstable   Skin: Negative for rash  Neurological: Negative for dizziness, tremors, seizures, syncope, weakness, light-headedness, numbness and headaches  Psychiatric/Behavioral: The patient is not nervous/anxious  Objective:    /70 (BP Location: Left arm, Patient Position: Sitting, Cuff Size: Large)   Pulse (!) 107   Temp 97 6 °F (36 4 °C) (Temporal)   Resp 18   Ht 5' 1" (1 549 m)   Wt 90 3 kg (199 lb)   SpO2 98%   BMI 37 60 kg/m²     Physical Exam  Vitals reviewed  Constitutional:       General: She is not in acute distress  Appearance: Normal appearance  She is not ill-appearing, toxic-appearing or diaphoretic  HENT:      Head: Normocephalic and atraumatic  No raccoon eyes, Nava's sign, contusion or masses        Nose: Nose normal  No congestion  Mouth/Throat:      Mouth: Mucous membranes are moist       Pharynx: Oropharynx is clear  No oropharyngeal exudate or posterior oropharyngeal erythema  Eyes:      Extraocular Movements: Extraocular movements intact  Cardiovascular:      Rate and Rhythm: Normal rate and regular rhythm  Pulses: Normal pulses  Heart sounds: Normal heart sounds  No murmur heard  Pulmonary:      Effort: Pulmonary effort is normal  No respiratory distress  Breath sounds: Normal breath sounds  No wheezing  Abdominal:      General: Abdomen is flat  Bowel sounds are normal  There is no distension  Palpations: Abdomen is soft  Tenderness: There is no abdominal tenderness  Musculoskeletal:         General: No swelling, deformity or signs of injury  Right lower leg: No edema  Left lower leg: No edema  Skin:     General: Skin is warm  Coloration: Skin is not jaundiced or pale  Findings: No bruising, erythema, lesion or rash  Neurological:      General: No focal deficit present  Mental Status: She is alert  Mental status is at baseline  Sensory: No sensory deficit  Motor: No weakness     Psychiatric:         Mood and Affect: Mood normal          Randi Mccullough MD  02/28/22  7:12 PM

## 2022-03-01 ENCOUNTER — OFFICE VISIT (OUTPATIENT)
Dept: PHYSICAL THERAPY | Facility: REHABILITATION | Age: 87
End: 2022-03-01
Payer: MEDICARE

## 2022-03-01 DIAGNOSIS — R26.81 GAIT INSTABILITY: ICD-10-CM

## 2022-03-01 DIAGNOSIS — F03.90 DEMENTIA WITHOUT BEHAVIORAL DISTURBANCE, UNSPECIFIED DEMENTIA TYPE (HCC): Primary | ICD-10-CM

## 2022-03-01 PROCEDURE — 97140 MANUAL THERAPY 1/> REGIONS: CPT

## 2022-03-01 PROCEDURE — 97112 NEUROMUSCULAR REEDUCATION: CPT

## 2022-03-01 PROCEDURE — 97110 THERAPEUTIC EXERCISES: CPT

## 2022-03-01 NOTE — PROGRESS NOTES
Daily Note        Name: Nessa Walker  Date: 22  : 1934  Referring Provider: Amy Butt MD  AUTHORIZATION:   Insurance: Payor: David Do MEDICARE [facility] REP / Plan: Bryant Ishan / Product Type: Medicare HMO /   4of 16 visits through , PN due 3/09        SUBJECTIVE:  HPI: Nessa Walker is a 80 y o  female referred to outpatient physical therapy for the following diagnosis   Encounter Diagnoses   Name Primary? 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)  Patient-Specific Functional Scale   Task is scored 0 (unable to perform activity) to 10 (ability to perform activity independently)  Activity     1  Negotiate steps  3    2  Walking without assistance  3    3    "Getting up" 5        OBJECTIVE: /72  HR 82 SPO2 94%    Assessment:  Patient with improved left shoulder pain since last intervention  Continued with gentle manual facilitation and AAROM B shoulder L>R  Incesed challenged of balance activities adding turns and barriers  She is improving posture, balance and safety during bathroom activities  Son reports he is seeing more improvement at home with increased mobility  She will continue to benefit from skilled PT intervention  PLAN OF CARE:  Patient will benefit from physical therapy 1-2 times per week   Neuromuscular re-education, therapeutic exercises, manual therapy and therapeutic activities as outlined in grids  Precautions: Dementia, Right RCT, LEFT TSR, B TKR    Specialty Treatment Diary  2/9/22 2/10 2/17 2/22 3/01   Home exercise program Family education in encouraging increased independence Hep developed and written copy issued to client's son  Discussed welcoming  2nd caregiver for education and training        Walking/endurance 6 MWT 200' x 2  100' x 2 200' x 3  100' x 2 200' x 2  100'  solostep amb withtout device forwards/ sideways 1 UE hand held    Walk/turn walk10'   200' x 2   100' x 2    Walk turns x 10    Scifit x 10 mi   Static and dynamic balance FR  MCTSIB   Backwards walking 25' x 2   Standing reaching activities   walk/turns Backwards walking with the walker    Side stepping ll bars    Backwards walking 15' x 4  Sidestepping 1 UE supports 15' x 3       Backwards walking 20' x 3 with walker    Walking 12' turns x 7 repetitions * verbal cues as for proper step length   Strengthening 5x sit to stand 5x sit to stand  Shoulder flex  With 1 lb bar               Horizontal abd               Forward projection  2 x 10 with assistance     Biceps curl  2 x 10 1#    Heel raises  2 x 10  Mini squats 2 x10   Hip extension  2 x10   5x sit to stand x 2  Shoulder flex  With 2 lb bar               Horizontal abd                Forward projection  2 x 10 with less assistance, tactile facilitation as for trunk at neutral for abdominaal engagement    Biceps curl  2 x 10 2#AA for proper technique and encouraging trunk neutral    Heel raises  2 x 10  Mini squats 2 x10   Hip extension  2 x10 Sit to stand 5' x 3 sets throughout session    Standing reaching B UE    Heel raises  2 x 10  Mini squats 2 x10   Hip extension  2 x10 Sit to stand 5' x 3 sets throughout session   Stretching/Manual  AArom RUE/ flex/abs rotation within pain free range AArom RUE/ flex/abs rotation within pain free range PROM RUE flex/rota/january/ext  B shoulder assessment: no change in baseline ROM  Points of tenderness left posterior hear and shoulder Upper trap Right >Left  Performed soft tissue Mob with good response decreased pain 2/10 PROM RUE flex/rota/january/ext  B shoulder assessment: no change in baseline ROM  Points of tenderness left posterior hear and shoulder Upper trap Right >Left   Performed soft tissue Mob    There activity  Walker management in bathroom, reaching for soap dispenser and weight shifting to neutral  Required tactile /cg to regain balance in new environment  Bathroom management  Weight shifting for reaching for soap/ paper towels etc Bathroom management  Weight shifting for reaching for soap/ paper towels etc, walker manangement Bathroom management  Weight shifting for reaching for soap/ paper towels etc, walker manangemen   Access Code: [email or web address]  URL: [email or web address]  Date: 02/10/2022  Prepared by: Bertram Friendly    Exercises  · Sentado a parado - 1 x daily - 7 x weekly - 2 sets - 1 reps  · Minisentadilla con apoyo en encimera - 1 x daily - 7 x weekly - 2 sets - 10 reps  · Levantamiento de Talones de Pie - 1 x daily - 7 x weekly - 3 sets - 10 reps  · Abducci&oacute;n de cadera de pie con apoyo en encimera - 1 x daily - 7 x weekly - 2 sets - 10 reps  · Marcha de Flexión de [employer] - 1 x daily - 7 x weekly - 2 sets - 10 reps  · Extensi&oacute;n de cadera de pie con apoyo en encimera - 1 x daily - 7 x weekly - 2 sets - 10 reps    STG's:     - Patient improves EC task of MCSTIB for improved static balance within 4 weeks    - Patient  and son is independent with initial home exercise program for improvements at home within 2 weeks    - Patient ambulates for 10 consecutive min with appropriate vital sign response for improved endurance within  4 weeks   - Patient will improve tie in 5 x sit to stand by  10 seconds for increased Muscle strength   - patient will report < 3/10 pain right shoulder during ambulation with walker   - Patient's son will provide appropriate level of assistance during transfers and ambulatio  LTG's:   - Patient improves distance ambulated on 6MWT by 30% within 8 weeks for improved endurance   - Patient decreased time to complete TUG by 20 for decreased risk of falls within 12 weeks   - Patient improves gait speed to within 20% of age matched norms within  12 weeks   - Patient improves score on Functional reach by 4 inches for improved dynamic balance      Faye Lennox, PT  3/1/2022

## 2022-03-01 NOTE — ASSESSMENT & PLAN NOTE
-Recent fall with minor head trauma  -No neurological deficits  -Not on anticoagulation  -At her baseline state of cognition  -Recommend using a walker when she ambulates  -Continue physical therapy outpatient  -X-ray of skull to rule out fracture per family's request

## 2022-03-03 ENCOUNTER — OFFICE VISIT (OUTPATIENT)
Dept: PHYSICAL THERAPY | Facility: REHABILITATION | Age: 87
End: 2022-03-03
Payer: MEDICARE

## 2022-03-03 ENCOUNTER — APPOINTMENT (OUTPATIENT)
Dept: SPEECH THERAPY | Facility: REHABILITATION | Age: 87
End: 2022-03-03
Payer: MEDICARE

## 2022-03-03 DIAGNOSIS — F03.90 DEMENTIA WITHOUT BEHAVIORAL DISTURBANCE, UNSPECIFIED DEMENTIA TYPE (HCC): Primary | ICD-10-CM

## 2022-03-03 DIAGNOSIS — R26.81 GAIT INSTABILITY: ICD-10-CM

## 2022-03-03 PROCEDURE — 97140 MANUAL THERAPY 1/> REGIONS: CPT

## 2022-03-03 PROCEDURE — 97112 NEUROMUSCULAR REEDUCATION: CPT

## 2022-03-03 PROCEDURE — 97110 THERAPEUTIC EXERCISES: CPT

## 2022-03-03 NOTE — PROGRESS NOTES
Daily Note        Name: Joss Villegas  Date: 22  : 1934  Referring Provider: Niurka Agrawal MD  AUTHORIZATION:   Insurance: Payor: Katelyn Potts MEDICARE [facility] REP / Plan: Simón Regalado / Product Type: Medicare HMO /   4of 16 visits through , PN due 3/09        SUBJECTIVE:  HPI: Joss Villegas is a 80 y o  female referred to outpatient physical therapy for the following diagnosis   Encounter Diagnoses   Name Primary? 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)  Patient-Specific Functional Scale   Task is scored 0 (unable to perform activity) to 10 (ability to perform activity independently)  Activity     1  Negotiate steps  3    2  Walking without assistance  3    3    "Getting up" 5        OBJECTIVE: /72  HR 82 SPO2 94%    Assessment:    Patient has improved ability to reach back for chair and control her descent  She is demonstrating improved quality of gait pattern as well as flaca  She is making good progress towards her goals  She has improved in distance ambulating as well as negotiating environmental barriers  She will continue to benefit from skilled PT intervention  PLAN OF CARE:  Patient will benefit from physical therapy 1-2 times per week   Neuromuscular re-education, therapeutic exercises, manual therapy and therapeutic activities as outlined in grids            Precautions: Dementia, Right RCT, LEFT TSR, B TKR    Specialty Treatment Diary  2/22 3/01 3   Home exercise program      Walking/endurance 200' x 2  100'  solostep amb withtout device forwards/ sideways 1 UE hand held    Walk/turn walk10'   200' x 2   100' x 2    Walk turns x 10    Scifit x 10 mi 75' x 6 with turn s    Scifit 10 min level2    Overground walking 6 minutes with resting periods between  Static and dynamic balance ll bars    Backwards walking 15' x 4  Sidestepping 1 UE supports 15' x 3       Backwards walking 20' x 3 with walker    Walking 12' turns x 7 repetitions * verbal cues as for proper step length Backwards walking 30' x 3      Begotiation of cones 30' x 4 with rolling walker    Step over 2" cones 14' x 2 80% verbal cues  40% precision   Strengthening Sit to stand 5' x 3 sets throughout session    Standing reaching B UE    Heel raises  2 x 10  Mini squats 2 x10   Hip extension  2 x10 Sit to stand 5' x 3 sets throughout session 2 # sandy  Shoulder flex  Shoulder press  Biceps curls   Stretching/Manual PROM RUE flex/rota/january/ext  B shoulder assessment: no change in baseline ROM  Points of tenderness left posterior hear and shoulder Upper trap Right >Left  Performed soft tissue Mob with good response decreased pain 2/10 PROM RUE flex/rota/january/ext  B shoulder assessment: no change in baseline ROM  Points of tenderness left posterior hear and shoulder Upper trap Right >Left  Performed soft tissue Mob  AA-PROM at end of range  SMT upper trap/ levators  /rhomboidss with decreased pain  Client able to perform ARROM with improved teachinque   There activity Bathroom management  Weight shifting for reaching for soap/ paper towels etc, walker manangement Bathroom management  Weight shifting for reaching for soap/ paper towels etc, walker manangemen    Access Code: [email or web address]  URL: [email or web address]  Date: 02/10/2022  Prepared by: Romeo Fierro    Exercises  · Sentado a parado - 1 x daily - 7 x weekly - 2 sets - 1 reps  · Minisentadilla con apoyo en encimera - 1 x daily - 7 x weekly - 2 sets - 10 reps  · Levantamiento de Talones de Pie - 1 x daily - 7 x weekly - 3 sets - 10 reps  · Abducci&oacute;n de cadera de pie con apoyo en encimera - 1 x daily - 7 x weekly - 2 sets - 10 reps  · Marcha de Flexión de [employer] - 1 x daily - 7 x weekly - 2 sets - 10 reps  · Extensi&oacute;n de cadera de pie con apoyo en encimera - 1 x daily - 7 x weekly - 2 sets - 10 reps    STG's:     - Patient improves EC task of MCSTIB for improved static balance within 4 weeks    - Patient  and son is independent with initial home exercise program for improvements at home within 2 weeks    - Patient ambulates for 10 consecutive min with appropriate vital sign response for improved endurance within  4 weeks   - Patient will improve tie in 5 x sit to stand by  10 seconds for increased Muscle strength   - patient will report < 3/10 pain right shoulder during ambulation with walker   - Patient's son will provide appropriate level of assistance during transfers and ambulatio  LTG's:   - Patient improves distance ambulated on 6MWT by 30% within 8 weeks for improved endurance   - Patient decreased time to complete TUG by 20 for decreased risk of falls within 12 weeks   - Patient improves gait speed to within 20% of age matched norms within  12 weeks   - Patient improves score on Functional reach by 4 inches for improved dynamic balance      Karie Contreras, PT  3/3/2022

## 2022-03-08 ENCOUNTER — OFFICE VISIT (OUTPATIENT)
Dept: PHYSICAL THERAPY | Facility: REHABILITATION | Age: 87
End: 2022-03-08
Payer: MEDICARE

## 2022-03-08 ENCOUNTER — EVALUATION (OUTPATIENT)
Dept: SPEECH THERAPY | Facility: REHABILITATION | Age: 87
End: 2022-03-08
Payer: MEDICARE

## 2022-03-08 DIAGNOSIS — R48.8 OTHER SYMBOLIC DYSFUNCTIONS: Primary | ICD-10-CM

## 2022-03-08 DIAGNOSIS — F03.90 DEMENTIA WITHOUT BEHAVIORAL DISTURBANCE, UNSPECIFIED DEMENTIA TYPE (HCC): ICD-10-CM

## 2022-03-08 DIAGNOSIS — R13.12 OROPHARYNGEAL DYSPHAGIA: ICD-10-CM

## 2022-03-08 DIAGNOSIS — R26.81 GAIT INSTABILITY: Primary | ICD-10-CM

## 2022-03-08 PROCEDURE — 97140 MANUAL THERAPY 1/> REGIONS: CPT

## 2022-03-08 PROCEDURE — 97110 THERAPEUTIC EXERCISES: CPT

## 2022-03-08 PROCEDURE — 97112 NEUROMUSCULAR REEDUCATION: CPT

## 2022-03-08 PROCEDURE — 92610 EVALUATE SWALLOWING FUNCTION: CPT

## 2022-03-08 PROCEDURE — 92507 TX SP LANG VOICE COMM INDIV: CPT

## 2022-03-08 NOTE — PROGRESS NOTES
Daily Note        Name: Martha Stoner  Date: 22  : 1934  Referring Provider: Suzie Goodwin MD  AUTHORIZATION:   Insurance: Payor: Sabrina Schwartzo MEDICARE [address] REP / Plan: Clarke Canseco / Product Type: Medicare HMO /   6 of 16 visits through , PN due 3/09        SUBJECTIVE:  HPI: Martha Stoner is a 80 y o  female referred to outpatient physical therapy for the following diagnosis   Encounter Diagnoses   Name Primary? 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)  Patient-Specific Functional Scale   Task is scored 0 (unable to perform activity) to 10 (ability to perform activity independently)  Activity     1  Negotiate steps  3    2  Walking without assistance  3    3    "Getting up" 5        OBJECTIVE: /72  HR 82 SPO2 94%    Assessment:    jaleesa presents with improved gait pattern and endurance  Her shoulder ROM is improving which is improving  her sit to stand ability and safety  She will continue to benenfit from skilled PT intervention to improve mobility and decrease risk of falls  PLAN OF CARE:  Patient will benefit from physical therapy 1-2 times per week   Neuromuscular re-education, therapeutic exercises, manual therapy and therapeutic activities as outlined in grids  Precautions: Dementia, Right RCT, LEFT TSR, B TKR    Specialty Treatment Diary  2/22 3/01 3 3   Home exercise program       Walking/endurance 200' x 2  100'  solostep amb withtout device forwards/ sideways 1 UE hand held    Walk/turn walk10'   200' x 2   100' x 2    Walk turns x 10    Scifit x 10 mi 75' x 6 with turn s    Scifit 10 min level2    Overground walking 6 minutes with resting periods between       Scifit 10 min level2    Overground ambulation   Rolling walker  50' x 6   Static and dynamic balance ll bars    Backwards walking 15' x 4  Sidestepping 1 UE supports 15' x 3       Backwards walking 20' x 3 with walker    Walking 12' turns x 7 repetitions * verbal cues as for proper step length Backwards walking 30' x 3      Negotiation of cones 30' x 4 with rolling walker    Step over 2" cones 14' x 2 80% verbal cues  40% precision Backwards amb with walker 20' x 2   Strengthening Sit to stand 5' x 3 sets throughout session    Standing reaching B UE    Heel raises  2 x 10  Mini squats 2 x10   Hip extension  2 x10 Sit to stand 5' x 3 sets throughout session 2 # sandy  Shoulder flex  Shoulder press  Biceps curls  2 x 10  Shoulder flex  Shoulder press  Biceps curls      Sit to stand 5' x 2    Stretching/Manual PROM RUE flex/rota/january/ext  B shoulder assessment: no change in baseline ROM  Points of tenderness left posterior hear and shoulder Upper trap Right >Left  Performed soft tissue Mob with good response decreased pain 2/10 PROM RUE flex/rota/january/ext  B shoulder assessment: no change in baseline ROM  Points of tenderness left posterior hear and shoulder Upper trap Right >Left  Performed soft tissue Mob  AA-PROM at end of range  SMT upper trap/ levators  /rhomboidss with decreased pain  Client able to perform ARROM with improved teachinque AA-PROM B shoulder end range *Client with improving AROM   There activity Bathroom management  Weight shifting for reaching for soap/ paper towels etc, walker manangement Bathroom management  Weight shifting for reaching for soap/ paper towels etc, walker manangemen  Reaching activities, negotiation of walker in smal areas  Stnding    Access Code: [email or web address]  URL: [email or web address]  Date: 02/10/2022  Prepared by: Dayanara Files    Exercises  · Sentado a parado - 1 x daily - 7 x weekly - 2 sets - 1 reps  · Minisentadilla con apoyo en encimera - 1 x daily - 7 x weekly - 2 sets - 10 reps  · Levantamiento de Talones de Pie - 1 x daily - 7 x weekly - 3 sets - 10 reps  · Abducci&oacute;n de cadera de pie con apoyo en encimera - 1 x daily - 7 x weekly - 2 sets - 10 reps  · Marcha de Flexión de [employer] - 1 x daily - 7 x weekly - 2 sets - 10 reps  · Extensi&oacute;n de cadera de pie con apoyo en encimera - 1 x daily - 7 x weekly - 2 sets - 10 reps    STG's:     - Patient improves EC task of MCSTIB for improved static balance within 4 weeks    - Patient  and son is independent with initial home exercise program for improvements at home within 2 weeks    - Patient ambulates for 10 consecutive min with appropriate vital sign response for improved endurance within  4 weeks   - Patient will improve tie in 5 x sit to stand by  10 seconds for increased Muscle strength   - patient will report < 3/10 pain right shoulder during ambulation with walker   - Patient's son will provide appropriate level of assistance during transfers and ambulatio  LTG's:   - Patient improves distance ambulated on 6MWT by 30% within 8 weeks for improved endurance   - Patient decreased time to complete TUG by 20 for decreased risk of falls within 12 weeks   - Patient improves gait speed to within 20% of age matched norms within  12 weeks   - Patient improves score on Functional reach by 4 inches for improved dynamic balance      Abiodun Kidd, PT  3/8/2022

## 2022-03-08 NOTE — PROGRESS NOTES
Speech-Language Pathology Re-Evaluation    Today's date: 3/8/2022  Patients name: Jorgito Mas  : 1934  MRN: [id number]  Safety measures: fall risk, NKA  Referring provider: Chris Hubbard MD    Encounter Diagnosis     ICD-10-CM    1  Other symbolic dysfunctions  L92 6    2  Dementia without behavioral disturbance, unspecified dementia type ([facility] [address] )  F03 90      Visit Tracking:  -Referring provider: Epic  -Billing guidelines: CMS  -Visit #5/ visits (8 additionall visits authorized)  -Insurance: Dayla Pepper  -RE due 2022    Subjective comments: Pt presents to session with son  Appears to be in a pleasant mood  Patient's goal(s): Son states that his goal is to make sure his mother is safe while eating  He states that she often overfills her mouth with food  On one occasion, she required interventions as food was stuck  Son reports that pt demonstrated emesis while eating following large bolus presentations/multiple bolus presentations/rapid PO intake  Assessments    DYSPHAGIA EVALUATION:    -Reason for referral: Signs/symptoms of dysphagia    -Subjective report of swallowing difficulty: Regurgitation of food on one occasion, overfilling mouth    -Eating Assessment Tool (EAT-10) Swallowing Screening Tool is a patient self-assessment tool that rates the percieved impairment a swallowing disorder has on the Functional, Physical, and Emotional aspects of one's life  EAT-10 score: DNT/40    -Difficulty swallowing: Solids    -Current diet (solids): Regular  -Current diet (liquids): Thin  -Alternative Feeding Method?: No    -Facial appearance Symmetrical   -Dentition Adequate   -Labial function Decreased coordination   -Lingual function Decreased ROM (elevation) and Decreased strength (protrusion)   -Velar function Symmetrical       LIQUID CONSISTENCY TESTIN   Liquid Consistency (Thin) - water    Administered by: Cup and water bottle -Self-fed   Strategies, attempts, and responses: None    CLINICAL FINDINGS:   Oral phase impairments: WFL   Pharyngeal Phase Impairments: WFL    *Laryngeal excursion upon palpation: mildly reduced HLE      SOLID CONSISTENCY TESTIN  Solid Consistency (Regular, Mechanical Soft, Mixed and Puree) - granola bar, soft fruit bar, diced peaches in thin liquid juice and applesauce   Administered by: Self-fed   Strategies, attempts, and responses: Multiple swallows    CLINICAL FINDINGS:   Oral phase impairments: Bolus formation/control, lingual oral residue post swallow   Pharyngeal Phase Impairments: Swallow initiation Mild delay--varied    *Laryngeal excursion upon palpation: variable HLE noted       SWALLOWING DIAGNOSTIC IMPRESSION:  -Swallowing diagnosis/severity: mild oropharyngeal phase dysphagia    -Factors affecting performance: Mental Status    -Safety concerns: Risk for aspiration and Risk for choking    -Risk factors: Impaired cognitive status/ dementia    SAFETY PRECAUTIONS:  -Supervision: Independent     -Strategies: Small sips and bites when eating, Slow rate, swallow between bites and Alternate liquids and solids    -Positioning: Upright position at least 30 minutes after meal and Upright position during meals    -Compensatory strategies: Effortful swallow and Multiple swallows    -Recommend (solids): Regular  w soft selections as tolerated (avoid dry, dense, chewy textures)    -Recommend (liquids): Thin    -Recommend (medications): as tolerated    REFERRALS: Videofluroscopic Swallow Study    Goals    Short-term Goals:     1  Patient will complete concrete and abstract categorization tasks to 70% accuracy provided cues as needed to facilitate improved generative naming skills and working memory, to be achieved in 4-6 weeks --CONTINUE    To facilitate improved generative naming skills and working memory, patient demonstrated 75% success during confrontational naming tasks, improving when given min verbal cues   When given a concrete category, pt added additional items to the category with 70% success, improving when given mod verbal cues  2  Patient will participate in continued therapeutic trials in 90% of opportunities for continued assessment and recommendations for carryover and functional tasks at home, to be achieved in 4-6 weeks --CONTINUE    3  Patient will improve long term memory retrieval and recall of information during reminiscing tasks with 80% accuracy, to be achieved in 4-6 weeks  --DISCONTINUE    4  Patient will demonstrate ability to follow 1 step directions with 80% accuracy provided cues in order to facilitate improved ability to follow directions within functional living environment to be achieved in 4-6 weeks  --CONTINUE    5  Patient will improve attention to task with 80% accuracy provided cues as needed to allow for improved task completion within environment, to be achieved in 4-6 weeks  --CONTINUE    6  Patient will demonstrate consistent use of compensatory strategies (e g , upright positioning, small bites/sips, slow rate, alternation of consistencies, multiple swallows, effortful swallow, chin tuck, head turn, etc ) with 90% accuracy to reduce risk of  penetration/aspiration during consumption of liquids/solids, to be achieved in 6-8 weeks --NEW GOAL    7  Patient will tolerate therapeutic trials of regular diet softer textures as appropriate with 90% free of s/s of aspiration, with use of compensatory strategies provided cues as needed, to be achieved in 6-8 weeks --NEW GOAL    Long-term Goals:    1  Patient will improve functional cognitive-linguistic skills with the implementation of cues and external aids, by discharge  --CONTINUE     2  Patient will demonstrate adequate memory/reasoning/judgment to perform desired activities in a supervised environment by discharge  --CONTINUE      3   Patient will utilize compensatory strategies 90% of the time independently optimizing safety and efficacy of swallowing function on P O  intake without overt s/sx of penetration or aspiration 90% of the time by discharge  --NEW GOAL         Impressions/Recommendations    Impressions:   Patient presents with mild oropharyngeal dysphagia, characterized by posterior lingual residue requiring cue to swallow (able to clear with secondary swallow), reduced mastication effectiveness, variable HLE with solids, and reported large rapid bolus presentations within home environment placing at risk for aspiration/choking and associated complications  Pt and son trained in techniques (small bites, alternating between solids and liquids, small bolus size) to reduce risk of aspiration and related complications  Recommendations at this time for VFSS to further assess swallow function  Patient would benefit from skilled SLP interventions at this time in order to facilitate improved toleration/safety of solids and to further train in strategies in order to facilitate improved PO intake, allowing for improved toleration of solids reducing risk of aspiration and related complications  From re-evaluation last session:    -Patient presents with moderate to severe cognitive linguistic difficulties at this time, characterized by reduced IM/DM/STM recall, reduced mental manipulation accuracy, reduced generative naming accuracy, reduced orientation accuracy, reduced ability to follow 1 and 2 step directions impacting functionality within envirionment at this time  Patient and family would benefit from additional training in strategies for improved task completion within environment, improved communication success during functional daily tasks and to promote meaningful conversations within environment  Patient's family member has improved with use of recommendations, however would still benefit from additional training to optimize patient's function within environment   Patient would benefit from continued skilled SLP interventions at this time in order to facilitate improved word finding accuracy/naming accuracy, attention to task and ability to follow directions and to train patient/caregivers in strategies in order to facilitate improved communication, ability to follow directions allowing for improved functionality within environment, reducing frequency of communication breakdowns  Recommendations:  -Patient would benefit from outpatient skilled Speech Therapy services : Dysphagia therapy    -Frequency: 1-2x weekly  -Duration: 6-8 weeks    -Intervention certification from: 0/4/6826  -Intervention certification to: 3/8/9157    Patient was assessed by Roe Box, graduate SLP student, and was under the direct supervision of SHIRLEY Mccray , [address] [place] [address]

## 2022-03-10 ENCOUNTER — OFFICE VISIT (OUTPATIENT)
Dept: PHYSICAL THERAPY | Facility: REHABILITATION | Age: 87
End: 2022-03-10
Payer: MEDICARE

## 2022-03-10 DIAGNOSIS — F03.90 DEMENTIA WITHOUT BEHAVIORAL DISTURBANCE, UNSPECIFIED DEMENTIA TYPE (HCC): ICD-10-CM

## 2022-03-10 DIAGNOSIS — R26.81 GAIT INSTABILITY: Primary | ICD-10-CM

## 2022-03-10 PROBLEM — T14.8XXA BRUISE: Status: ACTIVE | Noted: 2022-03-10

## 2022-03-10 PROCEDURE — 97110 THERAPEUTIC EXERCISES: CPT

## 2022-03-10 PROCEDURE — 97140 MANUAL THERAPY 1/> REGIONS: CPT

## 2022-03-10 PROCEDURE — 97112 NEUROMUSCULAR REEDUCATION: CPT

## 2022-03-10 NOTE — PROGRESS NOTES
Daily Note        Name: Ray Monique  Date: 03/10/22  : 1934  Referring Provider: eTodoro Lai MD  AUTHORIZATION:   Insurance: Payor: [employer] MEDICARE [facility] REP / Plan: [employer] / Product Type: Medicare HMO /   6 of 16 visits through , PN due 3/09        SUBJECTIVE:  HPI: Ray Monique is a 80 y o  female referred to outpatient physical therapy for the following diagnosis   Encounter Diagnoses   Name Primary? 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)  Patient-Specific Functional Scale   Task is scored 0 (unable to perform activity) to 10 (ability to perform activity independently)  Activity     1  Negotiate steps  3    2  Walking without assistance  3    3    "Getting up" 5        OBJECTIVE: /72  HR 82 SPO2 94%    Assessment:    Geraldine continues to make improvement towards goals  Anticipating re-eval next visit, as son was not present during session today  She will continue to benefit from skilled PT  PLAN OF CARE:  Patient will benefit from physical therapy 1-2 times per week   Neuromuscular re-education, therapeutic exercises, manual therapy and therapeutic activities as outlined in grids  Precautions: Dementia, Right RCT, LEFT TSR, B TKR    Specialty Treatment Diary  3/03 3/8 3/10   Home exercise program      Walking/endurance 75' x 6 with turn s    Scifit 10 min level2    Overground walking 6 minutes with resting periods between  Scifit 10 min level2     Overground ambulation   Rolling walker  50' x 6 Scifit 10 min level2    Overground ambulation   Rolling walker  60 ' x 6   Static and dynamic balance Backwards walking 30' x 3      Negotiation of ones 30' x 4 with rolling walkerc    Step over 2" cones 14' x 2 80% verbal cues   40% precision Backwards amb with walker 20' x 2 Backwards amb with walker 20' x 2  Negotiation of ones 30' x 4 with rolling walker   Strengthening 2 # sandy  Shoulder flex  Shoulder press  Biceps curls  2 x 10  Shoulder flex  Shoulder press  Biceps curls      Sit to stand 5' x 2  2 x 10  Shoulder flex  Shoulder press  Biceps curls      Sit to stand 5' x 2    Stretching/Manual AA-PROM at end of range  SMT upper trap/ levators  /rhomboidss with decreased pain  Client able to perform ARROM with improved teachinque AA-PROM B shoulder end range *Client with improving AROM AA-PROM B shoulder end range *Client with improving AROM   There activity  Reaching activities, negotiation of walker in smal areas  Stnding     Access Code: [email or web address]  URL: [email or web address]  Date: 02/10/2022  Prepared by: Shirley Asp    Exercises  · Sentado a parado - 1 x daily - 7 x weekly - 2 sets - 1 reps  · Minisentadilla con apoyo en encimera - 1 x daily - 7 x weekly - 2 sets - 10 reps  · Levantamiento de Talones de Pie - 1 x daily - 7 x weekly - 3 sets - 10 reps  · Abducci&oacute;n de cadera de pie con apoyo en encimera - 1 x daily - 7 x weekly - 2 sets - 10 reps  · Marcha de Flexión de [employer] - 1 x daily - 7 x weekly - 2 sets - 10 reps  · Extensi&oacute;n de cadera de pie con apoyo en encimera - 1 x daily - 7 x weekly - 2 sets - 10 reps    STG's:     - Patient improves EC task of MCSTIB for improved static balance within 4 weeks    - Patient  and son is independent with initial home exercise program for improvements at home within 2 weeks    - Patient ambulates for 10 consecutive min with appropriate vital sign response for improved endurance within  4 weeks   - Patient will improve tie in 5 x sit to stand by  10 seconds for increased Muscle strength   - patient will report < 3/10 pain right shoulder during ambulation with walker   - Patient's son will provide appropriate level of assistance during transfers and ambulatio  LTG's:   - Patient improves distance ambulated on 6MWT by 30% within 8 weeks for improved endurance   - Patient decreased time to complete TUG by 20 for decreased risk of falls within 12 weeks   - Patient improves gait speed to within 20% of age matched norms within  12 weeks   - Patient improves score on Functional reach by 4 inches for improved dynamic balance      Vivien Cruz, PT  3/10/2022

## 2022-03-14 NOTE — TELEPHONE ENCOUNTER
Order was cancelled, likely due to inability to contact the pt  She should call [phone] if she wishes to complete the delivery process

## 2022-03-15 ENCOUNTER — APPOINTMENT (OUTPATIENT)
Dept: SPEECH THERAPY | Facility: REHABILITATION | Age: 87
End: 2022-03-15
Payer: MEDICARE

## 2022-03-15 ENCOUNTER — APPOINTMENT (OUTPATIENT)
Dept: PHYSICAL THERAPY | Facility: REHABILITATION | Age: 87
End: 2022-03-15
Payer: MEDICARE

## 2022-03-15 DIAGNOSIS — I50.9 CHF (CONGESTIVE HEART FAILURE) (HCC): ICD-10-CM

## 2022-03-15 DIAGNOSIS — I10 BENIGN ESSENTIAL HYPERTENSION: ICD-10-CM

## 2022-03-15 DIAGNOSIS — E78.2 MIXED HYPERLIPIDEMIA: ICD-10-CM

## 2022-03-15 DIAGNOSIS — N32.81 OVERACTIVE BLADDER: ICD-10-CM

## 2022-03-15 DIAGNOSIS — K59.09 OTHER CONSTIPATION: ICD-10-CM

## 2022-03-15 DIAGNOSIS — R10.11 RIGHT UPPER QUADRANT PAIN: ICD-10-CM

## 2022-03-15 RX ORDER — LISINOPRIL 40 MG/1
TABLET ORAL
Qty: 90 TABLET | Refills: 0 | Status: SHIPPED | OUTPATIENT
Start: 2022-03-15 | End: 2022-06-08

## 2022-03-15 RX ORDER — ASPIRIN 81 MG
TABLET, DELAYED RELEASE (ENTERIC COATED) ORAL
Qty: 90 TABLET | Refills: 0 | Status: SHIPPED | OUTPATIENT
Start: 2022-03-15 | End: 2022-06-07

## 2022-03-15 RX ORDER — FESOTERODINE FUMARATE 8 MG/1
TABLET, FILM COATED, EXTENDED RELEASE ORAL
Qty: 90 TABLET | Refills: 0 | Status: SHIPPED | OUTPATIENT
Start: 2022-03-15 | End: 2022-06-08

## 2022-03-15 RX ORDER — FUROSEMIDE 40 MG/1
TABLET ORAL
Qty: 90 TABLET | Refills: 0 | Status: SHIPPED | OUTPATIENT
Start: 2022-03-15 | End: 2022-06-08

## 2022-03-15 RX ORDER — ATORVASTATIN CALCIUM 20 MG/1
TABLET, FILM COATED ORAL
Qty: 90 TABLET | Refills: 0 | Status: SHIPPED | OUTPATIENT
Start: 2022-03-15 | End: 2022-06-08

## 2022-03-15 RX ORDER — HYDROCHLOROTHIAZIDE 12.5 MG/1
TABLET ORAL
Qty: 90 TABLET | Refills: 0 | Status: SHIPPED | OUTPATIENT
Start: 2022-03-15 | End: 2022-06-08

## 2022-03-17 ENCOUNTER — OFFICE VISIT (OUTPATIENT)
Dept: PHYSICAL THERAPY | Facility: REHABILITATION | Age: 87
End: 2022-03-17
Payer: MEDICARE

## 2022-03-17 DIAGNOSIS — F03.90 DEMENTIA WITHOUT BEHAVIORAL DISTURBANCE, UNSPECIFIED DEMENTIA TYPE (HCC): ICD-10-CM

## 2022-03-17 DIAGNOSIS — R26.81 GAIT INSTABILITY: Primary | ICD-10-CM

## 2022-03-17 PROCEDURE — 97112 NEUROMUSCULAR REEDUCATION: CPT

## 2022-03-17 NOTE — PROGRESS NOTES
Daily Note/Update        Name: Rhesa Primrose  Date: 22  : 1934  Referring Provider: Efren Hinojosa MD  AUTHORIZATION:   Insurance: Payor: [employer] MEDICARE [facility] REP / Plan: [employer] / Product Type: Medicare HMO /   6 of 16 visits through , PN due 3/09        SUBJECTIVE:  HPI: Rhesa Primrose is a 80 y o  female referred to outpatient physical therapy for the following diagnosis   Encounter Diagnoses   Name Primary? 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)  Patient-Specific Functional Scale   Task is scored 0 (unable to perform activity) to 10 (ability to perform activity independently)  Activity 2/09 3/17   1  Negotiate steps  3   5   2  Walking without assistance  3    6   3    "Getting up" 5    8       OBJECTIVE: /76  HR 86 SPO2 96%    Core Movement Tasks Description of task  3/17   Sitting Abnormal - posterior lean , sacral sitting  able to sit with pelvis at neutral    Sitting to Standing Posterior losses of balance, Unable without use of arms and Poor control in descent Able to reach back with both arms to help with control in descent   Standing Other: increased base of support, reaches towrds unsteady surfaces Uses b Ue for push off   Walking Lacks heel strike, Lacks knee extension during stance phase gait, Decreased hip extension, Decreased trunk rotation and Decreased arm swing Improving trunk extension during ambulation   Now with improved step length and symmetry   Step-up Requires use of railing(s) and Posterior trunk lean unable to perform indepenendtly Uses two handrails with close supervision   Reach, grasp, manipulate Decreased reaching overhead functional reach 2"          Mobility Measures 2/9/2022 3/17   Assistive device used? Rolling Walker for 6 MWT only rollator   5 Time Sit to Stand  (17" chair, arms across chest) 36  seconds 29 sec   3 Meter Timed  Up & Go 43 seconds  40 sec without walker    39 with rolling walker   Walking speed   23 meters/second   56 meters/ second   MCTSIB  (see below) 12/120 22/120   6 Minute Walk Test (100 foot circular course) 135' feet  Ending heart rate 98 beats/minute 400' 103 HR 93 Spo2   Patient-Reported Outcome Measure: Patient Specific Functional Scale (PSFS)   FOTO 11/30    41 19/30   Functional reach  2" 5"     Modified Clinical Test of Sensory Interaction on Balance   18 eyes open firm surface   4 eyes closed firm surface   0 eyes open foam surface   0 eyes closed foam surface      Assessment:    Geraldine is making great strides towards her goals  She has made improvement in transfers, balance, endurance and the quality of her gait pattern is improving  She is participating more more independently in her self care and is able to negotiate her walker better in smaller areas  We have been addressing her shoulder limitations and her ability to reach back for the arms on the chair to improve her sitting/standing abilities  Her son is very supportive and continues to encourage her independence at home  Geraldine will continue to benefit from skilled PT intervention to maximize functional mobility   STG's:     - Patient improves EC task of MCSTIB for improved static balance within 4 weeks ongoing    - Patient  and son is independent with initial home exercise program for improvements at home within 2 weeks MET    - Patient ambulates for 10 consecutive min with appropriate vital sign response for improved endurance within  4 weeks ongoing (6 min)   - Patient will improve tie in 5 x sit to stand by  10 seconds for increased Muscle strength MET   - patient will report < 3/10 pain right shoulder during ambulation with walker met   - Patient's son will provide appropriate level of assistance during transfers and ambulation  MET  LTG's:   - Patient improves distance ambulated on 6MWT by 30% within 8 weeks for improved endurance MET (400')   - Patient decreased time to complete TUG by 20 for decreased risk of falls within 12 weeks ongoing   - Patient improves gait speed to within 20% of age matched norms within  12 weeks ongoing   - Patient improves score on Functional reach by 4 inches for improved dynamic balance ongoing  Added Goals: 3/17  -Geraldine will improve 5  Sit to stand by 10 more seconds  -Geraldine will improve distance in 6 MWT by 200'    PLAN OF CARE:  Patient will benefit from physical therapy 1-2 times per week for 2 months  Neuromuscular re-education, therapeutic exercises, manual therapy and therapeutic activities as outlined in grids  Precautions: Dementia, Right RCT, LEFT TSR, B TKR    Specialty Treatment Diary  3/03 3/8 3/10 3/17 Re-Evaluation   Home exercise program       Walking/endurance 75' x 6 with turn s    Scifit 10 min level2    Overground walking 6 minutes with resting periods between  Scifit 10 min level2     Overground ambulation   Rolling walker  50' x 6 Scifit 10 min level2    Overground ambulation   Rolling walker  60 ' x 6 6 MWT   Static and dynamic balance Backwards walking 30' x 3      Negotiation of ones 30' x 4 with rolling walkerc    Step over 2" cones 14' x 2 80% verbal cues   40% precision Backwards amb with walker 20' x 2 Backwards amb with walker 20' x 2  Negotiation of ones 30' x 4 with rolling walker 2 part MCTSIB  Functional reach  TUG   Strengthening 2 # sandy  Shoulder flex  Shoulder press  Biceps curls  2 x 10  Shoulder flex  Shoulder press  Biceps curls      Sit to stand 5' x 2  2 x 10  Shoulder flex  Shoulder press  Biceps curls      Sit to stand 5' x 2  5 sit to stand   Stretching/Manual AA-PROM at end of range  SMT upper trap/ levators  /rhomboidss with decreased pain  Client able to perform ARROM with improved teachinque AA-PROM B shoulder end range *Client with improving AROM AA-PROM B shoulder end range *Client with improving AROM    There activity  Reaching activities, negotiation of walker in smal areas  Stnding   Bathroom activities  Standing Reaching for soap/paper towels  Pulling up pants without LOB   Able to use BUE today without holding walker  Negotiation of walker properly in small environments  12 min   Access Code: [email or web address]  URL: [email or web address]  Date: 02/10/2022  Prepared by: Dc Diehl    Exercises  · Sentado a parado - 1 x daily - 7 x weekly - 2 sets - 1 reps  · Minisentadilla con apoyo en encimera - 1 x daily - 7 x weekly - 2 sets - 10 reps  · Levantamiento de Talones de Pie - 1 x daily - 7 x weekly - 3 sets - 10 reps  · Abducci&oacute;n de cadera de pie con apoyo en encimera - 1 x daily - 7 x weekly - 2 sets - 10 reps  · Marcha de Flexión de [employer] - 1 x daily - 7 x weekly - 2 sets - 10 reps  · Extensi&oacute;n de cadera de pie con apoyo en encimera - 1 x daily - 7 x weekly - 2 sets - 10 reps    STG's:     - Patient improves EC task of MCSTIB for improved static balance within 4 weeks    - Patient  and son is independent with initial home exercise program for improvements at home within 2 weeks    - Patient ambulates for 10 consecutive min with appropriate vital sign response for improved endurance within  4 weeks   - Patient will improve tie in 5 x sit to stand by  10 seconds for increased Muscle strength   - patient will report < 3/10 pain right shoulder during ambulation with walker   - Patient's son will provide appropriate level of assistance during transfers and ambulatio  LTG's:   - Patient improves distance ambulated on 6MWT by 30% within 8 weeks for improved endurance   - Patient decreased time to complete TUG by 20 for decreased risk of falls within 12 weeks   - Patient improves gait speed to within 20% of age matched norms within  15 weeks   - Patient improves score on Functional reach by 4 inches for improved dynamic balance      Kimberly Ramos, PT  3/17/2022

## 2022-03-22 ENCOUNTER — OFFICE VISIT (OUTPATIENT)
Dept: PHYSICAL THERAPY | Facility: REHABILITATION | Age: 87
End: 2022-03-22
Payer: MEDICARE

## 2022-03-22 ENCOUNTER — OFFICE VISIT (OUTPATIENT)
Dept: SPEECH THERAPY | Facility: REHABILITATION | Age: 87
End: 2022-03-22
Payer: MEDICARE

## 2022-03-22 DIAGNOSIS — R13.12 OROPHARYNGEAL DYSPHAGIA: ICD-10-CM

## 2022-03-22 DIAGNOSIS — F03.90 DEMENTIA WITHOUT BEHAVIORAL DISTURBANCE, UNSPECIFIED DEMENTIA TYPE (HCC): ICD-10-CM

## 2022-03-22 DIAGNOSIS — R26.81 GAIT INSTABILITY: Primary | ICD-10-CM

## 2022-03-22 DIAGNOSIS — R48.8 OTHER SYMBOLIC DYSFUNCTIONS: Primary | ICD-10-CM

## 2022-03-22 PROCEDURE — 97116 GAIT TRAINING THERAPY: CPT | Performed by: PHYSICAL THERAPY ASSISTANT

## 2022-03-22 PROCEDURE — 92507 TX SP LANG VOICE COMM INDIV: CPT

## 2022-03-22 PROCEDURE — 97112 NEUROMUSCULAR REEDUCATION: CPT | Performed by: PHYSICAL THERAPY ASSISTANT

## 2022-03-22 PROCEDURE — 97110 THERAPEUTIC EXERCISES: CPT | Performed by: PHYSICAL THERAPY ASSISTANT

## 2022-03-22 NOTE — PROGRESS NOTES
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    2  Oropharyngeal dysphagia  R13 12    3  Dementia without behavioral disturbance, unspecified dementia type ([address] )  F03 90        Visit Tracking:  -Referring provider: Epic  -Billing guidelines: CMS  -Visit #6 visits (8 additionall visits authorized)  -Insurance: [employer]  -RE due 2022    Subjective/Behavioral:  -Patient reports to session with son, who provided translation as needed throughout session  -Patient's son reports patient with improved use of strategies allowing for improved  toleration of PO intake within home environment  -Have not scheduled VFSS as of yet    Objective/Assessment:  -Patient's family member/caregiver was present during today's session  Short-term goals:    1  Patient will complete concrete and abstract categorization tasks to 70% accuracy provided cues as needed to facilitate improved generative naming skills and working memory, to be achieved in 4-6 weeks --CONTINUE    2  Patient will participate in continued therapeutic trials in 90% of opportunities for continued assessment and recommendations for carryover and functional tasks at home, to be achieved in 4-6 weeks --CONTINUE    3  Patient will improve long term memory retrieval and recall of information during reminiscing tasks with 80% accuracy, to be achieved in 4-6 weeks  --DISCONTINUE    4  Patient will demonstrate ability to follow 1 step directions with 80% accuracy provided cues in order to facilitate improved ability to follow directions within functional living environment to be achieved in 4-6 weeks  --CONTINUE  Patient was able to follow 1 step directions with 75% accuracy, able to improve to 90% provided min cues, this date  Patient was asked to sequence picture cards of daily tasks, patient able to correctly sequence tasks with 75% requiring mild to moderate cues this date  5   Patient will improve attention to task with 80% accuracy provided cues as needed to allow for improved task completion within environment, to be achieved in 4-6 weeks  --CONTINUE    Patient able to attend to therapeutic tasks with 80% accuracy this date, requiring min cues for improved attention to task  6  Patient will demonstrate consistent use of compensatory strategies (e g , upright positioning, small bites/sips, slow rate, alternation of consistencies, multiple swallows, effortful swallow, chin tuck, head turn, etc ) with 90% accuracy to reduce risk of  penetration/aspiration during consumption of liquids/solids, to be achieved in 6-8 weeks --NEW GOAL  Education on safe swallow recommendations and previously trained compensatory strategies  Patient and family verbalized understanding  Family reports improved toleration of solids with use of strategies  7  Patient will tolerate therapeutic trials of regular diet softer textures as appropriate with 90% free of s/s of aspiration, with use of compensatory strategies provided cues as needed, to be achieved in 6-8 weeks --NEW GOAL        Plan:  -Continue with current plan of care

## 2022-03-22 NOTE — PROGRESS NOTES
Daily Note/Update        Name: Raman Pérez  Date: 22  : 1934  Referring Provider: Nestor Felix MD  AUTHORIZATION:   Insurance: Payor: Rober Lyon MEDICARE [facility] REP / Plan: Darrick Lucero / Product Type: Medicare HMO /   6 of 16 visits through , PN due 3/09        SUBJECTIVE:  HPI: Raman Pérez is a 80 y o  female referred to outpatient physical therapy for the following diagnosis   Encounter Diagnoses   Name Primary? 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)  Patient-Specific Functional Scale   Task is scored 0 (unable to perform activity) to 10 (ability to perform activity independently)  Activity 2/09 3/17   1  Negotiate steps  3   5   2  Walking without assistance  3    6   3    "Getting up" 5    8       OBJECTIVE: /76  HR 86 SPO2 96%    Core Movement Tasks Description of task  3/17   Sitting Abnormal - posterior lean , sacral sitting  able to sit with pelvis at neutral    Sitting to Standing Posterior losses of balance, Unable without use of arms and Poor control in descent Able to reach back with both arms to help with control in descent   Standing Other: increased base of support, reaches towrds unsteady surfaces Uses b Ue for push off   Walking Lacks heel strike, Lacks knee extension during stance phase gait, Decreased hip extension, Decreased trunk rotation and Decreased arm swing Improving trunk extension during ambulation   Now with improved step length and symmetry   Step-up Requires use of railing(s) and Posterior trunk lean unable to perform indepenendtly Uses two handrails with close supervision   Reach, grasp, manipulate Decreased reaching overhead functional reach 2"          Mobility Measures 2/9/2022 3/17   Assistive device used? Rolling Walker for 6 MWT only rollator   5 Time Sit to Stand  (17" chair, arms across chest) 36  seconds 29 sec   3 Meter Timed  Up & Go 43 seconds  40 sec without walker    39 with rolling walker   Walking speed   23 meters/second   56 meters/ second   MCTSIB  (see below) 12/120 22/120   6 Minute Walk Test (100 foot circular course) 135' feet  Ending heart rate 98 beats/minute 400' 103 HR 93 Spo2   Patient-Reported Outcome Measure: Patient Specific Functional Scale (PSFS)   FOTO 11/30    41 19/30   Functional reach  2" 5"         Assessment:  Pt demonstrating increased fatigue throughout therapy session, requiring frequent seated rest breaks during therapy session  PLAN OF CARE:  Patient will benefit from  Continued therapy            Precautions: Dementia, Right RCT, LEFT TSR, B TKR    Specialty Treatment Diary  3/10 3/17 Re-Evaluation 3/22    Home exercise program       Walking/endurance Scifit 10 min level2    Overground ambulation   Rolling walker  60 ' x 6 6 MWT sci fit x10 min    Static and dynamic balance Backwards amb with walker 20' x 2  Negotiation of ones 30' x 4 with rolling walker 2 part MCTSIB  Functional reach  'x 2 with RW and S  Weaving between cones  30'x3    Strengthening 2 x 10  Shoulder flex  Shoulder press  Biceps curls      Sit to stand 5' x 2  5 sit to stand 5x sit to stand    shld flex 2x10  shld press 2x10  Bicep curls 1# 2x10    Stretching/Manual AA-PROM B shoulder end range *Client with improving AROM      There activity  Bathroom activities  Standing Reaching for soap/paper towels  Pulling up pants without LOB   Able to use BUE today without holding walker  Negotiation of walker properly in small environments  12 min     Access Code: [email or web address]  URL: [email or web address]  Date: 02/10/2022  Prepared by: Jann Snowball    Exercises  · Siddharth berry parado - 1 x daily - 7 x weekly - 2 sets - 1 reps  · Minisentadilla con apoyo en encimera - 1 x daily - 7 x weekly - 2 sets - 10 reps  · Levantamiento de Talones de Pie - 1 x daily - 7 x weekly - 3 sets - 10 reps  · Abducci&oacute;n de cadera de pie con apoyo en encimera - 1 x daily - 7 x weekly - 2 sets - 10 reps  · Marcha de Flexión de [employer] - 1 x daily - 7 x weekly - 2 sets - 10 reps  · Extensi&oacute;n de cadera de pie con apoyo en encimera - 1 x daily - 7 x weekly - 2 sets - 10 reps    STG's:     - Patient improves EC task of MCSTIB for improved static balance within 4 weeks    - Patient  and son is independent with initial home exercise program for improvements at home within 2 weeks    - Patient ambulates for 10 consecutive min with appropriate vital sign response for improved endurance within  4 weeks   - Patient will improve tie in 5 x sit to stand by  10 seconds for increased Muscle strength   - patient will report < 3/10 pain right shoulder during ambulation with walker   - Patient's son will provide appropriate level of assistance during transfers and ambulatio  LTG's:   - Patient improves distance ambulated on 6MWT by 30% within 8 weeks for improved endurance   - Patient decreased time to complete TUG by 20 for decreased risk of falls within 12 weeks   - Patient improves gait speed to within 20% of age matched norms within  12 weeks   - Patient improves score on Functional reach by 4 inches for improved dynamic balance      Jennie Gomez, PTA  3/22/2022

## 2022-03-24 ENCOUNTER — OFFICE VISIT (OUTPATIENT)
Dept: PHYSICAL THERAPY | Facility: REHABILITATION | Age: 87
End: 2022-03-24
Payer: MEDICARE

## 2022-03-24 DIAGNOSIS — R26.81 GAIT INSTABILITY: Primary | ICD-10-CM

## 2022-03-24 DIAGNOSIS — F03.90 DEMENTIA WITHOUT BEHAVIORAL DISTURBANCE, UNSPECIFIED DEMENTIA TYPE (HCC): ICD-10-CM

## 2022-03-24 PROCEDURE — 97110 THERAPEUTIC EXERCISES: CPT

## 2022-03-24 PROCEDURE — 97112 NEUROMUSCULAR REEDUCATION: CPT

## 2022-03-24 PROCEDURE — 97140 MANUAL THERAPY 1/> REGIONS: CPT

## 2022-03-24 NOTE — PROGRESS NOTES
Daily Speech Treatment Note    Today's date: 3/28/2022  Patients name: Rmaan Pérez  : 1934  MRN: [id number]  Safety measures: fall risk, NKA  Referring provider: Nestor Felix MD    Encounter Diagnosis     ICD-10-CM    1  Other symbolic dysfunctions  W91 6    2  Oropharyngeal dysphagia  R13 12    3  Dementia without behavioral disturbance, unspecified dementia type ([facility] [address] )  F03 90        Visit Tracking:  -Referring provider: Epic  -Billing guidelines: CMS  -Visit # visits (8 additionall visits authorized)  -Insurance: Noam Alfonso  -RE due 2022    Subjective/Behavioral:  Patient reports she is doing well  Son(Pawan) reports that he has scheduled her VFSS for   He reports that the patient's swallow is much better  She is slowing down her food intake and is taking smaller bites and sips with family monitoring  Objective/Assessment:  -Patient's family member/caregiver was present during today's session  and -Patient provided with a handout, "Geraldine's Memories" in which she is to fill out her family history and memories at home with family  She was provided with two copies, one for home and one to be brought back to therapy  Discussed activities to improve cognition at home (e g  cooking)  The patient loves to cook but family has been concerned about safety  Discussed cooking with supervision and safety modifications  Patient and son were pleased with this and plan to do some cooking together this week  Short-term goals:    1  Patient will complete concrete and abstract categorization tasks to 70% accuracy provided cues as needed to facilitate improved generative naming skills and working memory, to be achieved in 4-6 weeks --CONTINUE  Patient was asked to list steps for everyday tasks (e g  making soup)  Patient responded with 100% accuracy, requiring min-mod verbal cueing       2  Patient will participate in continued therapeutic trials in 90% of opportunities for continued assessment and recommendations for carryover and functional tasks at home, to be achieved in 4-6 weeks --CONTINUE    3  Patient will improve long term memory retrieval and recall of information during reminiscing tasks with 80% accuracy, to be achieved in 4-6 weeks  --DISCONTINUE  Patient was asked a series of questions related to her family and personal history  She responded with 80% accuracy (fact checked by son)  4  Patient will demonstrate ability to follow 1 step directions with 80% accuracy provided cues in order to facilitate improved ability to follow directions within functional living environment to be achieved in 4-6 weeks  --CONTINUE      5  Patient will improve attention to task with 80% accuracy provided cues as needed to allow for improved task completion within environment, to be achieved in 4-6 weeks  --CONTINUE      6  Patient will demonstrate consistent use of compensatory strategies (e g , upright positioning, small bites/sips, slow rate, alternation of consistencies, multiple swallows, effortful swallow, chin tuck, head turn, etc ) with 90% accuracy to reduce risk of  penetration/aspiration during consumption of liquids/solids, to be achieved in 6-8 weeks --NEW GOAL    7  Patient will tolerate therapeutic trials of regular diet softer textures as appropriate with 90% free of s/s of aspiration, with use of compensatory strategies provided cues as needed, to be achieved in 6-8 weeks --NEW GOAL        Plan:  -Continue with current plan of care

## 2022-03-24 NOTE — PROGRESS NOTES
Daily Note          Name: Karin Jimenez  Date: 22  : 1934  Referring Provider: Val Gilford, MD  AUTHORIZATION:   Insurance: Payor: Jesus Castaneda MEDICARE [facility] REP / Plan: Romina Conroy / Product Type: Medicare HMO /   6 of 16 visits through , PN due 3/09        SUBJECTIVE:  HPI: Karin Jimenez is a 80 y o  female referred to outpatient physical therapy for the following diagnosis   Encounter Diagnoses   Name Primary? 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)  Patient-Specific Functional Scale   Task is scored 0 (unable to perform activity) to 10 (ability to perform activity independently)  Activity 2/09 3/17   1  Negotiate steps  3   5   2  Walking without assistance  3    6   3    "Getting up" 5    8       OBJECTIVE: /84  HR 73    Assessment:  Patient is making gains  Son reports increased activity at home Will benefit from increased focus on ambulation and challenged balance activities  Recommend occupational Therapy to optimize ADls with decreasing riski of falls  Royal Brito    PLAN OF CARE:  Patient will benefit from  Continued therapy            Precautions: Dementia, Right RCT, LEFT TSR, B TKR    Specialty Treatment Diary  3/10 3/17 Re-Evaluation 3/22    Home exercise program       Walking/endurance Scifit 10 min level2    Overground ambulation   Rolling walker  60 ' x 6 6 MWT sci fit x10 min Scifit  X 10 min with HT/HN for C/s ROM   Static and dynamic balance Backwards amb with walker 20' x 2  Negotiation of ones 30' x 4 with rolling walker 2 part MCTSIB  Functional reach  'x 2 with RW and S  Weaving between cones  30'x3 200' x 2 solostep  ll bars no hands dforwards/ backwards sideways x 3 each  * initially voiced fear of falls, then more comfortable with activity  performing with close supervision   Strengthening 2 x 10  Shoulder flex  Shoulder press  Biceps curls      Sit to stand 5' x 2  5 sit to stand 5x sit to stand    shld flex 2x10  shld press 2x10  Bicep curls 1# 2x10 Shoulder flex 2 x 10  Shoulder press 2 x 10 each   Stretching/Manual AA-PROM B shoulder end range *Client with improving AROM   AAROM Right shoulder ROM for increaed end ROM  Gentle mobs A/P Inf/sup   There activity  Bathroom activities  Standing Reaching for soap/paper towels  Pulling up pants without LOB   Able to use BUE today without holding walker  Negotiation of walker properly in small environments  12 min   Bathroom Act  Increased stepping challenge to retrieve soap, using both hands without support, return to sink x 4  Walking to paper towel weems/return to sink x 3    Standing -lifting semi tight underwear with both hands LOB posteriorly    Improve maneuvering walker but requireing tactile facilitation  Access Code: [email or web address]  URL: [email or web address]  Date: 02/10/2022  Prepared by: Kimberly Richard    Exercises  · Sentado a parado - 1 x daily - 7 x weekly - 2 sets - 1 reps  · Minisentadilla con apoyo en encimera - 1 x daily - 7 x weekly - 2 sets - 10 reps  · Levantamiento de Talones de Pie - 1 x daily - 7 x weekly - 3 sets - 10 reps  · Abducci&oacute;n de cadera de pie con apoyo en encimera - 1 x daily - 7 x weekly - 2 sets - 10 reps  · Marcha de Flexión de [employer] - 1 x daily - 7 x weekly - 2 sets - 10 reps  · Extensi&oacute;n de cadera de pie con apoyo en encimera - 1 x daily - 7 x weekly - 2 sets - 10 reps    STG's:     - Patient improves EC task of MCSTIB for improved static balance within 4 weeks    - Patient  and son is independent with initial home exercise program for improvements at home within 2 weeks    - Patient ambulates for 10 consecutive min with appropriate vital sign response for improved endurance within  4 weeks   - Patient will improve tie in 5 x sit to stand by  10 seconds for increased Muscle strength   - patient will report < 3/10 pain right shoulder during ambulation with walker   - Patient's son will provide appropriate level of assistance during transfers and ambulatio  LTG's:   - Patient improves distance ambulated on 6MWT by 30% within 8 weeks for improved endurance   - Patient decreased time to complete TUG by 20 for decreased risk of falls within 12 weeks   - Patient improves gait speed to within 20% of age matched norms within  15 weeks   - Patient improves score on Functional reach by 4 inches for improved dynamic balance    Core Movement Tasks Description of task  3/17   Sitting Abnormal - posterior lean , sacral sitting  able to sit with pelvis at neutral    Sitting to Standing Posterior losses of balance, Unable without use of arms and Poor control in descent Able to reach back with both arms to help with control in descent   Standing Other: increased base of support, reaches towrds unsteady surfaces Uses b Ue for push off   Walking Lacks heel strike, Lacks knee extension during stance phase gait, Decreased hip extension, Decreased trunk rotation and Decreased arm swing Improving trunk extension during ambulation  Now with improved step length and symmetry   Step-up Requires use of railing(s) and Posterior trunk lean unable to perform indepenendtly Uses two handrails with close supervision   Reach, grasp, manipulate Decreased reaching overhead functional reach 2"          Mobility Measures 2/9/2022 3/17   Assistive device used? Rolling Walker for 6 MWT only rollator   5 Time Sit to Stand  (17" chair, arms across chest) 36  seconds 29 sec   3 Meter Timed  Up & Go 43 seconds  40 sec without walker    39 with rolling walker   Walking speed   23 meters/second   56 meters/ second   MCTSIB  (see below) 12/120 22/120   6 Minute Walk Test (100 foot circular course) 135' feet  Ending heart rate 98 beats/minute 400' 103 HR 93 Spo2   Patient-Reported Outcome Measure: Patient Specific Functional Scale (PSFS)   FOTO 11/30    41 19/30   Functional reach  2" 5"         Faye Lennox, PT  3/24/2022

## 2022-03-28 ENCOUNTER — OFFICE VISIT (OUTPATIENT)
Dept: SPEECH THERAPY | Facility: REHABILITATION | Age: 87
End: 2022-03-28
Payer: MEDICARE

## 2022-03-28 ENCOUNTER — OFFICE VISIT (OUTPATIENT)
Dept: PHYSICAL THERAPY | Facility: REHABILITATION | Age: 87
End: 2022-03-28
Payer: MEDICARE

## 2022-03-28 DIAGNOSIS — R48.8 OTHER SYMBOLIC DYSFUNCTIONS: Primary | ICD-10-CM

## 2022-03-28 DIAGNOSIS — R26.81 GAIT INSTABILITY: ICD-10-CM

## 2022-03-28 DIAGNOSIS — R13.12 OROPHARYNGEAL DYSPHAGIA: ICD-10-CM

## 2022-03-28 DIAGNOSIS — F03.90 DEMENTIA WITHOUT BEHAVIORAL DISTURBANCE, UNSPECIFIED DEMENTIA TYPE (HCC): ICD-10-CM

## 2022-03-28 DIAGNOSIS — R26.81 GAIT INSTABILITY: Primary | ICD-10-CM

## 2022-03-28 DIAGNOSIS — F03.90 DEMENTIA WITHOUT BEHAVIORAL DISTURBANCE, UNSPECIFIED DEMENTIA TYPE (HCC): Primary | ICD-10-CM

## 2022-03-28 PROCEDURE — 92507 TX SP LANG VOICE COMM INDIV: CPT

## 2022-03-28 PROCEDURE — 97110 THERAPEUTIC EXERCISES: CPT

## 2022-03-28 PROCEDURE — 97112 NEUROMUSCULAR REEDUCATION: CPT

## 2022-03-28 NOTE — PROGRESS NOTES
Daily Note          Name: Satnam Myers  Date: 22  : 1934  Referring Provider: Maria Del Rosario Braga MD  AUTHORIZATION:   Insurance: Payor: Yumiko Ivy MEDICARE [facility] REP / Plan: [employer] / Product Type: Medicare HMO /   6 of 16 visits through , PN due 3/09        SUBJECTIVE:  HPI: Satnam Myers is a 80 y o  female referred to outpatient physical therapy for the following diagnosis   Encounter Diagnoses   Name Primary? 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)  Patient-Specific Functional Scale   Task is scored 0 (unable to perform activity) to 10 (ability to perform activity independently)  Activity 2/09 3/17   1  Negotiate steps  3   5   2  Walking without assistance  3    6   3    "Getting up" 5    8       OBJECTIVE: /84  HR 73    Assessment: Requires contact guard for initiation of reaching activities  Verbal/visual cues as for proper body mechanics  Geraldine's confidence is improving with increasing repetitions of reaching outside her base of supports  PLAN OF CARE:  Patient will benefit from  Continued therapy  Precautions: Dementia, Right RCT, LEFT TSR, B TKR    Specialty Treatment Diary  3/22 3/24 3/28   Home exercise program      Walking/endurance sci fit x10 min Scifit  X 10 min with HT/HN for C/s ROM Scifit x 10 min  Ambulation with rolling walker 200' warm up    Static and dynamic balance 100'x 2 with RW and S  Weaving between cones  30'x3 200' x 2 solostep  ll bars no hands dforwards/ backwards sideways x 3 each  * initially voiced fear of falls, then more comfortable with activity  performing with close supervision solostep  25' x 4 no assisitive device  Forwards  Backwards  Sideways with hand hold assist   Strengthening 5x sit to stand    shld flex 2x10  shld press 2x10  Bicep curls 1# 2x10 Shoulder flex 2 x 10  Shoulder press 2 x 10 each Strength/BALANceStanding 3# bar  AA- proper body mechanics  Shoulder flex  Forward press  Biceps curls   Stretching/Manual  AAROM Right shoulder ROM for increaed end ROM  Gentle mobs A/P Inf/sup    There activity   Bathroom Act  Increased stepping challenge to retrieve soap, using both hands without support, return to sink x 4  Walking to paper towel weems/return to sink x 3    Standing -lifting semi tight underwear with both hands LOB posteriorly    Improve maneuvering walker but requireing tactile facilitation  Reaching activities, forwards stepping with reach > 4-5 inches  Sidestepping reaching with 1 and 2 hands/ retreive    Tossing items int o basket with 1 LE step forwards   2 minutes   Access Code: [email or web address]  URL: [email or web address]  Date: 02/10/2022  Prepared by: Tona Battles    Exercises  · Sentado a parado - 1 x daily - 7 x weekly - 2 sets - 1 reps  · Minisentadilla con apoyo en encimera - 1 x daily - 7 x weekly - 2 sets - 10 reps  · Levantamiento de Talones de Pie - 1 x daily - 7 x weekly - 3 sets - 10 reps  · Abducci&oacute;n de cadera de pie con apoyo en encimera - 1 x daily - 7 x weekly - 2 sets - 10 reps  · Marcha de Flexión de [employer] - 1 x daily - 7 x weekly - 2 sets - 10 reps  · Extensi&oacute;n de cadera de pie con apoyo en encimera - 1 x daily - 7 x weekly - 2 sets - 10 reps    STG's:     - Patient improves EC task of MCSTIB for improved static balance within 4 weeks    - Patient  and son is independent with initial home exercise program for improvements at home within 2 weeks    - Patient ambulates for 10 consecutive min with appropriate vital sign response for improved endurance within  4 weeks   - Patient will improve tie in 5 x sit to stand by  10 seconds for increased Muscle strength   - patient will report < 3/10 pain right shoulder during ambulation with walker   - Patient's son will provide appropriate level of assistance during transfers and ambulatio  LTG's:   - Patient improves distance ambulated on 6MWT by 30% within 8 weeks for improved endurance   - Patient decreased time to complete TUG by 20 for decreased risk of falls within 12 weeks   - Patient improves gait speed to within 20% of age matched norms within  15 weeks   - Patient improves score on Functional reach by 4 inches for improved dynamic balance    Core Movement Tasks Description of task  3/17   Sitting Abnormal - posterior lean , sacral sitting  able to sit with pelvis at neutral    Sitting to Standing Posterior losses of balance, Unable without use of arms and Poor control in descent Able to reach back with both arms to help with control in descent   Standing Other: increased base of support, reaches towrds unsteady surfaces Uses b Ue for push off   Walking Lacks heel strike, Lacks knee extension during stance phase gait, Decreased hip extension, Decreased trunk rotation and Decreased arm swing Improving trunk extension during ambulation  Now with improved step length and symmetry   Step-up Requires use of railing(s) and Posterior trunk lean unable to perform indepenendtly Uses two handrails with close supervision   Reach, grasp, manipulate Decreased reaching overhead functional reach 2"          Mobility Measures 2/9/2022 3/17   Assistive device used? Rolling Walker for 6 MWT only rollator   5 Time Sit to Stand  (17" chair, arms across chest) 36  seconds 29 sec   3 Meter Timed  Up & Go 43 seconds  40 sec without walker    39 with rolling walker   Walking speed   23 meters/second   56 meters/ second   MCTSIB  (see below) 12/120 22/120   6 Minute Walk Test (100 foot circular course) 135' feet  Ending heart rate 98 beats/minute 400' 103 HR 93 Spo2   Patient-Reported Outcome Measure: Patient Specific Functional Scale (PSFS)   FOTO 11/30    41 19/30   Functional reach  2" 5"         Romayne Gain, PT  3/28/2022

## 2022-04-04 ENCOUNTER — HOSPITAL ENCOUNTER (OUTPATIENT)
Dept: RADIOLOGY | Facility: HOSPITAL | Age: 87
Discharge: HOME | End: 2022-04-04
Attending: STUDENT IN AN ORGANIZED HEALTH CARE EDUCATION/TRAINING PROGRAM
Payer: MEDICARE

## 2022-04-04 DIAGNOSIS — R13.12 OROPHARYNGEAL DYSPHAGIA: ICD-10-CM

## 2022-04-04 DIAGNOSIS — F03.90 DEMENTIA WITHOUT BEHAVIORAL DISTURBANCE, UNSPECIFIED DEMENTIA TYPE (HCC): ICD-10-CM

## 2022-04-04 DIAGNOSIS — M67.442 GANGLION CYST OF JOINT OF FINGER OF LEFT HAND: ICD-10-CM

## 2022-04-04 DIAGNOSIS — R48.8 OTHER SYMBOLIC DYSFUNCTIONS: ICD-10-CM

## 2022-04-04 PROCEDURE — 74230 X-RAY XM SWLNG FUNCJ C+: CPT

## 2022-04-04 NOTE — PROCEDURES
Speech Pathology Videofluoroscopic Swallow Study (VFSS/VBS/MBSS)      Patient Name: Shashank Guzman    UXCSC'L Date: 4/4/2022    Past Medical History  Past Medical History:   Diagnosis Date    Diverticulosis 2001    Heart failure ([address] )     History of cystocele     Hypertension     Obesity     Positive PPD 1999    Dementia       Past Surgical History  Past Surgical History:   Procedure Laterality Date    BUNIONECTOMY Left 8/25/2021    Procedure: Hyla Nivia, EXCISION OF WOUND;  Surgeon: Darci Spain DPM;  Location: [address] [place] [address] OR;  Service: Podiatry    CARDIOVASCULAR STRESS TEST  2001    dobutamine stress test    CHOLECYSTECTOMY  04/2002    COLONOSCOPY      CYSTOCELE REPAIR      HYSTERECTOMY      KNEE SURGERY      knee replacement    KY REMOVAL DEEP IMPLANT Left 8/25/2021    Procedure: REMOVAL HARDWARE FOOT;  Surgeon: Darci Spain DPM;  Location: [address] [place] [address] OR;  Service: Podiatry    UPPER GASTROINTESTINAL ENDOSCOPY           General Information;  Myrna Ahmadi s a 80 y o  female with a PMH as stated above       Current concerns for dysphagia include "overfilling of her mouth with food  And, On one occasion, she required intervention as food was stuck  Son reports that pt demonstrated emesis while eating following large bolus presentations/multiple bolus presentations/rapid PO intake "    A VFSS was recommended to assess oropharyngeal stage swallowing skills at this time  Mrs Harini Rivera was viewed in lateral position and was given trials of nectar thick liquid, thin liquid, puree (applesauce), soft food (bites of Fig Floyd), solid food (hard cookie)  and a13mm pill with thin liquid/water    Her granddaughter was present for the evaluation and assisted with or communication/translation (Mercedes comprehends/speaks Yoruba only and I comprehend and speak a small amount of Yoruba)    Limitations: Please note that the first six series of images  (nectar thick liquid by individual and successive sips, plus thin liquid by individual and successive sips by cup an straw)  did not "save" hence are not in PACS    EFREM machine was set up for the study a second time and image capture/storage on EFREM ensured  Filming then resumed  Oral stage:  Mild oral stage dysphagia  Mastication was mildly prolonged but grossly effective with materials administered today  Bolus formation and transfer were mildly disorganized but functional   Oral control appeared adequate with no gross premature spillage over the base of tongue  Pharyngeal stage: No significant s/s pharyngeal dysphagia  Swallowing initiation was prompt  Hyolaryngeal excursion appeared adequate  AIrway entrance closure or protection appeared adequate  Tongue base retraction appeared to be of adequate strength  Pharyngeal constriction suspected to be adequate  Management of food/liquid/barium tablet follows: All food, liquid and the barium tablet passed through the pharynx with no penetration, aspiration or significant pharyngeal residue noted today  Strategies and Efficacy: Slowed rate utilized when delayed emptying of esophagus with liquids noted during initial filming with rapid intake of liquids    Esophageal stage:  Esophageal screening was completed after administration of the pill or water  Transient stasis of the pill in the esophagus was noted  Pill did not clear when given to additional sips of liquid; however, given a tsp of applesauce, a pill descended and emptied into the stomach  A brief view was also completed after rapid intake of thin liquid and delayed esophageal clearance suggested/noted  Assessment Summary:    Mrs Wisam Juarez presented with s/s suggestive of mild oral dysphagia, as well as with s/s suggestive of slowed/delayed transit of material through the esophagus (retention of liquid through much of the esophagus noted after rapid intake of liquids and delayed clearance of pill noted in screening today)    Risk for pharyngeal symptoms is present especially if Geraldine should eat/drink rapidly (anterograde/"top down" and retrograde/"bottom up" risk)  Note: Images are available for review in PACS as desired  NOMS (National Outcome Measurement System): Swallowing "Score"    LEVEL 5: Swallowing is safe with minimal diet restriction and/or occasionally requires minimal cueing to use compensatory strategies  The individual may occasionally self-cue  All nutrition and hydration needs are met by mouth at mealtime  Recommendations:   Recommended Diet:  Soft cooked foods (NDD3 or IDDSI Level 7 Easy to Chew), Thin liquid  Recommended Form of Medications: as desired and best tolerated (please encourage additional sips/bites of puree after medication administration to ensure transit of pill into stomach)   Aspiration precautions: Slow rate of intake  Re: SLP Dysphagia therapy: continue as clinically indicated across larger sample size, continue family education    Results Reviewed with Mercedes and grand-daughter    Thank you for this referral   Please do not hesitate to contact me with any questions or concerns      [facility]   Speech Pathologist  [place] License [id number]  [place] License [id number]  Available via [facility] FOR [facility] Text

## 2022-04-05 ENCOUNTER — OFFICE VISIT (OUTPATIENT)
Dept: PHYSICAL THERAPY | Facility: REHABILITATION | Age: 87
End: 2022-04-05
Payer: MEDICARE

## 2022-04-05 DIAGNOSIS — R26.81 GAIT INSTABILITY: Primary | ICD-10-CM

## 2022-04-05 DIAGNOSIS — F03.90 DEMENTIA WITHOUT BEHAVIORAL DISTURBANCE, UNSPECIFIED DEMENTIA TYPE (HCC): ICD-10-CM

## 2022-04-05 PROCEDURE — 97140 MANUAL THERAPY 1/> REGIONS: CPT

## 2022-04-05 PROCEDURE — 97110 THERAPEUTIC EXERCISES: CPT

## 2022-04-05 PROCEDURE — 97112 NEUROMUSCULAR REEDUCATION: CPT

## 2022-04-05 NOTE — PROGRESS NOTES
Daily Note          Name: Gordon Fischer  Date: 22  : 1934  Referring Provider: Derick Lynne MD  AUTHORIZATION:   Insurance: Payor: Sade Dickson MEDICARE [facility] REP / Plan: Varinder Black / Product Type: Medicare HMO /   6 of 16 visits through , PN due 3/09        SUBJECTIVE:  HPI: Gordon Fischer is a 80 y o  female referred to outpatient physical therapy for the following diagnosis   Encounter Diagnoses   Name Primary?  Gait instability Yes    Dementia without behavioral disturbance, unspecified dementia type ([facility] [address] )            I  Subjective: I went to my daughter's house and I almost fell down the stairs  I son and brother in law were there and made sure I was safe  I am very nervous today  Pain Level: Right Shoulder Pain 5/10 (h/o RCT without repair)  Patient-Specific Functional Scale   Task is scored 0 (unable to perform activity) to 10 (ability to perform activity independently)  Activity 2/09 3/17   1  Negotiate steps  3   5   2  Walking without assistance  3    6   3    "Getting up" 5    8       OBJECTIVE: /84  HR 73    Assessment: Patient was very fearful of activities and mobility today her on reports he was not aware of any falls/almost falls at his sister's home  Program was modified to accommodate patient's self reported "nervousness"  She will continue to benefit from skilled PT intervention  PLAN OF CARE:  Patient will benefit from  Continued therapy              Precautions: Dementia, Right RCT, LEFT TSR, B TKR    Specialty Treatment Diary  3/22 3/24 3/28 4/05   Home exercise program       Walking/endurance sci fit x10 min Scifit  X 10 min with HT/HN for C/s ROM Scifit x 10 min  Ambulation with rolling walker 200' warm up  Scifit x 10 min    Ambulation with walker and solostep  Forwards 200' x 2  100' x 1 with cane   Static and dynamic balance 100'x 2 with RW and S  Weaving between cones  30'x3 200' x 2 solostep  ll bars no hands dforwards/ backwards sideways x 3 each  * initially voiced fear of falls, then more comfortable with activity  performing with close supervision solostep  25' x 4 no assisitive device  Forwards  Backwards  Sideways with hand hold assist solostep  Forwards/sidewasy   25' x 4 with cane and contact guard assist due to fear today     Strengthening 5x sit to stand    shld flex 2x10  shld press 2x10  Bicep curls 1# 2x10 Shoulder flex 2 x 10  Shoulder press 2 x 10 each Strength/BALANceStanding 3# bar  AA- proper body mechanics  Shoulder flex  Forward press  Biceps curls Shoulder flex  Forwards press  Biceps curl   B Shoulder with wand   AA Right as for direction and proper technique      Stretching/Manual  AAROM Right shoulder ROM for increaed end ROM  Gentle mobs A/P Inf/sup  AAROM Right shoulder ROM for increased end ROM  Gentle mobs A/P Inf/sup   There activity   Bathroom Act  Increased stepping challenge to retrieve soap, using both hands without support, return to sink x 4  Walking to paper towel weems/return to sink x 3    Standing -lifting semi tight underwear with both hands LOB posteriorly    Improve maneuvering walker but requireing tactile facilitation  Reaching activities, forwards stepping with reach > 4-5 inches  Sidestepping reaching with 1 and 2 hands/ retreive    Tossing items int o basket with 1 LE step forwards   2 minutes    Access Code: [email or web address]  URL: [email or web address]  Date: 02/10/2022  Prepared by: Estelle Tez    Exercises  · Sentado a parado - 1 x daily - 7 x weekly - 2 sets - 1 reps  · Minisentadilla con apoyo en encimera - 1 x daily - 7 x weekly - 2 sets - 10 reps  · Levantamiento de Talones de Pie - 1 x daily - 7 x weekly - 3 sets - 10 reps  · Abducci&oacute;n de cadera de pie con apoyo en encimera - 1 x daily - 7 x weekly - 2 sets - 10 reps  · Marcha de Flexión de [employer] - 1 x daily - 7 x weekly - 2 sets - 10 reps  · Extensi&oacute;n de cadera de pie con apoyo en encimera - 1 x daily - 7 x weekly - 2 sets - 10 reps    STG's:     - Patient improves EC task of MCSTIB for improved static balance within 4 weeks    - Patient  and son is independent with initial home exercise program for improvements at home within 2 weeks    - Patient ambulates for 10 consecutive min with appropriate vital sign response for improved endurance within  4 weeks   - Patient will improve tie in 5 x sit to stand by  10 seconds for increased Muscle strength   - patient will report < 3/10 pain right shoulder during ambulation with walker   - Patient's son will provide appropriate level of assistance during transfers and ambulatio  LTG's:   - Patient improves distance ambulated on 6MWT by 30% within 8 weeks for improved endurance   - Patient decreased time to complete TUG by 20 for decreased risk of falls within 12 weeks   - Patient improves gait speed to within 20% of age matched norms within  15 weeks   - Patient improves score on Functional reach by 4 inches for improved dynamic balance    Core Movement Tasks Description of task  3/17   Sitting Abnormal - posterior lean , sacral sitting  able to sit with pelvis at neutral    Sitting to Standing Posterior losses of balance, Unable without use of arms and Poor control in descent Able to reach back with both arms to help with control in descent   Standing Other: increased base of support, reaches towrds unsteady surfaces Uses b Ue for push off   Walking Lacks heel strike, Lacks knee extension during stance phase gait, Decreased hip extension, Decreased trunk rotation and Decreased arm swing Improving trunk extension during ambulation   Now with improved step length and symmetry   Step-up Requires use of railing(s) and Posterior trunk lean unable to perform indepenendtly Uses two handrails with close supervision   Reach, grasp, manipulate Decreased reaching overhead functional reach 2"          Mobility Measures 2/9/2022 3/17   Assistive device used? Rolling Walker for 6 MWT only rollator   5 Time Sit to Stand  (17" chair, arms across chest) 36  seconds 29 sec   3 Meter Timed  Up & Go 43 seconds  40 sec without walker    39 with rolling walker   Walking speed   23 meters/second   56 meters/ second   MCTSIB  (see below) 12/120 22/120   6 Minute Walk Test (100 foot circular course) 135' feet  Ending heart rate 98 beats/minute 400' 103 HR 93 Spo2   Patient-Reported Outcome Measure: Patient Specific Functional Scale (PSFS)   FOTO 11/30    41 19/30   Functional reach  2" 5"         Latosha Frias, PT  4/5/2022       Latosha Frias, PT  4/5/2022

## 2022-04-11 ENCOUNTER — EVALUATION (OUTPATIENT)
Dept: SPEECH THERAPY | Facility: REHABILITATION | Age: 87
End: 2022-04-11
Payer: MEDICARE

## 2022-04-11 ENCOUNTER — OFFICE VISIT (OUTPATIENT)
Dept: PHYSICAL THERAPY | Facility: REHABILITATION | Age: 87
End: 2022-04-11
Payer: MEDICARE

## 2022-04-11 DIAGNOSIS — F03.90 DEMENTIA WITHOUT BEHAVIORAL DISTURBANCE, UNSPECIFIED DEMENTIA TYPE (HCC): ICD-10-CM

## 2022-04-11 DIAGNOSIS — R13.12 OROPHARYNGEAL DYSPHAGIA: ICD-10-CM

## 2022-04-11 DIAGNOSIS — R48.8 OTHER SYMBOLIC DYSFUNCTIONS: Primary | ICD-10-CM

## 2022-04-11 DIAGNOSIS — R26.81 GAIT INSTABILITY: Primary | ICD-10-CM

## 2022-04-11 PROCEDURE — 97110 THERAPEUTIC EXERCISES: CPT

## 2022-04-11 PROCEDURE — 92526 ORAL FUNCTION THERAPY: CPT

## 2022-04-11 PROCEDURE — 97112 NEUROMUSCULAR REEDUCATION: CPT

## 2022-04-11 PROCEDURE — 97140 MANUAL THERAPY 1/> REGIONS: CPT

## 2022-04-11 PROCEDURE — 92507 TX SP LANG VOICE COMM INDIV: CPT

## 2022-04-11 NOTE — PROGRESS NOTES
Daily Note          Name: Lala Anne  Date: 22  : 1934  Referring Provider: Balaji Moreno MD  AUTHORIZATION:   Insurance: Payor: Jackquline Ko MEDICARE [facility] REP / Plan: [address] / Product Type: Medicare HMO /   13 of 16 visits through , PN due 3/09        SUBJECTIVE:  HPI: Lala Anne is a 80 y o  female referred to outpatient physical therapy for the following diagnosis   Encounter Diagnoses   Name Primary?  Gait instability Yes    Dementia without behavioral disturbance, unspecified dementia type ([facility] [address] )            I  Subjective: I went to my daughter's house and I almost fell down the stairs  I son and brother in law were there and made sure I was safe  I am very nervous today  Pain Level: Right Shoulder Pain 5/10 (h/o RCT without repair)  Patient-Specific Functional Scale   Task is scored 0 (unable to perform activity) to 10 (ability to perform activity independently)  Activity 2/09 3/17   1  Negotiate steps  3   5   2  Walking without assistance  3    6   3    "Getting up" 5    8       OBJECTIVE: /68  HR 92 with activityHR 73    Assessment: Patient was somewhat guarded about ambulation  After cueing and coaxing, she agreed to perform distance ambulation with increasing step length and improved posture  She has a h/o dementia and requires killed serveis for continued improvement of mobility  She will continue to benefit from skilled PT intervention  PLAN OF CARE:  Patient will benefit from  Continued therapy              Precautions: Dementia, Right RCT, LEFT TSR, B TKR    Specialty Treatment Diary  3/28 4/05 4/11   Home exercise program      Walking/endurance Scifit x 10 min  Ambulation with rolling walker 200' warm up  Scifit x 10 min    Ambulation with walker and solostep  Forwards 200' x 2  100' x 1 with cane Scifit x 10 min level1  7 min 73 stp    Rolling walker 300' x 1  Rolling walker 200' x 1     Static and dynamic balance solostep  25' x 4 no assisitive device  Forwards  Backwards  Sideways with hand hold assist solostep  Forwards/sidewasy   25' x 4 with cane and contact guard assist due to fear today   ll bars    Side stepping 4 laps  4" hurdles x 2 laps forwards  Strengthening Strength/BALANceStanding 3# bar  AA- proper body mechanics  Shoulder flex  Forward press  Biceps curls Shoulder flex  Forwards press  Biceps curl   B Shoulder with wand   AA Right as for direction and proper technique    Shoulder flex  Forwards press  Biceps curl   B Shoulder with wand   AA Right as for direction and proper technique     Stretching/Manual  AAROM Right shoulder ROM for increased end ROM  Gentle mobs A/P Inf/sup AAROM Right shoulder ROM for increased end ROM  Gentle mobs A/P Inf/sup   There activity Reaching activities, forwards stepping with reach > 4-5 inches  Sidestepping reaching with 1 and 2 hands/ retreive    Tossing items int o basket with 1 LE step forwards   2 minutes     Access Code: [email or web address]  URL: [email or web address]  Date: 02/10/2022  Prepared by: Sandi Cooper    Exercises  · Sentado a parado - 1 x daily - 7 x weekly - 2 sets - 1 reps  · Minisentadilla con apoyo en encimera - 1 x daily - 7 x weekly - 2 sets - 10 reps  · Levantamiento de Talones de Pie - 1 x daily - 7 x weekly - 3 sets - 10 reps  · Abducci&oacute;n de cadera de pie con apoyo en encimera - 1 x daily - 7 x weekly - 2 sets - 10 reps  · Marcha de Flexión de [employer] - 1 x daily - 7 x weekly - 2 sets - 10 reps  · Extensi&oacute;n de cadera de pie con apoyo en encimera - 1 x daily - 7 x weekly - 2 sets - 10 reps    STG's:     - Patient improves EC task of MCSTIB for improved static balance within 4 weeks    - Patient  and son is independent with initial home exercise program for improvements at home within 2 weeks    - Patient ambulates for 10 consecutive min with appropriate vital sign response for improved endurance within  4 weeks   - Patient will improve tie in 5 x sit to stand by  10 seconds for increased Muscle strength   - patient will report < 3/10 pain right shoulder during ambulation with walker   - Patient's son will provide appropriate level of assistance during transfers and ambulatio  LTG's:   - Patient improves distance ambulated on 6MWT by 30% within 8 weeks for improved endurance   - Patient decreased time to complete TUG by 20 for decreased risk of falls within 12 weeks   - Patient improves gait speed to within 20% of age matched norms within  15 weeks   - Patient improves score on Functional reach by 4 inches for improved dynamic balance    Core Movement Tasks Description of task  3/17   Sitting Abnormal - posterior lean , sacral sitting  able to sit with pelvis at neutral    Sitting to Standing Posterior losses of balance, Unable without use of arms and Poor control in descent Able to reach back with both arms to help with control in descent   Standing Other: increased base of support, reaches towrds unsteady surfaces Uses b Ue for push off   Walking Lacks heel strike, Lacks knee extension during stance phase gait, Decreased hip extension, Decreased trunk rotation and Decreased arm swing Improving trunk extension during ambulation  Now with improved step length and symmetry   Step-up Requires use of railing(s) and Posterior trunk lean unable to perform indepenendtly Uses two handrails with close supervision   Reach, grasp, manipulate Decreased reaching overhead functional reach 2"          Mobility Measures 2/9/2022 3/17   Assistive device used? Rolling Walker for 6 MWT only rollator   5 Time Sit to Stand  (17" chair, arms across chest) 36  seconds 29 sec   3 Meter Timed  Up & Go 43 seconds  40 sec without walker    39 with rolling walker   Walking speed   23 meters/second   56 meters/ second   MCTSIB  (see below) 12/120 22/120   6 Minute Walk Test (100 foot circular course) 135' feet  Ending heart rate 98 beats/minute 400' 103 HR 93 Spo2   Patient-Reported Outcome Measure: Patient Specific Functional Scale (PSFS)   FOTO 11/30    41 19/30   Functional reach  2" 5"     Phone call to son post cancellation of visits  He reports is having difficulty transporting mother to and from PT  Will discharge episode at this time   5/24/22    Lalo Valenzuela, PT  4/11/2022

## 2022-04-11 NOTE — PROGRESS NOTES
Speech-Language Pathology Re-Evaluation/DISCHARGE  Today's date: 2022  Patients name: Rose Infante  : 1934  MRN: [id number]  Safety measures: fall risk, NKA  Referring provider: Maximino Mao MD    Encounter Diagnosis     ICD-10-CM    1  Other symbolic dysfunctions  N92 7    2  Oropharyngeal dysphagia  R13 12    3  Dementia without behavioral disturbance, unspecified dementia type ([facility] [address] )  F03 90        Visit Tracking:  -Referring provider: Epic  -Billing guidelines: CMS  -Visit #8/12 visits (8 additionall visits authorized)  -Insurance: Radha Parker    Subjective comments: Family presents to session with patient this date, reports improved active engagement/participation within environment provided assistance from caregivers and improved awareness of appropriate tasks/preferred tasks, tolerating PO well with use of strategies  Patient's goal(s): improve cog/ling, swallow function    Assessments      *Patient named 5 (previously named 4 5 on last RE) concrete category members (animals, fruit) in 60 sec (norm=15+)  -- BELOW AVERAGE    1 step direction accuracy: Patient required mild to mod cues this date for one step direction tasks  Orientation: Patient demonstrated 100% accuracy with month, day, year this date  Patient required cues for city  Education on results of VFSS, safe swallow strategies/recommendations for diet  Patient's family member verbalized understanding and states utilizes within environment  Patient as per family report has been tolerating softer diet selections well, requires cues for slow rate/small bolus size however family able to provide support  Goals      Short-term Goals:     1  Patient will complete concrete and abstract categorization tasks to 70% accuracy provided cues as needed to facilitate improved generative naming skills and working memory, to be achieved in 4-6 weeks --HIGHEST LEVEL    2   Patient will participate in continued therapeutic trials in 90% of opportunities for continued assessment and recommendations for carryover and functional tasks at home, to be achieved in 4-6 weeks  --GOAL MET    3  Patient will demonstrate ability to follow 1 step directions with 80% accuracy provided cues in order to facilitate improved ability to follow directions within functional living environment to be achieved in 4-6 weeks  --GOAL MET WHEN PROVIDED CUES    5  Patient will improve attention to task with 80% accuracy provided cues as needed to allow for improved task completion within environment, to be achieved in 4-6 weeks  --GOAL MET    6  Patient will demonstrate consistent use of compensatory strategies (e g , upright positioning, small bites/sips, slow rate, alternation of consistencies, multiple swallows, effortful swallow, chin tuck, head turn, etc ) with 90% accuracy to reduce risk of  penetration/aspiration during consumption of liquids/solids, to be achieved in 6-8 weeks  --GOAL MET WITH CUES FROM CAREGIVERS    7  Patient will tolerate therapeutic trials of regular diet softer textures as appropriate with 90% free of s/s of aspiration, with use of compensatory strategies provided cues as needed, to be achieved in 6-8 weeks  --DISCONTINUE    Long-term Goals:    1  Patient will improve functional cognitive-linguistic skills with the implementation of cues and external aids, by discharge  --GOAL MET     2  Patient will demonstrate adequate memory/reasoning/judgment to perform desired activities in a supervised environment by discharge  --GOAL MET      3   Patient will utilize compensatory strategies 90% of the time independently optimizing safety and efficacy of swallowing function on P O  intake without overt s/sx of penetration or aspiration 90% of the time by discharge --HIGHEST LEVEL, FAMILY ABLE TO PROVIDE SUPPORT    Impressions/Recommendations    Impressions: Patient presents with moderate to severe cognitive linguistic difficulties at this time, characterized by reduced IM/DM/STM recall, reduced generative naming accuracy,  reduced ability to follow 1 and 2 step directions impacting functionality within envirionment at this time  Patient presents with mild oral phase dysphagia at this time, able to tolerate soft diet textures with use of compensatory strategies, provided cues from family members  Family reports improved participation within environment, family  Reports improved awareness of activities appropriate for patient within home environment, communication strategies, improving functionality within environment  Family reports good carryover of trained recommendations upon presentation this session for both swallowing as well as for cognitive/linguistic recommendation  At this time, patient is appropriate for discharge from skilled SLP interventions as patient with highest level at this time, family has demonstrated good understanding of trained recommendations  Recommendations:  -Patient to be discharged from outpatient skilled Speech Therapy services: Patient made good progress toward goals in plan of care and has reached maximum potential  If patient would like to resume therapy in the future, a new prescription will be required      -Frequency: 1X PER WEEK  -Duration: 1 WEEK    -Intervention certification from: 8/99/7761  -Intervention certification to: 2/94/8327

## 2022-04-13 ENCOUNTER — APPOINTMENT (OUTPATIENT)
Dept: PHYSICAL THERAPY | Facility: REHABILITATION | Age: 87
End: 2022-04-13
Payer: MEDICARE

## 2022-04-18 ENCOUNTER — APPOINTMENT (OUTPATIENT)
Dept: SPEECH THERAPY | Facility: REHABILITATION | Age: 87
End: 2022-04-18
Payer: MEDICARE

## 2022-04-18 ENCOUNTER — APPOINTMENT (OUTPATIENT)
Dept: PHYSICAL THERAPY | Facility: REHABILITATION | Age: 87
End: 2022-04-18
Payer: MEDICARE

## 2022-04-21 ENCOUNTER — APPOINTMENT (OUTPATIENT)
Dept: PHYSICAL THERAPY | Facility: REHABILITATION | Age: 87
End: 2022-04-21
Payer: MEDICARE

## 2022-04-26 ENCOUNTER — APPOINTMENT (OUTPATIENT)
Dept: PHYSICAL THERAPY | Facility: REHABILITATION | Age: 87
End: 2022-04-26
Payer: MEDICARE

## 2022-04-26 ENCOUNTER — APPOINTMENT (OUTPATIENT)
Dept: SPEECH THERAPY | Facility: REHABILITATION | Age: 87
End: 2022-04-26
Payer: MEDICARE

## 2022-04-28 ENCOUNTER — OFFICE VISIT (OUTPATIENT)
Dept: FAMILY MEDICINE CLINIC | Facility: CLINIC | Age: 87
End: 2022-04-28

## 2022-04-28 VITALS
HEART RATE: 106 BPM | SYSTOLIC BLOOD PRESSURE: 128 MMHG | TEMPERATURE: 98.2 F | OXYGEN SATURATION: 98 % | WEIGHT: 197.2 LBS | BODY MASS INDEX: 37.23 KG/M2 | DIASTOLIC BLOOD PRESSURE: 86 MMHG | RESPIRATION RATE: 18 BRPM | HEIGHT: 61 IN

## 2022-04-28 DIAGNOSIS — K64.9 HEMORRHOIDS, UNSPECIFIED HEMORRHOID TYPE: ICD-10-CM

## 2022-04-28 DIAGNOSIS — M75.101 RIGHT ROTATOR CUFF TEAR ARTHROPATHY: ICD-10-CM

## 2022-04-28 DIAGNOSIS — F03.90 DEMENTIA WITHOUT BEHAVIORAL DISTURBANCE, UNSPECIFIED DEMENTIA TYPE (HCC): ICD-10-CM

## 2022-04-28 DIAGNOSIS — I10 BENIGN ESSENTIAL HYPERTENSION: Primary | ICD-10-CM

## 2022-04-28 DIAGNOSIS — M67.442 GANGLION CYST OF JOINT OF FINGER OF LEFT HAND: ICD-10-CM

## 2022-04-28 DIAGNOSIS — H91.90 HEARING LOSS, UNSPECIFIED HEARING LOSS TYPE, UNSPECIFIED LATERALITY: ICD-10-CM

## 2022-04-28 DIAGNOSIS — K21.9 GASTROESOPHAGEAL REFLUX DISEASE WITHOUT ESOPHAGITIS: ICD-10-CM

## 2022-04-28 DIAGNOSIS — M12.811 RIGHT ROTATOR CUFF TEAR ARTHROPATHY: ICD-10-CM

## 2022-04-28 PROCEDURE — 3074F SYST BP LT 130 MM HG: CPT | Performed by: FAMILY MEDICINE

## 2022-04-28 PROCEDURE — 99213 OFFICE O/P EST LOW 20 MIN: CPT | Performed by: FAMILY MEDICINE

## 2022-04-28 PROCEDURE — 3079F DIAST BP 80-89 MM HG: CPT | Performed by: FAMILY MEDICINE

## 2022-04-28 RX ORDER — HYDROCORTISONE 25 MG/G
CREAM TOPICAL
Qty: 28 G | Refills: 0 | Status: SHIPPED | OUTPATIENT
Start: 2022-04-28

## 2022-04-28 NOTE — PROGRESS NOTES
Assessment/Plan:    GERD (gastroesophageal reflux disease)  -following with GI  -some gas sensation at times  -controlled with PPI pantoprazole daily   -continue avoidance of food irritants    Hemorrhoids  -symptoms of itchiness, relieved with Anusol cream PRN  -following with GI  -continue current treatment, if any worsening notify     Benign essential hypertension  Blood Pressure: 128/86     -controlled  -current medications: lisinopril 40 mg, HCTZ 12 5 mg   -f/u BMP to continue to monitor renal function  -monitor BP at home     Chronic diastolic congestive heart failure (HCC)  Wt Readings from Last 3 Encounters:   04/28/22 89 4 kg (197 lb 3 2 oz)   04/22/22 95 3 kg (210 lb)   02/28/22 90 3 kg (199 lb)     -continues to improve on weight, Son reports has noticed patient gradually improving  -on: lasix 40 mg daily  -following with cardiology  F/u BMP      Dementia ([facility] )  -following with geriatrics  -good support at home  -completing speech therapies  -continue to monitor management of chronic conditions and optimization  -fall precautions--> order for DME for walker placed, provided Son with number in AVS to follow up on further steps on obtaining equipment    Right rotator cuff tear arthropathy  -following with orthopedics  -treatment conservatively and with steroid injections    Hearing loss  -chronic  -followed with ENT recently--> had cerumen removal   -hearing test: mild/ moderate sensorineural hearing loss left ear, moderate/ severe in right ear  -hearing aids offered at that visit--> declined per chart review         Diagnoses and all orders for this visit:    Benign essential hypertension    Gastroesophageal reflux disease without esophagitis    Hemorrhoids, unspecified hemorrhoid type    Dementia without behavioral disturbance, unspecified dementia type ([facility] )    Right rotator cuff tear arthropathy    Hearing loss, unspecified hearing loss type, unspecified laterality          Subjective:      Patient ID: Zara Yepez is a 80 y o  female  Case of 79 y/o female who came today for follow up, she is accompanied by Son  He states that patient has been doing much better overall  Has been exercising more and noticed gradual improvement in physical condition  Has been completing speech therapies as recommended per geriatrics team  Recently had ENT visit with cerumen removal  Son asks about how to follow up with walker equipment requested  The following portions of the patient's history were reviewed and updated as appropriate: allergies, current medications, past family history, past medical history, past social history, past surgical history and problem list     Review of Systems   Constitutional: Negative for fever  Respiratory: Negative for cough, shortness of breath and wheezing  Cardiovascular: Negative for chest pain, palpitations and leg swelling  Gastrointestinal:        Rectal itching   Skin: Negative  Neurological: Negative for headaches  Psychiatric/Behavioral: The patient is not nervous/anxious  Objective:      /86 (BP Location: Left arm, Patient Position: Sitting, Cuff Size: Standard)   Pulse (!) 106   Temp 98 2 °F (36 8 °C) (Temporal)   Resp 18   Ht 5' 1" (1 549 m)   Wt 89 4 kg (197 lb 3 2 oz)   SpO2 98%   Breastfeeding No   BMI 37 26 kg/m²          Physical Exam  Vitals reviewed  Constitutional:       General: She is not in acute distress  Appearance: Normal appearance  She is not ill-appearing, toxic-appearing or diaphoretic  HENT:      Mouth/Throat:      Mouth: Mucous membranes are moist       Pharynx: Oropharynx is clear  No oropharyngeal exudate or posterior oropharyngeal erythema  Eyes:      Extraocular Movements: Extraocular movements intact  Cardiovascular:      Rate and Rhythm: Normal rate and regular rhythm  Pulses: Normal pulses  Heart sounds: Normal heart sounds  No murmur heard        Pulmonary:      Effort: Pulmonary effort is normal  No respiratory distress  Breath sounds: Normal breath sounds  No wheezing  Abdominal:      General: Abdomen is flat  Bowel sounds are normal    Musculoskeletal:         General: Normal range of motion  Skin:     General: Skin is warm  Neurological:      Mental Status: She is alert  Mental status is at baseline     Psychiatric:         Mood and Affect: Mood normal

## 2022-04-30 NOTE — ASSESSMENT & PLAN NOTE
-symptoms of itchiness, relieved with Anusol cream PRN  -following with GI  -continue current treatment, if any worsening notify

## 2022-04-30 NOTE — ASSESSMENT & PLAN NOTE
-following with GI  -some gas sensation at times  -controlled with PPI pantoprazole daily   -continue avoidance of food irritants

## 2022-04-30 NOTE — ASSESSMENT & PLAN NOTE
-chronic  -followed with ENT recently--> had cerumen removal   -hearing test: mild/ moderate sensorineural hearing loss left ear, moderate/ severe in right ear  -hearing aids offered at that visit--> declined per chart review

## 2022-04-30 NOTE — ASSESSMENT & PLAN NOTE
Wt Readings from Last 3 Encounters:   04/28/22 89 4 kg (197 lb 3 2 oz)   04/22/22 95 3 kg (210 lb)   02/28/22 90 3 kg (199 lb)     -continues to improve on weight, Son reports has noticed patient gradually improving  -on: lasix 40 mg daily  -following with cardiology  F/u BMP

## 2022-04-30 NOTE — ASSESSMENT & PLAN NOTE
-following with geriatrics  -good support at home  -completing speech therapies  -continue to monitor management of chronic conditions and optimization  -fall precautions--> order for DME for walker placed, provided Son with number in AVS to follow up on further steps on obtaining equipment

## 2022-04-30 NOTE — ASSESSMENT & PLAN NOTE
Blood Pressure: 128/86     -controlled  -current medications: lisinopril 40 mg, HCTZ 12 5 mg   -f/u BMP to continue to monitor renal function  -monitor BP at home

## 2022-05-12 ENCOUNTER — OFFICE VISIT (OUTPATIENT)
Dept: FAMILY MEDICINE CLINIC | Facility: CLINIC | Age: 87
End: 2022-05-12

## 2022-05-12 VITALS
HEIGHT: 61 IN | RESPIRATION RATE: 18 BRPM | DIASTOLIC BLOOD PRESSURE: 70 MMHG | HEART RATE: 106 BPM | TEMPERATURE: 97.9 F | SYSTOLIC BLOOD PRESSURE: 112 MMHG | BODY MASS INDEX: 37.26 KG/M2 | OXYGEN SATURATION: 99 %

## 2022-05-12 DIAGNOSIS — K64.9 HEMORRHOIDS, UNSPECIFIED HEMORRHOID TYPE: Primary | ICD-10-CM

## 2022-05-12 PROCEDURE — 3074F SYST BP LT 130 MM HG: CPT | Performed by: FAMILY MEDICINE

## 2022-05-12 PROCEDURE — 3078F DIAST BP <80 MM HG: CPT | Performed by: FAMILY MEDICINE

## 2022-05-12 PROCEDURE — 99213 OFFICE O/P EST LOW 20 MIN: CPT | Performed by: FAMILY MEDICINE

## 2022-05-12 NOTE — PROGRESS NOTES
Assessment/Plan:    Hemorrhoids  -hx of hemorrhoids, ongoing  -prior evaluated by GI--> recommendations of anusol cream PRN, patient states 'not working'  -some constipation at times--> Son who spoke by phone reports that patient taking either docusate/ senna or prune juice PRN for constipation since at times gets loose stools as well  -rectal exam today with nurse chapperone in room: small palpable internal hemorrhoid, not bleeding, small external skin tag like lesion, no erythema, no irritation visible  -FOBT guaic test negative today  -recommend: lab occult blood test  -referral for colorectal surgery for evaluation given ongoing symptoms  -f/u labs CBC to check hgb levels       Diagnoses and all orders for this visit:    Hemorrhoids, unspecified hemorrhoid type  -     Ambulatory Referral to Colorectal Surgery; Future  -     Occult Blood, Fecal Immunochemical; Future          Subjective:      Patient ID: Lala Anne is a 80 y o  female  Case of 79 y/o female who was brought today for evaluation due to ongoing rectal pain, accompanied by Son  Reported ongoing burning sensation in rectal area, some blood noted when wiping  Some constipation at times that worsen symptoms  Hx of hemorrhoids in the past    Spoke with Son Lorenzo Menjivar on the phone as well, states that patient takes milk of magnesia/ docusate/ senna PRN as well for constipation, at times has loose stools and does not need to take medications  The following portions of the patient's history were reviewed and updated as appropriate: allergies, current medications, past family history, past medical history, past social history, past surgical history and problem list     Review of Systems   Constitutional: Negative for fever  Respiratory: Negative for cough, shortness of breath and wheezing  Cardiovascular: Negative for chest pain, palpitations and leg swelling  Gastrointestinal: Negative for abdominal pain     Genitourinary: Hemorrhoids  Rectal bleeding   Skin: Negative  Psychiatric/Behavioral: The patient is not nervous/anxious  Objective:      /70 (BP Location: Left arm, Patient Position: Sitting, Cuff Size: Adult)   Pulse (!) 106   Temp 97 9 °F (36 6 °C) (Temporal)   Resp 18   Ht 5' 1" (1 549 m)   SpO2 99%   BMI 37 26 kg/m²          Physical Exam  Vitals reviewed  Constitutional:       General: She is not in acute distress  Appearance: Normal appearance  She is not ill-appearing, toxic-appearing or diaphoretic  Eyes:      Extraocular Movements: Extraocular movements intact  Cardiovascular:      Rate and Rhythm: Normal rate and regular rhythm  Pulses: Normal pulses  Heart sounds: Normal heart sounds  No murmur heard  Pulmonary:      Effort: Pulmonary effort is normal  No respiratory distress  Breath sounds: Normal breath sounds  No wheezing  Abdominal:      General: Abdomen is flat  Bowel sounds are normal  There is no distension  Palpations: Abdomen is soft  Tenderness: There is no abdominal tenderness  Genitourinary:     Rectum: Normal  Guaiac result negative  Comments: Rectal exam performed with nurse Rohini Becerra MA in room  External rectal exam normal, no blood  Internal rectal exam palpable small internal hemorrhoid, no blood, no irritation noted, no discharge  Musculoskeletal:         General: Normal range of motion  Skin:     General: Skin is warm  Neurological:      Mental Status: She is alert  Mental status is at baseline     Psychiatric:         Mood and Affect: Mood normal

## 2022-05-13 NOTE — ASSESSMENT & PLAN NOTE
-hx of hemorrhoids, ongoing  -prior evaluated by GI--> recommendations of anusol cream PRN, patient states 'not working'  -some constipation at times--> Son who spoke by phone reports that patient taking either docusate/ senna or prune juice PRN for constipation since at times gets loose stools as well  -rectal exam today with nurse chapperone in room: small palpable internal hemorrhoid, not bleeding, small external skin tag like lesion, no erythema, no irritation visible  -FOBT guaic test negative today  -recommend: lab occult blood test  -referral for colorectal surgery for evaluation given ongoing symptoms  -f/u labs CBC to check hgb levels

## 2022-05-20 ENCOUNTER — APPOINTMENT (OUTPATIENT)
Dept: LAB | Facility: HOSPITAL | Age: 87
End: 2022-05-20
Payer: MEDICARE

## 2022-05-20 ENCOUNTER — HOSPITAL ENCOUNTER (OUTPATIENT)
Dept: CT IMAGING | Facility: HOSPITAL | Age: 87
Discharge: HOME | End: 2022-05-20
Attending: COLON & RECTAL SURGERY
Payer: MEDICARE

## 2022-05-20 DIAGNOSIS — I50.32 CHRONIC DIASTOLIC CONGESTIVE HEART FAILURE (HCC): ICD-10-CM

## 2022-05-20 DIAGNOSIS — I10 BENIGN ESSENTIAL HYPERTENSION: ICD-10-CM

## 2022-05-20 DIAGNOSIS — R10.9 ABDOMINAL PAIN, UNSPECIFIED ABDOMINAL LOCATION: ICD-10-CM

## 2022-05-20 LAB
ANION GAP SERPL CALCULATED.3IONS-SCNC: 10 MMOL/L (ref 5–14)
BASOPHILS # BLD AUTO: 0.04 THOUSANDS/ΜL (ref 0–0.1)
BASOPHILS NFR BLD AUTO: 1 % (ref 0–1)
BUN SERPL-MCNC: 20 MG/DL (ref 5–25)
CALCIUM SERPL-MCNC: 9.1 MG/DL (ref 8.4–10.2)
CHLORIDE SERPL-SCNC: 96 MMOL/L (ref 97–108)
CO2 SERPL-SCNC: 30 MMOL/L (ref 22–30)
CREAT SERPL-MCNC: 0.89 MG/DL (ref 0.6–1.2)
EOSINOPHIL # BLD AUTO: 1.33 THOUSAND/ΜL (ref 0–0.61)
EOSINOPHIL NFR BLD AUTO: 21 % (ref 0–6)
ERYTHROCYTE [DISTWIDTH] IN BLOOD BY AUTOMATED COUNT: 12.4 % (ref 11.6–15.1)
GFR SERPL CREATININE-BSD FRML MDRD: 58 ML/MIN/1.73SQ M
GLUCOSE P FAST SERPL-MCNC: 106 MG/DL (ref 70–99)
HCT VFR BLD AUTO: 37.5 % (ref 34.8–46.1)
HGB BLD-MCNC: 11.7 G/DL (ref 11.5–15.4)
IMM GRANULOCYTES # BLD AUTO: 0.01 THOUSAND/UL (ref 0–0.2)
IMM GRANULOCYTES NFR BLD AUTO: 0 % (ref 0–2)
LYMPHOCYTES # BLD AUTO: 1.55 THOUSANDS/ΜL (ref 0.6–4.47)
LYMPHOCYTES NFR BLD AUTO: 25 % (ref 14–44)
MCH RBC QN AUTO: 29.2 PG (ref 26.8–34.3)
MCHC RBC AUTO-ENTMCNC: 31.2 G/DL (ref 31.4–37.4)
MCV RBC AUTO: 94 FL (ref 82–98)
MONOCYTES # BLD AUTO: 0.44 THOUSAND/ΜL (ref 0.17–1.22)
MONOCYTES NFR BLD AUTO: 7 % (ref 4–12)
NEUTROPHILS # BLD AUTO: 2.87 THOUSANDS/ΜL (ref 1.85–7.62)
NEUTS SEG NFR BLD AUTO: 46 % (ref 43–75)
NRBC BLD AUTO-RTO: 0 /100 WBCS
NT-PROBNP SERPL-MCNC: 388 PG/ML (ref 0–299)
PLATELET # BLD AUTO: 223 THOUSANDS/UL (ref 149–390)
PMV BLD AUTO: 9.6 FL (ref 8.9–12.7)
POTASSIUM SERPL-SCNC: 4.4 MMOL/L (ref 3.6–5)
RBC # BLD AUTO: 4.01 MILLION/UL (ref 3.81–5.12)
SODIUM SERPL-SCNC: 136 MMOL/L (ref 137–147)
WBC # BLD AUTO: 6.24 THOUSAND/UL (ref 4.31–10.16)

## 2022-05-20 PROCEDURE — 74176 CT ABD & PELVIS W/O CONTRAST: CPT

## 2022-05-20 PROCEDURE — 85025 COMPLETE CBC W/AUTO DIFF WBC: CPT

## 2022-05-20 PROCEDURE — G1004 CDSM NDSC: HCPCS

## 2022-05-20 PROCEDURE — 36415 COLL VENOUS BLD VENIPUNCTURE: CPT

## 2022-05-20 PROCEDURE — 80048 BASIC METABOLIC PNL TOTAL CA: CPT

## 2022-05-20 PROCEDURE — 83880 ASSAY OF NATRIURETIC PEPTIDE: CPT

## 2022-05-26 ENCOUNTER — OFFICE VISIT (OUTPATIENT)
Dept: OBGYN CLINIC | Facility: MEDICAL CENTER | Age: 87
End: 2022-05-26
Payer: MEDICARE

## 2022-05-26 VITALS
DIASTOLIC BLOOD PRESSURE: 83 MMHG | WEIGHT: 197 LBS | BODY MASS INDEX: 37.19 KG/M2 | HEIGHT: 61 IN | SYSTOLIC BLOOD PRESSURE: 143 MMHG

## 2022-05-26 DIAGNOSIS — M75.101 RIGHT ROTATOR CUFF TEAR ARTHROPATHY: Primary | ICD-10-CM

## 2022-05-26 DIAGNOSIS — M12.811 RIGHT ROTATOR CUFF TEAR ARTHROPATHY: Primary | ICD-10-CM

## 2022-05-26 PROCEDURE — 20610 DRAIN/INJ JOINT/BURSA W/O US: CPT | Performed by: ORTHOPAEDIC SURGERY

## 2022-05-26 PROCEDURE — 99213 OFFICE O/P EST LOW 20 MIN: CPT | Performed by: ORTHOPAEDIC SURGERY

## 2022-05-26 RX ORDER — LIDOCAINE HYDROCHLORIDE 10 MG/ML
4 INJECTION, SOLUTION INFILTRATION; PERINEURAL
Status: COMPLETED | OUTPATIENT
Start: 2022-05-26 | End: 2022-05-26

## 2022-05-26 RX ORDER — METHYLPREDNISOLONE ACETATE 40 MG/ML
1 INJECTION, SUSPENSION INTRA-ARTICULAR; INTRALESIONAL; INTRAMUSCULAR; SOFT TISSUE
Status: COMPLETED | OUTPATIENT
Start: 2022-05-26 | End: 2022-05-26

## 2022-05-26 RX ADMIN — LIDOCAINE HYDROCHLORIDE 4 ML: 10 INJECTION, SOLUTION INFILTRATION; PERINEURAL at 12:33

## 2022-05-26 RX ADMIN — METHYLPREDNISOLONE ACETATE 1 ML: 40 INJECTION, SUSPENSION INTRA-ARTICULAR; INTRALESIONAL; INTRAMUSCULAR; SOFT TISSUE at 12:33

## 2022-05-26 NOTE — PROGRESS NOTES
Ortho Sports Medicine Shoulder Follow Up Visit     Assesment:   80 y o  female right shoulder rotator cuff arthropathy     Plan:    Conservative treatment:    Ice to shoulder 1-2 times daily, for 20 minutes at a time  Home exercise program for shoulder, including ROM and strenthening  Instructions given to patient of what exercises to perform  Let pain guide return to activities  Imaging:    No imaging was available for review today  Injection:    The risks and benefits of the injection (which include but are not limited to: infection, bleeding,damage to nerve/artery, need for further intervention), as well as the risks and benefits of all alternative treatments were explained and understood  The patient elected to proceed with injection  The procedure was done with aseptic technique, and the patient tolerated the procedure well with no complications  A corticosteroid injection of the subacromial space was performed  The patient should take 1-2 days off of activity, with gradual return to activity as tolerated  Ice to the shoulder 1-2 times daily for 20 minutes, for next 24-48 hrs  May consider future corticosteroid injection depending on clinical exam/imaging  Surgery:     No surgery is recommended at this point, continue with conservative treatment plan as noted  Follow up:    No follow-ups on file  Chief Complaint   Patient presents with    Right Shoulder - Follow-up         History of Present Illness: The patient is returns for follow up of right shoulder rotator cuff  arthropathy  Since the prior visit, She reports some improvement since her last injection on 1/6/22  Pain is improved by rest, ice, NSAIDS and injection  Pain is aggravated by overhead activity and reaching back  Symptoms include clicking and cracking  The patient has weakness  The patient has tried rest, ice, NSAIDS, physical therapy and injection          Shoulder Surgical History:  None    Past Medical, Social and Family History:  Past Medical History:   Diagnosis Date    [facility] obstructive pulmonary [facility]) [facility] [address] [facility]     [facility]     Diverticulosis 2001    GERD (gastroesophageal reflux disease)     Glaucoma     [facility] failure ([facility] )     History of cystocele     Hypercholesterolemia     Hypertension     Obesity     Positive PPD 1999    treated     Past Surgical History:   Procedure Laterality Date    BUNIONECTOMY Left 08/25/2021    Procedure: Wava Micah, EXCISION OF WOUND;  Surgeon: Flaquito Cabrera DPM;  Location: [address] [place] [address];  Service: Podiatry    CARDIOVASCULAR STRESS TEST  2001    dobutamine stress test    CATARACT EXTRACTION      CHOLECYSTECTOMY  04/2002    COLONOSCOPY      CYSTOCELE REPAIR      HYSTERECTOMY      KNEE SURGERY Bilateral     knee replacement    MN REMOVAL DEEP IMPLANT Left 08/25/2021    Procedure: REMOVAL HARDWARE FOOT;  Surgeon: Flaquito Cabrera DPM;  Location: [address] [place] [address];  Service: Podiatry    UPPER GASTROINTESTINAL ENDOSCOPY       No Known Allergies  Current Outpatient Medications on File Prior to Visit   Medication Sig Dispense Refill    acetaminophen (TYLENOL) 500 mg tablet Take 2 tablets (1,000 mg total) by mouth every 6 (six) hours as needed for mild pain 60 tablet 3    Advair Diskus 500-50 MCG/DOSE inhaler INHALE 1 PUFF BY MOUTH 2 TIMES A DAY  RINSE MOIUTH AFTER USE 60 blister 0    albuterol (PROVENTIL HFA,VENTOLIN HFA) 90 mcg/act inhaler Inhale 2 puffs every 6 (six) hours as needed for wheezing 1 Inhaler 0    aluminum-magnesium hydroxide-simethicone (MAALOX MAX) 400-400-40 MG/5ML suspension Take 10 mL by mouth every 6 (six) hours as needed for indigestion or heartburn (abdominal pain) 355 mL 0    amitriptyline (ELAVIL) 25 mg tablet TAKE ONE TABLET BY MOUTH DAILY AT BEDTIME   atorvastatin (LIPITOR) 20 mg tablet TAKE 1 TABLET BY MOUTH ONCE DAILY   90 tablet 0    benzonatate (TESSALON PERLES) 100 mg capsule Take 1 capsule (100 mg total) by mouth 3 (three) times a day as needed for cough 20 capsule 0    Calcium Carbonate-Vitamin D (CALCIUM 500 + D) 500-125 MG-UNIT TABS every 24 hours      Diclofenac Sodium (VOLTAREN) 1 % APPLY 2 GRAMS 4 TIMES A DAY TOPICALLY 100 g 0    docusate sodium (COLACE) 100 mg capsule Take 1 capsule (100 mg total) by mouth 2 (two) times a day 60 capsule 1    Elastic Bandages & Supports (Medical Compression Stockings) MISC by Does not apply route daily High knee compression stockings 20-30 MMHG 2 each 0    fluticasone (FLONASE) 50 mcg/act nasal spray 2 sprays into each nostril daily 1 Bottle 3    furosemide (LASIX) 40 mg tablet TAKE ONE TABLET BY MOUTH EVERY DAY 90 tablet 0    guaiFENesin (ROBITUSSIN) 100 MG/5ML oral liquid Take 10 mL (200 mg total) by mouth 3 (three) times a day as needed for cough 120 mL 0    hydrochlorothiazide (HYDRODIURIL) 12 5 mg tablet TAKE ONE TABLET BY MOUTH EVERY DAY 90 tablet 0    latanoprost (XALATAN) 0 005 % ophthalmic solution       lidocaine (XYLOCAINE) 5 % ointment Apply topically 2 (two) times a day as needed for mild pain 35 44 g 0    lisinopril (ZESTRIL) 40 mg tablet TAKE ONE TABLET BY MOUTH EVERY DAY 90 tablet 0    metoclopramide (REGLAN) 5 mg tablet Take 1 tablet (5 mg total) by mouth 4 (four) times a day 24 tablet 0    Misc  Devices (CANE) MISC Four point aluminum cane   omeprazole (PriLOSEC) 40 MG capsule Take 1 capsule (40 mg total) by mouth daily Half an hour before breakfast 90 capsule 3    Procto-Med HC 2 5 % rectal cream APPLY TOPICALLY TWO TIMES DAILY TO RECTAL AREA 28 g 0    Senna Plus 8 6-50 MG per tablet TAKE ONE TABLET BY MOUTH EVERY DAY 90 tablet 0    timolol (BETIMOL) 0 5 % ophthalmic solution Administer 1 drop to both eyes 2 (two) times a day       timolol (TIMOPTIC) 0 5 % ophthalmic solution       Toviaz 8 MG TB24 TAKE 1 TABLET BY MOUTH ONCE DAILY   90 tablet 0     No current facility-administered medications on file prior to visit  Social History     Socioeconomic History    Marital status:      Spouse name: Not on file    Number of children: Not on file    Years of education: Not on file    Highest education level: Not on file   Occupational History    Not on file   Tobacco Use    Smoking status: Never Smoker    Smokeless tobacco: Never Used   Vaping Use    Vaping Use: Never used   Substance and Sexual Activity    Alcohol use: Not Currently    Drug use: Not Currently    Sexual activity: Not Currently     Partners: Male   Other Topics Concern    Not on file   Social History Narrative    Lives at home with son and 2 grandkids  Consumes 1-2 cups of coffee per week     Social Determinants of Health     Financial Resource Strain: Low Risk     Difficulty of Paying Living Expenses: Not hard at all   Food Insecurity: No Food Insecurity    Worried About Running Out of Food in the Last Year: Never true    Karen of Food in the Last Year: Never true   Transportation Needs: No Transportation Needs    Lack of Transportation (Medical): No    Lack of Transportation (Non-Medical): No   Physical Activity: Not on file   Stress: Not on file   Social Connections: Not on file   Intimate Partner Violence: Not on file   Housing Stability: Not on file       I have reviewed the past medical, surgical, social and family history, medications and allergies as documented in the EMR  Review of systems: ROS is negative other than that noted in the HPI  Constitutional: Negative for fatigue and fever  Physical Exam:    Blood pressure 143/83, height 5' 1" (1 549 m), weight 89 4 kg (197 lb), not currently breastfeeding      General/Constitutional: NAD, well developed, well nourished  HENT: Normocephalic, atraumatic  CV: Intact distal pulses, regular rate  Resp: No respiratory distress or labored breathing  Lymphatic: No lymphadenopathy palpated  Neuro: Alert and Oriented x 3, no focal deficits  Psych: Normal mood, normal affect, normal judgement, normal behavior  Skin: Warm, dry, no rashes, no erythema      Shoulder focused exam:       RIGHT LEFT    Scapula Atrophy Negative Negative     Winging Negative Negative     Protraction Negative Negative    Rotator cuff SS 5/5 5/5     IS 5/5 5/5     SubS 5/5 5/5    ROM     170     ER0 60 60     ER90 90    90     IR90 T6    T6     IRb T6    T6    TTP: AC Positive Negative     Biceps Negative Negative     Coracoid Negative Negative    Special Tests: O'Briens Negative Negative     Juarez-shear Negative Negative     Cross body Adduction Negative Negative     Speeds  Negative Negative     Juan David's Negative Negative     Whipple Negative Negative       Neer Negative Negative     Ramirez Negative Negative    Instability: Apprehension & relocation not tested not tested     Load & shift not tested not tested    Other: Crank Negative Negative             Large joint arthrocentesis: R subacromial bursa  Universal Protocol:  Consent: Verbal consent obtained    Risks and benefits: risks, benefits and alternatives were discussed  Consent given by: patient  Patient understanding: patient states understanding of the procedure being performed  Patient consent: the patient's understanding of the procedure matches consent given  Patient identity confirmed: verbally with patient    Supporting Documentation  Indications: pain   Procedure Details  Location: shoulder - R subacromial bursa  Needle size: 22 G  Ultrasound guidance: no  Approach: posterolateral  Medications administered: 4 mL lidocaine 1 %; 1 mL methylPREDNISolone acetate 40 mg/mL    Patient tolerance: patient tolerated the procedure well with no immediate complications  Dressing:  Sterile dressing applied          UE NV Exam: +2 Radial pulses bilaterally  Sensation intact to light touch C5-T1 bilaterally, Radial/median/ulnar nerve motor intact    Cervical ROM is full without pain, numbness or tingling      Shoulder Imaging    No imaging was performed today      Scribe Attestation    I,:  Jerry Coto am acting as a scribe while in the presence of the attending physician :       I,:  Savilla Rubinstein, DO personally performed the services described in this documentation    as scribed in my presence :

## 2022-06-07 DIAGNOSIS — I50.9 CHF (CONGESTIVE HEART FAILURE) (HCC): ICD-10-CM

## 2022-06-07 DIAGNOSIS — R10.11 RIGHT UPPER QUADRANT PAIN: ICD-10-CM

## 2022-06-07 DIAGNOSIS — N32.81 OVERACTIVE BLADDER: ICD-10-CM

## 2022-06-07 DIAGNOSIS — E78.2 MIXED HYPERLIPIDEMIA: ICD-10-CM

## 2022-06-07 DIAGNOSIS — K59.09 OTHER CONSTIPATION: ICD-10-CM

## 2022-06-07 DIAGNOSIS — I10 BENIGN ESSENTIAL HYPERTENSION: ICD-10-CM

## 2022-06-07 RX ORDER — ASPIRIN 81 MG
TABLET, DELAYED RELEASE (ENTERIC COATED) ORAL
Qty: 90 TABLET | Refills: 0 | Status: SHIPPED | OUTPATIENT
Start: 2022-06-07 | End: 2022-06-27

## 2022-06-08 RX ORDER — FUROSEMIDE 40 MG/1
TABLET ORAL
Qty: 90 TABLET | Refills: 0 | Status: SHIPPED | OUTPATIENT
Start: 2022-06-08

## 2022-06-08 RX ORDER — LISINOPRIL 40 MG/1
TABLET ORAL
Qty: 90 TABLET | Refills: 0 | Status: SHIPPED | OUTPATIENT
Start: 2022-06-08

## 2022-06-08 RX ORDER — HYDROCHLOROTHIAZIDE 12.5 MG/1
TABLET ORAL
Qty: 90 TABLET | Refills: 0 | Status: SHIPPED | OUTPATIENT
Start: 2022-06-08

## 2022-06-08 RX ORDER — FESOTERODINE FUMARATE 8 MG/1
TABLET, FILM COATED, EXTENDED RELEASE ORAL
Qty: 90 TABLET | Refills: 0 | Status: SHIPPED | OUTPATIENT
Start: 2022-06-08 | End: 2022-06-27

## 2022-06-08 RX ORDER — ATORVASTATIN CALCIUM 20 MG/1
TABLET, FILM COATED ORAL
Qty: 90 TABLET | Refills: 0 | Status: SHIPPED | OUTPATIENT
Start: 2022-06-08

## 2022-06-16 ENCOUNTER — TELEPHONE (OUTPATIENT)
Dept: PSYCHIATRY | Facility: CLINIC | Age: 87
End: 2022-06-16

## 2022-06-26 ENCOUNTER — PATIENT MESSAGE (OUTPATIENT)
Dept: GERIATRICS | Age: 87
End: 2022-06-26

## 2022-06-27 ENCOUNTER — OFFICE VISIT (OUTPATIENT)
Dept: FAMILY MEDICINE CLINIC | Facility: CLINIC | Age: 87
End: 2022-06-27

## 2022-06-27 VITALS
DIASTOLIC BLOOD PRESSURE: 80 MMHG | SYSTOLIC BLOOD PRESSURE: 128 MMHG | WEIGHT: 193 LBS | TEMPERATURE: 98.7 F | HEIGHT: 61 IN | HEART RATE: 97 BPM | BODY MASS INDEX: 36.44 KG/M2 | OXYGEN SATURATION: 94 % | RESPIRATION RATE: 16 BRPM

## 2022-06-27 DIAGNOSIS — R10.84 GENERALIZED ABDOMINAL PAIN: Primary | ICD-10-CM

## 2022-06-27 DIAGNOSIS — K59.03 DRUG-INDUCED CONSTIPATION: ICD-10-CM

## 2022-06-27 PROCEDURE — 99214 OFFICE O/P EST MOD 30 MIN: CPT | Performed by: FAMILY MEDICINE

## 2022-06-27 PROCEDURE — 3079F DIAST BP 80-89 MM HG: CPT | Performed by: FAMILY MEDICINE

## 2022-06-27 PROCEDURE — 3074F SYST BP LT 130 MM HG: CPT | Performed by: FAMILY MEDICINE

## 2022-06-27 RX ORDER — AMITRIPTYLINE HYDROCHLORIDE 25 MG/1
25 TABLET, FILM COATED ORAL
Qty: 30 TABLET | Refills: 1 | Status: CANCELLED | OUTPATIENT
Start: 2022-06-27

## 2022-06-27 NOTE — PATIENT INSTRUCTIONS
Please stop Toviaz 8 mg daily  Please stop Reglan 5 mg   Please stop senna  Continue Colace 100 mg twice a day  Increase Protonix 40 mg twice a day

## 2022-06-27 NOTE — PATIENT COMMUNICATION
Called pt son and relayed Dr Bruce Davidson message stating "Please let the patient know, we have not been prescribing this"  Pt Son confirmed

## 2022-06-27 NOTE — PROGRESS NOTES
[facility]   [address] [facility], [address]  Phone#  [phone]  Fax#  [phone]    ASSESSMENT and PLAN  No problem-specific Assessment & Plan notes found for this encounter  1  Abdominal Pain  · History of constipation, hemorrhoids as evidenced on colonoscopy, CT scan  · CT Abd/Pelvis 5/20/22: Absent GB, No acute intra-abdominal/pelvic abnormalities noted  · On physical exam; abdomen soft, tender to touch but without rebound, rigidity, masses  Normoactive bowel sounds  No evidence of an acute abdomen    · Do not believe this is a colitis, pancreatitis, diverticulitis, pyelonephritis based on vital sings, presentation, recent imaging and history of constipation  · Believe there are multiple medication interactions that are occurring that can be contributing to her abdominal discomfort  · In conjunction with my attending we advised to stop Toviaz 8 mg, Reglan 5 mg, Senna  · Will continue Colace 100 mg BID  · Will increase Protonix to 40 mg BID  · ER precautions given  · Will follow up with PCP     Diagnoses and all orders for this visit:    Generalized abdominal pain    Drug-induced constipation        HISTORY OF PRESENT ILLNESS  Geraldine Grant is a 80 y o  female a significant PMHx who presents with a 2 week history of abdominal discomfort  She has a history of constipation and is on multiple medications that can cause this  She presents with her son who states he takes care of her and has been having hard stools with straining lately  Denies any fevers, chills, hematochezia, melena, unintentional weight loss, travel, chest pain, shortness of breath  Has a surgical history with her gall bladder out  Follows with colorectal outpatient  Denies any drinking, smoking, recreational drugs  Review of Systems   Constitutional: Negative for chills, fatigue and fever  HENT: Negative for sore throat      Respiratory: Negative for cough, chest tightness and shortness of breath  Cardiovascular: Negative for chest pain and leg swelling  Gastrointestinal: Positive for abdominal pain and nausea  Negative for blood in stool, constipation, diarrhea, rectal pain and vomiting  Endocrine: Negative for polydipsia, polyphagia and polyuria  Genitourinary: Negative for dysuria  Musculoskeletal: Negative for arthralgias  Skin: Negative for rash  Neurological: Negative for dizziness, light-headedness and headaches  Psychiatric/Behavioral: Negative for agitation and behavioral problems  PAST MEDICAL HISTORY   Past Medical History:   Diagnosis Date    COPD (chronic obstructive pulmonary disease) ([place] [address] )     Dementia ([place] [address] )     Diverticulosis 2001    GERD (gastroesophageal reflux disease)     Glaucoma     Heart failure ([place] [address] )     History of cystocele     Hypercholesterolemia     Hypertension     Obesity     Positive PPD 1999    treated     Past Surgical History:   Procedure Laterality Date    BUNIONECTOMY Left 08/25/2021    Procedure: Sendy Gonzáles, EXCISION OF WOUND;  Surgeon: Darci Spain DPM;  Location: [address] [place] [address] MAIN OR;  Service: Podiatry    CARDIOVASCULAR STRESS TEST  2001    dobutamine stress test    CATARACT EXTRACTION      CHOLECYSTECTOMY  04/2002    COLONOSCOPY      CYSTOCELE REPAIR      HYSTERECTOMY      KNEE SURGERY Bilateral     knee replacement    UT REMOVAL DEEP IMPLANT Left 08/25/2021    Procedure: REMOVAL HARDWARE FOOT;  Surgeon: Darci Spain DPM;  Location:  MAIN OR;  Service: Podiatry    UPPER GASTROINTESTINAL ENDOSCOPY       Social History     Socioeconomic History    Marital status:       Spouse name: Not on file    Number of children: Not on file    Years of education: Not on file    Highest education level: Not on file   Occupational History    Not on file   Tobacco Use    Smoking status: Never Smoker    Smokeless tobacco: Never Used   Vaping Use    Vaping Use: Never used   Substance and Sexual Activity    Alcohol use: Not Currently    Drug use: Not Currently    Sexual activity: Not Currently     Partners: Male   Other Topics Concern    Not on file   Social History Narrative    Lives at home with son and 2 grandkids  Consumes 1-2 cups of coffee per week     Social Determinants of Health     Financial Resource Strain: Low Risk     Difficulty of Paying Living Expenses: Not hard at all   Food Insecurity: No Food Insecurity    Worried About Running Out of Food in the Last Year: Never true    Karen of Food in the Last Year: Never true   Transportation Needs: No Transportation Needs    Lack of Transportation (Medical): No    Lack of Transportation (Non-Medical): No   Physical Activity: Not on file   Stress: Not on file   Social Connections: Not on file   Intimate Partner Violence: Not on file   Housing Stability: Not on file     Family History   Problem Relation Age of Onset    No Known Problems Mother     No Known Problems Father     No Known Problems Maternal Grandfather     No Known Problems Paternal Grandmother     No Known Problems Paternal Grandfather        MEDICATIONS    Current Outpatient Medications:     acetaminophen (TYLENOL) 500 mg tablet, Take 2 tablets (1,000 mg total) by mouth every 6 (six) hours as needed for mild pain, Disp: 60 tablet, Rfl: 3    Advair Diskus 500-50 MCG/DOSE inhaler, INHALE 1 PUFF BY MOUTH 2 TIMES A DAY  RINSE MOIUTH AFTER USE, Disp: 60 blister, Rfl: 0    albuterol (PROVENTIL HFA,VENTOLIN HFA) 90 mcg/act inhaler, Inhale 2 puffs every 6 (six) hours as needed for wheezing, Disp: 1 Inhaler, Rfl: 0    aluminum-magnesium hydroxide-simethicone (MAALOX MAX) 400-400-40 MG/5ML suspension, Take 10 mL by mouth every 6 (six) hours as needed for indigestion or heartburn (abdominal pain), Disp: 355 mL, Rfl: 0    amitriptyline (ELAVIL) 25 mg tablet, TAKE ONE TABLET BY MOUTH DAILY AT BEDTIME , Disp: , Rfl:     atorvastatin (LIPITOR) 20 mg tablet, TAKE 1 TABLET BY MOUTH ONCE DAILY , Disp: 90 tablet, Rfl: 0    benzonatate (TESSALON PERLES) 100 mg capsule, Take 1 capsule (100 mg total) by mouth 3 (three) times a day as needed for cough, Disp: 20 capsule, Rfl: 0    Calcium Carbonate-Vitamin D (CALCIUM 500 + D) 500-125 MG-UNIT TABS, every 24 hours, Disp: , Rfl:     Diclofenac Sodium (VOLTAREN) 1 %, APPLY 2 GRAMS 4 TIMES A DAY TOPICALLY, Disp: 100 g, Rfl: 0    docusate sodium (COLACE) 100 mg capsule, Take 1 capsule (100 mg total) by mouth 2 (two) times a day, Disp: 60 capsule, Rfl: 1    Elastic Bandages & Supports (Medical Compression Stockings) MISC, by Does not apply route daily High knee compression stockings 20-30 MMHG, Disp: 2 each, Rfl: 0    fluticasone (FLONASE) 50 mcg/act nasal spray, 2 sprays into each nostril daily, Disp: 1 Bottle, Rfl: 3    furosemide (LASIX) 40 mg tablet, TAKE ONE TABLET BY MOUTH EVERY DAY, Disp: 90 tablet, Rfl: 0    guaiFENesin (ROBITUSSIN) 100 MG/5ML oral liquid, Take 10 mL (200 mg total) by mouth 3 (three) times a day as needed for cough, Disp: 120 mL, Rfl: 0    hydrochlorothiazide (HYDRODIURIL) 12 5 mg tablet, TAKE ONE TABLET BY MOUTH EVERY DAY, Disp: 90 tablet, Rfl: 0    latanoprost (XALATAN) 0 005 % ophthalmic solution, , Disp: , Rfl:     lidocaine (XYLOCAINE) 5 % ointment, Apply topically 2 (two) times a day as needed for mild pain, Disp: 35 44 g, Rfl: 0    lisinopril (ZESTRIL) 40 mg tablet, TAKE ONE TABLET BY MOUTH EVERY DAY, Disp: 90 tablet, Rfl: 0    Misc   Devices (CANE) MISC, Four point aluminum cane , Disp: , Rfl:     omeprazole (PriLOSEC) 40 MG capsule, Take 1 capsule (40 mg total) by mouth daily Half an hour before breakfast, Disp: 90 capsule, Rfl: 3    Procto-Med HC 2 5 % rectal cream, APPLY TOPICALLY TWO TIMES DAILY TO RECTAL AREA, Disp: 28 g, Rfl: 0    timolol (BETIMOL) 0 5 % ophthalmic solution, Administer 1 drop to both eyes 2 (two) times a day , Disp: , Rfl:     timolol (TIMOPTIC) 0 5 % ophthalmic solution, , Disp: , Rfl: PHYSICAL EXAM  Vitals:    06/27/22 1403   BP: 128/80   BP Location: Right arm   Patient Position: Sitting   Cuff Size: Standard   Pulse: 97   Resp: 16   Temp: 98 7 °F (37 1 °C)   TempSrc: Temporal   SpO2: 94%   Weight: 87 5 kg (193 lb)   Height: 5' 1" (1 549 m)     Wt Readings from Last 3 Encounters:   06/27/22 87 5 kg (193 lb)   05/26/22 89 4 kg (197 lb)   04/28/22 89 4 kg (197 lb 3 2 oz)    , Body mass index is 36 47 kg/m²  Physical Exam  Constitutional:       Appearance: She is well-developed  HENT:      Head: Normocephalic and atraumatic  Eyes:      Pupils: Pupils are equal, round, and reactive to light  Cardiovascular:      Rate and Rhythm: Normal rate and regular rhythm  Heart sounds: Normal heart sounds  No murmur heard  Pulmonary:      Effort: Pulmonary effort is normal  No respiratory distress  Breath sounds: Normal breath sounds  Abdominal:      General: Bowel sounds are normal  There is no distension  Palpations: Abdomen is soft  There is no mass  Tenderness: There is no abdominal tenderness  There is no right CVA tenderness, left CVA tenderness or rebound  Hernia: No hernia is present  Musculoskeletal:         General: Normal range of motion  Cervical back: Normal range of motion and neck supple  Right lower leg: Edema (trace pitting) present  Left lower leg: Edema (trace pitting) present  Skin:     General: Skin is warm  Findings: No erythema or rash  Neurological:      Mental Status: She is alert and oriented to person, place, and time     Psychiatric:         Behavior: Behavior normal        LABS/IMAGING  No results found for: HGBA1C  Cholesterol   Date Value Ref Range Status   05/11/2018 160 <200 mg/dL Final     Comment:            NCEP ATP III      <200           DESIRABLE   200-239     BORDERLINE HIGH   > = 240                HIGH       HDL   Date Value Ref Range Status   05/11/2018 57 >40 mg/dL Final     Comment:            NCEP ATP III       <40                 LOW    > = 60                HIGH       HDL, Direct   Date Value Ref Range Status   08/10/2021 69 >=40 mg/dL Final     Comment:     HDL Cholesterol:       Low     <41 mg/dL  Specimen collection should occur prior to Metamizole administration due to the potential for falsley depressed results  Triglycerides   Date Value Ref Range Status   08/10/2021 126 <150 mg/dL Final     Comment:     Triglyceride:     Normal          <150 mg/dl     Borderline High 150-199 mg/dl     High            200-499 mg/dl        Very High       >499 mg/dl    Specimen collection should occur prior to N-Acetylcysteine or Metamizole administration due to the potential for falsely depressed results     05/11/2018 149 <150 mg/dL Final     Comment:            NCEP ATP III      <150              NORMAL  150-199     BORDERLINE HIGH  200-499                HIGH  = > 500           VERY HIGH        WBC   Date Value Ref Range Status   05/20/2022 6 24 4 31 - 10 16 Thousand/uL Final   08/10/2021 4 60 4 31 - 10 16 Thousand/uL Final   07/23/2020 4 80 4 31 - 10 16 Thousand/uL Final     Hemoglobin   Date Value Ref Range Status   05/20/2022 11 7 11 5 - 15 4 g/dL Final   08/10/2021 11 6 (L) 12 0 - 16 0 g/dL Final   07/23/2020 11 7 (L) 12 0 - 16 0 g/dL Final     Platelets   Date Value Ref Range Status   05/20/2022 223 149 - 390 Thousands/uL Final   08/10/2021 201 150 - 450 Thousands/uL Final   07/23/2020 276 150 - 450 Thousands/uL Final     Potassium   Date Value Ref Range Status   05/20/2022 4 4 3 6 - 5 0 mmol/L Final   08/10/2021 4 5 3 6 - 5 0 mmol/L Final   09/18/2020 4 4 3 6 - 5 0 mmol/L Final   05/11/2018 4 6 3 6 - 5 0 MEQ/L Final     Chloride   Date Value Ref Range Status   05/20/2022 96 (L) 97 - 108 mmol/L Final   08/10/2021 102 97 - 108 mmol/L Final   09/18/2020 100 97 - 108 mmol/L Final   05/11/2018 101 97 - 108 MEQ/L Final     CO2   Date Value Ref Range Status   05/20/2022 30 22 - 30 mmol/L Final   08/10/2021 30 22 - 30 mmol/L Final   09/18/2020 29 22 - 30 mmol/L Final   05/11/2018 29 22 - 30 MMOL/L Final     Anion Gap   Date Value Ref Range Status   05/11/2018 12 5 - 14 MMOL/L Final     BUN   Date Value Ref Range Status   05/20/2022 20 5 - 25 mg/dL Final   08/10/2021 29 (H) 5 - 25 mg/dL Final   09/18/2020 26 (H) 5 - 25 mg/dL Final   05/11/2018 31 (H) 5 - 25 MG/DL Final     Creatinine   Date Value Ref Range Status   05/20/2022 0 89 0 60 - 1 20 mg/dL Final     Comment:     Standardized to IDMS reference method   08/10/2021 0 82 0 60 - 1 20 mg/dL Final     Comment:     Standardized to IDMS reference method   09/18/2020 0 79 0 60 - 1 20 mg/dL Final     Comment:     Standardized to IDMS reference method     eGFR   Date Value Ref Range Status   05/20/2022 58 ml/min/1 73sq m Final   08/10/2021 65 >60 ml/min/1 73sq m Final   09/18/2020 78 >60 ml/min/1 73sq m Final     Glucose   Date Value Ref Range Status   05/11/2018 90 70 - 99 MG/DL Final     Calcium   Date Value Ref Range Status   05/20/2022 9 1 8 4 - 10 2 mg/dL Final   08/10/2021 9 4 8 4 - 10 2 mg/dL Final   09/18/2020 9 4 8 4 - 10 2 mg/dL Final   05/11/2018 9 2 8 4 - 10 2 MG/DL Final     AST   Date Value Ref Range Status   08/10/2021 29 14 - 36 U/L Final     Comment:     Specimen collection should occur prior to Sulfasalazine administration due to the potential for falsely depressed results  09/18/2020 27 14 - 36 U/L Final     Comment:     Specimen collection should occur prior to Sulfasalazine administration due to the potential for falsely depressed results  07/23/2020 30 14 - 36 U/L Final     Comment:       Specimen collection should occur prior to Sulfasalazine administration due to the potential for falsely depressed results  05/11/2018 21 14 - 36 U/L Final     ALT   Date Value Ref Range Status   08/10/2021 18 <35 U/L Final     Comment:     Specimen collection should occur prior to Sulfasalazine administration due to the potential for falsely depressed results  09/18/2020 14 9 - 52 U/L Final     Comment:     Specimen collection should occur prior to Sulfasalazine administration due to the potential for falsely depressed results  07/23/2020 14 9 - 52 U/L Final     Comment:       Specimen collection should occur prior to Sulfasalazine administration due to the potential for falsely depressed results  05/11/2018 24 9 - 52 U/L Final     Alkaline Phosphatase   Date Value Ref Range Status   08/10/2021 79 43 - 122 U/L Final   09/18/2020 98 43 - 122 U/L Final   07/23/2020 91 43 - 122 U/L Final   05/11/2018 92 43 - 122 U/L Final     Total Protein   Date Value Ref Range Status   05/11/2018 7 5 5 9 - 8 4 G/DL Final     Total Bilirubin   Date Value Ref Range Status   05/11/2018 0 3 <1 3 mg/dL Final     No results found for: TSH    This note has been dictated using BioSET software  It may contain errors, including improperly dictated words  Please contact physician directly for any questions       Marylou Bowie MD   PGY-2

## 2022-07-07 ENCOUNTER — OFFICE VISIT (OUTPATIENT)
Dept: GASTROENTEROLOGY | Facility: CLINIC | Age: 87
End: 2022-07-07
Payer: MEDICARE

## 2022-07-07 VITALS
BODY MASS INDEX: 34.93 KG/M2 | HEIGHT: 61 IN | SYSTOLIC BLOOD PRESSURE: 100 MMHG | WEIGHT: 185 LBS | DIASTOLIC BLOOD PRESSURE: 62 MMHG | TEMPERATURE: 99.3 F

## 2022-07-07 DIAGNOSIS — K58.1 IRRITABLE BOWEL SYNDROME WITH CONSTIPATION: ICD-10-CM

## 2022-07-07 DIAGNOSIS — K59.01 SLOW TRANSIT CONSTIPATION: ICD-10-CM

## 2022-07-07 DIAGNOSIS — K21.9 GASTROESOPHAGEAL REFLUX DISEASE WITHOUT ESOPHAGITIS: Primary | ICD-10-CM

## 2022-07-07 PROCEDURE — 99214 OFFICE O/P EST MOD 30 MIN: CPT | Performed by: INTERNAL MEDICINE

## 2022-07-07 RX ORDER — DICYCLOMINE HCL 20 MG
20 TABLET ORAL 3 TIMES DAILY PRN
Qty: 90 TABLET | Refills: 3 | Status: SHIPPED | OUTPATIENT
Start: 2022-07-07

## 2022-07-07 RX ORDER — SUCRALFATE ORAL 1 G/10ML
1 SUSPENSION ORAL 3 TIMES DAILY
Qty: 520 ML | Refills: 3 | Status: SHIPPED | OUTPATIENT
Start: 2022-07-07 | End: 2022-08-19

## 2022-07-07 NOTE — PROGRESS NOTES
[facility] - Outpatient Follow-up Note  Ginny Lighter 80 y o  female MRN: [id number]  Encounter: [id number]          ASSESSMENT AND PLAN:    24-year-old female here for evaluation of chronic constipation abdominal pain and reflux symptoms  She is complaining of diffuse mid and upper abdominal pain and bloating  She described the pain as burning sensation  She has no dysphagia at this time  Prior history of H pylori infection  1  Generalized abdominal pain  2  Chronic constipation  3  Gastroesophageal reflux disease/dyspepsia    Take MiraLax 1 scoop daily  You can increase the dose as needed  Take Bentyl 1 tablet as needed for pain up to 3 times a day  Take Carafate 3 times a day - avoid taking any other medication within 2 hours of taking Carafate  Take Gas-X for bloating and gas  Continue omeprazole 40 mg twice a day  If no improvement call our office to schedule EGD for further evaluation  Biopsy will be taken at  That time to assess for H pylori infection        ______________________________________________________________________    SUBJECTIVE:  24-year-old female here for evaluation of chronic constipation abdominal pain and reflux symptoms  She is complaining of diffuse mid and upper abdominal pain and bloating  She described the pain as burning sensation  She has no dysphagia at this time  Prior visit in October 2021 - seen by Dr Jaqueline Shen for GERD, hemorrhoids and constipation   4/4/2022 Speech and swallow evaluation  s/s suggestive of mild oral dysphagia, as well as with s/s suggestive of slowed/delayed transit of material through the esophagus (retention of liquid through much of the esophagus noted after rapid intake of liquids and delayed clearance of pill noted in screening today)  Risk for pharyngeal symptoms is present especially if Geraldine should eat/drink rapidly (anterograde/"top down" and retrograde/"bottom up" risk)     Recommended - Soft cooked foods (NDD3 or IDDSI Level 7 Easy to Chew), Thin liquid    CT abdomen and pelvis with contrast  -limited due to lack of IV contrast  -5/20/22 Stomach is mildly distended with oral contrast   No gross intraluminal mass noted  The small and large bowel loops appear normal in course and caliber without evidence for obstruction or inflammation  Terminal ileum and appendix are   unremarkable  Scattered descending and sigmoid colon diverticulosis noted without evidence for acute diverticulitis  Subtle wall thickening in portions of the distal transverse colon is likely related to underdistention but possibility of mild focal   colitis cannot be entirely excluded  REVIEW OF SYSTEMS IS OTHERWISE NEGATIVE        Historical Information   Past Medical History:   Diagnosis Date    [facility] [facility]     Dementia [facility]     Diverticulosis 2001    GERD (gastroesophageal reflux disease)     Glaucoma     Heart failure (HCC)     History of cystocele     Hypercholesterolemia     Hypertension     Obesity     Positive PPD 1999    treated     Past Surgical History:   Procedure Laterality Date    BUNIONECTOMY Left 08/25/2021    Procedure: Jas Quevedo, EXCISION OF WOUND;  Surgeon: Fercho Jurado DPM;  Location: [facility] MAIN OR;  Service: Podiatry    CARDIOVASCULAR STRESS TEST  2001    dobutamine stress test    CATARACT EXTRACTION      CHOLECYSTECTOMY  04/2002    COLONOSCOPY      CYSTOCELE REPAIR      HYSTERECTOMY      KNEE SURGERY Bilateral     knee replacement    [place] REMOVAL DEEP IMPLANT Left 08/25/2021    Procedure: REMOVAL HARDWARE FOOT;  Surgeon: Fercho Jurado DPM;  Location: [facility] MAIN OR;  Service: Podiatry    UPPER GASTROINTESTINAL ENDOSCOPY       Social History   Social History     Substance and Sexual Activity   Alcohol Use Not Currently     Social History     Substance and Sexual Activity   Drug Use Not Currently     Social History     Tobacco Use   Smoking Status Never Smoker Smokeless Tobacco Never Used     Family History   Problem Relation Age of Onset    No Known Problems Mother     No Known Problems Father     No Known Problems Maternal Grandfather     No Known Problems Paternal Grandmother     No Known Problems Paternal Grandfather        Meds/Allergies       Current Outpatient Medications:     dicyclomine (BENTYL) 20 mg tablet    sucralfate (CARAFATE) 1 g/10 mL suspension    acetaminophen (TYLENOL) 500 mg tablet    Advair Diskus 500-50 MCG/DOSE inhaler    albuterol (PROVENTIL HFA,VENTOLIN HFA) 90 mcg/act inhaler    aluminum-magnesium hydroxide-simethicone (MAALOX MAX) 400-400-40 MG/5ML suspension    amitriptyline (ELAVIL) 25 mg tablet    atorvastatin (LIPITOR) 20 mg tablet    benzonatate (TESSALON PERLES) 100 mg capsule    Calcium Carbonate-Vitamin D (CALCIUM 500 + D) 500-125 MG-UNIT TABS    Diclofenac Sodium (VOLTAREN) 1 %    docusate sodium (COLACE) 100 mg capsule    Elastic Bandages & Supports (Medical Compression Stockings) MISC    fluticasone (FLONASE) 50 mcg/act nasal spray    furosemide (LASIX) 40 mg tablet    guaiFENesin (ROBITUSSIN) 100 MG/5ML oral liquid    hydrochlorothiazide (HYDRODIURIL) 12 5 mg tablet    latanoprost (XALATAN) 0 005 % ophthalmic solution    lidocaine (XYLOCAINE) 5 % ointment    lisinopril (ZESTRIL) 40 mg tablet    Misc  Devices (CANE) MISC    omeprazole (PriLOSEC) 40 MG capsule    Procto-Med HC 2 5 % rectal cream    timolol (BETIMOL) 0 5 % ophthalmic solution    timolol (TIMOPTIC) 0 5 % ophthalmic solution    No Known Allergies        Objective     Blood pressure 100/62, temperature 99 3 °F (37 4 °C), temperature source Tympanic, height 5' 1" (1 549 m), weight 83 9 kg (185 lb), not currently breastfeeding  Body mass index is 34 96 kg/m²        PHYSICAL EXAM:      General Appearance:   Alert, cooperative, no distress   HEENT:   Normocephalic, atraumatic, anicteric      Neck:  Supple, symmetrical, trachea midline   Lungs:   Clear to auscultation bilaterally; no rales, rhonchi or wheezing; respirations unlabored    Heart[de-identified]   Regular rate and rhythm; no murmur, rub, or gallop  Abdomen:   Soft, non-tender, non-distended; normal bowel sounds; no masses, no organomegaly    Genitalia:   Deferred    Rectal:   Deferred    Extremities:  No cyanosis, clubbing or edema    Pulses:  2+ and symmetric    Skin:  No jaundice, rashes, or lesions    Lymph nodes:  No palpable cervical lymphadenopathy        Lab Results:   No visits with results within 1 Day(s) from this visit  Latest known visit with results is:   Appointment on 05/20/2022   Component Date Value    NT-proBNP 05/20/2022 388 (A)    WBC 05/20/2022 6 24     RBC 05/20/2022 4 01     Hemoglobin 05/20/2022 11 7     Hematocrit 05/20/2022 37 5     MCV 05/20/2022 94     MCH 05/20/2022 29 2     MCHC 05/20/2022 31 2 (A)    RDW 05/20/2022 12 4     MPV 05/20/2022 9 6     Platelets 44/93/2295 223     nRBC 05/20/2022 0     Neutrophils Relative 05/20/2022 46     Immat GRANS % 05/20/2022 0     Lymphocytes Relative 05/20/2022 25     Monocytes Relative 05/20/2022 7     Eosinophils Relative 05/20/2022 21 (A)    Basophils Relative 05/20/2022 1     Neutrophils Absolute 05/20/2022 2 87     Immature Grans Absolute 05/20/2022 0 01     Lymphocytes Absolute 05/20/2022 1 55     Monocytes Absolute 05/20/2022 0 44     Eosinophils Absolute 05/20/2022 1 33 (A)    Basophils Absolute 05/20/2022 0 04     Sodium 05/20/2022 136 (A)    Potassium 05/20/2022 4 4     Chloride 05/20/2022 96 (A)    CO2 05/20/2022 30     ANION GAP 05/20/2022 10     BUN 05/20/2022 20     Creatinine 05/20/2022 0 89     Glucose, Fasting 05/20/2022 106 (A)    Calcium 05/20/2022 9 1     eGFR 05/20/2022 58          Radiology Results:   No results found

## 2022-07-07 NOTE — PATIENT INSTRUCTIONS
Take MiraLax 1 scoop daily    You can increase the dose as needed  Take Bentyl 1 tablet as needed for pain up to 3 times a day  Take Carafate 3 times a day - avoid taking any other medication within 2 hours of taking Carafate  Take Gas-X for bloating and gas  Continue omeprazole 40 mg twice a day

## 2022-07-08 ENCOUNTER — TELEPHONE (OUTPATIENT)
Dept: GASTROENTEROLOGY | Facility: AMBULARY SURGERY CENTER | Age: 87
End: 2022-07-08

## 2022-07-08 ENCOUNTER — TELEPHONE (OUTPATIENT)
Dept: OTHER | Facility: OTHER | Age: 87
End: 2022-07-08

## 2022-07-08 ENCOUNTER — TELEPHONE (OUTPATIENT)
Dept: GASTROENTEROLOGY | Facility: CLINIC | Age: 87
End: 2022-07-08

## 2022-07-08 DIAGNOSIS — K21.9 GASTROESOPHAGEAL REFLUX DISEASE WITHOUT ESOPHAGITIS: Primary | ICD-10-CM

## 2022-07-08 DIAGNOSIS — U07.1 2019 NOVEL CORONAVIRUS DISEASE (COVID-19): ICD-10-CM

## 2022-07-08 PROBLEM — R10.13 ABDOMINAL PAIN, EPIGASTRIC: Status: ACTIVE | Noted: 2022-07-08

## 2022-07-08 RX ORDER — SUCRALFATE 1 G/1
1 TABLET ORAL 4 TIMES DAILY
Qty: 120 TABLET | Refills: 2 | Status: SHIPPED | OUTPATIENT
Start: 2022-07-08 | End: 2022-09-16

## 2022-07-08 NOTE — TELEPHONE ENCOUNTER
Patient's Neri Harrison called in to inform the pharmacy reports patient needs prior authorization for dicyclomine (BENTYL) 20 mg tablet  as prescribed by provider (Clarisa Espinoza) on 7/7/22  Please follow up with [place] and pharmacy

## 2022-07-08 NOTE — TELEPHONE ENCOUNTER
Patient  is unable to get her carfate in liquid due to insurance would you be able to send it in a pill form for her

## 2022-07-11 RX ORDER — GUAIFENESIN 100 MG/5ML
200 SYRUP ORAL 3 TIMES DAILY PRN
Qty: 120 ML | Refills: 0 | OUTPATIENT
Start: 2022-07-11

## 2022-07-11 NOTE — TELEPHONE ENCOUNTER
Spoke to pharmacy staff and everything went through with insurance and they were getting it ready for the patient  I did sabina;l and speak to her dtr and made her aware that they can  med in 30 minutes

## 2022-07-11 NOTE — TELEPHONE ENCOUNTER
Approved, I called and left vm of Approval      LAMBERT QUINTANA (Cole: Cynthia Hdz)      This request has received a Favorable outcome    Please note any additional information provided by [facility] at the bottom of this request

## 2022-07-12 PROBLEM — E66.9 CLASS 1 OBESITY: Status: ACTIVE | Noted: 2022-07-12

## 2022-07-26 ENCOUNTER — OFFICE VISIT (OUTPATIENT)
Dept: FAMILY MEDICINE CLINIC | Facility: CLINIC | Age: 87
End: 2022-07-26

## 2022-07-26 VITALS
TEMPERATURE: 97.7 F | HEART RATE: 86 BPM | HEIGHT: 61 IN | BODY MASS INDEX: 34.93 KG/M2 | WEIGHT: 185 LBS | DIASTOLIC BLOOD PRESSURE: 70 MMHG | OXYGEN SATURATION: 98 % | SYSTOLIC BLOOD PRESSURE: 106 MMHG | RESPIRATION RATE: 18 BRPM

## 2022-07-26 DIAGNOSIS — Z91.89 AT RISK FOR POLYPHARMACY: ICD-10-CM

## 2022-07-26 DIAGNOSIS — R10.13 ABDOMINAL PAIN, EPIGASTRIC: Primary | ICD-10-CM

## 2022-07-26 PROCEDURE — 3078F DIAST BP <80 MM HG: CPT | Performed by: FAMILY MEDICINE

## 2022-07-26 PROCEDURE — 99214 OFFICE O/P EST MOD 30 MIN: CPT | Performed by: FAMILY MEDICINE

## 2022-07-26 PROCEDURE — 3074F SYST BP LT 130 MM HG: CPT | Performed by: FAMILY MEDICINE

## 2022-07-26 NOTE — ASSESSMENT & PLAN NOTE
Patient has been followed by GI since 2018 for management of constipation and GERD  During last GI consult on 7/7/22, patient was prescribed Omeprazole 40 mg BID, MiraLax 1 scoop daily, Bentyl 1 tablet as needed for pain (which patient is not taking), and Carafate TID  GI considered EGD if pain persisted  Plan:  Since pain has got better, CT on 5/20/22 was normal, patient has no current signs of infection, and gallbladder was removed surgically, we have decided to wait until next GI appointment on 8/9/22  We are considering gastroparesis as a possible diagnosis given patient's age and medical history  We do not think an EGD would be needed at this time     Follow up in 4 weeks

## 2022-07-26 NOTE — ASSESSMENT & PLAN NOTE
Patient is currently on multiple medications and caregiver is not sure about all the medications the patient is taking daily and does not recognize several medications on patient's chart      Scheduled a visit in 4 weeks for medication reconciliation and Medicare AWV

## 2022-07-26 NOTE — PROGRESS NOTES
Assessment/Plan:    Abdominal pain, epigastric  Patient has been followed by GI since 2018 for management of constipation and GERD  During last GI consult on 7/7/22, patient was prescribed Omeprazole 40 mg BID, MiraLax 1 scoop daily, Bentyl 1 tablet as needed for pain (which patient is not taking), and Carafate TID  GI considered EGD if pain persisted  Plan:  Since pain has got better, CT on 5/20/22 was normal, patient has no current signs of infection, and gallbladder was removed surgically, we have decided to wait until next GI appointment on 8/9/22  We are considering gastroparesis as a possible diagnosis given patient's age and medical history  We do not think an EGD would be needed at this time  Follow up in 4 weeks      At risk for polypharmacy  Patient is currently on multiple medications and caregiver is not sure about all the medications the patient is taking daily and does not recognize several medications on patient's chart  Scheduled a visit in 4 weeks for medication reconciliation and Medicare AWV        Diagnoses and all orders for this visit:    Abdominal pain, epigastric    At risk for polypharmacy          Subjective:      Patient ID: Cheryl Salas is a 80 y o  female  Yadiel Dubon is a 80 y o  female with a complex PMH  She is here today for epigastric pain for the past month  Patient was accompanied by her son who is her caregiver  The patient's caregiver reports that the patient has been followed by GI due to constipation and GERD for a few years  He states that she was seen on 7/7/22 by the GI team who changed the omeprazole regimen to BID instead of once a day and reports that they are noticing improvement  Patient states that the pain gets worse after eating and reports decreased appetite  She denies nausea, vomiting, diarrhea, constipation, fever, or urinary symptoms  An abdominal CT on 5/20/22 was normal, except for diverticulosis without signs of diverticulitis        Patient is on multiple medications and caregiver is not sure about all the medications the patient is taking daily and does not recognize several medications on patients chart  The following portions of the patient's history were reviewed and updated as appropriate: current medications, past medical history, past social history and problem list     Review of Systems   Constitutional: Positive for appetite change  Negative for fatigue and fever  Respiratory: Negative for shortness of breath  Cardiovascular: Negative for chest pain  Gastrointestinal: Positive for abdominal distention and abdominal pain  Negative for anal bleeding, blood in stool, constipation, diarrhea, nausea and vomiting  Genitourinary: Negative for difficulty urinating and dysuria  Objective:      /70 (BP Location: Right arm, Patient Position: Sitting, Cuff Size: Standard)   Pulse 86   Temp 97 7 °F (36 5 °C) (Temporal)   Resp 18   Ht 5' 1" (1 549 m)   Wt 83 9 kg (185 lb)   SpO2 98%   BMI 34 96 kg/m²          Physical Exam  Constitutional:       General: She is not in acute distress  Appearance: Normal appearance  She is obese  She is not ill-appearing  HENT:      Head: Normocephalic and atraumatic  Eyes:      General: No scleral icterus  Conjunctiva/sclera: Conjunctivae normal    Cardiovascular:      Rate and Rhythm: Normal rate and regular rhythm  Pulses: Normal pulses  Heart sounds: Normal heart sounds  No murmur heard  No gallop  Pulmonary:      Effort: Pulmonary effort is normal  No respiratory distress  Breath sounds: Normal breath sounds  No wheezing  Abdominal:      General: Bowel sounds are normal  There is no distension  Palpations: Abdomen is soft  There is no mass  Tenderness: There is no abdominal tenderness  There is no guarding or rebound  Comments:  Deep palpation and decompression of the abdomen when patient was distracted did not elicit pain   No skin lesions in the abdomen  Skin:     General: Skin is warm and dry  Coloration: Skin is not jaundiced or pale  Findings: No bruising, erythema, lesion or rash  Neurological:      Mental Status: She is alert and oriented to person, place, and time  Psychiatric:         Mood and Affect: Mood is anxious  Affect is tearful

## 2022-08-08 ENCOUNTER — TELEPHONE (OUTPATIENT)
Dept: GASTROENTEROLOGY | Facility: CLINIC | Age: 87
End: 2022-08-08

## 2022-08-08 NOTE — TELEPHONE ENCOUNTER
Called pt left message for pt  Her appt had to be canceled due Dr Tanisha Cortez not able to come in  Pt has to call back to reschedule appt

## 2022-08-16 ENCOUNTER — HOSPITAL ENCOUNTER (INPATIENT)
Facility: HOSPITAL | Age: 87
LOS: 3 days | Discharge: HOME | DRG: 086 | End: 2022-08-19
Attending: SURGERY | Admitting: SURGERY
Payer: MEDICARE

## 2022-08-16 ENCOUNTER — APPOINTMENT (EMERGENCY)
Dept: CT IMAGING | Facility: HOSPITAL | Age: 87
End: 2022-08-16
Payer: MEDICARE

## 2022-08-16 ENCOUNTER — HOSPITAL ENCOUNTER (EMERGENCY)
Facility: HOSPITAL | Age: 87
End: 2022-08-16
Attending: EMERGENCY MEDICINE
Payer: MEDICARE

## 2022-08-16 VITALS
DIASTOLIC BLOOD PRESSURE: 79 MMHG | HEART RATE: 64 BPM | TEMPERATURE: 97.5 F | BODY MASS INDEX: 35.24 KG/M2 | WEIGHT: 186.5 LBS | OXYGEN SATURATION: 98 % | SYSTOLIC BLOOD PRESSURE: 140 MMHG | RESPIRATION RATE: 19 BRPM
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
## 2022-08-16 DIAGNOSIS — I60.9 SUBARACHNOID HEMORRHAGE (HCC): ICD-10-CM

## 2022-08-16 DIAGNOSIS — I60.9 SUBARACHNOID BLEED (HCC): Primary | ICD-10-CM
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
## 2022-08-16 DIAGNOSIS — S09.90XA INJURY OF HEAD, INITIAL ENCOUNTER: ICD-10-CM

## 2022-08-16 DIAGNOSIS — I60.9 SUBARACHNOID BLEED (HCC): ICD-10-CM

## 2022-08-16 LAB
ABO GROUP BLD: NORMAL
ALBUMIN SERPL BCP-MCNC: 4 G/DL (ref 3.5–5)
ALP SERPL-CCNC: 84 U/L (ref 43–122)
ALT SERPL W P-5'-P-CCNC: 18 U/L
ANION GAP SERPL CALCULATED.3IONS-SCNC: 6 MMOL/L (ref 5–14)
APTT PPP: 27 SECONDS (ref 23–37)
AST SERPL W P-5'-P-CCNC: 31 U/L (ref 14–36)
ATRIAL RATE: 77 BPM
BASOPHILS # BLD AUTO: 0.03 THOUSANDS/ΜL (ref 0–0.1)
BASOPHILS NFR BLD AUTO: 1 % (ref 0–1)
BILIRUB SERPL-MCNC: 0.48 MG/DL (ref 0.2–1)
BILIRUB UR QL STRIP: NEGATIVE
BLD GP AB SCN SERPL QL: NEGATIVE
BUN SERPL-MCNC: 23 MG/DL (ref 5–25)
CALCIUM SERPL-MCNC: 8.9 MG/DL (ref 8.4–10.2)
CHLORIDE SERPL-SCNC: 94 MMOL/L (ref 96–108)
CLARITY UR: CLEAR
CO2 SERPL-SCNC: 31 MMOL/L (ref 21–32)
COLOR UR: COLORLESS
CREAT SERPL-MCNC: 0.87 MG/DL (ref 0.6–1.2)
EOSINOPHIL # BLD AUTO: 0.98 THOUSAND/ΜL (ref 0–0.61)
EOSINOPHIL NFR BLD AUTO: 20 % (ref 0–6)
ERYTHROCYTE [DISTWIDTH] IN BLOOD BY AUTOMATED COUNT: 12.7 % (ref 11.6–15.1)
GFR SERPL CREATININE-BSD FRML MDRD: 59 ML/MIN/1.73SQ M
GLUCOSE SERPL-MCNC: 110 MG/DL (ref 70–99)
GLUCOSE UR STRIP-MCNC: NEGATIVE MG/DL
HCT VFR BLD AUTO: 34.1 % (ref 34.8–46.1)
HGB BLD-MCNC: 11.2 G/DL (ref 11.5–15.4)
HGB UR QL STRIP.AUTO: NEGATIVE
IMM GRANULOCYTES # BLD AUTO: 0.01 THOUSAND/UL (ref 0–0.2)
IMM GRANULOCYTES NFR BLD AUTO: 0 % (ref 0–2)
INR PPP: 0.9 (ref 0.84–1.19)
KETONES UR STRIP-MCNC: NEGATIVE MG/DL
LEUKOCYTE ESTERASE UR QL STRIP: NEGATIVE
LYMPHOCYTES # BLD AUTO: 1.38 THOUSANDS/ΜL (ref 0.6–4.47)
LYMPHOCYTES NFR BLD AUTO: 28 % (ref 14–44)
MCH RBC QN AUTO: 29.5 PG (ref 26.8–34.3)
MCHC RBC AUTO-ENTMCNC: 32.8 G/DL (ref 31.4–37.4)
MCV RBC AUTO: 90 FL (ref 82–98)
MONOCYTES # BLD AUTO: 0.43 THOUSAND/ΜL (ref 0.17–1.22)
MONOCYTES NFR BLD AUTO: 9 % (ref 4–12)
NEUTROPHILS # BLD AUTO: 2.05 THOUSANDS/ΜL (ref 1.85–7.62)
NEUTS SEG NFR BLD AUTO: 42 % (ref 43–75)
NITRITE UR QL STRIP: NEGATIVE
NRBC BLD AUTO-RTO: 0 /100 WBCS
P AXIS: 23 DEGREES
PH UR STRIP.AUTO: 7 [PH]
PLATELET # BLD AUTO: 243 THOUSANDS/UL (ref 149–390)
PMV BLD AUTO: 8.8 FL (ref 8.9–12.7)
POTASSIUM SERPL-SCNC: 3.5 MMOL/L (ref 3.5–5.3)
PR INTERVAL: 204 MS
PROT SERPL-MCNC: 7.6 G/DL (ref 6.4–8.4)
PROT UR STRIP-MCNC: NEGATIVE MG/DL
PROTHROMBIN TIME: 12.5 SECONDS (ref 11.6–14.5)
QRS AXIS: -21 DEGREES
QRSD INTERVAL: 88 MS
QT INTERVAL: 386 MS
QTC INTERVAL: 436 MS
RBC # BLD AUTO: 3.8 MILLION/UL (ref 3.81–5.12)
RH BLD: POSITIVE
SODIUM SERPL-SCNC: 131 MMOL/L (ref 135–147)
SP GR UR STRIP.AUTO: 1.01 (ref 1–1.03)
SPECIMEN EXPIRATION DATE: NORMAL
T WAVE AXIS: 26 DEGREES
UROBILINOGEN UR STRIP-ACNC: <2 MG/DL
VENTRICULAR RATE: 77 BPM
WBC # BLD AUTO: 4.88 THOUSAND/UL (ref 4.31–10.16)

## 2022-08-16 PROCEDURE — 81003 URINALYSIS AUTO W/O SCOPE: CPT | Performed by: STUDENT IN AN ORGANIZED HEALTH CARE EDUCATION/TRAINING PROGRAM

## 2022-08-16 PROCEDURE — 70450 CT HEAD/BRAIN W/O DYE: CPT

## 2022-08-16 PROCEDURE — 86850 RBC ANTIBODY SCREEN: CPT | Performed by: STUDENT IN AN ORGANIZED HEALTH CARE EDUCATION/TRAINING PROGRAM

## 2022-08-16 PROCEDURE — 85610 PROTHROMBIN TIME: CPT | Performed by: EMERGENCY MEDICINE

## 2022-08-16 PROCEDURE — 99245 OFF/OP CONSLTJ NEW/EST HI 55: CPT | Performed by: INTERNAL MEDICINE

## 2022-08-16 PROCEDURE — 99285 EMERGENCY DEPT VISIT HI MDM: CPT

## 2022-08-16 PROCEDURE — 86900 BLOOD TYPING SEROLOGIC ABO: CPT | Performed by: STUDENT IN AN ORGANIZED HEALTH CARE EDUCATION/TRAINING PROGRAM

## 2022-08-16 PROCEDURE — 93010 ELECTROCARDIOGRAM REPORT: CPT

## 2022-08-16 PROCEDURE — 99215 OFFICE O/P EST HI 40 MIN: CPT | Performed by: INTERNAL MEDICINE

## 2022-08-16 PROCEDURE — 99223 1ST HOSP IP/OBS HIGH 75: CPT | Performed by: SURGERY

## 2022-08-16 PROCEDURE — 99223 1ST HOSP IP/OBS HIGH 75: CPT | Performed by: PHYSICIAN ASSISTANT

## 2022-08-16 PROCEDURE — 80053 COMPREHEN METABOLIC PANEL: CPT | Performed by: EMERGENCY MEDICINE

## 2022-08-16 PROCEDURE — 86901 BLOOD TYPING SEROLOGIC RH(D): CPT | Performed by: STUDENT IN AN ORGANIZED HEALTH CARE EDUCATION/TRAINING PROGRAM

## 2022-08-16 PROCEDURE — 99254 IP/OBS CNSLTJ NEW/EST MOD 60: CPT | Performed by: INTERNAL MEDICINE

## 2022-08-16 PROCEDURE — 99285 EMERGENCY DEPT VISIT HI MDM: CPT | Performed by: EMERGENCY MEDICINE

## 2022-08-16 PROCEDURE — 93005 ELECTROCARDIOGRAM TRACING: CPT

## 2022-08-16 PROCEDURE — 85730 THROMBOPLASTIN TIME PARTIAL: CPT | Performed by: EMERGENCY MEDICINE

## 2022-08-16 PROCEDURE — 85025 COMPLETE CBC W/AUTO DIFF WBC: CPT | Performed by: EMERGENCY MEDICINE

## 2022-08-16 PROCEDURE — G1004 CDSM NDSC: HCPCS

## 2022-08-16 PROCEDURE — 36415 COLL VENOUS BLD VENIPUNCTURE: CPT | Performed by: EMERGENCY MEDICINE

## 2022-08-16 PROCEDURE — 72125 CT NECK SPINE W/O DYE: CPT

## 2022-08-16 RX ORDER — LIDOCAINE 50 MG/G
1 PATCH TOPICAL DAILY
Status: DISCONTINUED | OUTPATIENT
Start: 2022-08-16 | End: 2022-08-19 | Stop reason: HOSPADM

## 2022-08-16 RX ORDER — ONDANSETRON 4 MG/1
4 TABLET, ORALLY DISINTEGRATING ORAL EVERY 6 HOURS PRN
Status: DISCONTINUED | OUTPATIENT
Start: 2022-08-16 | End: 2022-08-19 | Stop reason: HOSPADM

## 2022-08-16 RX ORDER — LEVETIRACETAM 500 MG/1
500 TABLET ORAL EVERY 12 HOURS SCHEDULED
Status: DISCONTINUED | OUTPATIENT
Start: 2022-08-16 | End: 2022-08-19 | Stop reason: HOSPADM

## 2022-08-16 RX ORDER — ONDANSETRON 2 MG/ML
4 INJECTION INTRAMUSCULAR; INTRAVENOUS ONCE
Status: COMPLETED | OUTPATIENT
Start: 2022-08-16 | End: 2022-08-16

## 2022-08-16 RX ORDER — ACETAMINOPHEN 325 MG/1
650 TABLET ORAL EVERY 6 HOURS PRN
Status: DISCONTINUED | OUTPATIENT
Start: 2022-08-16 | End: 2022-08-19 | Stop reason: HOSPADM

## 2022-08-16 RX ORDER — PANTOPRAZOLE SODIUM 20 MG/1
20 TABLET, DELAYED RELEASE ORAL
Status: DISCONTINUED | OUTPATIENT
Start: 2022-08-16 | End: 2022-08-19 | Stop reason: HOSPADM

## 2022-08-16 RX ORDER — METHOCARBAMOL 500 MG/1
500 TABLET, FILM COATED ORAL EVERY 6 HOURS PRN
Status: DISCONTINUED | OUTPATIENT
Start: 2022-08-16 | End: 2022-08-19 | Stop reason: HOSPADM

## 2022-08-16 RX ADMIN — PANTOPRAZOLE SODIUM 20 MG: 20 TABLET, DELAYED RELEASE ORAL at 13:02

## 2022-08-16 RX ADMIN — LEVETIRACETAM 500 MG: 500 TABLET, FILM COATED ORAL at 10:26

## 2022-08-16 RX ADMIN — LEVETIRACETAM 500 MG: 500 TABLET, FILM COATED ORAL at 21:17

## 2022-08-16 RX ADMIN — ONDANSETRON 4 MG: 2 INJECTION INTRAMUSCULAR; INTRAVENOUS at 10:22

## 2022-08-16 RX ADMIN — ACETAMINOPHEN 650 MG: 325 TABLET ORAL at 10:22

## 2022-08-16 RX ADMIN — LIDOCAINE 5% 1 PATCH: 700 PATCH TOPICAL at 13:03

## 2022-08-16 RX ADMIN — ACETAMINOPHEN 650 MG: 325 TABLET ORAL at 21:17

## 2022-08-16 NOTE — H&P
[facility]  H&P- Geraldine Gabriella Martinez 1934, 80 y o  female MRN: [id number]  Unit/Bed#: ED 21 Encounter: [id number]  Primary Care Provider: Arley Marinelli MD   Date and time admitted to hospital: 8/16/2022  7:36 AM    Subarachnoid hemorrhage [facility])  Assessment & Plan  Trace right mid superior frontal subarachnoid hemorrhage  Step down 2   Neurosurg consulted          Model of Arrival: Ambulance    Trauma Team: Attending Dr Delgadillo Cancer  Consultants:     Neurosurgery: routine consult; Epic consult order placed; History of Present Illness     Chief Complaint: Head injury  Mechanism:Fall     HPI:    Geraldine Chavez is a 80 y o  female who presented at [facility] ED  "with complaint of blunt head trauma  She reportedly rolled out of bed yesterday morning at 3:00 a m  Lajean Cassette There was no loss of consciousness and other than a headache she has had no other acute issues or concerns  She has taken Tylenol only minimal improvement of symptoms  She has had no change in vision, recurrent falls, vomiting, focal neurological deficits, or other acute issues "  Found to have a small frontal SAH and transferred to [facility]         Review of Systems   Unable to perform ROS: Mental status change     12-point, complete review of systems was reviewed and negative except as stated above       Historical Information     Past Medical History:   Diagnosis Date    [facility]     Dementia [facility]     Diverticulosis 2001    GERD (gastroesophageal reflux disease)     Glaucoma     [facility]     History of cystocele     Hypercholesterolemia     Hypertension     Obesity     Positive PPD 1999    treated     Past Surgical History:   Procedure Laterality Date    BUNIONECTOMY Left 08/25/2021    Procedure: Guilherme Perez, EXCISION OF WOUND;  Surgeon: Marcus Ludwig DPM;  Location: [address] [place] [address];  Service: Podiatry    CARDIOVASCULAR STRESS TEST  2001 dobutamine stress test    CATARACT EXTRACTION      CHOLECYSTECTOMY  04/2002    COLONOSCOPY      CYSTOCELE REPAIR      HYSTERECTOMY      KNEE SURGERY Bilateral     knee replacement    TN REMOVAL DEEP IMPLANT Left 08/25/2021    Procedure: REMOVAL HARDWARE FOOT;  Surgeon: Tavon Pool DPM;  Location: [address] [place] [address] OR;  Service: Podiatry    UPPER GASTROINTESTINAL ENDOSCOPY          Social History     Tobacco Use    Smoking status: Never Smoker    Smokeless tobacco: Never Used   Vaping Use    Vaping Use: Never used   Substance Use Topics    Alcohol use: Not Currently    Drug use: Not Currently     Immunization History   Administered Date(s) Administered    COVID-19 MODERNA VACC 0 5 ML IM 03/17/2021, 04/14/2021, 12/17/2021    INFLUENZA 12/18/2009, 09/23/2010, 10/13/2011, 11/06/2012, 10/23/2013, 10/29/2014, 10/28/2015, 10/07/2016, 11/20/2017, 10/16/2018    Influenza Split 11/06/2012, 10/23/2013    Influenza Split High Dose Preservative Free IM 11/20/2017, 10/16/2018, 10/10/2020    Influenza, high dose seasonal 0 7 mL 10/03/2019, 10/19/2020, 01/27/2022    Influenza, seasonal, injectable 10/11/2011    Pneumococcal Conjugate 13-Valent 10/28/2016    Tdap 08/14/2009, 02/25/2020       Family History: Non-contributory    1  Before the illness or injury that brought you to the Emergency, did you need someone to help you on a regular basis? 1=Yes   2  Since the illness or injury that brought you to the Emergency, have you needed more help than usual to take care of yourself? 0=No   3  Have you been hospitalized for one or more nights during the past 6 months (excluding a stay in the Emergency Department)? 0=No   4  In general, do you see well? 0=Yes   5  In general, do you have serious problems with your memory? 1=Yes   6  Do you take more than three different medications everyday?  1=Yes   TOTAL   3     Did you order a geriatric consult if the score was 2 or greater?: yes     Meds/Allergies   all current active meds have been reviewed  Allergies have not been reviewed; No Known Allergies    Objective   Initial Vitals:        Primary Survey:   Airway:        Status: patent;        Pre-hospital Interventions: none        Hospital Interventions: none  Breathing:        Pre-hospital Interventions: none       Effort: normal       Right breath sounds: normal       Left breath sounds: normal  Circulation:        Rhythm: regular       Rate: regular   Right Pulses Left Pulses    R radial: 2+         L radial: 2+           Disability:        GCS: Eye: 4; Verbal: 4 Motor: 6 Total: 14       Right Pupil: round;  reactive         Left Pupil:  round;  reactive      R Motor Strength L Motor Strength    R : 5/5  R dorsiflex: 5/5  R plantarflex: 5/5 L : 5/5  L dorsiflex: 5/5  L plantarflex: 5/5          Exposure:           Secondary Survey:  Physical Exam  Vitals reviewed  Constitutional:       General: She is not in acute distress  Appearance: She is not ill-appearing  HENT:      Head: Normocephalic  Comments: Posterior head tender to palpation     Right Ear: External ear normal       Left Ear: External ear normal       Nose: Nose normal  No congestion  Mouth/Throat:      Mouth: Mucous membranes are moist       Pharynx: No oropharyngeal exudate or posterior oropharyngeal erythema  Eyes:      General:         Right eye: No discharge  Left eye: No discharge  Extraocular Movements: Extraocular movements intact  Pupils: Pupils are equal, round, and reactive to light  Cardiovascular:      Rate and Rhythm: Normal rate  Pulses: Normal pulses  Pulmonary:      Effort: Pulmonary effort is normal       Breath sounds: Normal breath sounds  Abdominal:      General: Abdomen is flat  Palpations: Abdomen is soft  Tenderness: There is no abdominal tenderness  Musculoskeletal:         General: Normal range of motion  Cervical back: Normal range of motion and neck supple  No tenderness  Skin:     General: Skin is warm  Neurological:      General: No focal deficit present  Mental Status: She is disoriented  Cranial Nerves: No cranial nerve deficit  Sensory: No sensory deficit  Invasive Devices  Report    Peripheral Intravenous Line  Duration           Peripheral IV 10/30/21 Left Forearm 289 days    Peripheral IV 08/16/22 Right Forearm <1 day              Lab Results:   BMP/CMP:   Lab Results   Component Value Date    SODIUM 131 (L) 08/16/2022    K 3 5 08/16/2022    CL 94 (L) 08/16/2022    CO2 31 08/16/2022    BUN 23 08/16/2022    CREATININE 0 87 08/16/2022    CALCIUM 8 9 08/16/2022    AST 31 08/16/2022    ALT 18 08/16/2022    ALKPHOS 84 08/16/2022    EGFR 59 08/16/2022       Imaging Results: I have personally reviewed pertinent reports      Chest Xray(s): N/A   FAST exam(s): N/A   CT Scan(s): positive for acute findings: see results   Additional Xray(s): N/A     Other Studies:     Code Status: Level 1 - Full Code  Advance Directive and Living Will:      Power of :    POLST:

## 2022-08-16 NOTE — CONSULTS
Consultation - Geriatric Medicine   Geraldine Albert 80 y o  female MRN: [id number]  Unit/Bed#: ED 21 Encounter: [id number]      Assessment/Plan     Ambulatory dysfunction with fall  -unwitnessed fall at bed at home yesterday  -(+) head strike (-) loss of consciousness  -injuries as outlined below  -Requires use of walker for ambulation at baseline  -no prior hx recurrent falls  -high risk future falls due to age, impaired vision, now with hx fall, cognitive impairment with underlying impaired safety awareness deconditioning/debility and unfamiliar environment   -encourage good body mechanics and assist with all transfers  -keep personal items and call bell close to prevent reaching  -maintain environment free of fall hazards  -encourage appropriate footwear and adequate lighting at all times when out of bed  -consider home fall risk assessment and personal fall alert system if returning home  -PT and OT pending    Subarachnoid hemorrhage  -s/p fall with head strike as above  -not maintained on chronic systemic anticoagulation at baseline  -[facility] obtained admission reports trace right mid superior frontal subarachnoid hemorrhage without intraparenchymal hemorrhage or skull fracture  -AC/AP on hold  -neuro checks per protocol  -anticipate repeat CTH to ensure stability   -Nsx consult pending    Hyponatremia  -[Na] down to 131 from 136 earlier in year and appears to be normal at baseline   -likely multifactorial including medications such as amitriptyline now with subarachnoid hemorrhage superimposed  -monitor closely and avoid rapid/drastic fluctuations over more than 8-12 pts/24H  -recommend repeating labs later in day to ensure not down trending  -consider holding amitriptyline if continues to trend downward    Dementia without behavior disturbance  -moderate to severe and progressive  -MoCA 6/30 (20/21)  MMSE 17/30 (4/2019)  -at baseline is mostly oriented but extremely forgetful and repetitive  -resides at home with family caregivers - requires assist with ADLs and IADLs  -mildly anxious at baseline but usually easily reassured   -not maintained on any memory medications at this time  [facility] obtained on admission personally reviewed, in addition to acute findings, some degree general frontal lobe atrophy and chronic microvascular changes are also appreciated  -no recent TSH or B12, consider checking  -underlying dementia markedly increases risk of delirium during hospitalization, continue strict delirium precautions  -encourage use of sensory assist devices such as corrective lenses and hearing aids at all appropriate times to reduce risk sensory impairment contributing to isolation, confusion, encephalopathy more precipitous cognitive decline  -appreciate family at bedside for familiarity and reassurance  -redirect unwanted behaviors as first-line and minimize/avoid use of sedating medications as possible to reduce risk contributing to worsening confusion/encephalopathy and allow clear neuro exam given presentation with acute subarachnoid hemorrhage  -maintain calm and quiet environment reduce risk overstimulation    Anxiety  -maintained on amitriptyline 25 mg daily at bedtime chronically as outpatient, given increased risk of falls and confusion with use in older adult population consider long-term goal tapering to off, do not abruptly stop due to risk of withdrawal symptoms, target taper would be no more than 25% weekly or every other week however due to risk hyponatremia and confusion during hospitalization may need to temporarily hold dose to prevent worsening hyponatremia in setting of SAH with very high risk developing SIADH type symptoms   -encourage calm and quiet environment and provide frequent reassurance  -avoid benzodiazepines due to underlying dementia and risk of worsening confusion and increased risk of falls    COPD   -not maintained on supplemental O2 at baseline  -previously on Advair and Flonase  -monitor respiratory status closely    Chronic diastolic heart failure  -home regimen includes Lasix, hydrochlorothiazide, and lisinopril  -monitor volume status closely  -close outpatient follow-up with PCP and Cardiology    Chronic constipation and dyspepsia  -follows Dr Kai Guzman (GI) last seen 7/7/22  -maintained on MiraLax, Bentyl (not yet started), Carafate and omeprazole  -continue appropriate diet and avoid trigger foods  -continue close outpatient follow-up with GI    Impaired Vision  -recommend use of corrective lenses at all appropriate times  -encourage adequate lighting and encourage use of assistance with ambulation  -keep personal belongings close to person to avoid reaching  -consider large font for printed materials provided to patient    Impaired mastication  -Requires use of dentures -encourage use of appropriate times and ensure appropriately fitting  -ensure meal consistency appropriate for abilities  -continue aspiration precautions    Impaired Hearing  -Encourage use of hearing aids at all appropriate times  -encourage providers and caregivers to speak slowly and clearly directly to patient  -minimize background noise to encourage patient engagement  -recommend use of hearing amplifier to reduce risk of straining to hear if hearing aids are not present or are not sufficient   -encourage use of teach back method to ensure clear communication    Deconditioning/debility/frailty  -clinical frailty scale stage 5/6 moderately frail  -multifactorial including age, advanced dementia and multiple chronic medical comorbidities now with and acute intracerebral hemorrhage superimposed on elderly individual with limited physiologic and metabolic reserves  -albumin 4 0, continue to encourage well-balanced nutrition, consider supplementation between meals if oral intake is poor  -continue optimization chronic conditions and address acute derangements as arise  -ensure that anxiety/depression/mood symptoms are adequately controlled as may impact patient's response to therapies as well as overall sense of well-being and quality of life  -continue psychosocial supports of patient and family    Delirium precautions  -Patient is high risk of delirium due to age, fall, traumatic injuries, acute pain, hospitalization, dementia, intracerebral and polypharmacy  -Initiate delirium precautions  -maintain normal sleep/wake cycle  -minimize overnight interruptions, group overnight vitals/labs/nursing checks as possible  -dim lights, close blinds and turn off tv to minimize stimulation and encourage sleep environment in evenings  -ensure that pain is well controlled  -monitor for fecal and urinary retention which may precipitate delirium  -encourage early mobilization and ambulation with assistance once cleared to safely do so by Trauma and neuro surgical services  -provide frequent reorientation and redirection as indicated and appropriate  -encourage family and friends at the bedside to help help calm patient if anxious -for familiarity and reassurance  -minimize use of medications which may precipitate or worsen delirium such as tramadol, benzodiazepine, anticholinergics, and benadryl  -encourage hydration and nutrition   -redirect unwanted behaviors as first line    Home medication review  Medications confirmed with son (who manages patients medications) at bedside, read directly off dispense packs which remained in sons possession:    Atorvastatin 20 mg daily  Lasix 40 mg daily  Hydrochlorothiazide 12 5 mg daily  Lisinopril 40 mg daily  Omeprazole 40 mg daily  Sucralfate 1g daily (written as QID but only using daily as symptoms improved)  Amitriptyline 25 mg HS    Care coordination: rounded with [address] (RN)    History of Present Illness   Physician Requesting Consult: Kristine Guzman MD  Reason for Consult / Principal Problem:  Fall  Hx and PE limited by: Dementia  Additional history obtained from: Chart review and patient evaluation, son at bedside     HPI: Satnam Myers is a 80y o  year old female with hypertension, hyperlipidemia, GERD, moderate to severe dementia, COPD, malignant neoplasm of overlapping sites of left breast, ambulatory dysfunction with recurrent falls, impaired vision, impaired mastication, and impaired hearing who is admitted to [address] [place] [address] from the [facility] where she initially presented with report of blunt head trauma sustained when she rolled out of bed the evening prior, she is being seen in consultation by Geriatrics for dementia and high risk developing delirium during hospitalization  Geraldine is seen and examined at bedside where she is sitting with her son at her side, she has advanced dementia and son is caregiver, he assists with HPI  He explains that the patient fell out bed at approximately 0300 hours Monday morning, she had reported head strike with no loss of consciousness  She has been complaining of severe headache since that time and her son notes that she is much more confused, restless and anxious since the event  She resides at home with her son who is her primary caregiver, she has dementia and requires assist with all ADLs and iADLs, she is usually generally oriented but forgetful  She has no history of sundowning or behavorial disturbances but is at baseline very repetitive requiring constant redirection and reassurance  She mobilizes with use of Rollator walker, family denies any falls within the past 6 months  She requires use of corrective lenses and dentures, she has impaired hearing however does not currently use hearing aids        Inpatient consult to Gerontology  Consult performed by: Supriya Tejeda DO  Consult ordered by: Shawanda Randolph DO        Review of Systems   Unable to perform ROS: Mental status change     Historical Information   Past Medical History:   Diagnosis Date    [facility]chronic obstructive [facility] disease) [facility]     [facility]     Diverticulosis 2001    GERD (gastroesophageal reflux disease)     Glaucoma     Heart failure (HCC)     History of cystocele     Hypercholesterolemia     Hypertension     Obesity     Positive PPD 1999    treated     Past Surgical History:   Procedure Laterality Date    BUNIONECTOMY Left 08/25/2021    Procedure: Rohini Law, EXCISION OF WOUND;  Surgeon: Crystal Goodman DPM;  Location: [address] [place] [address];  Service: Podiatry    CARDIOVASCULAR STRESS TEST  2001    dobutamine stress test    CATARACT EXTRACTION      CHOLECYSTECTOMY  04/2002    COLONOSCOPY      CYSTOCELE REPAIR      HYSTERECTOMY      KNEE SURGERY Bilateral     knee replacement    SD REMOVAL DEEP IMPLANT Left 08/25/2021    Procedure: REMOVAL HARDWARE FOOT;  Surgeon: Crystal Goodman DPM;  Location: [address] [place] [address];  Service: Podiatry    UPPER GASTROINTESTINAL ENDOSCOPY       Social History   Social History     Substance and Sexual Activity   Alcohol Use Not Currently     Social History     Substance and Sexual Activity   Drug Use Not Currently     Social History     Tobacco Use   Smoking Status Never Smoker   Smokeless Tobacco Never Used     Family History:   Family History   Problem Relation Age of Onset    No Known Problems Mother     No Known Problems Father     No Known Problems Maternal Grandfather     No Known Problems Paternal Grandmother     No Known Problems Paternal Grandfather      Meds/Allergies   all current active meds have been reviewed    No Known Allergies    Objective   No intake or output data in the 24 hours ending 08/16/22 0840  Invasive Devices  Report    Peripheral Intravenous Line  Duration           Peripheral IV 10/30/21 Left Forearm 289 days    Peripheral IV 08/16/22 Right Forearm <1 day              Physical Exam  Vitals and nursing note reviewed  Constitutional:       General: She is not in acute distress  Appearance: She is not toxic-appearing  HENT:      Head: Normocephalic        Comments: Swelling post left occiput Nose: Nose normal       Mouth/Throat:      Mouth: Mucous membranes are dry  Comments: Upper dentures in place  Eyes:      General: No scleral icterus  Right eye: No discharge  Left eye: No discharge  Conjunctiva/sclera: Conjunctivae normal    Neck:      Comments: Trachea midline  Cardiovascular:      Rate and Rhythm: Normal rate  Pulses: Normal pulses  Heart sounds: Murmur heard  Pulmonary:      Effort: Pulmonary effort is normal  No respiratory distress  Breath sounds: No wheezing  Comments: Saturating well on room air  Abdominal:      General: There is no distension  Palpations: Abdomen is soft  Tenderness: There is no abdominal tenderness  Musculoskeletal:      Cervical back: Neck supple  Right lower leg: No edema  Left lower leg: No edema  Comments: Obese body habitus, reduced overall muscle mass   Skin:     General: Skin is warm and dry  Neurological:      Mental Status: She is alert  Comments: Awake and alert, confused and very repetitive requiring constant redirection/reassurance of family and staff   Psychiatric:      Comments: Anxious, restless, inattentive       Lab Results:   I have personally reviewed pertinent lab results      I have personally reviewed the following imaging study reports in PACS:    8/16/22-CT head without contrast, CT C-spine without contrast    Therapies:   PT:  Pending  OT:  Pending    VTE Prophylaxis: RX contraindicated due to: Acute subarachnoid hemorrhage    Code Status: Level 1 - Full Code  Advance Directive and Living Will:      Power of :    POLST:      Family and Social Support:  Son    Goals of Care:  Pain control

## 2022-08-16 NOTE — EMTALA/ACUTE CARE TRANSFER
[facility] EMERGENCY DEPARTMENT  [address] [facility] [address]  [phone]  Dept: [phone]      EMTALA TRANSFER CONSENT    NAME Tyron Boxer 1934                              MRN [id number]    I have been informed of my rights regarding examination, treatment, and transfer   by Dr Marcus Denson,     Benefits: Specialized equipment and/or services available at the receiving facility (Include comment)________________________    Risks: Potential for delay in receiving treatment, Potential deterioration of medical condition, Loss of IV, Increased discomfort during transfer, Possible worsening of condition or death during transfer      Consent for Transfer:  I acknowledge that my medical condition has been evaluated and explained to me by the emergency department physician or other qualified medical person and/or my attending physician, who has recommended that I be transferred to the service of  Accepting Physician: Milo szymanski    The above potential benefits of such transfer, the potential risks associated with such transfer, and the probable risks of not being transferred have been explained to me, and I fully understand them  The doctor has explained that, in my case, the benefits of transfer outweigh the risks  I agree to be transferred  I authorize the performance of emergency medical procedures and treatments upon me in both transit and upon arrival at the receiving facility  Additionally, I authorize the release of any and all medical records to the receiving facility and request they be transported with me, if possible  I understand that the safest mode of transportation during a medical emergency is an ambulance and that the Hospital advocates the use of this mode of transport   Risks of traveling to the receiving facility by car, including absence of medical control, life sustaining equipment, such as oxygen, and medical personnel has been explained to me and I fully understand them  (JERROD CORRECT BOX BELOW)  [  ]  I consent to the stated transfer and to be transported by ambulance/helicopter  [  ]  I consent to the stated transfer, but refuse transportation by ambulance and accept full responsibility for my transportation by car  I understand the risks of non-ambulance transfers and I exonerate the Hospital and its staff from any deterioration in my condition that results from this refusal     X___________________________________________    DATE  08/16/22  TIME________  Signature of patient or legally responsible individual signing on patient behalf           RELATIONSHIP TO PATIENT_________________________          Provider Certification    NAME Stanford Johns 1934                              MRN [id number]    A medical screening exam was performed on the above named patient  Based on the examination:    Condition Necessitating Transfer The primary encounter diagnosis was Fall, initial encounter  Diagnoses of Injury of head, initial encounter and Subarachnoid bleed [facility]) were also pertinent to this visit      Patient Condition: The patient has been stabilized such that within reasonable medical probability, no material deterioration of the patient condition or the condition of the unborn child(mati) is likely to result from the transfer    Reason for Transfer: Level of Care needed not available at this facility    Transfer Requirements: Facility     · Space available and qualified personnel available for treatment as acknowledged by    · Agreed to accept transfer and to provide appropriate medical treatment as acknowledged by       [facility]  · Appropriate medical records of the examination and treatment of the patient are provided at the time of transfer   [address] _______  · Transfer will be performed by qualified personnel from    and appropriate transfer equipment as required, including the use of necessary and appropriate life support measures  Provider Certification: I have examined the patient and explained the following risks and benefits of being transferred/refusing transfer to the patient/family:  General risk, such as traffic hazards, adverse weather conditions, rough terrain or turbulence, possible failure of equipment (including vehicle or aircraft), or consequences of actions of persons outside the control of the transport personnel      Based on these reasonable risks and benefits to the patient and/or the unborn child(mati), and based upon the information available at the time of the patients examination, I certify that the medical benefits reasonably to be expected from the provision of appropriate medical treatments at another medical facility outweigh the increasing risks, if any, to the individuals medical condition, and in the case of labor to the unborn child, from effecting the transfer      X____________________________________________ DATE 08/16/22        TIME_______      ORIGINAL - SEND TO MEDICAL RECORDS   COPY - SEND WITH PATIENT DURING TRANSFER

## 2022-08-16 NOTE — ED PROVIDER NOTES
History  Chief Complaint   Patient presents with    Fall     Pt arrives with reports of a earl Monday @ 0300  Son says that the patient rolled out of her bed in her sleep  She has been reporting L posterior head pain as well as bruising on her R shoulder  Pt last received tylenol around 250       49-year-old female presents with complaint of blunt head trauma  She reportedly rolled out of bed yesterday morning at 3:00 a m  Kori Ferraro There was no loss of consciousness and other than a headache she has had no other acute issues or concerns  She has taken Tylenol only minimal improvement of symptoms  She has had no change in vision, recurrent falls, vomiting, focal neurological deficits, or other acute issues  Head Injury w/unknown LOC  Location:  Occipital  Time since incident:  1 day  Mechanism of injury: fall    Fall:     Fall occurred:  From a bed    Impact surface:  Hard floor    Entrapped after fall: no    Pain details:     Quality:  Throbbing    Severity:  Moderate    Timing:  Constant    Progression:  Waxing and waning  Chronicity:  New  Relieved by:  Nothing  Worsened by:  Nothing  Ineffective treatments:  OTC medications  Associated symptoms: no blurred vision, no difficulty breathing, no disorientation, no double vision, no focal weakness, no headaches, no hearing loss, no loss of consciousness, no memory loss, no nausea, no neck pain, no numbness, no seizures, no tinnitus and no vomiting        Prior to Admission Medications   Prescriptions Last Dose Informant Patient Reported? Taking? Advair Diskus 500-50 MCG/DOSE inhaler  Child No No   Sig: INHALE 1 PUFF BY MOUTH 2 TIMES A DAY  RINSE MOIUTH AFTER USE   Calcium Carbonate-Vitamin D (CALCIUM 500 + D) 500-125 MG-UNIT TABS  Child Yes No   Sig: every 24 hours   Diclofenac Sodium (VOLTAREN) 1 %  Child No No   Sig: APPLY 2 GRAMS 4 TIMES A DAY TOPICALLY   Elastic Bandages & Supports (Medical Compression Stockings) MISC  Child No No   Sig: by Does not apply route daily High knee compression stockings 20-30 MMHG   Misc  Devices (CANE) MISC  Child Yes No   Sig: Four point aluminum cane  Procto-Med HC 2 5 % rectal cream  Child No No   Sig: APPLY TOPICALLY TWO TIMES DAILY TO RECTAL AREA   acetaminophen (TYLENOL) 500 mg tablet  Child No No   Sig: Take 2 tablets (1,000 mg total) by mouth every 6 (six) hours as needed for mild pain   albuterol (PROVENTIL HFA,VENTOLIN HFA) 90 mcg/act inhaler  Child No No   Sig: Inhale 2 puffs every 6 (six) hours as needed for wheezing   aluminum-magnesium hydroxide-simethicone (MAALOX MAX) 400-400-40 MG/5ML suspension  Child No No   Sig: Take 10 mL by mouth every 6 (six) hours as needed for indigestion or heartburn (abdominal pain)   amitriptyline (ELAVIL) 25 mg tablet  Child Yes No   Sig: TAKE ONE TABLET BY MOUTH DAILY AT BEDTIME  atorvastatin (LIPITOR) 20 mg tablet  Child No No   Sig: TAKE 1 TABLET BY MOUTH ONCE DAILY     benzonatate (TESSALON PERLES) 100 mg capsule  Child No No   Sig: Take 1 capsule (100 mg total) by mouth 3 (three) times a day as needed for cough   dicyclomine (BENTYL) 20 mg tablet   No No   Sig: Take 1 tablet (20 mg total) by mouth 3 (three) times a day as needed (abdominal pain)   docusate sodium (COLACE) 100 mg capsule  Child No No   Sig: Take 1 capsule (100 mg total) by mouth 2 (two) times a day   fluticasone (FLONASE) 50 mcg/act nasal spray  Child No No   Si sprays into each nostril daily   furosemide (LASIX) 40 mg tablet  Child No No   Sig: TAKE ONE TABLET BY MOUTH EVERY DAY   guaiFENesin (ROBITUSSIN) 100 MG/5ML oral liquid  Child No No   Sig: Take 10 mL (200 mg total) by mouth 3 (three) times a day as needed for cough   hydrochlorothiazide (HYDRODIURIL) 12 5 mg tablet  Child No No   Sig: TAKE ONE TABLET BY MOUTH EVERY DAY   latanoprost (XALATAN) 0 005 % ophthalmic solution  Child Yes No   lidocaine (XYLOCAINE) 5 % ointment  Child No No   Sig: Apply topically 2 (two) times a day as needed for mild pain lisinopril (ZESTRIL) 40 mg tablet  Child No No   Sig: TAKE ONE TABLET BY MOUTH EVERY DAY   Patient not taking: Reported on 7/7/2022   omeprazole (PriLOSEC) 40 MG capsule  Child No No   Sig: Take 1 capsule (40 mg total) by mouth daily Half an hour before breakfast   sucralfate (CARAFATE) 1 g tablet   No No   Sig: Take 1 tablet (1 g total) by mouth 4 (four) times a day   sucralfate (CARAFATE) 1 g/10 mL suspension   No No   Sig: Take 10 mL (1 g total) by mouth 3 (three) times a day   timolol (BETIMOL) 0 5 % ophthalmic solution  Child Yes No   Sig: Administer 1 drop to both eyes 2 (two) times a day    timolol (TIMOPTIC) 0 5 % ophthalmic solution  Child Yes No      Facility-Administered Medications: None       Past Medical History:   Diagnosis Date    [facility]ch[facility] obstructive pulmonary disease) ([facility])     Dementia ([facility] [address] )     Diverticulosis 2001    GERD (gastroesophageal reflux disease)     Glaucoma     Heart failure ([facility])     History of cystocele     Hypercholesterolemia     Hypertension     Obesity     Positive PPD 1999    treated       Past Surgical History:   Procedure Laterality Date    BUNIONECTOMY Left 08/25/2021    Procedure: Concepcion Limber, EXCISION OF WOUND;  Surgeon: Karan Nam DPM;  Location: [address] [place] [address] OR;  Service: Podiatry    CARDIOVASCULAR STRESS TEST  2001    dobutamine stress test    CATARACT EXTRACTION      CHOLECYSTECTOMY  04/2002    COLONOSCOPY      CYSTOCELE REPAIR      HYSTERECTOMY      KNEE SURGERY Bilateral     knee replacement    [place] REMOVAL DEEP IMPLANT Left 08/25/2021    Procedure: REMOVAL HARDWARE FOOT;  Surgeon: Karan Nam DPM;  Location: [address] [place] [address] OR;  Service: Podiatry    UPPER GASTROINTESTINAL ENDOSCOPY         Family History   Problem Relation Age of Onset    No Known Problems Mother     No Known Problems Father     No Known Problems Maternal Grandfather     No Known Problems Paternal Grandmother     No Known Problems Paternal Grandfather      I have reviewed and agree with the history as documented  E-Cigarette/Vaping    E-Cigarette Use Never User      E-Cigarette/Vaping Substances    Nicotine No     THC No     CBD No     Flavoring No     Other No     Unknown No      Social History     Tobacco Use    Smoking status: Never Smoker    Smokeless tobacco: Never Used   Vaping Use    Vaping Use: Never used   Substance Use Topics    Alcohol use: Not Currently    Drug use: Not Currently       Review of Systems   HENT: Negative for hearing loss and tinnitus  Eyes: Negative for blurred vision, double vision, photophobia and visual disturbance  Respiratory: Negative for shortness of breath  Cardiovascular: Negative for chest pain  Gastrointestinal: Negative for abdominal pain, constipation, diarrhea, nausea and vomiting  Musculoskeletal: Negative for neck pain and neck stiffness  Skin: Negative for wound  Neurological: Negative for dizziness, focal weakness, seizures, loss of consciousness, weakness, numbness and headaches  Hematological: Does not bruise/bleed easily  Psychiatric/Behavioral: Negative for memory loss  All other systems reviewed and are negative  Physical Exam  Physical Exam  Vitals and nursing note reviewed  Constitutional:       General: She is not in acute distress  Appearance: Normal appearance  She is well-developed  She is not ill-appearing or toxic-appearing  HENT:      Head: Normocephalic and atraumatic  Right Ear: External ear normal       Left Ear: External ear normal       Nose: Nose normal  No congestion  Mouth/Throat:      Mouth: Mucous membranes are moist       Pharynx: Oropharynx is clear  Eyes:      Conjunctiva/sclera: Conjunctivae normal       Pupils: Pupils are equal, round, and reactive to light  Cardiovascular:      Rate and Rhythm: Normal rate and regular rhythm  Heart sounds: Normal heart sounds     Pulmonary:      Effort: Pulmonary effort is normal  No respiratory distress  Breath sounds: Normal breath sounds  No wheezing  Abdominal:      General: Bowel sounds are normal       Palpations: Abdomen is soft  Tenderness: There is no abdominal tenderness  There is no guarding  Musculoskeletal:         General: No tenderness or deformity  Cervical back: Normal range of motion and neck supple  No rigidity  Skin:     General: Skin is warm and dry  Capillary Refill: Capillary refill takes less than 2 seconds  Findings: No rash  Neurological:      General: No focal deficit present  Mental Status: She is alert and oriented to person, place, and time  Psychiatric:         Mood and Affect: Mood is anxious  Speech: Speech normal          Behavior: Behavior normal  Behavior is cooperative  Vital Signs  ED Triage Vitals [08/16/22 0520]   Temperature Pulse Respirations Blood Pressure SpO2   97 5 °F (36 4 °C) 76 18 103/81 97 %      Temp Source Heart Rate Source Patient Position - Orthostatic VS BP Location FiO2 (%)   Tympanic Monitor Sitting Left arm --      Pain Score       10 - Worst Possible Pain           Vitals:    08/16/22 0520   BP: 103/81   Pulse: 76   Patient Position - Orthostatic VS: Sitting         Visual Acuity      ED Medications  Medications - No data to display    Diagnostic Studies  Results Reviewed     Procedure Component Value Units Date/Time    CBC and differential [[id number]]     Lab Status: No result Specimen: Blood     Comprehensive metabolic panel [[id number]]     Lab Status: No result Specimen: Blood     Protime-INR [[id number]]     Lab Status: No result Specimen: Blood     APTT [[id number]]     Lab Status: No result Specimen: Blood     UA (URINE) with reflex to Scope [[id number]]     Lab Status: No result Specimen: Urine                  CT head without contrast   Final Result by Abdirizak Noriega MD (08/16 4021)      Trace right mid superior frontal subarachnoid hemorrhage        No acute intraparenchymal hemorrhage  No skull fracture  Chronic microangiopathy  I personally discussed this study with Althaddeuskristi Kacey on 8/16/2022 at 6:24 AM                      Workstation performed: [id number]         CT spine cervical without contrast   Final Result by Georgiana Ham MD (08/16 8203)      No cervical spine fracture or traumatic malalignment  Workstation performed: [id number]                    Procedures  ECG 12 Lead Documentation Only    Date/Time: 8/16/2022 5:27 AM  Performed by: Lino Anderson DO  Authorized by: Lino Anderson DO     ECG reviewed by me, the ED Provider: yes    Patient location:  ED  Rate:     ECG rate:  77    ECG rate assessment: normal    Rhythm:     Rhythm: sinus rhythm    QRS:     QRS axis:  Left  Conduction:     Conduction: normal    ST segments:     ST segments:  Normal  T waves:     T waves: non-specific               ED Course                                             MDM    Disposition  Final diagnoses:   Fall, initial encounter   Injury of head, initial encounter   Subarachnoid bleed ([address] )     Time reflects when diagnosis was documented in both MDM as applicable and the Disposition within this note     Time User Action Codes Description Comment    8/16/2022  6:25 AM [facility] Add [Y53  WWBW] Fall, initial encounter     8/16/2022  6:25 AM [facility] Add [E01 [id number]] Injury of head, initial encounter     [facility]16[facility]20[facility]  6[facility] 9] Subarachnoid bleed [facility])       ED Disposition     ED Disposition   Transfer to Another Facility-In Network    Condition   --    Date/Time   Tue Aug 16, 2022  6:28 AM    Comment   G[facility] should be transferred out to [facility]            MD Documentation    Flowsheet Row Most Recent Value   Patient Condition The patient has been stabilized such that within reasonable medical probability, no material deterioration of the patient condition or the condition of the unborn child(mati) is likely to result from the transfer   Reason for Transfer Level of Care needed not available at this facility   Benefits of Transfer Specialized equipment and/or services available at the receiving facility (Include comment)________________________   Risks of Transfer Potential for delay in receiving treatment, Potential deterioration of medical condition, Loss of IV, Increased discomfort during transfer, Possible worsening of condition or death during transfer   Accepting Physician Nicole Gay MD [facility]   Provider Certification General risk, such as traffic hazards, adverse weather conditions, rough terrain or turbulence, possible failure of equipment (including vehicle or aircraft), or consequences of actions of persons outside the control of the transport personnel      Follow-up Information    None         Patient's Medications   Discharge Prescriptions    No medications on file       No discharge procedures on file      PDMP Review     None          ED Provider  Electronically Signed by           Pramod West DO  08/16/22 8515

## 2022-08-16 NOTE — ED NOTES
Report given to Doris Thapa RN at [facility] ED       Austen Yoder RN  08/16/22 9002

## 2022-08-16 NOTE — ASSESSMENT & PLAN NOTE
Pt is s/p fall with trace right frontal acute SAH  Pt with bilateral chronic hygromas  · Imaging reviewed personally and by attending  Final results as below  · CT head wo 8/16/22: Trace right mid superior frontal subarachnoid hemorrhage  Progressive hypodense thickening of bifrontal extra-axial space likely progressive volume loss of chronic hygromas, maximum thickness 1 4 cm on the right  No acute hydrocephalus  Mild prominence of CSF spaces diffusely attributed to generalized volume loss  Plan  · Continue frequent neurological checks  · STAT CT head with any neurological decline or a decrease in GCS of 2pts within 1 hr   · Recommend SBP <160 mmHg  · Keppra for seizure ppx per primary team   · Hold/ avoid all antiplatelet and anticoagulation medications  No reported AC/AP therapy  · Pain control per primary team  · Mobilize and eval by PT/OT when able to  · DVT PPX: SCDs only at this time  Would recommend  obtaining a stable CT head wo prior to initiation of pharmacological DVT PPX  · Ongoing medical management per primary team   · No neurosurgical intervention is anticipated at this time  · Repeat CTH tomorrow AM     · Neurosurgery will review repeat [facility] when completed  Please call with any questions/concerns

## 2022-08-16 NOTE — CONSULTS
[address] [facility] 1934, 80 y o  female MRN: [id number]  Unit/Bed#: [facility] 603-01 Encounter: [id number]  Primary Care Provider: Yesi Abdi MD   Date and time admitted to hospital: 8/16/2022  7:36 AM    Inpatient consult to Neurosurgery  Consult performed by: Shaila Pickard PA-C  Consult ordered by: Leonila Merrill DO          Subarachnoid hemorrhage [facility])  Assessment & Plan  Pt is s/p fall with trace right frontal acute SAH  Pt with bilateral chronic hygromas  · Imaging reviewed personally and by attending  Final results as below  · CT head wo 8/16/22: Trace right mid superior frontal subarachnoid hemorrhage  Progressive hypodense thickening of bifrontal extra-axial space likely progressive volume loss of chronic hygromas, maximum thickness 1 4 cm on the right  No acute hydrocephalus  Mild prominence of CSF spaces diffusely attributed to generalized volume loss  Plan  · Continue frequent neurological checks  · STAT CT head with any neurological decline or a decrease in GCS of 2pts within 1 hr   · Recommend SBP <160 mmHg  · Keppra for seizure ppx per primary team   · Hold/ avoid all antiplatelet and anticoagulation medications  No reported AC/AP therapy  · Pain control per primary team  · Mobilize and eval by PT/OT when able to  · DVT PPX: SCDs only at this time  Would recommend  obtaining a stable CT head wo prior to initiation of pharmacological DVT PPX  · Ongoing medical management per primary team   · No neurosurgical intervention is anticipated at this time  · Repeat CTH tomorrow AM     · Neurosurgery will review repeat [address] [place] [address] when completed  Please call with any questions/concerns  Patient personally accessed and examined pm 8/16/22 at 2 55 pm    History of Present Illness   History, ROS and PFSH obtained from pt and chart review     HPI: Isabel Preston is a 80 y o  Chinese speaking  female with PMH including COPD, GERD, CHF, Dementia, HTN and hyperlipidemia who presented to the ED s/p fall at home  Per report pt sustained head trauma when she rolled out of bed  Per report, she had + head stroke but no loss of consciousness  Pt reports she has a severe headache  Per pt's son, pt is more confused, restless and anxious  Pt resides at home with her son who is her caregiver  She has dementia and requires assistance with ADLs  She mobilizes with a rollator walker  Review of Systems   Unable to perform ROS: Mental status change   Neurological: Positive for headaches  Psychiatric/Behavioral: Positive for confusion         Historical Information   Past Medical History:   Diagnosis Date    COPD [facility]chronic obstructive pulmonary disease) [facility] [address] [facility]     Dementia ([facility] [address] )     Diverticulosis 2001    GERD (gastroesophageal reflux disease)     Glaucoma     Heart failure (HCC)     History of cystocele     Hypercholesterolemia     Hypertension     Obesity     Positive PPD 1999    treated     Past Surgical History:   Procedure Laterality Date    BUNIONECTOMY Left 08/25/2021    Procedure: Lajune Fees, EXCISION OF WOUND;  Surgeon: Geeta Rodriguez DPM;  Location: [facility] MAIN OR;  Service: Podiatry    CARDIOVASCULAR STRESS TEST  2001    dobutamine stress test    CATARACT EXTRACTION      CHOLECYSTECTOMY  04/2002    COLONOSCOPY      CYSTOCELE REPAIR      HYSTERECTOMY      KNEE SURGERY Bilateral     knee replacement    UT REMOVAL DEEP IMPLANT Left 08/25/2021    Procedure: REMOVAL HARDWARE FOOT;  Surgeon: Geeta Rodriguez DPM;  Location:  MAIN OR;  Service: Podiatry    UPPER GASTROINTESTINAL ENDOSCOPY       Social History     Substance and Sexual Activity   Alcohol Use Not Currently     Social History     Substance and Sexual Activity   Drug Use Not Currently     Social History     Tobacco Use   Smoking Status Never Smoker   Smokeless Tobacco Never Used     Family History   Problem Relation Age of Onset    No Known Problems Mother     No Known Problems Father     No Known Problems Maternal Grandfather     No Known Problems Paternal Grandmother     No Known Problems Paternal Grandfather        Meds/Allergies   all current active meds have been reviewed, current meds:   Current Facility-Administered Medications   Medication Dose Route Frequency    acetaminophen (TYLENOL) tablet 650 mg  650 mg Oral Q6H PRN    levETIRAcetam (KEPPRA) tablet 500 mg  500 mg Oral Q12H [address]    lidocaine (LIDODERM) 5 % patch 1 patch  1 patch Topical Daily    methocarbamol (ROBAXIN) tablet 500 mg  500 mg Oral Q6H PRN    ondansetron (ZOFRAN-ODT) dispersible tablet 4 mg  4 mg Oral Q6H PRN    pantoprazole (PROTONIX) EC tablet 20 mg  20 mg Oral Early Morning    and PTA meds:   Prior to Admission Medications   Prescriptions Last Dose Informant Patient Reported? Taking? Advair Diskus 500-50 MCG/DOSE inhaler  Child No No   Sig: INHALE 1 PUFF BY MOUTH 2 TIMES A DAY  RINSE MOIUTH AFTER USE   Calcium Carbonate-Vitamin D (CALCIUM 500 + D) 500-125 MG-UNIT TABS  Child Yes No   Sig: every 24 hours   Diclofenac Sodium (VOLTAREN) 1 %  Child No No   Sig: APPLY 2 GRAMS 4 TIMES A DAY TOPICALLY   Elastic Bandages & Supports (Medical Compression Stockings) MISC  Child No No   Sig: by Does not apply route daily High knee compression stockings 20-30 MMHG   Misc  Devices (CANE) MISC  Child Yes No   Sig: Four point aluminum cane     Procto-Med HC 2 5 % rectal cream  Child No No   Sig: APPLY TOPICALLY TWO TIMES DAILY TO RECTAL AREA   acetaminophen (TYLENOL) 500 mg tablet  Child No No   Sig: Take 2 tablets (1,000 mg total) by mouth every 6 (six) hours as needed for mild pain   albuterol (PROVENTIL HFA,VENTOLIN HFA) 90 mcg/act inhaler  Child No No   Sig: Inhale 2 puffs every 6 (six) hours as needed for wheezing   aluminum-magnesium hydroxide-simethicone (MAALOX MAX) 400-400-40 MG/5ML suspension  Child No No   Sig: Take 10 mL by mouth every 6 (six) hours as needed for indigestion or heartburn (abdominal pain)   amitriptyline (ELAVIL) 25 mg tablet  Child Yes No   Sig: TAKE ONE TABLET BY MOUTH DAILY AT BEDTIME  atorvastatin (LIPITOR) 20 mg tablet  Child No No   Sig: TAKE 1 TABLET BY MOUTH ONCE DAILY     benzonatate (TESSALON PERLES) 100 mg capsule  Child No No   Sig: Take 1 capsule (100 mg total) by mouth 3 (three) times a day as needed for cough   dicyclomine (BENTYL) 20 mg tablet   No No   Sig: Take 1 tablet (20 mg total) by mouth 3 (three) times a day as needed (abdominal pain)   docusate sodium (COLACE) 100 mg capsule  Child No No   Sig: Take 1 capsule (100 mg total) by mouth 2 (two) times a day   fluticasone (FLONASE) 50 mcg/act nasal spray  Child No No   Si sprays into each nostril daily   furosemide (LASIX) 40 mg tablet  Child No No   Sig: TAKE ONE TABLET BY MOUTH EVERY DAY   guaiFENesin (ROBITUSSIN) 100 MG/5ML oral liquid  Child No No   Sig: Take 10 mL (200 mg total) by mouth 3 (three) times a day as needed for cough   hydrochlorothiazide (HYDRODIURIL) 12 5 mg tablet  Child No No   Sig: TAKE ONE TABLET BY MOUTH EVERY DAY   latanoprost (XALATAN) 0 005 % ophthalmic solution  Child Yes No   lidocaine (XYLOCAINE) 5 % ointment  Child No No   Sig: Apply topically 2 (two) times a day as needed for mild pain   lisinopril (ZESTRIL) 40 mg tablet  Child No No   Sig: TAKE ONE TABLET BY MOUTH EVERY DAY   Patient not taking: Reported on 2022   omeprazole (PriLOSEC) 40 MG capsule  Child No No   Sig: Take 1 capsule (40 mg total) by mouth daily Half an hour before breakfast   sucralfate (CARAFATE) 1 g tablet   No No   Sig: Take 1 tablet (1 g total) by mouth 4 (four) times a day   sucralfate (CARAFATE) 1 g/10 mL suspension   No No   Sig: Take 10 mL (1 g total) by mouth 3 (three) times a day   timolol (BETIMOL) 0 5 % ophthalmic solution  Child Yes No   Sig: Administer 1 drop to both eyes 2 (two) times a day    timolol (TIMOPTIC) 0 5 % ophthalmic solution  Child Yes No      Facility-Administered Medications: None     No Known Allergies    Objective   I/O     None          Physical Exam  Constitutional:       General: She is not in acute distress  Appearance: She is well-developed  HENT:      Head: Normocephalic  Eyes:      Pupils: Pupils are equal, round, and reactive to light  Neck:      Trachea: No tracheal deviation  Cardiovascular:      Rate and Rhythm: Normal rate  Pulmonary:      Effort: Pulmonary effort is normal    Abdominal:      Palpations: Abdomen is soft  Tenderness: There is no abdominal tenderness  There is no guarding  Musculoskeletal:      Cervical back: Neck supple  Skin:     General: Skin is warm and dry  Coloration: Skin is not pale  Findings: No rash  Neurological:      Mental Status: She is alert  Comments:  Awake, Alert    Motor: COHEN, strength 4+/5 throughout  Reflexes: 2+ and symmetric, no zee's or clonus     Coordination: no drift bilateral upper extremities         Neurologic Exam     Cranial Nerves     CN III, IV, VI   Pupils are equal, round, and reactive to light  Vitals:Blood pressure 99/60, pulse 72, temperature (!) 97 4 °F (36 3 °C), resp  rate 18, SpO2 99 %, not currently breastfeeding  ,There is no height or weight on file to calculate BMI  Lab Results:   Results from last 7 days   Lab Units 08/16/22  0637   WBC Thousand/uL 4 88   HEMOGLOBIN g/dL 11 2*   HEMATOCRIT % 34 1*   PLATELETS Thousands/uL 243   NEUTROS PCT % 42*   MONOS PCT % 9     Results from last 7 days   Lab Units 08/16/22  0637   POTASSIUM mmol/L 3 5   CHLORIDE mmol/L 94*   CO2 mmol/L 31   BUN mg/dL 23   CREATININE mg/dL 0 87   CALCIUM mg/dL 8 9   ALK PHOS U/L 84   ALT U/L 18   AST U/L 31             Results from last 7 days   Lab Units 08/16/22  0637   INR  0 90   PTT seconds 27     No results found for: TROPONINT  ABG:No results found for: PHART, CNR6PIE, PO2ART, FST2VFA, U1VTVJFM, BEART, SOURCE    Imaging Studies: I have personally reviewed pertinent reports  EKG, Pathology, and Other Studies: I have personally reviewed pertinent reports  VTE Prophylaxis: Sequential compression device Priyank Osman)     Code Status: Level 1 - Full Code  Advance Directive and Living Will:      Power of :    POLST:      Counseling / Coordination of Care  I spent 45 minutes with the patient  PLEASE NOTE:  This encounter may have been completed utilizing the [employer]- [employer] Direct Speech Voice Recognition Software  Grammatical errors, random word insertions, pronoun errors and incomplete sentences are occasional consequences of the system due to software limitations, ambient noise and hardware issues  These may be missed even after proof reading prior to affixing electronic signature  Please do not hesitate to contact me directly if you have any questions or concerns about the content, text or information contained within the body of this dictation

## 2022-08-16 NOTE — ASSESSMENT & PLAN NOTE
Pt is s/p fall with small bifrontal acute SAH  Pt with bilateral chronic hygromas vs SDH  · Imaging reviewed personally and by attending  Final results as below  · CT head wo 8/16/22: Trace right mid superior frontal subarachnoid hemorrhage  Progressive hypodense thickening of bifrontal extra-axial space likely progressive volume loss of chronic hygromas, maximum thickness 1 4 cm on the right  No acute hydrocephalus  Mild prominence of CSF spaces diffusely attributed to generalized volume loss  · CT head wo 8/17/22: Mild interval worsening in high bifrontal subarachnoid hemorrhage  Low attenuation bilateral subdural collections are grossly stable and consistent with chronic subdural hematomas versus hygromas  Plan  · Continue frequent neurological checks  · STAT CT head with any neurological decline or a decrease in GCS of 2pts within 1 hr   · Recommend SBP <160 mmHg  · Keppra for seizure ppx per primary team   · Hold/ avoid all antiplatelet and anticoagulation medications  No reported AC/AP therapy  · Pain control per primary team  · Mobilize and eval by PT/OT when able to  · DVT PPX: SCDs only at this time  Would recommend  obtaining a stable CT head wo prior to initiation of pharmacological DVT PPX  · Ongoing medical management per primary team   · No neurosurgical intervention is anticipated at this time  · Given mild interval increased SAH, will plan repeat CTH tomorrow AM     · Neurosurgery will review repeat [facility] when completed  Please call with any questions/concerns

## 2022-08-17 ENCOUNTER — APPOINTMENT (INPATIENT)
Dept: RADIOLOGY | Facility: HOSPITAL | Age: 87
DRG: 086 | End: 2022-08-17
Payer: MEDICARE

## 2022-08-17 PROBLEM — E87.1 HYPONATREMIA: Status: ACTIVE | Noted: 2022-01-01

## 2022-08-17 LAB
ANION GAP SERPL CALCULATED.3IONS-SCNC: 7 MMOL/L (ref 4–13)
BASOPHILS # BLD AUTO: 0.04 THOUSANDS/ΜL (ref 0–0.1)
BASOPHILS NFR BLD AUTO: 1 % (ref 0–1)
BUN SERPL-MCNC: 17 MG/DL (ref 5–25)
CALCIUM SERPL-MCNC: 8.9 MG/DL (ref 8.3–10.1)
CHLORIDE SERPL-SCNC: 97 MMOL/L (ref 96–108)
CO2 SERPL-SCNC: 28 MMOL/L (ref 21–32)
CREAT SERPL-MCNC: 0.84 MG/DL (ref 0.6–1.3)
EOSINOPHIL # BLD AUTO: 0.88 THOUSAND/ΜL (ref 0–0.61)
EOSINOPHIL NFR BLD AUTO: 18 % (ref 0–6)
ERYTHROCYTE [DISTWIDTH] IN BLOOD BY AUTOMATED COUNT: 12.7 % (ref 11.6–15.1)
GFR SERPL CREATININE-BSD FRML MDRD: 62 ML/MIN/1.73SQ M
GLUCOSE SERPL-MCNC: 133 MG/DL (ref 65–140)
HCT VFR BLD AUTO: 34.7 % (ref 34.8–46.1)
HGB BLD-MCNC: 11.3 G/DL (ref 11.5–15.4)
IMM GRANULOCYTES # BLD AUTO: 0 THOUSAND/UL (ref 0–0.2)
IMM GRANULOCYTES NFR BLD AUTO: 0 % (ref 0–2)
LYMPHOCYTES # BLD AUTO: 1.25 THOUSANDS/ΜL (ref 0.6–4.47)
LYMPHOCYTES NFR BLD AUTO: 25 % (ref 14–44)
MCH RBC QN AUTO: 29 PG (ref 26.8–34.3)
MCHC RBC AUTO-ENTMCNC: 32.6 G/DL (ref 31.4–37.4)
MCV RBC AUTO: 89 FL (ref 82–98)
MONOCYTES # BLD AUTO: 0.35 THOUSAND/ΜL (ref 0.17–1.22)
MONOCYTES NFR BLD AUTO: 7 % (ref 4–12)
NEUTROPHILS # BLD AUTO: 2.47 THOUSANDS/ΜL (ref 1.85–7.62)
NEUTS SEG NFR BLD AUTO: 49 % (ref 43–75)
NRBC BLD AUTO-RTO: 0 /100 WBCS
PLATELET # BLD AUTO: 263 THOUSANDS/UL (ref 149–390)
PMV BLD AUTO: 8.9 FL (ref 8.9–12.7)
POTASSIUM SERPL-SCNC: 3.5 MMOL/L (ref 3.5–5.3)
RBC # BLD AUTO: 3.9 MILLION/UL (ref 3.81–5.12)
SODIUM SERPL-SCNC: 132 MMOL/L (ref 135–147)
WBC # BLD AUTO: 4.99 THOUSAND/UL (ref 4.31–10.16)

## 2022-08-17 PROCEDURE — 99232 SBSQ HOSP IP/OBS MODERATE 35: CPT | Performed by: INTERNAL MEDICINE

## 2022-08-17 PROCEDURE — G1004 CDSM NDSC: HCPCS

## 2022-08-17 PROCEDURE — 99232 SBSQ HOSP IP/OBS MODERATE 35: CPT | Performed by: PHYSICIAN ASSISTANT

## 2022-08-17 PROCEDURE — 97163 PT EVAL HIGH COMPLEX 45 MIN: CPT

## 2022-08-17 PROCEDURE — 70450 CT HEAD/BRAIN W/O DYE: CPT

## 2022-08-17 PROCEDURE — 97166 OT EVAL MOD COMPLEX 45 MIN: CPT

## 2022-08-17 PROCEDURE — 85025 COMPLETE CBC W/AUTO DIFF WBC: CPT | Performed by: INTERNAL MEDICINE

## 2022-08-17 PROCEDURE — 80048 BASIC METABOLIC PNL TOTAL CA: CPT | Performed by: INTERNAL MEDICINE

## 2022-08-17 PROCEDURE — 99233 SBSQ HOSP IP/OBS HIGH 50: CPT | Performed by: STUDENT IN AN ORGANIZED HEALTH CARE EDUCATION/TRAINING PROGRAM

## 2022-08-17 RX ORDER — FUROSEMIDE 40 MG/1
40 TABLET ORAL DAILY
Status: DISCONTINUED | OUTPATIENT
Start: 2022-08-17 | End: 2022-08-19 | Stop reason: HOSPADM

## 2022-08-17 RX ORDER — HYDROCHLOROTHIAZIDE 12.5 MG/1
12.5 TABLET ORAL DAILY
Status: DISCONTINUED | OUTPATIENT
Start: 2022-08-17 | End: 2022-08-19 | Stop reason: HOSPADM

## 2022-08-17 RX ORDER — ATORVASTATIN CALCIUM 20 MG/1
20 TABLET, FILM COATED ORAL DAILY
Status: DISCONTINUED | OUTPATIENT
Start: 2022-08-17 | End: 2022-08-19 | Stop reason: HOSPADM

## 2022-08-17 RX ORDER — SUCRALFATE 1 G/1
1 TABLET ORAL DAILY
Status: DISCONTINUED | OUTPATIENT
Start: 2022-08-17 | End: 2022-08-19 | Stop reason: HOSPADM

## 2022-08-17 RX ADMIN — ACETAMINOPHEN 650 MG: 325 TABLET ORAL at 02:20

## 2022-08-17 RX ADMIN — PANTOPRAZOLE SODIUM 20 MG: 20 TABLET, DELAYED RELEASE ORAL at 05:16

## 2022-08-17 RX ADMIN — LIDOCAINE 5% 1 PATCH: 700 PATCH TOPICAL at 09:33

## 2022-08-17 RX ADMIN — LEVETIRACETAM 500 MG: 500 TABLET, FILM COATED ORAL at 21:34

## 2022-08-17 RX ADMIN — HYDROCHLOROTHIAZIDE 12.5 MG: 12.5 TABLET ORAL at 14:23

## 2022-08-17 RX ADMIN — FUROSEMIDE 40 MG: 40 TABLET ORAL at 11:05

## 2022-08-17 RX ADMIN — SUCRALFATE 1 G: 1 TABLET ORAL at 11:05

## 2022-08-17 RX ADMIN — LEVETIRACETAM 500 MG: 500 TABLET, FILM COATED ORAL at 09:33

## 2022-08-17 RX ADMIN — ATORVASTATIN CALCIUM 20 MG: 20 TABLET, FILM COATED ORAL at 11:05

## 2022-08-17 NOTE — PLAN OF CARE
Problem: MOBILITY - ADULT  Goal: Maintain or return to baseline ADL function  Description: INTERVENTIONS:  -  Assess patient's ability to carry out ADLs; assess patient's baseline for ADL function and identify physical deficits which impact ability to perform ADLs (bathing, care of mouth/teeth, toileting, grooming, dressing, etc )  - Assess/evaluate cause of self-care deficits   - Assess range of motion  - Assess patient's mobility; develop plan if impaired  - Assess patient's need for assistive devices and provide as appropriate  - Encourage maximum independence but intervene and supervise when necessary  - Involve family in performance of ADLs  - Assess for home care needs following discharge   - Consider OT consult to assist with ADL evaluation and planning for discharge  - Provide patient education as appropriate  Outcome: Progressing     Problem: PAIN - ADULT  Goal: Verbalizes/displays adequate comfort level or baseline comfort level  Description: Interventions:  - Encourage patient to monitor pain and request assistance  - Assess pain using appropriate pain scale  - Administer analgesics based on type and severity of pain and evaluate response  - Implement non-pharmacological measures as appropriate and evaluate response  - Consider cultural and social influences on pain and pain management  - Notify physician/advanced practitioner if interventions unsuccessful or patient reports new pain  Outcome: Progressing     Problem: INFECTION - ADULT  Goal: Absence or prevention of progression during hospitalization  Description: INTERVENTIONS:  - Assess and monitor for signs and symptoms of infection  - Monitor lab/diagnostic results  - Monitor all insertion sites, i e  indwelling lines, tubes, and drains  - Monitor endotracheal if appropriate and nasal secretions for changes in amount and color  - [place] appropriate cooling/warming therapies per order  - Administer medications as ordered  - Instruct and encourage patient and family to use good hand hygiene technique  - Identify and instruct in appropriate isolation precautions for identified infection/condition  Outcome: Progressing     Problem: SAFETY ADULT  Goal: Maintain or return to baseline ADL function  Description: INTERVENTIONS:  -  Assess patient's ability to carry out ADLs; assess patient's baseline for ADL function and identify physical deficits which impact ability to perform ADLs (bathing, care of mouth/teeth, toileting, grooming, dressing, etc )  - Assess/evaluate cause of self-care deficits   - Assess range of motion  - Assess patient's mobility; develop plan if impaired  - Assess patient's need for assistive devices and provide as appropriate  - Encourage maximum independence but intervene and supervise when necessary  - Involve family in performance of ADLs  - Assess for home care needs following discharge   - Consider OT consult to assist with ADL evaluation and planning for discharge  - Provide patient education as appropriate  Outcome: Progressing

## 2022-08-17 NOTE — PROGRESS NOTES
[address] [facility]  Progress Note - Karin Jimenez 1934, 80 y o  female MRN: [id number]  Unit/Bed#: [facility] 603-01 Encounter: [id number]  Primary Care Provider: Rafael Villatoro MD   Date and time admitted to hospital: 8/16/2022  7:36 AM    Hyponatremia  Assessment & Plan  Na 132 this morning (from 131, 133)  No h/o hyponatremia per chart review      GERD (gastroesophageal reflux disease)  Assessment & Plan  Continue home lasix  Monitor fluid status    HLD (hyperlipidemia)  Assessment & Plan  Continue home statin    Benign essential hypertension  Assessment & Plan  Continue home HCTZ  Continue home lasix  Holding home lisinopril    * Subarachnoid hemorrhage ([address] )  Assessment & Plan  8/16 CT head: trace right mid superior frontal subarachnoid hemorrhage  8/17 repeat CT head: slight interval worsening  Neurosurgery following  Continue HOT protocol  Holding SQH until stable CT head  Q1hr neuro checks          Disposition: continue SD2    SUBJECTIVE:  Chief Complaint: occipital pain    Subjective: No acute events overnight  Patient complains of pain over occiput at site of hematoma  No other complains  OBJECTIVE:   Vitals:   Temp:  [97 4 °F (36 3 °C)-97 6 °F (36 4 °C)] 97 6 °F (36 4 °C)  HR:  [72-91] 91  Resp:  [16-18] 16  BP: ()/(58-91) 144/91    Intake/Output:  I/O         08/15 0701  08/16 0700 08/16 0701  08/17 0700 08/17 0701  08/18 0700    Urine  350     Total Output  350     Net  -350            Unmeasured Urine Occurrence  1 x 1 x           Nutrition: Diet Cardiovascular;  Sodium 4 Gm (DAYAN)  VTE Prophylaxis:Reason for no pharmacologic prophylaxis SAH      Physical Exam:   Gen:  NAD  HENT: MMM, tiny hematoma to L occiput  CV:  RRR  Lungs:  nl effort on RA  Abd:  soft, NT/ND  Ext:  no CCE  Skin:  no rashes  Neuro:  A&Ox3, GCS 15, no focal deficits    Invasive Devices  Report      Peripheral Intravenous Line  Duration             Peripheral IV 08/16/22 Right Forearm 1 day Lab Results: Results: I have personally reviewed all pertinent laboratory/tests results, BMP/CMP:   Lab Results   Component Value Date    SODIUM 132 (L) 08/17/2022    K 3 5 08/17/2022    CL 97 08/17/2022    CO2 28 08/17/2022    BUN 17 08/17/2022    CREATININE 0 84 08/17/2022    CALCIUM 8 9 08/17/2022    EGFR 62 08/17/2022   , and CBC:   Lab Results   Component Value Date    WBC 4 99 08/17/2022    HGB 11 3 (L) 08/17/2022    HCT 34 7 (L) 08/17/2022    MCV 89 08/17/2022     08/17/2022    MCH 29 0 08/17/2022    MCHC 32 6 08/17/2022    RDW 12 7 08/17/2022    MPV 8 9 08/17/2022    NRBC 0 08/17/2022     Imaging/EKG Studies: I have personally reviewed pertinent reports  and I have personally reviewed pertinent films in PACS   Other Studies: I have personally reviewed pertinent reports

## 2022-08-17 NOTE — OCCUPATIONAL THERAPY NOTE
Occupational Therapy Evaluation     Patient Name: Alyssa Delgado Date: 8/17/2022  Problem List  Principal Problem:    Subarachnoid hemorrhage ([place] [address] )  Active Problems:    Benign essential hypertension    HLD (hyperlipidemia)    GERD (gastroesophageal reflux disease)    Hyponatremia    Past Medical History  Past Medical History:   Diagnosis Date    COPD (chronic obstructive pulmonary disease) ([facility] )     Dementia ([place] [address] )     Diverticulosis 2001    GERD (gastroesophageal reflux disease)     Glaucoma     Heart failure ([place] [address] )     History of cystocele     Hypercholesterolemia     Hypertension     Obesity     Positive PPD 1999    treated     Past Surgical History  Past Surgical History:   Procedure Laterality Date    BUNIONECTOMY Left 08/25/2021    Procedure: [place], EXCISION OF WOUND;  Surgeon: Mary Morgan DPM;  Location: [address] [place] [address] OR;  Service: Podiatry    CARDIOVASCULAR STRESS TEST  2001    dobutamine stress test    CATARACT EXTRACTION      CHOLECYSTECTOMY  04/2002    COLONOSCOPY      CYSTOCELE REPAIR      HYSTERECTOMY      KNEE SURGERY Bilateral     knee replacement    GA REMOVAL DEEP IMPLANT Left 08/25/2021    Procedure: REMOVAL HARDWARE FOOT;  Surgeon: Mary Morgan DPM;  Location: [address] [place] [address] OR;  Service: Podiatry    UPPER GASTROINTESTINAL ENDOSCOPY           08/17/22 1420   OT Last Visit   OT Visit Date 08/17/22   Note Type   Note type Evaluation   Restrictions/Precautions   Weight Bearing Precautions Per Order No   Other Precautions Cognitive; Chair Alarm; Fall Risk;Pain;Multiple lines  (Syriac speaking only)   Pain Assessment   Pain Assessment Tool 0-10   Pain Score 5   Pain Location/Orientation Location: Head   Patient's Stated Pain Goal No pain   Hospital Pain Intervention(s) Repositioned; Ambulation/increased activity; Environmental changes   Home Living   Type of [address] [place] [address] One level  (0STE)   Bathroom Shower/Tub Walk-in shower   Bathroom Toilet Standard   Bathroom Equipment Grab bars in shower; Shower chair   [address] Walker   Additional Comments Pt is a poor historian  Pt Liberian speaking only  Rehab Aide present to translate  Granddaughter at bedside to verify PETER, PLOF, and assistance  Prior Function   Level of [place] Independent with ADLs and functional mobility   Lives With Son;Family   Receives Help From Family   ADL Assistance Needs assistance   IADLs Needs assistance   Falls in the last 6 months 1 to 4  (1 leading to admission)   Comments Pt is a poor historian  Pt's granddaughter reports pt uses RW at baseline  Lifestyle   Autonomy Pt required assistance w/ ADLs/IADLs including, showering, dressing, and meal preparation per granddaughter   Reciprocal Relationships Pt lives with son who is able to assist at all times  Granddaughter reports pt is never left alone at home  Service to Others Pt is a poor historian unable to obtain information   Intrinsic Gratification Pt is a poor historian unable to obtain information   ADL   Eating Assistance 5  Supervision/Setup   Grooming Assistance 5  Supervision/Setup   UB Bathing Assistance 4  Minimal Assistance   LB Bathing Assistance 3  Moderate Assistance   UB Dressing Assistance 4  Minimal Assistance   LB Dressing Assistance 3  Moderate [address]  3  Moderate Assistance   Functional Assistance 3  Moderate Assistance   Bed Mobility   Supine to Sit 3  Moderate assistance   Additional items Assist x 1; Increased time required   Transfers   Sit to Stand 4  Minimal assistance   Additional items Assist x 1; Increased time required   Stand to Sit 4  Minimal assistance   Additional items Assist x 1; Increased time required   Additional Comments Pt left OOB with all items within reach and chair alarm activated     Functional Mobility   Functional Mobility 4  Minimal assistance   Additional Comments x1   Additional items Rolling walker   Balance   Static Sitting Fair - Static Standing Poor +   Ambulatory Poor +   Activity Tolerance   Activity Tolerance Patient limited by fatigue;Patient limited by pain   Medical Staff [facility] DPT; PT present due to pt's medical presentation that overall impacts occupational performance  Nurse Made Aware Pt appropraite to be seen  RUE Assessment   RUE Assessment WFL   LUE Assessment   LUE Assessment WFL   Cognition   Overall Cognitive Status Impaired   Arousal/Participation Alert; Cooperative   Attention Attends with cues to redirect   Orientation Level Oriented to person;Oriented to place; Disoriented to time;Disoriented to situation  (Grossly oriented to place with cueing and first name only )   Memory Decreased recall of precautions;Decreased recall of recent events;Decreased short term memory   Following Commands Follows one step commands with increased time or repetition   Comments Pt demonstrates baseline dementia  Pt is a poor historian  Pt's granddaughter at bedside and able to report that pt demonstrates baseline cog status  Pt demonstrates impaired insight, judgement, and safety awareness  Assessment   Assessment Pt is an 79 yo F (Salvadorean speaking) transferred from [facility] to [facility] s/p fall OOB on Monday (8/15/22) w/ c/o L posterior head pain  Pt sustained subarachnoid hemorrhage  Pt's problem list includes: COPD, hx dementia, diverticulitis, GERD, glaucoma, HF, hx cystocele, HTN, obesity, HTN, HLD, hyponatremia  Pt is Salvadorean speaking only, rehab aide present to translate  Pt is a poor historian  Pt's granddaughter at bedside and able to provide information regarding pt's PLOF, and PETER  Pt's PLOF was requiring assistance w/ ADLs/IADLs living with son in a first floor apartment  Currently, pt is Sup-Min A w/ UB ADLs, and Mod A w/ LB ADLs   Pt completed functional transfers/mobility at a Min A level w/ RW  Pt's limitations include: baseline dementia, decreased ability to complete ADLs, decreased endurance, decreased balance, decreased activity tolerance, decreased safety awareness, insight, and judgement  Pt was educated on increased participation w/ nursing to complete self care tasks  The patient's raw score on the AM-PAC Daily Activity inpatient short form is 15, standardized score is 34 69, less than 39 4  Patients at this level are likely to benefit from discharge to post-acute rehabilitation services  Please refer to the recommendation of the Occupational Therapist for safe discharge planning  OT recommends pt returns home with increased family support  Pt's granddaughter reports pt has assist from family and lives with son who is home at all times and able to assist  Pt no longer requires additional acute care OT services   D/o OT   Goals   Patient Goals None 2* to cog status   Recommendation   OT Discharge Recommendation   (Return home with increased family support)   AM-PAC Daily Activity Inpatient   Lower Body Dressing 2   Bathing 2   Toileting 2   Upper Body Dressing 3   Grooming 3   Eating 3   Daily Activity Raw Score 15   Daily Activity Standardized Score (Calc for Raw Score >=11) 34 69   AM-PAC Applied Cognition Inpatient   Following a Speech/Presentation 1   Understanding Ordinary Conversation 2   Taking Medications 1   Remembering Where Things Are Placed or Put Away 1   Remembering List of 4-5 Errands 1   Taking Care of Complicated Tasks 1   Applied Cognition Raw Score 7   Applied Cognition Standardized Score 15 17     [address], [facility]

## 2022-08-17 NOTE — CASE MANAGEMENT
Case Management Discharge Planning Note    [facility] [address] [facility] MRN [id number]  : 1934 Date 2022       Current Admission Date: 2022  Current Admission Diagnosis:Subarachnoid hemorrhage [facility])   Patient Active Problem List    Diagnosis Date Noted    Hyponatremia 2022    Subarachnoid hemorrhage ([facility] ) 2022    At risk for polypharmacy 2022    Class 1 obesity 2022    Abdominal pain, epigastric 2022    Bruise 03/10/2022    Fall 2022    Influenza vaccine needed 2022    Ganglion cyst of joint of finger of left hand 2021    Hearing loss 10/27/2021    Dementia ([facility] ) 10/26/2021    Nonintractable headache 2021    Pre-op evaluation 2021    Pain of toe of left foot 2021    Right upper quadrant pain 2020    2019 novel coronavirus disease (COVID-19) 2020    Encounter for removal of sutures 2020    Right rotator cuff tear arthropathy 2020    Acute pain of right shoulder 2019    COPD (chronic obstructive pulmonary disease) ([facility] ) 2019    Central perforation of tympanic membrane 2019    Osteoarthritis 2018    Anal or rectal pain 2018    Hemorrhoids 2018    Constipation 2018    Chronic diastolic congestive heart failure ([facility] ) 2018    GERD (gastroesophageal reflux disease) 2018    Cataract 2018    Malignant neoplasm of overlapping sites of left female breast ([facility] ) 11/10/2016    HLD (hyperlipidemia) 10/31/2016    Aortic stenosis 10/26/2016    Carpal tunnel syndrome on both sides 2016    Bilateral occipital neuralgia 10/15/2015    Gait instability 2015    Anemia 2014    Benign essential hypertension 2014    Glaucoma 2014      LOS (days): 1  Geometric Mean LOS (GMLOS) (days): 3 00  Days to GMLOS:1 7     OBJECTIVE:  Risk of Unplanned Readmission Score: 19 59 Current admission status: Inpatient   Preferred Pharmacy:   [address], [address]  [address]  Phone: [phone] Fax: [phone]    Primary Care Provider: Tamy Reynoso MD    Primary Insurance: Watt Castleman MEDICARE [facility] REP  Secondary Insurance: Vaishali Counts include 234 beds at the [facility]    DISCHARGE DETAILS:  This CM verified with [place], granddaughter, patient has 24/7 supervision/ support at home from multiple family members

## 2022-08-17 NOTE — PROGRESS NOTES
Progress Note - Geriatric Medicine   Geraldine Parra 80 y o  female MRN: [id number]  Unit/Bed#: [facility] 603-01 Encounter: [id number]      Assessment/Plan:    Ambulatory dysfunction with fall  -unwitnessed fall at home, head strike reported  -injuries as outlined below  -remains high risk future falls, continue fall precautions  -PT OT following    Subarachnoid hemorrhage  -CTH on initial presentation reports trace right mid superior frontal subarachnoid hemorrhage without skull fracture  -repeat CTH earlier this morning reports mild interval worsening of high bifrontal subarachnoid hemorrhages, bilateral subdural collections consistent with chronic subdural versus hygroma reportedly grossly stable  -AC/AP on hold  -neuro checks per protocol  -Nsx consult pending    Hyponatremia  -[Na] 130-131 yesterday, normal at baseline  -cannot rule out medication effect of home Elavil which is currently on hold, if remains stable consider resumption on with close monitoring and long-term goal of tapering off given adverse side effect profile in older adult population    Dementia without behavior disturbance  -moderate to severe and progressive  -at baseline mostly oriented but very forgetful and repetitive  -resides at home with family who serve as around the clock caregivers, very supportive and patient appears very well cared for  -continue psychosocial supports of patient and family    Anxiety  -maintained on amitriptyline 25 mg HS as outpatient currently on hold due to hyponatremia, consideration of resumption as above - while on hold if agent needed for interim therapy could consider low-dose BuSpar relatively well tolerated start and stop in older population without electrolyte derangements side effects    COPD  -maintained on Advair and Flonase as outpatient  -manager respiratory status closely    Impaired vision  -encourage use of corrective lenses all appropriate times  -consider large font for printed materials provided to patient    Impaired hearing  -encourage use of hearing aids at all appropriate times  -encourage use of teach back method to ensure clear communication    High risk of developing delirium  -due to underlying dementia, fall with intracerebral hemorrhage, polypharmacy and hospitalization  -continue strict delirium precautions, reorient frequently, ensure pain is controlled, maintain normal circadian rhythm, monitor for fecal and urinary retention and correct electrolyte and other metabolic derangements as arise    Frailty syndrome in geriatric patient  -patient may be very susceptible to even mild metabolic derangements, continue to monitor closely  -enter treatments interventions along with patient's and family's wishes and goals of care  -continue psychosocial supports of patient and family    Care coordination:  Son updated at bedside    Subjective:     Patient seen and examined at bedside where she is lying resting comfortably with her family at her side, she appears in much better spirits and pain is much improved, she offers no acute complaints  Review of Systems   Unable to perform ROS: Dementia     Objective:     Vitals: Blood pressure 124/66, pulse 76, temperature (!) 96 °F (35 6 °C), temperature source Oral, resp  rate 16, SpO2 96 %, not currently breastfeeding  ,There is no height or weight on file to calculate BMI  Intake/Output Summary (Last 24 hours) at 8/17/2022 1222  Last data filed at 8/17/2022 0501  Gross per 24 hour   Intake --   Output 350 ml   Net -350 ml     Current Medications: Reviewed    Physical Exam:   Physical Exam  Vitals and nursing note reviewed  Constitutional:       General: She is not in acute distress  Appearance: Normal appearance  She is obese  She is not ill-appearing  HENT:      Head: Normocephalic  Nose: Nose normal       Mouth/Throat:      Mouth: Mucous membranes are moist    Eyes:      General: No scleral icterus  Right eye: No discharge  Left eye: No discharge  Conjunctiva/sclera: Conjunctivae normal    Cardiovascular:      Rate and Rhythm: Normal rate  Pulmonary:      Effort: Pulmonary effort is normal  No respiratory distress  Breath sounds: No wheezing  Comments: Saturating well on room air  Abdominal:      General: There is no distension  Palpations: Abdomen is soft  Tenderness: There is no abdominal tenderness  Comments: Obese abdomen   Musculoskeletal:      Cervical back: Neck supple  Comments: Mildly reduced overall muscle mass   Skin:     General: Skin is dry  Neurological:      Mental Status: She is alert  Comments: Awake and alert, pleasantly confused cooperative   Psychiatric:      Comments: Mood and affect bright        Invasive Devices  Report    Peripheral Intravenous Line  Duration           Peripheral IV 08/16/22 Right Forearm 1 day              Lab, Imaging and other studies: I have personally reviewed pertinent reports

## 2022-08-17 NOTE — UTILIZATION REVIEW
Inpatient Admission Authorization Request   NOTIFICATION OF INPATIENT ADMISSION/INPATIENT AUTHORIZATION REQUEST   SERVICING FACILITY:   [facility]  Address: [address] [place] [address], [address] [place] [address]  Tax ID: [id number]  NPI: [id number]  Place of Service: Inpatient [address] [facility] Code: 24     ATTENDING PROVIDER:  Attending Name and NPI#: Maggi Tobias Md [[id number]]  Address: [address] [place] [address], [address] [place] [address]  Phone: [phone]     UTILIZATION REVIEW CONTACT:  Giovana Muhammad Utilization   Network Utilization Review Department  Phone: [phone]  Fax: [phone]  Email: [email or web address]     PHYSICIAN ADVISORY SERVICES:  FOR DVRD-UL-ABQM REVIEW - MEDICAL NECESSITY DENIAL  Phone: [phone]  Fax: [phone]  Email: [email or web address]     TYPE OF REQUEST:  Inpatient Status     ADMISSION INFORMATION:  ADMISSION DATE/TIME: 8/16/22  7:47 AM  PATIENT DIAGNOSIS CODE/DESCRIPTION:  Subarachnoid cyst [G93 0]  Subarachnoid bleed ([facility] ) [I60 9]  Fall, initial encounter [W19  XXXA]  DISCHARGE DATE/TIME: No discharge date for patient encounter  IMPORTANT INFORMATION:  Please contact Giovana Muhammad directly with any questions or concerns regarding this request  Department voicemails are confidential     Send requests for admission clinical reviews, concurrent reviews, approvals, and administrative denials due to lack of clinical to fax [phone]

## 2022-08-17 NOTE — PLAN OF CARE
Problem: PHYSICAL THERAPY ADULT  Goal: Performs mobility at highest level of function for planned discharge setting  See evaluation for individualized goals  Description: Treatment/Interventions: Functional transfer training, LE strengthening/ROM, Therapeutic exercise, Endurance training, Patient/family training, Equipment eval/education, Bed mobility, Gait training, Spoke to nursing, OT  Equipment Recommended: Caesar Dial       See flowsheet documentation for full assessment, interventions and recommendations  Note: Prognosis: Good  Problem List: Decreased strength, Decreased endurance, Impaired balance, Decreased mobility, Impaired judgement, Decreased safety awareness, Decreased cognition, Pain  Assessment: Pt is 80 y o  female seen for PT evaluation s/p admit to [facility] on 8/16/2022  Two pt identifiers were used to confirm  Pt presented s/p fall OOB  Pt originally presented at [facility] and was [facility] for further medical management/ evaluation  Pt was admitted with a primary dx of:  Subarachnoid hemorrhage, and other active problems including hyponatremia, GERD, HLD, benign essential HTN  PT now consulted for assessment of mobility and d/c needs  Pt with Up in chair orders  Pts current co morbidities affecting treatment include:  COPD, dementia, GERD, glaucoma, heart failure, hypercholesterolemia, HTN, obesity  Pts current clinical presentation is Unstable/ Unpredictable (high complexity) due to Ongoing medical management for primary dx, Decreased activity tolerance compared to baseline, Fall risk, Cog status, Continuous pulse oximetry monitoring     Upon evaluation, pt currently is requiring Mod Ax1 for bed mobility; Min Ax1 for transfers and Min Ax1 for ambulation w/ RW   Pt presents at PT eval functioning below baseline and currently w/ overall mobility deficits 2* to: BLE weakness, impaired balance, decreased endurance, gait deviations, pain, decreased activity tolerance compared to baseline, decreased safety awareness, impaired judgement, fall risk, decreased cognition  Pt currently at a fall risk 2* to impairments listed above  Based on the aforementioned PT evaluation, pt will continue to benefit from skilled Acute PT interventions to address stated impairments; to maximize functional mobility; for ongoing pt/ family training; and DME needs  At conclusion of PT session pt returned back in chair with chair alarm engaged and with phone and call bell within reach  PT is currently recommending Home with increased family support  Pts granddaughter present during PT eval and reports the family feels comfortable taking pt home at her current mobility status  PT will continue to follow during hospital stay  PT Discharge Recommendation: No rehabilitation needs (home with increased family support)    See flowsheet documentation for full assessment

## 2022-08-17 NOTE — UTILIZATION REVIEW
Initial Clinical Review    Admission: Date/Time/Statement:   Admission Orders (From admission, onward)     Ordered        08/16/22 0747  Inpatient Admission  Once                      Orders Placed This Encounter   Procedures    Inpatient Admission     Standing Status:   Standing     Number of Occurrences:   1     Order Specific Question:   Level of Care     Answer:   Level 2 Stepdown / HOT [14]     Order Specific Question:   Estimated length of stay     Answer:   More than 2 Midnights     Order Specific Question:   Certification     Answer:   I certify that inpatient services are medically necessary for this patient for a duration of greater than two midnights  See H&P and MD Progress Notes for additional information about the patient's course of treatment  ED Arrival Information     Expected   8/16/2022 06:45    Arrival   8/16/2022 07:36    Acuity   Emergent            Means of arrival   Ambulance    Escorted by   [facility])    Service   Trauma    Admission type   Urgent            Arrival complaint   subarachnoid           Chief Complaint   Patient presents with    Fall     Trauma tx from [address] [place] [address] for fall, + headstrike, [facility]       Initial Presentation: 80 y o  female    80 y o  female who presented at [address] [place] [address] ED  "with complaint of blunt head trauma   She reportedly rolled out of bed yesterday morning at 3:00 a m  Clide Hilda was no loss of consciousness and other than a headache she has had no other acute issues or concerns   She has taken Tylenol only minimal improvement of symptoms   She has had no change in vision,  focal neurological deficits, or other acute issues "  AT [address] [place] [address] ER,  Found to have a small frontal SAH and transferred to [address] [place] [address]   For neurology/ neuro surgery services     ADMITTED  INPATIENT  STATUS to Trauma service,  Stepdown level 2,   For management of  TBI/Subarachnoid Hemorrhage 2/2 head trauma,  Hyponatremia in setting of dementia  Will trend hourly neuro cks, VS q 4 H,  Repeat head CT tomorrow am/ stat CT for any neuro changes;  PT/OT eval,  4gm Na diet,  Daily bmp   Consult Neurology/ Neurosurgery    8/16  NEUROSURGERY consult:  Recommend SBP <160 mmHg  Initiate Keppra for sz ppx  Hold/ avoid all antiplatelet and anticoagulation medications  No reported AC/AP therapy  DVT PPX: SCDs only at this time  No neurosurgical intervention is anticipated at this time  Will review tomorrow's head CT   EXAm:  Awake, Alert  Motor: COHEN, strength 4+/5 throughout  Reflexes: 2+ and symmetric, no zee's or clonus  Coordination: no drift bilateral upper extremities    8/16  GCS scores   Consistently 14  Alert, Awake,  But intermittently confused   w/c/o HA Pain    Date:  8/17      Day 2:    Patient complains of pain over occiput at site of hematoma       Repeat CT head: slight interval worsening  GCS remains 14  Oriented to Person/place but not time or situation  Cont holding lisinopril    Per Neurosurgery - continues hourly neuro cks , repeat CT Head if change in GCS     ---------------------------------------------------------------------------------------------------------------------------------------------------------------------------------------------------------------------------------------------------      ED Triage Vitals   Temperature Pulse Respirations Blood Pressure SpO2   08/16/22 0744 08/16/22 0744 08/16/22 0744 08/16/22 0744 08/16/22 0744   (!) 97 2 °F (36 2 °C) 69 18 139/75 98 %      Temp Source Heart Rate Source Patient Position - Orthostatic VS BP Location FiO2 (%)   08/16/22 0744 08/16/22 0744 08/16/22 1015 08/16/22 1015 --   Tympanic Monitor Lying Right arm       Pain Score       08/16/22 1015       10 - Worst Possible Pain          Wt Readings from Last 1 Encounters:   08/16/22 84 6 kg (186 lb 8 oz)     Additional Vital Signs:   08/17/22 11:46:31 96 °F (35 6 °C) Abnormal  76 -- 124/66 85  Map  96 % -- 08/17/22 07:28:48 -- 91 16 144/91 109 98 % --   08/17/22 02:12:25 -- 85 18 117/65 82 96 % None (Room air)   08/17/22 0000 -- -- -- -- -- 92 % --   08/16/22 22:25:29 97 6 °F (36 4 °C) 79 18 108/58 75 96 % None (Room air)   08/16/22 1930 -- -- -- -- -- 93 % None (Room air)   08/16/22 15:20:08 97 4 °F (36 3 °C) Abnormal  72 -- 99/60 73 99 % --   08/16/22 1015 -- 68 18 170/71 102 98 % None (Room air)         Pertinent Labs/Diagnostic Test Results:   CT head wo contrast   Final Result by Marikay Goltz, MD (08/17 1026)   Mild interval worsening in high bifrontal subarachnoid hemorrhage   Low attenuation bilateral subdural collections are grossly stable and consistent with chronic subdural hematomas versus hygromas  8/16  cT HEAD:  Trace right mid superior frontal subarachnoid hemorrhage  No acute intraparenchymal hemorrhage  No skull fracture  Chronic microangiopathy  8/16  CT Cervical spine:   No cervical spine fracture or traumatic malalignment       8/16  EKG -  Normal sinus rhythm  Inferior infarct (cited on or before 03-OCT-2019)  Abnormal ECG  When compared with ECG of 04-OCT-2019 04:59,  No significant change was found    Results from last 7 days   Lab Units 08/17/22  0849 08/16/22  0637   WBC Thousand/uL 4 99 4 88   HEMOGLOBIN g/dL 11 3* 11 2*   HEMATOCRIT % 34 7* 34 1*   PLATELETS Thousands/uL 263 243   NEUTROS ABS Thousands/µL 2 47 2 05         Results from last 7 days   Lab Units 08/17/22  0849 08/16/22  0637   SODIUM mmol/L 132* 131*   POTASSIUM mmol/L 3 5 3 5   CHLORIDE mmol/L 97 94*   CO2 mmol/L 28 31   ANION GAP mmol/L 7 6   BUN mg/dL 17 23   CREATININE mg/dL 0 84 0 87   EGFR ml/min/1 73sq m 62 59   CALCIUM mg/dL 8 9 8 9     Results from last 7 days   Lab Units 08/16/22  0637   AST U/L 31   ALT U/L 18   ALK PHOS U/L 84   TOTAL PROTEIN g/dL 7 6   ALBUMIN g/dL 4 0   TOTAL BILIRUBIN mg/dL 0 48         Results from last 7 days   Lab Units 08/17/22  0849 08/16/22  0637   GLUCOSE RANDOM mg/dL 133 110*     Results from last 7 days   Lab Units 08/16/22  0637   PROTIME seconds 12 5   INR  0 90   PTT seconds 27     Results from last 7 days   Lab Units 08/16/22  0823   CLARITY UA  Clear   COLOR UA  Colorless   SPEC GRAV UA  1 007   PH UA  7 0   GLUCOSE UA mg/dl Negative   KETONES UA mg/dl Negative   BLOOD UA  Negative   PROTEIN UA mg/dl Negative   NITRITE UA  Negative   BILIRUBIN UA  Negative   UROBILINOGEN UA (BE) mg/dl <2 0   LEUKOCYTES UA  Negative     ED Treatment:   Medication Administration from 08/16/2022 0645 to 08/16/2022 1453    Date/Time Order Dose Route Action   08/16/2022 1022 acetaminophen (TYLENOL) tablet 650 mg 650 mg Oral Given   08/16/2022 1026 levETIRAcetam (KEPPRA) tablet 500 mg 500 mg Oral Given   08/16/2022 1022 ondansetron (ZOFRAN) injection 4 mg 4 mg Intravenous Given   08/16/2022 1303 lidocaine (LIDODERM) 5 % patch 1 patch 1 patch Topical Medication Applied   08/16/2022 1302 pantoprazole (PROTONIX) EC tablet 20 mg 20 mg Oral Given        Past Medical History:   Diagnosis Date    [facility] obstructive [facility]     Dementia [facility]     Diverticulosis 2001    GERD (gastroesophageal reflux disease)     Glaucoma     Heart failure ([facility]     History of cystocele     Hypercholesterolemia     Hypertension     Obesity     Positive PPD 1999    treated     Present on Admission:   HLD (hyperlipidemia)   Benign essential hypertension   GERD (gastroesophageal reflux disease)      Admitting Diagnosis: Subarachnoid cyst [G93 0]  Subarachnoid bleed ([facility] ) [I60 9]  Fall, initial encounter [W19  XXXA]  Age/Sex: 80 y o  female  Admission Orders:  see above note ,  Up in chair tid  Scheduled Medications:  atorvastatin, 20 mg, Oral, Daily  furosemide, 40 mg, Oral, Daily  hydrochlorothiazide, 12 5 mg, Oral, Daily  levETIRAcetam, 500 mg, Oral, Q12H BO  lidocaine, 1 patch, Topical, Daily  pantoprazole, 20 mg, Oral, Early Morning  sucralfate, 1 g, Oral, Daily      Continuous IV Infusions:     PRN Meds:  acetaminophen, 650 mg, Oral, Q6H PRN  methocarbamol, 500 mg, Oral, Q6H PRN  ondansetron, 4 mg, Oral, Q6H PRN        IP CONSULT TO NEUROSURGERY  IP CONSULT TO GERONTOLOGY    Network Utilization Review Department  ATTENTION: Please call with any questions or concerns to [phone] and carefully listen to the prompts so that you are directed to the right person  All voicemails are confidential   Ana Copeland all requests for admission clinical reviews, approved or denied determinations and any other requests to dedicated fax number below belonging to the campus where the patient is receiving treatment   List of dedicated fax numbers for the Facilities:  [address] DENIALS (Administrative/Medical Necessity) [phone]   [address] (Maternity/NICU/Pediatrics) [phone]   [address]  [address] Medical [place] [address] [address] [place] [address] [address] [facility] [facility] [phone]

## 2022-08-17 NOTE — ASSESSMENT & PLAN NOTE
· 8/16 CT head: trace right mid superior frontal subarachnoid hemorrhage  · 8/17 repeat CT head: slight interval worsening  · Neurosurgery following  · Continue HOT protocol  · Holding SQH until stable CT head  · Q1hr neuro checks

## 2022-08-17 NOTE — PHYSICAL THERAPY NOTE
PHYSICAL THERAPY RE-EVALUATION          Patient Name: Alyssa Delgado Date: 8/17/2022 08/17/22 0000   Note Type   Note type Evaluation   Pain Assessment   Pain Assessment Tool 0-10   Pain Score 5   Pain Location/Orientation Location: Head   Pain Onset/Description Onset: Ongoing;Frequency: Constant/Continuous; Descriptor: Aching   Effect of Pain on Daily Activities Increased pain with activity   Patient's Stated Pain Goal No pain   Hospital Pain Intervention(s) Ambulation/increased activity;Repositioned   Restrictions/Precautions   Weight Bearing Precautions Per Order No   Other Precautions Cognitive; Chair Alarm; Bed Alarm;Multiple lines; Fall Risk;Pain  (Namibian speaking only)   Home Living   Type of [address]  One level  (0 ALYCIA)   Home Equipment Walker   Additional Comments Patient primarily Wolof-speaking and poor historian  Rehab aide present to assist with translation  Patient's granddaughter present and helped provide information about patient's living situation and prior level of function  Per patient's granddaughter patient resides with son and has additional local family who can assist as needed  Patient never home alone per granddaughter  Prior Function   Level of [place] Independent with ADLs and functional mobility   Lives With Son;Family   Receives Help From Family   ADL Assistance Needs assistance   Falls in the last 6 months 1 to 4   Comments Per family, patient utilizes a rolling walker for ambulation prior to admission   General   Family/Caregiver Present Yes   Cognition   Overall Cognitive Status Impaired   Arousal/Participation Responsive   Orientation Level Oriented to person;Oriented to place; Disoriented to time;Disoriented to situation  (oriented to general place ( hospital))   Memory Decreased short term memory;Decreased recall of precautions;Decreased recall of recent events   Following Commands Follows one step commands with increased time or repetition   RUE Assessment   RUE Assessment WFL   LUE Assessment   LUE Assessment WFL   RLE Assessment   RLE Assessment WFL   Strength RLE   RLE Overall Strength 4-/5   LLE Assessment   LLE Assessment WFL   Strength LLE   LLE Overall Strength 4-/5   Bed Mobility   Supine to Sit 3  Moderate assistance   Additional items Assist x 1; Increased time required;Verbal cues   Transfers   Sit to Stand 4  Minimal assistance   Additional items Assist x 1; Increased time required;Verbal cues   Stand to Sit 4  Minimal assistance   Additional items Assist x 1; Increased time required;Verbal cues   Additional Comments VC and TC needed for hand placement during transfers   Ambulation/Elevation   Gait pattern Excessively slow; Short stride; Foward flexed; Inconsistent flaca   Gait Assistance 4  Minimal assist   Additional items Assist x 1   Assistive Device Rolling walker   Distance 30ft   Balance   Static Sitting Fair -   Static Standing Poor +   Ambulatory Poor +   Endurance Deficit   Endurance Deficit Yes   Endurance Deficit Description fatigue, pain   Activity Tolerance   Activity Tolerance Patient limited by fatigue;Patient limited by pain   Medical Staff Made Aware Priyank OT; German Hernandez OT student; OT present for co evaluation due to patient's current medical presentation   Nurse Made Aware Patient appropriate to be seen and mobilized per nursing   Assessment   Prognosis Good   Problem List Decreased strength;Decreased endurance; Impaired balance;Decreased mobility; Impaired judgement;Decreased safety awareness;Decreased cognition;Pain   Assessment Pt is 80 y o  female seen for PT evaluation s/p admit to [facility] on 8/16/2022  Two pt identifiers were used to confirm  Pt presented s/p fall OOB  Pt originally presented at [facility] and [facility] for further medical management/ evaluation    Pt was admitted with a primary dx of:  Subarachnoid hemorrhage, and other active problems including hyponatremia, GERD, HLD, benign essential HTN  PT now consulted for assessment of mobility and d/c needs  Pt with Up in chair orders  Pts current co morbidities affecting treatment include:  COPD, dementia, GERD, glaucoma, heart failure, hypercholesterolemia, HTN, obesity  Pts current clinical presentation is Unstable/ Unpredictable (high complexity) due to Ongoing medical management for primary dx, Decreased activity tolerance compared to baseline, Fall risk, Cog status, Continuous pulse oximetry monitoring     Upon evaluation, pt currently is requiring Mod Ax1 for bed mobility; Min Ax1 for transfers and Min Ax1 for ambulation w/ RW  Pt presents at PT eval functioning below baseline and currently w/ overall mobility deficits 2* to: BLE weakness, impaired balance, decreased endurance, gait deviations, pain, decreased activity tolerance compared to baseline, decreased safety awareness, impaired judgement, fall risk, decreased cognition  Pt currently at a fall risk 2* to impairments listed above  Based on the aforementioned PT evaluation, pt will continue to benefit from skilled Acute PT interventions to address stated impairments; to maximize functional mobility; for ongoing pt/ family training; and DME needs  At conclusion of PT session pt returned back in chair with chair alarm engaged and with phone and call bell within reach  PT is currently recommending Home with increased family support  Pts granddaughter present during PT eval and reports the family feels comfortable taking pt home at her current mobility status  PT will continue to follow during hospital stay  Goals   Patient Goals none stated 2* cog status   STG Expiration Date 08/27/22   Short Term Goal #1 In 10 days pt will complete: 1) Bed mobility skills with S to increase safety and independence as well as decrease caregiver burden   2) Functional transfers with S to promote increased independence, safety, and QOL  3) Ambulate 150' using least restrictive AD with S without LOB and stable vitals so that pt can negotiate previous living environment safely and promote independence with functional mobility and return to PLOF  4) Improve balance grades by 1/2 grade to increase safety with all mobility and decrease fall risk  5) Improve BLE strength by 1/2 grade to help increase overall functional mobility and decrease fall risk  Plan   Treatment/Interventions Functional transfer training;LE strengthening/ROM; Therapeutic exercise; Endurance training;Patient/family training;Equipment eval/education; Bed mobility;Gait training;Spoke to nursing;OT   PT Frequency 3-5x/wk   Recommendation   PT Discharge Recommendation No rehabilitation needs  (home with increased family support)   Equipment Recommended [facility] [facility] Recommended Wheeled walker   Additional Comments Pts family present during PT eval and denies any mobility or safety concerns about pt returning home at time of d/c  Pts granddaughter reports she is comfortable with pt returning home at her current mobility level  AM-PAC Basic Mobility Inpatient   Turning in Bed Without Bedrails 4   Lying on Back to Sitting on Edge of Flat Bed 2   Moving Bed to Chair 3   Standing Up From Chair 3   Walk in Room 3   Climb 3-5 Stairs 3   Basic Mobility Inpatient Raw Score 18   Basic Mobility Standardized Score 41 05   Highest Level Of Mobility   JH-HLM Goal 6: Walk 10 steps or more   JH-HLM Achieved 7: Walk 25 feet or more   Modified [place] Scale   Modified Arley Scale 4   Barthel Index   Feeding 10   Bathing 0   Grooming Score 0   Dressing Score 5   Bladder Score 10   Bowels Score 10   Toilet Use Score 5   Transfers (Bed/Chair) Score 10   Mobility (Level Surface) Score 0   Stairs Score 0   Barthel Index Score 50   Portions of the documentation may have been created using voice recognition software  Occasional wrong word or sound alike substitutions may have occurred due to the inherent limitations of the voice recognition software  Read the chart carefully and recognize, using context, where substitutions have occurred      Crystal Kaplan, PT, DPT

## 2022-08-17 NOTE — PROGRESS NOTES
[address] [facility]  Progress Note - Jorgito Mas 1934, 80 y o  female MRN: [id number]  Unit/Bed#: [facility] 603-01 Encounter: [id number]  Primary Care Provider: Arley Marinelli MD   Date and time admitted to hospital: 8/16/2022  7:36 AM    * Subarachnoid hemorrhage [facility])  Assessment & Plan  Pt is s/p fall with small bifrontal acute SAH  Pt with bilateral chronic hygromas vs SDH  · Imaging reviewed personally and by attending  Final results as below  · CT head wo 8/16/22: Trace right mid superior frontal subarachnoid hemorrhage  Progressive hypodense thickening of bifrontal extra-axial space likely progressive volume loss of chronic hygromas, maximum thickness 1 4 cm on the right  No acute hydrocephalus  Mild prominence of CSF spaces diffusely attributed to generalized volume loss  · CT head wo 8/17/22: Mild interval worsening in high bifrontal subarachnoid hemorrhage  Low attenuation bilateral subdural collections are grossly stable and consistent with chronic subdural hematomas versus hygromas  Plan  · Continue frequent neurological checks  · STAT CT head with any neurological decline or a decrease in GCS of 2pts within 1 hr   · Recommend SBP <160 mmHg  · Keppra for seizure ppx per primary team   · Hold/ avoid all antiplatelet and anticoagulation medications  No reported AC/AP therapy  · Pain control per primary team  · Mobilize and eval by PT/OT when able to  · DVT PPX: SCDs only at this time  Would recommend  obtaining a stable CT head wo prior to initiation of pharmacological DVT PPX  · Ongoing medical management per primary team   · No neurosurgical intervention is anticipated at this time  · Given mild interval increased SAH, will plan repeat CTH tomorrow AM     · Neurosurgery will review repeat [address] [place] [address] when completed  Please call with any questions/concerns       Subjective/Objective     Chief Complaint: Follow up for bifrontal SAH    Subjective: Pt reports in Tajik that she has a headache and pain in the back of her head/scalp  She denies any new numbness or weakness  Per pt's granddaughter pt seems more coherent today and was able to carry out a conversation with her family  Objective: Alert and awake, No acute distress  I/O       08/15 0701  08/16 0700 08/16 0701  08/17 0700 08/17 0701 08/18 0700    Urine  350     Total Output  350     Net  -350            Unmeasured Urine Occurrence  1 x 1 x          Invasive Devices  Report    Peripheral Intravenous Line  Duration           Peripheral IV 08/16/22 Right Forearm 1 day                Physical Exam:  Vitals: Blood pressure 141/83, pulse 81, temperature 98 3 °F (36 8 °C), temperature source Oral, resp  rate 16, SpO2 95 %, not currently breastfeeding  ,There is no height or weight on file to calculate BMI  General appearance:  Appears stated age  Head: Normocephalic, palpable scalp swelling likely 2/2 scalp/soft tissue hematoma   Eyes: EOMI, PERRL  Neck: supple, symmetrical, trachea midline   Lungs: non labored breathing  Heart: regular heart rate  Neurologic:   Mental status: Alert and awake, speaks fluently in Tajik  Cranial nerves: grossly intact (Cranial nerves II-XII)  Sensory: normal to LT X 4  Motor: moving all extremities, Strength 4+-5/5 throughout     Reflexes: 2+ and symmetric  Coordination:  no drift in bilateral upper extremities      Lab Results:  Results from last 7 days   Lab Units 08/17/22  0849 08/16/22  0637   WBC Thousand/uL 4 99 4 88   HEMOGLOBIN g/dL 11 3* 11 2*   HEMATOCRIT % 34 7* 34 1*   PLATELETS Thousands/uL 263 243   NEUTROS PCT % 49 42*   MONOS PCT % 7 9     Results from last 7 days   Lab Units 08/17/22  0849 08/16/22  0637   POTASSIUM mmol/L 3 5 3 5   CHLORIDE mmol/L 97 94*   CO2 mmol/L 28 31   BUN mg/dL 17 23   CREATININE mg/dL 0 84 0 87   CALCIUM mg/dL 8 9 8 9   ALK PHOS U/L  --  84   ALT U/L  --  18   AST U/L  --  31             Results from last 7 days   Lab Units 08/16/22  0637   INR  0 90   PTT seconds 27     Imaging Studies: I have personally reviewed pertinent reports and I have personally reviewed pertinent films in PACS    EKG, Pathology, and Other Studies: I have personally reviewed pertinent reports  PLEASE NOTE:  This encounter may have been completed utilizing the [employer] Direct Speech Voice Recognition Software  Grammatical errors, random word insertions, pronoun errors and incomplete sentences are occasional consequences of the system due to software limitations, ambient noise and hardware issues  These may be missed by proof reading prior to affixing electronic signature  Any questions or concerns about the content, text or information contained within the body of this dictation should be directly addressed to the advanced practitioner or physician for clarification

## 2022-08-18 ENCOUNTER — APPOINTMENT (INPATIENT)
Dept: RADIOLOGY | Facility: HOSPITAL | Age: 87
DRG: 086 | End: 2022-08-18
Payer: MEDICARE

## 2022-08-18 LAB
ANION GAP SERPL CALCULATED.3IONS-SCNC: 8 MMOL/L (ref 4–13)
BUN SERPL-MCNC: 16 MG/DL (ref 5–25)
CALCIUM SERPL-MCNC: 8.9 MG/DL (ref 8.3–10.1)
CHLORIDE SERPL-SCNC: 96 MMOL/L (ref 96–108)
CO2 SERPL-SCNC: 29 MMOL/L (ref 21–32)
CREAT SERPL-MCNC: 0.8 MG/DL (ref 0.6–1.3)
GFR SERPL CREATININE-BSD FRML MDRD: 66 ML/MIN/1.73SQ M
GLUCOSE SERPL-MCNC: 103 MG/DL (ref 65–140)
POTASSIUM SERPL-SCNC: 3.1 MMOL/L (ref 3.5–5.3)
SODIUM SERPL-SCNC: 133 MMOL/L (ref 135–147)

## 2022-08-18 PROCEDURE — 99232 SBSQ HOSP IP/OBS MODERATE 35: CPT | Performed by: INTERNAL MEDICINE

## 2022-08-18 PROCEDURE — 80048 BASIC METABOLIC PNL TOTAL CA: CPT | Performed by: SURGERY

## 2022-08-18 PROCEDURE — 99232 SBSQ HOSP IP/OBS MODERATE 35: CPT | Performed by: STUDENT IN AN ORGANIZED HEALTH CARE EDUCATION/TRAINING PROGRAM

## 2022-08-18 PROCEDURE — 70450 CT HEAD/BRAIN W/O DYE: CPT

## 2022-08-18 RX ORDER — ENOXAPARIN SODIUM 100 MG/ML
30 INJECTION SUBCUTANEOUS EVERY 12 HOURS SCHEDULED
Status: DISCONTINUED | OUTPATIENT
Start: 2022-08-18 | End: 2022-08-19 | Stop reason: HOSPADM

## 2022-08-18 RX ORDER — POTASSIUM CHLORIDE 20 MEQ/1
40 TABLET, EXTENDED RELEASE ORAL ONCE
Status: COMPLETED | OUTPATIENT
Start: 2022-08-18 | End: 2022-08-18

## 2022-08-18 RX ADMIN — PANTOPRAZOLE SODIUM 20 MG: 20 TABLET, DELAYED RELEASE ORAL at 06:06

## 2022-08-18 RX ADMIN — ATORVASTATIN CALCIUM 20 MG: 20 TABLET, FILM COATED ORAL at 08:46

## 2022-08-18 RX ADMIN — POTASSIUM CHLORIDE 40 MEQ: 1500 TABLET, EXTENDED RELEASE ORAL at 12:41

## 2022-08-18 RX ADMIN — ENOXAPARIN SODIUM 30 MG: 30 INJECTION SUBCUTANEOUS at 12:41

## 2022-08-18 RX ADMIN — HYDROCHLOROTHIAZIDE 12.5 MG: 12.5 TABLET ORAL at 08:46

## 2022-08-18 RX ADMIN — ACETAMINOPHEN 650 MG: 325 TABLET ORAL at 06:09

## 2022-08-18 RX ADMIN — ENOXAPARIN SODIUM 30 MG: 30 INJECTION SUBCUTANEOUS at 23:28

## 2022-08-18 RX ADMIN — SUCRALFATE 1 G: 1 TABLET ORAL at 08:46

## 2022-08-18 RX ADMIN — ACETAMINOPHEN 650 MG: 325 TABLET ORAL at 12:43

## 2022-08-18 RX ADMIN — LIDOCAINE 5% 1 PATCH: 700 PATCH TOPICAL at 08:46

## 2022-08-18 RX ADMIN — LEVETIRACETAM 500 MG: 500 TABLET, FILM COATED ORAL at 08:46

## 2022-08-18 RX ADMIN — LEVETIRACETAM 500 MG: 500 TABLET, FILM COATED ORAL at 20:58

## 2022-08-18 RX ADMIN — FUROSEMIDE 40 MG: 40 TABLET ORAL at 08:46

## 2022-08-18 NOTE — PROGRESS NOTES
[address] [facility]  Progress Note - Arjunbricekristi [place] 1934, 80 y o  female MRN: [id number]  Unit/Bed#: [facility] 603-01 Encounter: [id number]  Primary Care Provider: Sánchez Nelson MD   Date and time admitted to hospital: 8/16/2022  7:36 AM    Hyponatremia  Assessment & Plan  · Na 133 this morning (from 132,131, 133)  · No h/o hyponatremia per chart review      GERD (gastroesophageal reflux disease)  Assessment & Plan  · Continue home lasix  · Monitor fluid status    HLD (hyperlipidemia)  Assessment & Plan  · Continue home statin    Benign essential hypertension  Assessment & Plan  · Continue home HCTZ  · Continue home lasix  · Holding home lisinopril    * Subarachnoid hemorrhage (HCC)  Assessment & Plan  · 8/16 CT head: trace right mid superior frontal subarachnoid hemorrhage  · 8/17 repeat CT head: slight interval worsening  · Neurosurgery following  · Continue HOT protocol  · Holding SQH until stable CT head  · Q1hr neuro checks  · 8/18 repeat CT this am        Disposition: continue sd2    SUBJECTIVE:  Chief Complaint: Headache    Subjective: headache    OBJECTIVE:   Vitals:   Temp:  [96 °F (35 6 °C)-98 7 °F (37 1 °C)] 97 7 °F (36 5 °C)  HR:  [76-97] 85  Resp:  [16-20] 16  BP: (111-144)/(66-91) 111/68    Intake/Output:  I/O       08/16 0701  08/17 0700 08/17 0701  08/18 0700 08/18 0701  08/19 0700    Urine 350      Total Output 350      Net -350             Unmeasured Urine Occurrence 1 x 3 x     Unmeasured Stool Occurrence  1 x          Nutrition: Diet Cardiovascular; Sodium 4 Gm (DAYAN)  GI Proph/Bowel Reg: see mar  VTE Prophylaxis: none    Physical Exam:   GENERAL APPEARANCE: Patient in no acute distress  HEENT: PERRL, EOMs intact; Mucous membranes moist, small hematoma L occiput  NECK / BACK: non-tender  CV: Regular rate and rhythm; no murmur/gallops/rubs appreciated  CHEST / LUNGS: Clear to auscultation; no wheezes/rales/rhonci    ABD: NABS; soft; non-distended; non-tender  EXT: +2 pulses bilaterally upper & lower extremities; no edema  NEURO: GCS 15; no focal neurologic deficits; neurovascularly intact  SKIN: Warm, dry and well perfused; no rash; no jaundice  Invasive Devices  Report    Peripheral Intravenous Line  Duration           Peripheral IV 08/16/22 Right Forearm 2 days                      Lab Results:   BMP/CMP:   Lab Results   Component Value Date    SODIUM 133 (L) 08/18/2022    K 3 1 (L) 08/18/2022    CL 96 08/18/2022    CO2 29 08/18/2022    BUN 16 08/18/2022    CREATININE 0 80 08/18/2022    CALCIUM 8 9 08/18/2022    EGFR 66 08/18/2022     Imaging/EKG Studies: I have personally reviewed pertinent reports       Other Studies:

## 2022-08-18 NOTE — DISCHARGE INSTRUCTIONS
Neurosurgery discharge instructions following traumatic head bleed:     Do not take any blood thinning medications for at least 2 weeks after your fall (ie  No Advil  No motrin  No ibuprofen  No Aleve  No Aspirin  No fishoil  No heparin  No antiplatelet / no anticoagulation medication)  Refrain from activity that increases chance of trauma to head or falls  Recommend you take fall precaution  No strenuous activity or sports  Return to hospital Emergency Room if you experience worsening / new headache, nausea/vomiting, speech/vision change, seizure, confusion / mental status change, weakness, or other neurological changes  No follow up appointment required by neurosurgery, but should you have any questions or concerns about your head bleed, call neurosurgery at

## 2022-08-18 NOTE — PROGRESS NOTES
Progress Note - Geriatric Medicine   Geraldinekristi Hernandez 80 y o  female MRN: [id number]  [facility] Encounter: [id number]      Assessment/Plan:    Ambulatory dysfunction with fall  -injuries as below  -remains high risk future falls, continue fall precautions, assist with transfers maintain environment free of fall hazards  -consider home fall risk assessment and personal fall alert system on return home  -PT OT following    Subarachnoid hemorrhage  -s/p fall with head strike PTA  -CTH on initial presentation reports right mid superior frontal subarachnoid hemorrhage without focal fracture  -followup [facility] 8/17/22 reported mild interval worsening of bilateral frontal subarachnoid hemorrhages with stable bilateral subdural collections (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)  -CTH obtained this morning reports no significant interval change in bifrontal subarachnoid hemorrhages and stability of bilateral subdural collections  -AC/AP remains on hold until cleared by Neurosurgery    Hyponatremia  -[Na] slowly drifting back to normal, 133 today from 132 yesterday  -given stability could consider resumption home amitriptyline regimen with close monitoring of mentation and repeat lab work as outpatient to ensure stability, ideally prefer not to cc in older adult population however patient has been maintained on this medication chronically and is likely to suffer withdrawal symptoms if abruptly discontinued, consider risk versus benefit of tapering as outpatient as part of long-term goal, defer taper in acute setting to minimize changes which may confound neuro evals in patient in acute ICH    Dementia without behavior disturbance  -remains pleasantly forgetful  -excellent psychosocial family support and thrives in own environment, continue psychosocial supports, agree with son that patient would likely do much better overall with return home with family on dc vs rehab as unfamiliar setting and OT changes can be very difficult for patients with underlying dementia/cognitive impairment to adjust to and patient is very well cared for at home    Anxiety  -sodium remained relatively stable, could consider resumption home amitriptyline to reduce risk withdrawal symptoms -monitor mentation and left closely    COPD  -without acute exacerbation  -continue home inhaler regimen    Impaired vision  -encourage use of corrective lenses at appropriate times  -consider large font for printed materials provided to patient    High risk developing delirium  -due to age, comorbidities, dementia, ICH  -continue strict precautions, continue to correct metabolic derangements, ensure pain is controlled, monitor for fecal retention and reorient appropriately  -very much appreciate above family at bedside for familiarity and reassurance as is extremely soothing and helpful for patient to have them with her in unfamiliar environment  -continue psychosocial supports of patient and family    Care coordination: rounded with Colleen Aviles (RN), updated son at bedside    Subjective:     Patient seen examined at bedside where she is lying resting comfortably with her son at her side, she reports that she slept well overnight, pain in the back of her head markedly improved, appetite is good and she is feeling much better overall  She reports no acute complaints but misses home and is hopeful she can return there with family soon  Nursing reports no acute events overnight  Objective:     Vitals: Blood pressure 111/68, pulse 85, temperature 97 7 °F (36 5 °C), resp  rate 16, SpO2 97 %, not currently breastfeeding  ,There is no height or weight on file to calculate BMI  No intake or output data in the 24 hours ending 08/18/22 0932    Current Medications: Reviewed    Physical Exam:   Physical Exam  Vitals and nursing note reviewed  Constitutional:       General: She is not in acute distress  Appearance: She is obese  She is not ill-appearing, toxic-appearing or diaphoretic  HENT:      Head: Normocephalic  Nose: Nose normal       Mouth/Throat:      Mouth: Mucous membranes are moist       Comments: Dentures in place  Eyes:      General: No scleral icterus  Right eye: No discharge  Left eye: No discharge  Conjunctiva/sclera: Conjunctivae normal    Cardiovascular:      Rate and Rhythm: Normal rate  Pulses: Normal pulses  Pulmonary:      Effort: Pulmonary effort is normal  No respiratory distress  Breath sounds: No wheezing  Comments: Saturating well on room air  Abdominal:      General: There is no distension  Palpations: Abdomen is soft  Tenderness: There is no abdominal tenderness  Musculoskeletal:      Cervical back: Neck supple  Right lower leg: No edema  Left lower leg: No edema  Comments: Reduced overall muscle mass   Skin:     General: Skin is dry  Neurological:      Mental Status: She is alert  Comments: Awake and alert, oriented to self, follows commands    Moves all 4 extremities spontaneously   Psychiatric:      Comments: Pleasantly confused and cooperative, currently at baseline per son at bedside         Invasive Devices  Report    Peripheral Intravenous Line  Duration           Peripheral IV 08/16/22 Right Forearm 2 days              Lab, Imaging and other studies: I have personally reviewed pertinent reports

## 2022-08-18 NOTE — ASSESSMENT & PLAN NOTE
· 8/16 CT head: trace right mid superior frontal subarachnoid hemorrhage  · 8/17 repeat CT head: slight interval worsening  · Neurosurgery following  · Continue HOT protocol  · Holding SQH until stable CT head  · Q1hr neuro checks  · 8/18 repeat CT this am

## 2022-08-18 NOTE — PLAN OF CARE
Problem: MOBILITY - ADULT  Goal: Maintain or return to baseline ADL function  Description: INTERVENTIONS:  -  Assess patient's ability to carry out ADLs; assess patient's baseline for ADL function and identify physical deficits which impact ability to perform ADLs (bathing, care of mouth/teeth, toileting, grooming, dressing, etc )  - Assess/evaluate cause of self-care deficits   - Assess range of motion  - Assess patient's mobility; develop plan if impaired  - Assess patient's need for assistive devices and provide as appropriate  - Encourage maximum independence but intervene and supervise when necessary  - Involve family in performance of ADLs  - Assess for home care needs following discharge   - Consider OT consult to assist with ADL evaluation and planning for discharge  - Provide patient education as appropriate  Outcome: Progressing     Problem: PAIN - ADULT  Goal: Verbalizes/displays adequate comfort level or baseline comfort level  Description: Interventions:  - Encourage patient to monitor pain and request assistance  - Assess pain using appropriate pain scale  - Administer analgesics based on type and severity of pain and evaluate response  - Implement non-pharmacological measures as appropriate and evaluate response  - Consider cultural and social influences on pain and pain management  - Notify physician/advanced practitioner if interventions unsuccessful or patient reports new pain  Outcome: Progressing     Problem: INFECTION - ADULT  Goal: Absence or prevention of progression during hospitalization  Description: INTERVENTIONS:  - Assess and monitor for signs and symptoms of infection  - Monitor lab/diagnostic results  - Monitor all insertion sites, i e  indwelling lines, tubes, and drains  - Monitor endotracheal if appropriate and nasal secretions for changes in amount and color  - [place] appropriate cooling/warming therapies per order  - Administer medications as ordered  - Instruct and encourage patient and family to use good hand hygiene technique  - Identify and instruct in appropriate isolation precautions for identified infection/condition  Outcome: Progressing     Problem: SAFETY ADULT  Goal: Maintain or return to baseline ADL function  Description: INTERVENTIONS:  -  Assess patient's ability to carry out ADLs; assess patient's baseline for ADL function and identify physical deficits which impact ability to perform ADLs (bathing, care of mouth/teeth, toileting, grooming, dressing, etc )  - Assess/evaluate cause of self-care deficits   - Assess range of motion  - Assess patient's mobility; develop plan if impaired  - Assess patient's need for assistive devices and provide as appropriate  - Encourage maximum independence but intervene and supervise when necessary  - Involve family in performance of ADLs  - Assess for home care needs following discharge   - Consider OT consult to assist with ADL evaluation and planning for discharge  - Provide patient education as appropriate  Outcome: Progressing

## 2022-08-18 NOTE — TREATMENT PLAN
Neurosurgery treatment plan    * Subarachnoid hemorrhage [facility])  Assessment & Plan  Pt is s/p fall with small bifrontal acute SAH  Pt with bilateral chronic hygromas vs SDH      · Imaging reviewed personally and by attending  Final results as below  ? CT head wo 8/16/22: Trace right mid superior frontal subarachnoid hemorrhage  Progressive hypodense thickening of bifrontal extra-axial space likely progressive volume loss of chronic hygromas, maximum thickness 1 4 cm on the right  No acute hydrocephalus   Mild prominence of CSF spaces diffusely attributed to generalized volume loss  ? CT head wo 8/17/22: Mild interval worsening in high bifrontal subarachnoid hemorrhage  Low attenuation bilateral subdural collections are grossly stable and consistent with chronic subdural hematomas versus hygromas  ? CT head without contrast 08/18/2022:  No significant change in right greater than left bifrontal subarachnoid hemorrhages and possible tiny cortical hemorrhage in the parasagittal left frontal lobe  Stable bilateral cerebral convexity subdural hygroma versus chronic hematoma      Plan  · Continue frequent neurological checks  · STAT CT head with any neurological decline or a decrease in GCS of 2pts within 1 hr   · Recommend SBP <160 mmHg  · Keppra for seizure ppx per primary team   · Hold/ avoid all antiplatelet and anticoagulation medications  No reported AC/AP therapy  · Pain control per primary team  · Mobilize and eval by PT/OT when able to  · DVT PPX: SCDs, given stable CT head, may consider pharmacologic DVT prophylaxis as needed  · Ongoing medical management per primary team   · Given small bilateral subarachnoid hemorrhages, these should clear on the home without surgical intervention  No neurosurgical intervention is anticipated  · Neurosurgery will see patient p r n  During the remainder of the hospitalization  Further follow-up will be on a p r n  Basis  Please call with any questions/concerns

## 2022-08-19 ENCOUNTER — TELEPHONE (OUTPATIENT)
Dept: NEUROSURGERY | Facility: CLINIC | Age: 87
End: 2022-08-19

## 2022-08-19 VITALS
OXYGEN SATURATION: 98 % | HEART RATE: 100 BPM | DIASTOLIC BLOOD PRESSURE: 86 MMHG | TEMPERATURE: 98.3 F | SYSTOLIC BLOOD PRESSURE: 132 MMHG | RESPIRATION RATE: 20 BRPM

## 2022-08-19 LAB
ANION GAP SERPL CALCULATED.3IONS-SCNC: 6 MMOL/L (ref 4–13)
BUN SERPL-MCNC: 18 MG/DL (ref 5–25)
CALCIUM SERPL-MCNC: 9 MG/DL (ref 8.3–10.1)
CHLORIDE SERPL-SCNC: 96 MMOL/L (ref 96–108)
CO2 SERPL-SCNC: 30 MMOL/L (ref 21–32)
CREAT SERPL-MCNC: 0.93 MG/DL (ref 0.6–1.3)
GFR SERPL CREATININE-BSD FRML MDRD: 55 ML/MIN/1.73SQ M
GLUCOSE SERPL-MCNC: 147 MG/DL (ref 65–140)
POTASSIUM SERPL-SCNC: 3.4 MMOL/L (ref 3.5–5.3)
SODIUM SERPL-SCNC: 132 MMOL/L (ref 135–147)

## 2022-08-19 PROCEDURE — 80048 BASIC METABOLIC PNL TOTAL CA: CPT | Performed by: STUDENT IN AN ORGANIZED HEALTH CARE EDUCATION/TRAINING PROGRAM

## 2022-08-19 PROCEDURE — NC001 PR NO CHARGE: Performed by: STUDENT IN AN ORGANIZED HEALTH CARE EDUCATION/TRAINING PROGRAM

## 2022-08-19 PROCEDURE — 99232 SBSQ HOSP IP/OBS MODERATE 35: CPT | Performed by: INTERNAL MEDICINE

## 2022-08-19 PROCEDURE — 99238 HOSP IP/OBS DSCHRG MGMT 30/<: CPT | Performed by: PHYSICIAN ASSISTANT

## 2022-08-19 RX ORDER — OXYCODONE HYDROCHLORIDE 5 MG/1
2.5 TABLET ORAL ONCE
Status: DISCONTINUED | OUTPATIENT
Start: 2022-08-19 | End: 2022-08-19 | Stop reason: HOSPADM

## 2022-08-19 RX ORDER — LEVETIRACETAM 500 MG/1
500 TABLET ORAL EVERY 12 HOURS SCHEDULED
Qty: 7 TABLET | Refills: 0 | Status: SHIPPED | OUTPATIENT
Start: 2022-08-19 | End: 2022-08-23

## 2022-08-19 RX ORDER — POTASSIUM CHLORIDE 20 MEQ/1
40 TABLET, EXTENDED RELEASE ORAL ONCE
Status: COMPLETED | OUTPATIENT
Start: 2022-08-19 | End: 2022-08-19

## 2022-08-19 RX ADMIN — POTASSIUM CHLORIDE 40 MEQ: 1500 TABLET, EXTENDED RELEASE ORAL at 12:42

## 2022-08-19 RX ADMIN — FUROSEMIDE 40 MG: 40 TABLET ORAL at 08:40

## 2022-08-19 RX ADMIN — HYDROCHLOROTHIAZIDE 12.5 MG: 12.5 TABLET ORAL at 08:40

## 2022-08-19 RX ADMIN — ACETAMINOPHEN 650 MG: 325 TABLET ORAL at 02:53

## 2022-08-19 RX ADMIN — PANTOPRAZOLE SODIUM 20 MG: 20 TABLET, DELAYED RELEASE ORAL at 05:14

## 2022-08-19 RX ADMIN — ATORVASTATIN CALCIUM 20 MG: 20 TABLET, FILM COATED ORAL at 08:41

## 2022-08-19 RX ADMIN — LIDOCAINE 5% 1 PATCH: 700 PATCH TOPICAL at 08:41

## 2022-08-19 RX ADMIN — ENOXAPARIN SODIUM 30 MG: 30 INJECTION SUBCUTANEOUS at 08:41

## 2022-08-19 RX ADMIN — LEVETIRACETAM 500 MG: 500 TABLET, FILM COATED ORAL at 08:41

## 2022-08-19 RX ADMIN — ACETAMINOPHEN 650 MG: 325 TABLET ORAL at 08:41

## 2022-08-19 RX ADMIN — SUCRALFATE 1 G: 1 TABLET ORAL at 08:41

## 2022-08-19 NOTE — DISCHARGE SUMMARY
Discharge Summary - Yunier Harrell 80 y o  female MRN: [id number]    [facility] Encounter: [id number]    Admission Date:   Admission Orders (From admission, onward)     Ordered        08/16/22 0747  Inpatient Admission  Once                        Admitting Diagnosis: Subarachnoid cyst [G93 0]  Subarachnoid bleed ([address] ) [I60 9]  Fall, initial encounter [W19  XXXA]    HPI: per Bina Sargent, "Geraldine Suarez is a 80 y o  female who presented at [facility]  "with complaint of blunt head trauma   She reportedly rolled out of bed yesterday morning at 3:00 a m  Foreman Outlaw was no loss of consciousness and other than a headache she has had no other acute issues or concerns   She has taken Tylenol only minimal improvement of symptoms   She has had no change in vision, recurrent falls, vomiting, focal neurological deficits, or other acute issues "  Found to have a small frontal SAH and transferred to B "    Procedures Performed: No orders of the defined types were placed in this encounter  Summary of Hospital Course:  Patient is an 77-year-old female who comes in status post fall  She rolled out of bed  She was noted to have a small traumatic brain injury  Neurosurgery has signed off and did not recommend any acute intervention  She would continue on Keppra for total of 7 days  She would be discharged on 08/19/2022 with outpatient follow-up with her primary care provider  This was discussed with the patient's family at bedside prior to discharge  Patient family in agreement plan  Given resources regarding outpatient visiting nursing  Significant Findings, Care, Treatment and Services Provided:   CT head wo contrast    Result Date: 8/18/2022  Impression: 1  No significant change in right greater than left bifrontal subarachnoid hemorrhage and possible tiny cortical hemorrhage in the parasagittal left frontal lobe   2   Stable bilateral cerebral convexity subdural hygroma versus chronic hematoma  Workstation performed: [id number]     CT head wo contrast    Result Date: 8/17/2022  Impression: Mild interval worsening in high bifrontal subarachnoid hemorrhage Low attenuation bilateral subdural collections are grossly stable and consistent with chronic subdural hematomas versus hygromas  The study was marked in [facility] for immediate notification  Workstation performed: [id number]     CT head without contrast    Result Date: 8/16/2022  Impression: Trace right mid superior frontal subarachnoid hemorrhage  No acute intraparenchymal hemorrhage  No skull fracture  Chronic microangiopathy  I personally discussed this study with Macy Grant on 8/16/2022 at 6:24 AM  Workstation performed: [id number]     CT spine cervical without contrast    Result Date: 8/16/2022  Impression: No cervical spine fracture or traumatic malalignment    Workstation performed: [id number]       Complications: no complications    Discharge Diagnosis:   Patient Active Problem List   Diagnosis    Gait instability    Anemia    Aortic stenosis    Benign essential hypertension    Bilateral occipital neuralgia    Carpal tunnel syndrome on both sides    Glaucoma    HLD (hyperlipidemia)    Malignant neoplasm of overlapping sites of left female breast ([address] )    Cataract    Hemorrhoids    Constipation    Chronic diastolic congestive heart failure (HCC)    GERD (gastroesophageal reflux disease)    Anal or rectal pain    Osteoarthritis    COPD (chronic obstructive pulmonary disease) (HCC)    Central perforation of tympanic membrane    Acute pain of right shoulder    Right rotator cuff tear arthropathy    Encounter for removal of sutures    2019 novel coronavirus disease (COVID-19)    Right upper quadrant pain    Pain of toe of left foot    Pre-op evaluation    Nonintractable headache    Dementia (HCC)    Hearing loss    Ganglion cyst of joint of finger of left hand    Influenza vaccine needed    Fall    Bruise    Abdominal pain, epigastric    Class 1 obesity    At risk for polypharmacy    Subarachnoid hemorrhage (HCC)    Hyponatremia         Medical Problems             Resolved Problems  Date Reviewed: 7/8/2022   None                 Condition at Discharge: good         Discharge instructions/Information to patient and family:   See after visit summary for information provided to patient and family  Provisions for Follow-Up Care:  See after visit summary for information related to follow-up care and any pertinent home health orders  PCP: Kristian Mejía MD    Disposition: Home    Planned Readmission: No      Discharge Statement   I spent 22 minutes discharging the patient  This time was spent on the day of discharge  I had direct contact with the patient on the day of discharge  Additional documentation is required if more than 30 minutes were spent on discharge  Discharge Medications:  See after visit summary for reconciled discharge medications provided to patient and family

## 2022-08-19 NOTE — PROGRESS NOTES
[facility]  Progress Note - Jorgito Mas 1934, 80 y o  female MRN: [id number]  [facility] Encounter: [id number]  Primary Care Provider: Arley Marinelli MD   Date and time admitted to hospital: 8/16/2022  7:36 AM    Hyponatremia  Assessment & Plan  · Na 133 this morning (from 132,131, 133)  · No h/o hyponatremia per chart review      GERD (gastroesophageal reflux disease)  Assessment & Plan  · Continue home lasix  · Monitor fluid status    HLD (hyperlipidemia)  Assessment & Plan  · Continue home statin    Benign essential hypertension  Assessment & Plan  · Continue home HCTZ  · Continue home lasix  · Holding home lisinopril    * Subarachnoid hemorrhage (HCC)  Assessment & Plan  · 8/16 CT head: trace right mid superior frontal subarachnoid hemorrhage  · 8/17 repeat CT head: slight interval worsening  · Neurosurgery following  · Continue HOT protocol  · Holding SQH until stable CT head  · Q1hr neuro checks  · 8/18 repeat CT this am        Disposition: planning for home rehab/nursing    SUBJECTIVE:  Chief Complaint: none      OBJECTIVE:   Vitals:   Temp:  [98 3 °F (36 8 °C)] 98 3 °F (36 8 °C)  HR:  [84-99] 97  Resp:  [18] 18  BP: (105-130)/(56-85) 130/85    Intake/Output:  I/O       08/17 0701  08/18 0700 08/18 0701  08/19 0700 08/19 0701  08/20 0700    P  O   780     Total Intake  780     Urine       Total Output       Net  +780            Unmeasured Urine Occurrence 3 x 8 x     Unmeasured Stool Occurrence 1 x           Nutrition: Diet Cardiovascular; Sodium 4 Gm (DAYAN)  GI Proph/Bowel Reg: see mar  VTE Prophylaxis:Enoxaparin (Lovenox)     Physical Exam:   GENERAL APPEARANCE: Patient in no acute distress  HEENT: NCAT; PERRL, EOMs intact; Mucous membranes moist  NECK / BACK: non-tender  CV: Regular rate and rhythm; no murmur/gallops/rubs appreciated  CHEST / LUNGS: Clear to auscultation; no wheezes/rales/rhonci    ABD: NABS; soft; non-distended; non-tender  EXT: +2 pulses bilaterally upper & lower extremities; no edema  NEURO: GCS 15; no focal neurologic deficits; neurovascularly intact  SKIN: Warm, dry and well perfused; no rash; no jaundice  Invasive Devices  Report    Peripheral Intravenous Line  Duration           Peripheral IV 08/16/22 Right Forearm 3 days                      Lab Results:   BMP/CMP:   Lab Results   Component Value Date    SODIUM 132 (L) 08/19/2022    K 3 4 (L) 08/19/2022    CL 96 08/19/2022    CO2 30 08/19/2022    BUN 18 08/19/2022    CREATININE 0 93 08/19/2022    CALCIUM 9 0 08/19/2022    EGFR 55 08/19/2022     Imaging/EKG Studies: I have personally reviewed pertinent reports       Other Studies:

## 2022-08-19 NOTE — TELEPHONE ENCOUNTER
08/19/2022-PLEASE SEE Mylinda Primrose 08/17/2022 NOTE    ----- Message from Nicole Schroeder PA-C sent at 8/18/2022  4:04 PM EDT -----  Regarding: Follow-up p r n  Patient seen  Follow-up p r n

## 2022-08-19 NOTE — PLAN OF CARE
Problem: MOBILITY - ADULT  Goal: Maintain or return to baseline ADL function  Description: INTERVENTIONS:  -  Assess patient's ability to carry out ADLs; assess patient's baseline for ADL function and identify physical deficits which impact ability to perform ADLs (bathing, care of mouth/teeth, toileting, grooming, dressing, etc )  - Assess/evaluate cause of self-care deficits   - Assess range of motion  - Assess patient's mobility; develop plan if impaired  - Assess patient's need for assistive devices and provide as appropriate  - Encourage maximum independence but intervene and supervise when necessary  - Involve family in performance of ADLs  - Assess for home care needs following discharge   - Consider OT consult to assist with ADL evaluation and planning for discharge  - Provide patient education as appropriate  Outcome: Progressing  Goal: Maintains/Returns to pre admission functional level  Description: INTERVENTIONS:  - Perform BMAT or MOVE assessment daily    - Set and communicate daily mobility goal to care team and patient/family/caregiver  - Collaborate with rehabilitation services on mobility goals if consulted  - Perform Range of Motion  times a day  - Reposition patient every  hours    - Dangle patient  times a day  - Stand patient  times a day  - Ambulate patient  times a day  - Out of bed to chair  times a day   - Out of bed for meals  times a day  - Out of bed for toileting  - Record patient progress and toleration of activity level   Outcome: Progressing     Problem: PAIN - ADULT  Goal: Verbalizes/displays adequate comfort level or baseline comfort level  Description: Interventions:  - Encourage patient to monitor pain and request assistance  - Assess pain using appropriate pain scale  - Administer analgesics based on type and severity of pain and evaluate response  - Implement non-pharmacological measures as appropriate and evaluate response  - Consider cultural and social influences on pain and pain management  - Notify physician/advanced practitioner if interventions unsuccessful or patient reports new pain  Outcome: Progressing     Problem: INFECTION - ADULT  Goal: Absence or prevention of progression during hospitalization  Description: INTERVENTIONS:  - Assess and monitor for signs and symptoms of infection  - Monitor lab/diagnostic results  - Monitor all insertion sites, i e  indwelling lines, tubes, and drains  - Monitor endotracheal if appropriate and nasal secretions for changes in amount and color  - [place] appropriate cooling/warming therapies per order  - Administer medications as ordered  - Instruct and encourage patient and family to use good hand hygiene technique  - Identify and instruct in appropriate isolation precautions for identified infection/condition  Outcome: Progressing  Goal: Absence of fever/infection during neutropenic period  Description: INTERVENTIONS:  - Monitor WBC    Outcome: Progressing     Problem: SAFETY ADULT  Goal: Maintain or return to baseline ADL function  Description: INTERVENTIONS:  -  Assess patient's ability to carry out ADLs; assess patient's baseline for ADL function and identify physical deficits which impact ability to perform ADLs (bathing, care of mouth/teeth, toileting, grooming, dressing, etc )  - Assess/evaluate cause of self-care deficits   - Assess range of motion  - Assess patient's mobility; develop plan if impaired  - Assess patient's need for assistive devices and provide as appropriate  - Encourage maximum independence but intervene and supervise when necessary  - Involve family in performance of ADLs  - Assess for home care needs following discharge   - Consider OT consult to assist with ADL evaluation and planning for discharge  - Provide patient education as appropriate  Outcome: Progressing  Goal: Maintains/Returns to pre admission functional level  Description: INTERVENTIONS:  - Perform BMAT or MOVE assessment daily    - Set and communicate daily mobility goal to care team and patient/family/caregiver  - Collaborate with rehabilitation services on mobility goals if consulted  - Perform Range of Motion  times a day  - Reposition patient every  hours    - Dangle patient  times a day  - Stand patient  times a day  - Ambulate patient  times a day  - Out of bed to chair  times a day   - Out of bed for meals  times a day  - Out of bed for toileting  - Record patient progress and toleration of activity level   Outcome: Progressing  Goal: Patient will remain free of falls  Description: INTERVENTIONS:  - Educate patient/family on patient safety including physical limitations  - Instruct patient to call for assistance with activity   - Consult OT/PT to assist with strengthening/mobility   - Keep Call bell within reach  - Keep bed low and locked with side rails adjusted as appropriate  - Keep care items and personal belongings within reach  - Initiate and maintain comfort rounds  - Make Fall Risk Sign visible to staff  - Offer Toileting every  Hours, in advance of need  - Initiate/Maintain alarm  - Obtain necessary fall risk management equipment:   - Apply yellow socks and bracelet for high fall risk patients  - Consider moving patient to room near nurses station  Outcome: Progressing     Problem: DISCHARGE PLANNING  Goal: Discharge to home or other facility with appropriate resources  Description: INTERVENTIONS:  - Identify barriers to discharge w/patient and caregiver  - Arrange for needed discharge resources and transportation as appropriate  - Identify discharge learning needs (meds, wound care, etc )  - Arrange for interpretive services to assist at discharge as needed  - Refer to Case Management Department for coordinating discharge planning if the patient needs post-hospital services based on physician/advanced practitioner order or complex needs related to functional status, cognitive ability, or social support system  Outcome: Progressing Problem: Knowledge Deficit  Goal: Patient/family/caregiver demonstrates understanding of disease process, treatment plan, medications, and discharge instructions  Description: Complete learning assessment and assess knowledge base  Interventions:  - Provide teaching at level of understanding  - Provide teaching via preferred learning methods  Outcome: Progressing     Problem: Nutrition/Hydration-ADULT  Goal: Nutrient/Hydration intake appropriate for improving, restoring or maintaining nutritional needs  Description: Monitor and assess patient's nutrition/hydration status for malnutrition  Collaborate with interdisciplinary team and initiate plan and interventions as ordered  Monitor patient's weight and dietary intake as ordered or per policy  Utilize nutrition screening tool and intervene as necessary  Determine patient's food preferences and provide high-protein, high-caloric foods as appropriate       INTERVENTIONS:  - Monitor oral intake, urinary output, labs, and treatment plans  - Assess nutrition and hydration status and recommend course of action  - Evaluate amount of meals eaten  - Assist patient with eating if necessary   - Allow adequate time for meals  - Recommend/ encourage appropriate diets, oral nutritional supplements, and vitamin/mineral supplements  - Order, calculate, and assess calorie counts as needed  - Recommend, monitor, and adjust tube feedings and TPN/PPN based on assessed needs  - Assess need for intravenous fluids  - Provide specific nutrition/hydration education as appropriate  - Include patient/family/caregiver in decisions related to nutrition  Outcome: Progressing     Problem: Potential for Falls  Goal: Patient will remain free of falls  Description: INTERVENTIONS:  - Educate patient/family on patient safety including physical limitations  - Instruct patient to call for assistance with activity   - Consult OT/PT to assist with strengthening/mobility   - Keep Call bell within reach  - Keep bed low and locked with side rails adjusted as appropriate  - Keep care items and personal belongings within reach  - Initiate and maintain comfort rounds  - Make Fall Risk Sign visible to staff  - Offer Toileting every Hours, in advance of need  - Initiate/Maintain alarm  - Obtain necessary fall risk management equipment:   - Apply yellow socks and bracelet for high fall risk patients  - Consider moving patient to room near nurses station  Outcome: Progressing

## 2022-08-19 NOTE — PROGRESS NOTES
Progress Note - Geriatric Medicine   Geraldine Simons Began 80 y o  female MRN: [id number]  Unit/Bed#: [facility] 603-01 Encounter: [id number]      Assessment/Plan:    Ambulatory dysfunction with fall  -injuries as below  -continue fall precautions, assist with transfers maintain environment free of fall hazards  -PT OT following    Subarachnoid hemorrhage  -s/p fall with head strike PTA  -noted on CTH obtained on admission reportedly stable on most recent followup CTH earlier today  -AC/AP on hold until cleared by Neurosurgery    Hyponatremia  -remains stable, could consider resumption home Elavil although preferable to minimize use in older adult population patient maintained on chronically as outpatient and abrupt discontinuation may precipitate significant withdrawal symptoms  -consider repeat labs shortly after discharge with follow-up with PCP to ensure stability    Dementia without behavior disturbance  -pleasantly forgetful, cooperative  -excellent psychosocial family support  -redirect unwanted behaviors as first-line  -continue strict delirium precautions    Impaired vision  -encourage use corrective lenses appropriate times  -recommend large font for printed materials provided to patient    Frailty syndrome in geriatric patient  -due to age and co-morbidities - optimize chronic conditions and address acute derangements as arise  -encourage well-balanced nutrition    Subjective:     Patient seen examined bedside where she is lying resting comfortably, she is pleasantly confused in no acute distress, anticipate discharge home with family later today  Review of Systems   Unable to perform ROS: Dementia     Objective:     Vitals: Blood pressure 130/85, pulse 97, temperature 98 3 °F (36 8 °C), resp  rate 18, SpO2 98 %, not currently breastfeeding  ,There is no height or weight on file to calculate BMI        Intake/Output Summary (Last 24 hours) at 8/19/2022 1005  Last data filed at 8/19/2022 0601  Gross per 24 hour Intake 540 ml   Output --   Net 540 ml     Current Medications: Reviewed    Physical Exam:   Physical Exam  Vitals and nursing note reviewed  Constitutional:       General: She is not in acute distress  Appearance: Normal appearance  She is obese  She is not ill-appearing  HENT:      Head: Normocephalic  Nose: Nose normal       Mouth/Throat:      Mouth: Mucous membranes are moist       Comments: Dentures in place  Eyes:      General:         Right eye: No discharge  Left eye: No discharge  Conjunctiva/sclera: Conjunctivae normal    Cardiovascular:      Rate and Rhythm: Normal rate  Pulses: Normal pulses  Pulmonary:      Effort: Pulmonary effort is normal  No respiratory distress  Breath sounds: No wheezing  Abdominal:      General: There is no distension  Palpations: Abdomen is soft  Tenderness: There is no abdominal tenderness  Musculoskeletal:      Cervical back: Neck supple  Comments: Obese body habitus    Mildly reduced overall muscle mass   Skin:     General: Skin is dry  Comments: Cool   Neurological:      Mental Status: She is alert  Comments: Awake and alert   Psychiatric:      Comments: Pleasantly confused        Invasive Devices  Report    Peripheral Intravenous Line  Duration           Peripheral IV 08/16/22 Right Forearm 3 days              Lab, Imaging and other studies: I have personally reviewed pertinent reports

## 2022-08-19 NOTE — CASE MANAGEMENT
Case Management Discharge Planning Note    Patient name Abiodun Ch  [facility] [id number]  : 1934 Date 2022       Current Admission Date: 2022  Current Admission Diagnosis:Subarachnoid hemorrhage [facility])   Patient Active Problem List    Diagnosis Date Noted    Hyponatremia 2022    Subarachnoid hemorrhage ([facility] ) 2022    At risk for polypharmacy 2022    Class 1 obesity 2022    Abdominal pain, epigastric 2022    Bruise 03/10/2022    Fall 2022    Influenza vaccine needed 2022    Ganglion cyst of joint of finger of left hand 2021    Hearing loss 10/27/2021    Dementia ([facility] ) 10/26/2021    Nonintractable headache 2021    Pre-op evaluation 2021    Pain of toe of left foot 2021    Right upper quadrant pain 2020    2019 novel coronavirus disease (COVID-19) 2020    Encounter for removal of sutures 2020    Right rotator cuff tear arthropathy 2020    Acute pain of right shoulder 2019    COPD (chronic obstructive pulmonary disease) ([facility] ) 2019    Central perforation of tympanic membrane 2019    Osteoarthritis 2018    Anal or rectal pain 2018    Hemorrhoids 2018    Constipation 2018    Chronic diastolic congestive heart failure ([address] ) 2018    GERD (gastroesophageal reflux disease) 2018    Cataract 2018    Malignant neoplasm of overlapping sites of left female breast ([facility] [facility] ) 11/10/2016    HLD (hyperlipidemia) 10/31/2016    Aortic stenosis 10/26/2016    Carpal tunnel syndrome on both sides 2016    Bilateral occipital neuralgia 10/15/2015    Gait instability 2015    Anemia 2014    Benign essential hypertension 2014    Glaucoma 2014      LOS (days): 3  Geometric Mean LOS (GMLOS) (days): 3 00  Days to GMLOS:-0 1     OBJECTIVE:  Risk of Unplanned Readmission Score: 21 62 Current admission status: Inpatient   Preferred Pharmacy:   [address], [address]  [address] [address]  Phone: [phone] Fax: [phone]    Primary Care Provider: [address], [place]    Primary Insurance: Joanna Corporal MEDICARE [facility] REP  Secondary Insurance: [address] [place] [address]    DISCHARGE DETAILS:    CM was informed by Unit Staff, that the pt's dtr expressed concerns about a home d/c due to pt's lack of assistance at night  Pt's dtr instructed CM to call pt's son Boy Hicks to discuss  CM spoke to Boy Hicks, who assured Cm that he and his family will have adequate support and coverage for the pt, even at night time  CM offered to give the family a list of [place] who could assist should the family require more  CM gave to pt's dtr  Plan remains to d/c home today   Family to transport

## 2022-08-22 ENCOUNTER — VBI (OUTPATIENT)
Dept: ADMINISTRATIVE | Facility: OTHER | Age: 87
End: 2022-08-22

## 2022-08-22 NOTE — UTILIZATION REVIEW
Notification of Discharge   This is a Notification of Discharge from our facility [address]  Please be advised that this patient has been discharge from our facility  Below you will find the admission and discharge date and time including the patients disposition  UTILIZATION REVIEW CONTACT:  Delma Corona  Utilization   Network Utilization Review Department  Phone: [phone] x carefully listen to the prompts  All voicemails are confidential   Email: [email or web address]     PHYSICIAN ADVISORY SERVICES:  FOR SDSN-HE-ETCZ REVIEW - MEDICAL NECESSITY DENIAL  Phone: [phone]  Fax: [phone]  Email: [email or web address]     PRESENTATION DATE: 8/16/2022  7:36 AM  OBERVATION ADMISSION DATE:   INPATIENT ADMISSION DATE: 8/16/22  7:47 AM   DISCHARGE DATE: 8/19/2022  4:59 PM  DISPOSITION: Home/Self Care Home/Self Care      IMPORTANT INFORMATION:  Send all requests for admission clinical reviews, approved or denied determinations and any other requests to dedicated fax number below belonging to the campus where the patient is receiving treatment   List of dedicated fax numbers:  [address] DENIALS (Administrative/Medical Necessity) [phone]   [address] [place] [address] (Maternity/NICU/Pediatrics) [phone]   [employer] [phone]   130 [facility] [phone]   [address] [place] [address] [phone]   [address] [place] [address],[address] [phone]   [facility]  [phone]   [facility], [facility]  [address] [facility] 1000 [address] [facility] [phone]

## 2022-08-23 ENCOUNTER — OFFICE VISIT (OUTPATIENT)
Dept: FAMILY MEDICINE CLINIC | Facility: CLINIC | Age: 87
End: 2022-08-23

## 2022-08-23 ENCOUNTER — TELEPHONE (OUTPATIENT)
Dept: NEUROSURGERY | Facility: CLINIC | Age: 87
End: 2022-08-23

## 2022-08-23 VITALS
TEMPERATURE: 97.6 F | HEART RATE: 101 BPM | HEIGHT: 61 IN | DIASTOLIC BLOOD PRESSURE: 70 MMHG | RESPIRATION RATE: 18 BRPM | BODY MASS INDEX: 33.61 KG/M2 | SYSTOLIC BLOOD PRESSURE: 130 MMHG | WEIGHT: 178 LBS | OXYGEN SATURATION: 96 %

## 2022-08-23 DIAGNOSIS — I10 BENIGN ESSENTIAL HYPERTENSION: ICD-10-CM

## 2022-08-23 DIAGNOSIS — I50.9 CHF (CONGESTIVE HEART FAILURE) (HCC): ICD-10-CM

## 2022-08-23 DIAGNOSIS — F01.518 VASCULAR DEMENTIA WITH BEHAVIOR DISTURBANCE: Primary | ICD-10-CM

## 2022-08-23 DIAGNOSIS — L21.0 SEBORRHEA CAPITIS: ICD-10-CM

## 2022-08-23 DIAGNOSIS — K59.01 SLOW TRANSIT CONSTIPATION: ICD-10-CM

## 2022-08-23 DIAGNOSIS — F03.C0 SEVERE DEMENTIA: ICD-10-CM

## 2022-08-23 DIAGNOSIS — Z51.5 END OF LIFE CARE: ICD-10-CM

## 2022-08-23 PROCEDURE — 99214 OFFICE O/P EST MOD 30 MIN: CPT | Performed by: INTERNAL MEDICINE

## 2022-08-23 PROCEDURE — 3078F DIAST BP <80 MM HG: CPT | Performed by: INTERNAL MEDICINE

## 2022-08-23 PROCEDURE — 3075F SYST BP GE 130 - 139MM HG: CPT | Performed by: INTERNAL MEDICINE

## 2022-08-23 RX ORDER — AMITRIPTYLINE HYDROCHLORIDE 25 MG/1
25 TABLET, FILM COATED ORAL
Qty: 30 TABLET | Refills: 3 | Status: SHIPPED | OUTPATIENT
Start: 2022-08-23 | End: 2022-08-24

## 2022-08-23 RX ORDER — LISINOPRIL 40 MG/1
40 TABLET ORAL DAILY
Qty: 90 TABLET | Refills: 0 | Status: SHIPPED | OUTPATIENT
Start: 2022-08-23 | End: 2022-09-16

## 2022-08-23 RX ORDER — KETOCONAZOLE 20 MG/G
CREAM TOPICAL DAILY
Qty: 30 G | Refills: 3 | Status: SHIPPED | OUTPATIENT
Start: 2022-08-23 | End: 2022-09-16

## 2022-08-23 RX ORDER — SENNA AND DOCUSATE SODIUM 50; 8.6 MG/1; MG/1
1 TABLET, FILM COATED ORAL DAILY
Qty: 30 TABLET | Refills: 6 | Status: SHIPPED | OUTPATIENT
Start: 2022-08-23 | End: 2022-09-16

## 2022-08-23 RX ORDER — KETOCONAZOLE 20 MG/ML
0.4 SHAMPOO TOPICAL 3 TIMES WEEKLY
Qty: 100 ML | Refills: 3 | Status: SHIPPED | OUTPATIENT
Start: 2022-08-24 | End: 2022-09-16

## 2022-08-23 RX ORDER — HYDROCHLOROTHIAZIDE 12.5 MG/1
12.5 TABLET ORAL DAILY
Qty: 90 TABLET | Refills: 0 | Status: SHIPPED | OUTPATIENT
Start: 2022-08-23 | End: 2022-09-16

## 2022-08-23 RX ORDER — FUROSEMIDE 40 MG/1
40 TABLET ORAL DAILY
Qty: 90 TABLET | Refills: 0 | Status: SHIPPED | OUTPATIENT
Start: 2022-08-23

## 2022-08-23 NOTE — PROGRESS NOTES
Assessment/Plan:    Vascular dementia with behavior disturbance (HCC)  Severe vascular dementia, follows with geriatrics  CT head 2020 showing volume loss with white matter disease is present  Requires assistance for all ADL and 24/7 direct supervision  Son states that sometimes it can be difficult to manage patient's behavior specially when she gets anxious  Patient has good support at home  She lives with son and granddaughter  Son is the primary caregiver  Family wishes for patient to stay-at-home while receiving care  Plan:  Family was counseled about patient's prognosis  Family was counseled about the benefits of palliative care/hospice care and prioritizing comfort and well-being  Referral to home health was placed for a nurse visit, physical therapy, and occupational therapy  Family was counseled to continue following with Geriatrics    GERD (gastroesophageal reflux disease)  Patient follows with Gastroenterology  Reports the patient has not complained of any dyspeptic symptoms recently  Currently on omeprazole 40 mg q d and sucralfate 1 g 4 times a day, compliant  Plan:  Family advised to continue follow-up with Gastroenterology and current medication regimen  Benign essential hypertension  Blood pressure at todays office visit 130/70 mmHg, controlled  Blood Pressure goal as per JNC-8 less than 150/90 mmHg,   Currently on lisinopril 40mg and hydrochlorothiazide 12 5, compliant    Plan: Will continue current regimen  BP goals reviewed with family         End of life care  Patient with severe dementia, needing assistance for all activities of daily living and 24/7 supervision  Patient has good support at home and her son is her primary caregiver  Patient follows with Geriatrics      Plan:  Referral for some [address]  Family counseled about the benefits of palliative care/hospice care  Family is aware of patient's prognosis    HLD (hyperlipidemia)  Patient currently on atorvastatin 20 mg, compliant    Plan   Consider removing current medication from patient's meds  Regimen next appointment due to her prognosis and age  However family reports that patient gets anxious when she does receive are medications  Seborrhea capitis  Itchy white patchy lesion on scalp  Patient complains of pain in the area for the past 2 weeks  Patient dyes hair frequently  Plan:  Ordered ketoconazole shampoo 2% to wash hair 3 times a week and ketoconazole cream 2% to put on the lesion  Counseled family about avoiding coloring patient's hair      Chronic diastolic congestive heart failure [facility])  Patient follows with Cardiology  Currently on Lasix 40 mg, compliant  Echo from April 2021 significant for aortic stenosis 1 2 cmï¾²  LV Ejection fraction of 60%  Plan:  Continue to follow-up with cardiology and current regimen  Diagnoses and all orders for this visit:    Vascular dementia with behavior disturbance (HCC)  -     amitriptyline (ELAVIL) 25 mg tablet; Take 1 tablet (25 mg total) by mouth daily at bedtime    Seborrhea capitis  -     ketoconazole (NIZORAL) 2 % shampoo; Apply 0 4 application topically 3 (three) times a week  -     ketoconazole (NIZORAL) 2 % cream; Apply topically daily To scalp lesions    Severe dementia (HCC)  -     Referral to [address] [place] [address]; Future  -     Discontinue: amitriptyline (ELAVIL) 25 mg tablet; Take 1 tablet (25 mg total) by mouth daily at bedtime    Slow transit constipation  Comments:  Family states that patient got constipated after stopping senna  Called the pharmacy and resumed medication  Orders:  -     senna-docusate sodium (SENOKOT-S) 8 6-50 mg per tablet; Take 1 tablet by mouth daily    Benign essential hypertension  -     hydrochlorothiazide (HYDRODIURIL) 12 5 mg tablet; Take 1 tablet (12 5 mg total) by mouth daily  -     lisinopril (ZESTRIL) 40 mg tablet;  Take 1 tablet (40 mg total) by mouth daily    CHF (congestive heart failure) (HCC)  -     furosemide (LASIX) 40 mg tablet; Take 1 tablet (40 mg total) by mouth daily    End of life care          Subjective:      Patient ID: Cheryl Salas is a 80 y o  female  Yadiel Dubon is a 80-year-old female with past medical history of severe vascular dementia, hypertension, chronic diastolic heart failure, hyperlipidemia, COPD, hearing loss, gait instability, bilateral occipital neuralgia, and constipation who presents today for a follow-up after a hospitalization [facility] secondary to a fall from her bed on 8/16/22  Patient is accompanied by her son who is her primary caregiver  Patient has been followed by Geriatric who made an assessment of her health and functionality and stated that patient needs direct supervision 24/7  Family wishes to keep patient at the house under the care of family members, however her son reports that sometimes is overwhelming to care for patient especially when she feels anxious  Patient needs constant help with ADL and supervision  Son states that he watches his mother at night through a baby camera, but her bad does not have an alarm  Son reports that the fall happened when patient tried to get out of bad by herself in the middle of the night to go to the bathroom  The following portions of the patient's history were reviewed and updated as appropriate: current medications, past medical history, past social history and problem list     Review of Systems   Constitutional: Negative for appetite change and fever  HENT: Negative for congestion, ear discharge and ear pain  Respiratory: Negative for chest tightness and shortness of breath  Cardiovascular: Negative for chest pain  Gastrointestinal: Positive for constipation  Negative for abdominal pain, blood in stool, diarrhea, nausea and vomiting  Genitourinary: Negative for difficulty urinating and dysuria  Musculoskeletal: Negative for arthralgias and back pain     Neurological: Negative for seizures  Psychiatric/Behavioral: Positive for confusion  The patient is nervous/anxious  Objective:      /70 (BP Location: Left arm, Patient Position: Sitting, Cuff Size: Adult)   Pulse 101   Temp 97 6 °F (36 4 °C) (Temporal)   Resp 18   Ht 5' 1" (1 549 m)   Wt 80 7 kg (178 lb)   SpO2 96%   BMI 33 63 kg/m²          Physical Exam  Constitutional:       General: She is not in acute distress  Appearance: She is obese  She is not toxic-appearing  HENT:      Head: Normocephalic  Comments: Seborrheic capitis (Right parietal scalp)     Nose: Nose normal    Eyes:      General: No scleral icterus  Conjunctiva/sclera: Conjunctivae normal    Cardiovascular:      Rate and Rhythm: Normal rate and regular rhythm  Pulses: Normal pulses  Heart sounds: No murmur heard  Pulmonary:      Effort: Pulmonary effort is normal  No respiratory distress  Breath sounds: Normal breath sounds  No wheezing or rhonchi  Abdominal:      General: There is no distension  Palpations: Abdomen is soft  Tenderness: There is no abdominal tenderness  There is no guarding  Skin:     General: Skin is warm and dry  Capillary Refill: Capillary refill takes less than 2 seconds  Coloration: Skin is not jaundiced or pale  Neurological:      Mental Status: She is alert  Mental status is at baseline  She is disoriented  Psychiatric:         Mood and Affect: Mood is anxious  Behavior: Behavior is agitated  Cognition and Memory: Cognition is impaired

## 2022-08-23 NOTE — TELEPHONE ENCOUNTER
Received a call from Omar Hays the patients son regarding Geraldine's status  He states that she has been feeling "weird" since her fall and anticipated feeling better by now  He states that she has a mild headache but denies changes in her mental status, or trouble walking, or speaking  Advised that it is not uncommon to be mildly headachy due to resolving brain bleed however if this symptoms worsens/does not response to OTC medication, and/or has changes in mental status, slurred speech, ambulatory dysfunction, or becomes lethargic or difficult to arouse should report to the ED for evaluation  Encouraged call back to the office with additional questions or concerns

## 2022-08-24 ENCOUNTER — TELEPHONE (OUTPATIENT)
Dept: FAMILY MEDICINE CLINIC | Facility: CLINIC | Age: 87
End: 2022-08-24

## 2022-08-24 PROBLEM — Z48.02 ENCOUNTER FOR REMOVAL OF SUTURES: Status: RESOLVED | Noted: 2020-03-03 | Resolved: 2022-01-01

## 2022-08-24 PROBLEM — L21.0 SEBORRHEA CAPITIS: Status: ACTIVE | Noted: 2022-01-01

## 2022-08-24 PROBLEM — F01.518 VASCULAR DEMENTIA WITH BEHAVIOR DISTURBANCE: Status: ACTIVE | Noted: 2021-01-01

## 2022-08-24 PROBLEM — F01.51 VASCULAR DEMENTIA WITH BEHAVIOR DISTURBANCE (HCC): Status: ACTIVE | Noted: 2021-10-26

## 2022-08-24 RX ORDER — AMITRIPTYLINE HYDROCHLORIDE 25 MG/1
25 TABLET, FILM COATED ORAL
Qty: 30 TABLET | Refills: 3 | Status: SHIPPED | OUTPATIENT
Start: 2022-08-24 | End: 2022-09-16

## 2022-08-24 NOTE — ASSESSMENT & PLAN NOTE
Itchy white patchy lesion on scalp  Patient complains of pain in the area for the past 2 weeks  Patient dyes hair frequently  Plan:  Ordered ketoconazole shampoo 2% to wash hair 3 times a week and ketoconazole cream 2% to put on the lesion    Counseled family about avoiding coloring patient's hair

## 2022-08-24 NOTE — ASSESSMENT & PLAN NOTE
Patient follows with Cardiology  Currently on Lasix 40 mg, compliant  Echo from April 2021 significant for aortic stenosis 1 2 cmï¾²  LV Ejection fraction of 60%  Plan:  Continue to follow-up with cardiology and current regimen

## 2022-08-24 NOTE — ASSESSMENT & PLAN NOTE
Severe vascular dementia, follows with geriatrics  CT head 2020 showing volume loss with white matter disease is present  Requires assistance for all ADL and 24/7 direct supervision  Son states that sometimes it can be difficult to manage patient's behavior specially when she gets anxious  Patient has good support at home  She lives with son and granddaughter  Son is the primary caregiver  Family wishes for patient to stay-at-home while receiving care  Plan:  Family was counseled about patient's prognosis  Family was counseled about the benefits of palliative care/hospice care and prioritizing comfort and well-being  Referral to home health was placed for a nurse visit, physical therapy, and occupational therapy     Family was counseled to continue following with Geriatrics

## 2022-08-24 NOTE — ASSESSMENT & PLAN NOTE
Patient with severe dementia, needing assistance for all activities of daily living and 24/7 supervision  Patient has good support at home and her son is her primary caregiver  Patient follows with Geriatrics      Plan:  Referral for some [address]   Family counseled about the benefits of palliative care/hospice care  Family is aware of patient's prognosis

## 2022-08-24 NOTE — ASSESSMENT & PLAN NOTE
Blood pressure at todays office visit 130/70 mmHg, controlled  Blood Pressure goal as per JNC-8 less than 150/90 mmHg,   Currently on lisinopril 40mg and hydrochlorothiazide 12 5, compliant    Plan: Will continue current regimen    BP goals reviewed with family

## 2022-08-24 NOTE — ASSESSMENT & PLAN NOTE
Patient follows with Gastroenterology  Reports the patient has not complained of any dyspeptic symptoms recently  Currently on omeprazole 40 mg q d and sucralfate 1 g 4 times a day, compliant  Plan:  Family advised to continue follow-up with Gastroenterology and current medication regimen

## 2022-08-24 NOTE — ASSESSMENT & PLAN NOTE
Patient currently on atorvastatin 20 mg, compliant    Plan   Consider removing current medication from patient's meds  Regimen next appointment due to her prognosis and age  However family reports that patient gets anxious when she does receive are medications

## 2022-08-29 ENCOUNTER — OFFICE VISIT (OUTPATIENT)
Dept: OBGYN CLINIC | Facility: MEDICAL CENTER | Age: 87
End: 2022-08-29
Payer: MEDICARE

## 2022-08-29 VITALS
WEIGHT: 178 LBS | BODY MASS INDEX: 33.61 KG/M2 | SYSTOLIC BLOOD PRESSURE: 94 MMHG | HEART RATE: 93 BPM | HEIGHT: 61 IN | DIASTOLIC BLOOD PRESSURE: 68 MMHG

## 2022-08-29 DIAGNOSIS — M75.101 RIGHT ROTATOR CUFF TEAR ARTHROPATHY: Primary | ICD-10-CM

## 2022-08-29 DIAGNOSIS — M12.811 RIGHT ROTATOR CUFF TEAR ARTHROPATHY: Primary | ICD-10-CM

## 2022-08-29 PROCEDURE — 99213 OFFICE O/P EST LOW 20 MIN: CPT | Performed by: ORTHOPAEDIC SURGERY

## 2022-08-29 PROCEDURE — 20610 DRAIN/INJ JOINT/BURSA W/O US: CPT | Performed by: ORTHOPAEDIC SURGERY

## 2022-08-29 RX ORDER — METHYLPREDNISOLONE ACETATE 40 MG/ML
1 INJECTION, SUSPENSION INTRA-ARTICULAR; INTRALESIONAL; INTRAMUSCULAR; SOFT TISSUE
Status: COMPLETED | OUTPATIENT
Start: 2022-08-29 | End: 2022-08-29

## 2022-08-29 RX ADMIN — METHYLPREDNISOLONE ACETATE 1 ML: 40 INJECTION, SUSPENSION INTRA-ARTICULAR; INTRALESIONAL; INTRAMUSCULAR; SOFT TISSUE at 17:37

## 2022-08-29 NOTE — PROGRESS NOTES
Ortho Sports Medicine Shoulder Visit     Assesment:   80 y o  female  right shoulder rotator cuff arthopathy     Plan:    Conservative treatment:    Ice to shoulder 1-2 times daily, for 20 minutes at a time  Home exercise program for shoulder, including ROM and strenthening  Instructions given to patient of what exercises to perform  Let pain guide return to activities  Imaging: All imaging from today was reviewed by myself and explained to the patient  No imaging was available for review today  Injection:  The risks and benefits of the injection (which include but are not limited to: infection, bleeding,damage to nerve/artery, need for further intervention), as well as the risks and benefits of all alternative treatments were explained and understood  The patient elected to proceed with injection  The procedure was done with aseptic technique, and the patient tolerated the procedure well with no complications  A corticosteroid injection of the subacromial space was performed  The patient should take 1-2 days off of activity, with gradual return to activity as tolerated  Ice to the shoulder 1-2 times daily for 20 minutes, for next 24-48 hrs  May consider future corticosteroid injection depending on clinical exam/imaging  Surgery:     No surgery is recommended at this point, continue with conservative treatment plan as noted  History of Present Illness: The patient is returns for follow up of right shoulder rotator cuff arthoplasty  Since the prior visit, She reports some improvement  She reports some improvement since her last injection on 5/26/22  Pt is hear for another injection    Pain is improved by rest, ice, NSAIDS and injection  Pain is aggravated by overhead activity and reaching back  Symptoms include clicking cracking  The patient has weakness  The patient has tried rest, ice, NSAIDS, physical therapy and injection            I have reviewed the past medical, surgical, social and family history, medications and allergies as documented in the EMR  Review of systems: ROS is negative other than that noted in the HPI  Constitutional: Negative for fatigue and fever  Cardiovascular: Negative for chest pain  Pulmonary: negative for shortness of breath    PMH/PSH:  Past Medical History:   Diagnosis Date    [facility]     Dementia ([place] [address] )     Diverticulosis 2001    GERD (gastroesophageal reflux disease)     Glaucoma     Heart failure ([place] [address] )     History of cystocele     Hypercholesterolemia     Hypertension     Obesity     Positive PPD 1999    treated     Past Surgical History:   Procedure Laterality Date    BUNIONECTOMY Left 08/25/2021    Procedure: Norman Smith, EXCISION OF WOUND;  Surgeon: Noelle Perez DPM;  Location: [address] [place] [address];  Service: Podiatry    CARDIOVASCULAR STRESS TEST  2001    dobutamine stress test    CATARACT EXTRACTION      CHOLECYSTECTOMY  04/2002    COLONOSCOPY      CYSTOCELE REPAIR      HYSTERECTOMY      KNEE SURGERY Bilateral     knee replacement    [place] REMOVAL DEEP IMPLANT Left 08/25/2021    Procedure: REMOVAL HARDWARE FOOT;  Surgeon: Noelle Perez DPM;  Location: [address] [place] [address];  Service: Podiatry    UPPER GASTROINTESTINAL ENDOSCOPY          Physical Exam:    Blood pressure 94/68, pulse 93, height 5' 1" (1 549 m), weight 80 7 kg (178 lb), not currently breastfeeding  General/Constitutional: NAD, well developed, well nourished  HENT: Normocephalic, atraumatic  CV: Intact distal pulses, regular rate  Resp: No respiratory distress or labored breathing  Lymphatic: No lymphadenopathy palpated  Neuro: Alert and Oriented x 3, no focal deficits  Psych: Normal mood, normal affect, normal judgement, normal behavior  Skin: Warm, dry, no rashes, no erythema     Shoulder Exam (focused):     Shoulder focused exam:       RIGHT LEFT    Scapula Atrophy Negative Negative     Winging Negative Negative Protraction Negative Negative    Rotator cuff SS 5/5/5 5/5/5     IS 5/5/5 5/5/5     SubS 5/5/5 5/5/5    ROM  170     ER0 60 60     ER90 90 90     IR90 40 40     IRb T6 T6    TTP: AC Positive Negative     Biceps Negative Negative     Coracoid Negative Negative    Special Tests: O'Briens Negative Negative     Juarez-shear Negative Negative     Cross body Adduction Negative Negative     Speeds  Negative Negative     Juan David's Negative Negative     Whipple Negative Negative       Neer Negative Negative     Ramirez Negative Negative    In Large joint arthrocentesis: R subacromial bursa  Universal Protocol:  Consent: Verbal consent obtained  Risks and benefits: risks, benefits and alternatives were discussed  Consent given by: patient  Patient understanding: patient states understanding of the procedure being performed  Patient consent: the patient's understanding of the procedure matches consent given  Procedure consent: procedure consent matches procedure scheduled  Relevant documents: relevant documents present and verified  Test results: test results available and properly labeled  Site marked: the operative site was marked  Radiology Images displayed and confirmed   If images not available, report reviewed: imaging studies available  Patient identity confirmed: verbally with patient    Supporting Documentation  Indications: pain   Procedure Details  Location: shoulder - R subacromial bursa  Needle size: 22 G  Ultrasound guidance: no  Approach: anterior  Medications administered: 4 mL lidocaine 1 %; 1 mL methylPREDNISolone acetate 40 mg/mL    Patient tolerance: patient tolerated the procedure well with no immediate complications  Dressing:  Sterile dressing applied        stability: Apprehension & relocation not tested not tested     Load & shift not tested not tested    Other: Crank {positive negative:23931::"Negative"} {positive negative:70245::"Negative"}               UE NV Exam: +2 Radial pulses bilaterally  Sensation intact to light touch C5-T1 bilaterally, Radial/median/ulnar nerve motor intact    Cervical ROM is full without pain, numbness or tingling      Shoulder Imaging  No Imaging was performed today

## 2022-08-29 NOTE — PROGRESS NOTES
Ortho Sports Medicine Shoulder Visit     Assesment:   80 y o  female right shoulder rotator cuff arthropathy      Plan:    Conservative treatment:    Ice to shoulder 1-2 times daily, for 20 minutes at a time  Home exercise program for shoulder, including ROM and strenthening  Instructions given to patient of what exercises to perform  Let pain guide return to activities  Imaging: All imaging from today was reviewed by myself and explained to the patient  No imaging was available for review today  Injection:    The risks and benefits of the injection (which include but are not limited to: infection, bleeding,damage to nerve/artery, need for further intervention), as well as the risks and benefits of all alternative treatments were explained and understood  The patient elected to proceed with injection  The procedure was done with aseptic technique, and the patient tolerated the procedure well with no complications  A corticosteroid injection of the subacromial space was performed  The patient should take 1-2 days off of activity, with gradual return to activity as tolerated  Ice to the shoulder 1-2 times daily for 20 minutes, for next 24-48 hrs  May consider future corticosteroid injection depending on clinical exam/imaging          Surgery:     No surgery is recommended at this point, continue with conservative treatment plan as noted  History of Present Illness: The patient is returns for follow up of Right shoulder rotator cuff arhtopathy   Since the prior visit, She reports minimal improvement  Pain is improved by rest, ice, NSAIDS, physical therapy and injection  Pain is aggravated by overhead activity and reaching back  Symptoms include clicking and catching  The patient has weakness  The patient has tried rest, ice, NSAIDS, physical therapy and injection            I have reviewed the past medical, surgical, social and family history, medications and allergies as documented in the EMR  Review of systems: ROS is negative other than that noted in the HPI  Constitutional: Negative for fatigue and fever  Cardiovascular: Negative for chest pain  Pulmonary: negative for shortness of breath    PMH/PSH:  Past Medical History:   Diagnosis Date    [facility]     [facility] )     Diverticulosis 2001    GERD (gastroesophageal reflux disease)     Glaucoma     Heart failure ([place] [address] )     History of cystocele     Hypercholesterolemia     Hypertension     Obesity     Positive PPD 1999    treated     Past Surgical History:   Procedure Laterality Date    BUNIONECTOMY Left 08/25/2021    Procedure: Mertie [place], EXCISION OF WOUND;  Surgeon: Mary Morgan DPM;  Location: [facility] MAIN OR;  Service: Podiatry    CARDIOVASCULAR STRESS TEST  2001    dobutamine stress test    CATARACT EXTRACTION      CHOLECYSTECTOMY  04/2002    COLONOSCOPY      CYSTOCELE REPAIR      HYSTERECTOMY      KNEE SURGERY Bilateral     knee replacement    [place] REMOVAL DEEP IMPLANT Left 08/25/2021    Procedure: REMOVAL HARDWARE FOOT;  Surgeon: Mayr Morgan DPM;  Location: [facility] MAIN OR;  Service: Podiatry    UPPER GASTROINTESTINAL ENDOSCOPY          Physical Exam:    Blood pressure 94/68, pulse 93, height 5' 1" (1 549 m), weight 80 7 kg (178 lb), not currently breastfeeding  General/Constitutional: NAD, well developed, well nourished  HENT: Normocephalic, atraumatic  CV: Intact distal pulses, regular rate  Resp: No respiratory distress or labored breathing  Lymphatic: No lymphadenopathy palpated  Neuro: Alert and Oriented x 3, no focal deficits  Psych: Normal mood, normal affect, normal judgement, normal behavior  Skin: Warm, dry, no rashes, no erythema     Shoulder Exam (focused):     Shoulder focused exam:       RIGHT LEFT    Scapula Atrophy Negative Negative     Winging Negative Negative     Protraction Negative Negative    Rotator cuff SS 5/5/5 5/5/5     IS 5/5/5 5/5/5     SubS 5/5/5 5/5/5    ROM  170     ER0 60 60     ER90 90 90     IR90 40 40     IRb T6 T6    TTP: AC Positive Negative     Biceps Negative Negative     Coracoid Negative Negative    Special Tests: O'Briens Negative Negative     Juarez-shear Negative Negative     Cross body Adduction Negative Negative     Speeds  Negative Negative     Juan David's Negative Negative     Whipple Negative Negative       Neer Negative Negative     Ramirez Negative Negative    Instability: Apprehension & relocation not tested not tested     Load & shift not tested not tested    Other: Crank Negative Negative                     Large joint arthrocentesis: R subacromial bursa  Universal Protocol:  Consent: Verbal consent obtained  Risks and benefits: risks, benefits and alternatives were discussed  Consent given by: patient  Timeout called at: 8/29/2022 12:48 PM   Patient understanding: patient states understanding of the procedure being performed  Patient consent: the patient's understanding of the procedure matches consent given  Procedure consent: procedure consent matches procedure scheduled  Relevant documents: relevant documents present and verified  Test results: test results available and properly labeled  Site marked: the operative site was marked  Radiology Images displayed and confirmed   If images not available, report reviewed: imaging studies available  Patient identity confirmed: verbally with patient    Supporting Documentation  Indications: pain   Procedure Details  Location: shoulder - R subacromial bursa  Needle size: 22 G  Approach: posterolateral  Medications administered: 1 mL methylPREDNISolone acetate 40 mg/mL    Patient tolerance: patient tolerated the procedure well with no immediate complications  Dressing:  Sterile dressing applied            UE NV Exam: +2 Radial pulses bilaterally  Sensation intact to light touch C5-T1 bilaterally, Radial/median/ulnar nerve motor intact    Cervical ROM is full without pain, numbness or tingling      Shoulder Imaging      No images

## 2022-09-06 ENCOUNTER — HOME HEALTH ADMISSION (OUTPATIENT)
Dept: HOME HEALTH SERVICES | Facility: HOME HEALTHCARE | Age: 87
End: 2022-09-06

## 2022-09-06 ENCOUNTER — TELEPHONE (OUTPATIENT)
Dept: FAMILY MEDICINE CLINIC | Facility: CLINIC | Age: 87
End: 2022-09-06

## 2022-09-06 ENCOUNTER — TELEMEDICINE (OUTPATIENT)
Dept: FAMILY MEDICINE CLINIC | Facility: CLINIC | Age: 87
End: 2022-09-06

## 2022-09-06 ENCOUNTER — TELEMEDICINE (OUTPATIENT)
Dept: GERIATRICS | Age: 87
End: 2022-09-06
Payer: MEDICARE

## 2022-09-06 ENCOUNTER — TELEPHONE (OUTPATIENT)
Dept: GERIATRICS | Age: 87
End: 2022-09-06

## 2022-09-06 DIAGNOSIS — K59.01 SLOW TRANSIT CONSTIPATION: ICD-10-CM

## 2022-09-06 DIAGNOSIS — R26.81 GAIT INSTABILITY: ICD-10-CM

## 2022-09-06 DIAGNOSIS — F01.518 VASCULAR DEMENTIA WITH BEHAVIOR DISTURBANCE: ICD-10-CM

## 2022-09-06 DIAGNOSIS — I10 BENIGN ESSENTIAL HYPERTENSION: ICD-10-CM

## 2022-09-06 DIAGNOSIS — F03.90 DEMENTIA WITHOUT BEHAVIORAL DISTURBANCE, UNSPECIFIED DEMENTIA TYPE: Primary | ICD-10-CM

## 2022-09-06 DIAGNOSIS — H91.90 HEARING LOSS, UNSPECIFIED HEARING LOSS TYPE, UNSPECIFIED LATERALITY: ICD-10-CM
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
## 2022-09-06 DIAGNOSIS — I60.9 SUBARACHNOID HEMORRHAGE (HCC): Primary | ICD-10-CM

## 2022-09-06 PROCEDURE — 3078F DIAST BP <80 MM HG: CPT | Performed by: PHYSICIAN ASSISTANT

## 2022-09-06 PROCEDURE — 99213 OFFICE O/P EST LOW 20 MIN: CPT | Performed by: PHYSICIAN ASSISTANT

## 2022-09-06 PROCEDURE — 3074F SYST BP LT 130 MM HG: CPT | Performed by: PHYSICIAN ASSISTANT

## 2022-09-06 PROCEDURE — 99483 ASSMT & CARE PLN PT COG IMP: CPT | Performed by: STUDENT IN AN ORGANIZED HEALTH CARE EDUCATION/TRAINING PROGRAM

## 2022-09-06 NOTE — PROGRESS NOTES
ASSESSMENT AND PLAN:  1  Dementia without behavioral disturbance, unspecified dementia type [facility])  Assessment & Plan:  Minicog 0/5 ( unable to assess(, CSDD 5, TUGT deferred  Patient with a prior history moderate to severe dementia  Presenting after recent fall with subarachnoid hemorrhage  Patient remains confused at baseline with significant debility  Per medical records, during recent hospitalization, end of life discussion was held with family aware of prognosis  Today son is requesting the patient to have ancillary services with OT and PT  Son also questioned why her recovery has been slow  I did have a detailed discussion with the patient's son who is his primary caregiver that given the patient's age, frailty and already did existing dementia, if she does recover, this will be a very slow process  Would defer any medication management given patient's polypharmacy at this time  Reorientation and redirection as needed  Manage chronic conditions  Maintain Falls precautions  Encourage patient to remain active mentally, physically and socially  Participate in cognitively stimulating exercises as able      2  Fall, initial encounter  Assessment & Plan:  History of recent fall with subsequent subarachnoid hemorrhage noted on imaging  Maintain Falls precautions  Will refer to PT for gait training, balance and strengthening  Recommend a falls Alert device as a safety precaution      3  Gait instability  Assessment & Plan:  Patient with significant debility since recent hospitalization  Will refer to PT for gait training, balance and strengthening  Recommend a falls Alert device as a safety precaution  Maintain Falls precautions      4   Hearing loss, unspecified hearing loss type, unspecified laterality  Assessment & Plan:  Patient previously seen by ENT with recommendations for hearing aids  Recommend facing patient directly and standing in close proximity when communicating to avoid confusion  Maintain Falls precautions      5  Benign essential hypertension  Assessment & Plan:  Maintained on lisinopril  Recommend healthy lifestyle changes      6  Slow transit constipation  Assessment & Plan:  Continues on Senokot S  Encourage increased fiber intake  Physical activity as able        Decision-making capacity:  The patient should not make any important medical or financial decisions independently without the presence of her POA    Staging: Moderate to severe dementia, FAST stage 5-6    Medications Review:  Medications appropriate for chronic conditions      HPI:    We had the pleasure of re-evaluating Geraldine Zelaya who is a 80 y o  female in Geriatric follow up today  Ms Jaskaran Zelaya is present virtually with her son who is her primary care taker and aide  Today family explained that the patient did sustain a recent fall for which she was hospitalized due to a subsequent subarachnoid hemorrhage  Since her release from hospital, the patient has remained very debilitated and is dependent in all ADLs and IADLs  Per medical records, hospital team did have a discussion with the patient about end of life decisions and family was aware of prognosis  Today however son is requesting physical as well as occupational therapy to assist with gait, balance, strengthening and ADLs  The patient has had a progressive decline in appetite as well and family has been encouraging oral intake although this remains decreased  She currently has not been able to ambulate and requires assistance with simply sitting up  Per medical records, family was offered palliative care however this was declined  No overt changes in mood, personality or behaviors  No overt safety concerns  During the office visit, the patient was oriented to self only and remain confused for the most part  No cardiorespiratory distress, fever, chills, URI or urinary symptoms  Will plan to follow-up in 6 months with our nurse practitioner    Will place referral to [facility] (OUTPATIENT CAMP[facility]) for PT/OT  HISTORY AS PER:Jared Villalta    COGNITION:  Memory Issues noticed since over 2 years   Memory affected: short term memory loss and long term memory loss    Symptoms started: gradual  Over time the memory has:  worsened  Memory issue(s) were noted by: family   Difficulty finding the right word while speaking: Yes  Requires repeat information or asking the same question repeatedly: Yes  Fluctuation in alertness: No  Changes in mood or personality:Yes  Current or previous treatment for depression or anxiety: Yes    Family member with dementia and what type? unknown  History of head trauma: Yes  History of stroke: No  History of alcohol or substance abuse: No    FUNCTIONAL STATUS:  BADLs  Does patient require assistance with:  Bathing: Yes  Dressing: Yes  Transferring: Yes  Continence: Yes  Toileting: Yes  Feeding: Yes    IADLs  Dose patient require assistance with:  Telephone: Yes  Shopping: Yes  Food Preparation: Yes  Housekeeping: No  Laundry: Yes  Transportation: Yes  Medications: Yes  Finances: Yes    NEUROPSYCH SYMPTOMS:  Does patient get angry or hostile? Resist care from others? No  Does patient see or hear things that no one else can see or hear? Yes  Does patient act impatient and cranky? Does mood frequently change for no apparent reason? No  Does patient act suspicious without good reason (example: believes that others are stealing from him or her, or planning to harm him or her in some way)? No  Does patient less interested in his or her usual activities or in the activities and plans of others? Yes  Does patient have trouble sleeping at night? No  Dose patient have abnormal movements while asleep?  No    SAFETY:  Hearing and vision issue: No  Any gait or balance disorder: Yes  Uses: walker  Any falls in the last year: Yes  Any history of wandering: No  Are there firearms or guns in the home: No  Does patient drive: No  Any driving accidents or citations in the last year: No  Any concerns about patient's ability to drive: No    ACP REVIEW:  Does patient have POA: No  Does patient have a Living will: No  Any legal assistance needed for healthcare planning?: Yes    ROS: Review of Systems   Unable to perform ROS: Dementia       No Known Allergies    Medications:    Current Outpatient Medications on File Prior to Visit   Medication Sig Dispense Refill    acetaminophen (TYLENOL) 500 mg tablet Take 2 tablets (1,000 mg total) by mouth every 6 (six) hours as needed for mild pain 60 tablet 3    aluminum-magnesium hydroxide-simethicone (MAALOX MAX) 400-400-40 MG/5ML suspension Take 10 mL by mouth every 6 (six) hours as needed for indigestion or heartburn (abdominal pain) 355 mL 0    amitriptyline (ELAVIL) 25 mg tablet Take 1 tablet (25 mg total) by mouth daily at bedtime 30 tablet 3    atorvastatin (LIPITOR) 20 mg tablet TAKE 1 TABLET BY MOUTH ONCE DAILY  (Patient taking differently: 20 mg) 90 tablet 0    docusate sodium (COLACE) 100 mg capsule Take 1 capsule (100 mg total) by mouth 2 (two) times a day 60 capsule 1    Elastic Bandages & Supports (Medical Compression Stockings) MISC by Does not apply route daily High knee compression stockings 20-30 MMHG 2 each 0    hydrochlorothiazide (HYDRODIURIL) 12 5 mg tablet Take 1 tablet (12 5 mg total) by mouth daily 90 tablet 0    ketoconazole (NIZORAL) 2 % cream Apply topically daily To scalp lesions 30 g 3    ketoconazole (NIZORAL) 2 % shampoo Apply 0 4 application topically 3 (three) times a week 100 mL 3    lidocaine (XYLOCAINE) 5 % ointment Apply topically 2 (two) times a day as needed for mild pain 35 44 g 0    lisinopril (ZESTRIL) 40 mg tablet Take 1 tablet (40 mg total) by mouth daily 90 tablet 0    Misc  Devices (CANE) MISC Four point aluminum cane        omeprazole (PriLOSEC) 40 MG capsule Take 1 capsule (40 mg total) by mouth daily Half an hour before breakfast 90 capsule 3    senna-docusate sodium (SENOKOT-S) 8 6-50 mg per tablet Take 1 tablet by mouth daily 30 tablet 6    sucralfate (CARAFATE) 1 g tablet Take 1 tablet (1 g total) by mouth 4 (four) times a day 120 tablet 2    Advair Diskus 500-50 MCG/DOSE inhaler INHALE 1 PUFF BY MOUTH 2 TIMES A DAY  RINSE MOIUTH AFTER USE (Patient not taking: Reported on 9/6/2022) 60 blister 0    albuterol (PROVENTIL HFA,VENTOLIN HFA) 90 mcg/act inhaler Inhale 2 puffs every 6 (six) hours as needed for wheezing (Patient not taking: Reported on 9/6/2022) 1 Inhaler 0    benzonatate (TESSALON PERLES) 100 mg capsule Take 1 capsule (100 mg total) by mouth 3 (three) times a day as needed for cough (Patient not taking: Reported on 9/6/2022) 20 capsule 0    Calcium Carbonate-Vitamin D 500-125 MG-UNIT TABS every 24 hours (Patient not taking: Reported on 9/6/2022)      Diclofenac Sodium (VOLTAREN) 1 % APPLY 2 GRAMS 4 TIMES A DAY TOPICALLY (Patient not taking: Reported on 9/6/2022) 100 g 0    dicyclomine (BENTYL) 20 mg tablet Take 1 tablet (20 mg total) by mouth 3 (three) times a day as needed (abdominal pain) (Patient not taking: Reported on 9/6/2022) 90 tablet 3    fluticasone (FLONASE) 50 mcg/act nasal spray 2 sprays into each nostril daily (Patient not taking: Reported on 9/6/2022) 1 Bottle 3    furosemide (LASIX) 40 mg tablet Take 1 tablet (40 mg total) by mouth daily (Patient not taking: Reported on 9/6/2022) 90 tablet 0    guaiFENesin (ROBITUSSIN) 100 MG/5ML oral liquid Take 10 mL (200 mg total) by mouth 3 (three) times a day as needed for cough (Patient not taking: Reported on 9/6/2022) 120 mL 0    levETIRAcetam (KEPPRA) 500 mg tablet Take 1 tablet (500 mg total) by mouth every 12 (twelve) hours for 7 doses (Patient not taking: Reported on 9/6/2022) 7 tablet 0     No current facility-administered medications on file prior to visit         History:  Past Medical History:   Diagnosis Date    COPD [facility]chronic obstructive pulmonary disease) ([facility] [address] )     Dementia ([address] )     Diverticulosis 2001    GERD (gastroesophageal reflux disease)     Glaucoma     Heart failure (HCC)     History of cystocele     Hypercholesterolemia     Hypertension     Obesity     Positive PPD 1999    treated     Past Surgical History:   Procedure Laterality Date    BUNIONECTOMY Left 08/25/2021    Procedure: Viktor Grew, EXCISION OF WOUND;  Surgeon: Kush Ramey DPM;  Location: [address] [place] [address];  Service: Podiatry    CARDIOVASCULAR STRESS TEST  2001    dobutamine stress test    CATARACT EXTRACTION      CHOLECYSTECTOMY  04/2002    COLONOSCOPY      CYSTOCELE REPAIR      HYSTERECTOMY      KNEE SURGERY Bilateral     knee replacement    IN REMOVAL DEEP IMPLANT Left 08/25/2021    Procedure: REMOVAL HARDWARE FOOT;  Surgeon: Kush Ramey DPM;  Location: [address] [place] [address];  Service: Podiatry    UPPER GASTROINTESTINAL ENDOSCOPY       Family History   Problem Relation Age of Onset    No Known Problems Mother     No Known Problems Father     No Known Problems Maternal Grandfather     No Known Problems Paternal Grandmother     No Known Problems Paternal Grandfather      Social History     Socioeconomic History    Marital status:      Spouse name: Not on file    Number of children: Not on file    Years of education: Not on file    Highest education level: Not on file   Occupational History    Not on file   Tobacco Use    Smoking status: Never Smoker    Smokeless tobacco: Never Used   Vaping Use    Vaping Use: Never used   Substance and Sexual Activity    Alcohol use: Not Currently    Drug use: Not Currently    Sexual activity: Not Currently     Partners: Male   Other Topics Concern    Not on file   Social History Narrative    Lives at home with son and 2 grandkids          Consumes 1-2 cups of coffee per week     Social Determinants of Health     Financial Resource Strain: Low Risk     Difficulty of Paying Living Expenses: Not hard at all   Food Insecurity: No Food Insecurity    Worried About [facility] in the Last Year: Never true   [address] in the Last Year: Never true   Transportation Needs: No Transportation Needs    Lack of Transportation (Medical): No    Lack of Transportation (Non-Medical): No   Physical Activity: Not on file   Stress: Not on file   Social Connections: Not on file   Intimate Partner Violence: Not on file   Housing Stability: Unknown    Unable to Pay for Housing in the Last Year: No    Number of Places Lived in the Last Year: Not on file    Unstable Housing in the Last Year: No     Past Surgical History:   Procedure Laterality Date    BUNIONECTOMY Left 08/25/2021    Procedure: Angelica Saypancho, EXCISION OF WOUND;  Surgeon: Roxy Arriola DPM;  Location: [facility] MAIN OR;  Service: Podiatry    CARDIOVASCULAR STRESS TEST  2001    dobutamine stress test    CATARACT EXTRACTION      CHOLECYSTECTOMY  04/2002    COLONOSCOPY      CYSTOCELE REPAIR      HYSTERECTOMY      KNEE SURGERY Bilateral     knee replacement    MS REMOVAL DEEP IMPLANT Left 08/25/2021    Procedure: REMOVAL HARDWARE FOOT;  Surgeon: Roxy Arriola DPM;  Location:  MAIN OR;  Service: Podiatry    UPPER GASTROINTESTINAL ENDOSCOPY         OBJECTIVE:  There were no vitals filed for this visit  There is no height or weight on file to calculate BMI  Physical Exam  Constitutional:       General: She is not in acute distress  Appearance: Normal appearance  She is not ill-appearing or diaphoretic  HENT:      Head: Normocephalic and atraumatic  Nose: Nose normal       Mouth/Throat:      Mouth: Mucous membranes are moist    Eyes:      General:         Right eye: No discharge  Left eye: No discharge  Conjunctiva/sclera: Conjunctivae normal    Pulmonary:      Effort: Pulmonary effort is normal  No respiratory distress  Abdominal:      General: There is no distension  Palpations: Abdomen is soft  Tenderness: There is no abdominal tenderness     Musculoskeletal:      Cervical back: Normal range of motion  Skin:     General: Skin is dry  Neurological:      General: No focal deficit present  Mental Status: She is alert  Mental status is at baseline  She is disoriented  Motor: Weakness present  Gait: Gait abnormal    Psychiatric:         Mood and Affect: Mood normal          Minico/5, unable to complete      Labs & Imaging:  Lab Results   Component Value Date    WBC 4 99 2022    HGB 11 3 (L) 2022    HCT 34 7 (L) 2022    MCV 89 2022     2022     Lab Results   Component Value Date     2018    SODIUM 132 (L) 2022    K 3 4 (L) 2022    CL 96 2022    CO2 30 2022    ANIONGAP 12 2018    AGAP 6 2022    BUN 18 2022    CREATININE 0 93 2022    GLUC 147 (H) 2022    GLUF 106 (H) 2022    CALCIUM 9 0 2022    AST 31 2022    ALT 18 2022    ALKPHOS 84 2022    PROT 7 5 2018    TP 7 6 2022    BILITOT 0 3 2018    TBILI 0 48 2022    EGFR 55 2022     No results found for: HGBA1C  Lab Results   Component Value Date    KJECAJLE17 435 08/10/2021     Lab Results   Component Value Date    VUC0LPEPQFTL 1 720 08/10/2021     Lab Results   Component Value Date    RPR Non-Reactive 08/10/2021     No results found for: [id number], [id number]   Results for orders placed during the hospital encounter of 22    CT head wo contrast    Narrative  CT BRAIN - WITHOUT CONTRAST    INDICATION:   re-eval SAH  COMPARISON:  Head CT 2022    TECHNIQUE:  CT examination of the brain was performed  In addition to axial images, sagittal and coronal 2D reformatted images were created and submitted for interpretation  Radiation dose length product (DLP) for this visit:  823 51 mGy-cm     This examination, like all CT scans performed in the [facility], was performed utilizing techniques to minimize radiation dose exposure, including the use of iterative  reconstruction and automated exposure control  IMAGE QUALITY:  Diagnostic  FINDINGS:    PARENCHYMA AND EXTRA-AXIAL SPACES:    No significant change in right greater than left bifrontal subarachnoid hemorrhage and possible tiny cortical hemorrhage in the parasagittal left frontal lobe (series 2 image 28)    Stable bilateral cerebral convexity hypodense subdural collections measuring up to 7-8mm  Nonspecific  decreased attenuation involving periventricular and subcortical white matter, most compatible with moderate to advance microangiopathic change  No intracranial mass, mass effect or midline shift  No CT signs of acute infarction  VENTRICLES: Normal for the patient's age  VISUALIZED ORBITS AND PARANASAL SINUSES:  Unremarkable  CALVARIUM AND EXTRACRANIAL SOFT TISSUES:  Normal     Impression  1  No significant change in right greater than left bifrontal subarachnoid hemorrhage and possible tiny cortical hemorrhage in the parasagittal left frontal lobe  2   Stable bilateral cerebral convexity 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

## 2022-09-06 NOTE — PROGRESS NOTES
Virtual Regular Visit    Verification of patient location:    Patient is located in the following state in which I hold an active license [place]        Reason for visit is   Chief Complaint   Patient presents with    Virtual Regular Visit     6 month follow up        Encounter provider Rusty Myers MD    Provider located at [address] [place] [address] [address] [place] [address]  [phone]      Recent Visits  Date Type Provider Dept   09/06/22 Telephone [place]   09/06/22 Delma Herrera  [address]   Showing recent visits within past 7 days and meeting all other requirements  Future Appointments  No visits were found meeting these conditions  Showing future appointments within next 150 days and meeting all other requirements       The patient was identified by name and date of birth  Geraldine Tripathi was informed that this is a telemedicine visit and that the visit is being conducted through [employer] and patient was informed that this is a secure, HIPAA-compliant platform  She agrees to proceed     My office door was closed  No one else was in the room  She acknowledged consent and understanding of privacy and security of the video platform  The patient has agreed to participate and understands they can discontinue the visit at any time  Patient is aware this is a billable service  Subjective  Geraldine Tripathi is a 80 y o  female who presents for neurocognitive assessment follow up   ASSESSMENT AND PLAN:  1   Dementia without behavioral disturbance, unspecified dementia type ([facility] [address] )  Assessment & Plan:  Minicog 0/5 ( unable to assess(, CSDD 5, TUGT deferred  Patient with a prior history moderate to severe dementia  Presenting after recent fall with subarachnoid hemorrhage  Patient remains confused at baseline with significant debility  Per medical records, during recent hospitalization, end of life discussion was held with family aware of prognosis  Today son is requesting the patient to have ancillary services with OT and PT  Son also questioned why her recovery has been slow  I did have a detailed discussion with the patient's son who is his primary caregiver that given the patient's age, frailty and already did existing dementia, if she does recover, this will be a very slow process  Would defer any medication management given patient's polypharmacy at this time  Reorientation and redirection as needed  Manage chronic conditions  Maintain Falls precautions  Encourage patient to remain active mentally, physically and socially  Participate in cognitively stimulating exercises as able      2  Fall, initial encounter  Assessment & Plan:  History of recent fall with subsequent subarachnoid hemorrhage noted on imaging  Maintain Falls precautions  Will refer to PT for gait training, balance and strengthening  Recommend a falls Alert device as a safety precaution      3  Gait instability  Assessment & Plan:  Patient with significant debility since recent hospitalization  Will refer to PT for gait training, balance and strengthening  Recommend a falls Alert device as a safety precaution  Maintain Falls precautions      4  Hearing loss, unspecified hearing loss type, unspecified laterality  Assessment & Plan:  Patient previously seen by ENT with recommendations for hearing aids  Recommend facing patient directly and standing in close proximity when communicating to avoid confusion  Maintain Falls precautions      5  Benign essential hypertension  Assessment & Plan:  Maintained on lisinopril  Recommend healthy lifestyle changes      6   Slow transit constipation  Assessment & Plan:  Continues on Senokot S  Encourage increased fiber intake  Physical activity as able        Decision-making capacity:  The patient should not make any important medical or financial decisions independently without the presence of her POA    Staging: Moderate to severe dementia, FAST stage 5-6    Medications Review:  Medications appropriate for chronic conditions      HPI:    We had the pleasure of re-evaluating Geraldine Ball who is a 80 y o  female in Geriatric follow up today  Ms Will Ball is present virtually with her son who is her primary care taker and aide  Today family explained that the patient did sustain a recent fall for which she was hospitalized due to a subsequent subarachnoid hemorrhage  Since her release from hospital, the patient has remained very debilitated and is dependent in all ADLs and IADLs  Per medical records, hospital team did have a discussion with the patient about end of life decisions and family was aware of prognosis  Today however son is requesting physical as well as occupational therapy to assist with gait, balance, strengthening and ADLs  The patient has had a progressive decline in appetite as well and family has been encouraging oral intake although this remains decreased  She currently has not been able to ambulate and requires assistance with simply sitting up  Per medical records, family was offered palliative care however this was declined  No overt changes in mood, personality or behaviors  No overt safety concerns  During the office visit, the patient was oriented to self only and remain confused for the most part  No cardiorespiratory distress, fever, chills, URI or urinary symptoms  Will plan to follow-up in 6 months with our nurse practitioner  Will place referral to [facility] (OUTPAT[facility]AMP[facility]) for PT/OT          HISTORY AS PER:Jared Villalta    COGNITION:  Memory Issues noticed since over 2 years   Memory affected: short term memory loss and long term memory loss    Symptoms started: gradual  Over time the memory has:  worsened  Memory issue(s) were noted by: family   Difficulty finding the right word while speaking: Yes  Requires repeat information or asking the same question repeatedly: Yes  Fluctuation in alertness: No  Changes in mood or personality:Yes  Current or previous treatment for depression or anxiety: Yes    Family member with dementia and what type? unknown  History of head trauma: Yes  History of stroke: No  History of alcohol or substance abuse: No    FUNCTIONAL STATUS:  BADLs  Does patient require assistance with:  Bathing: Yes  Dressing: Yes  Transferring: Yes  Continence: Yes  Toileting: Yes  Feeding: Yes    IADLs  Dose patient require assistance with:  Telephone: Yes  Shopping: Yes  Food Preparation: Yes  Housekeeping: No  Laundry: Yes  Transportation: Yes  Medications: Yes  Finances: Yes    NEUROPSYCH SYMPTOMS:  Does patient get angry or hostile? Resist care from others? No  Does patient see or hear things that no one else can see or hear? Yes  Does patient act impatient and cranky? Does mood frequently change for no apparent reason? No  Does patient act suspicious without good reason (example: believes that others are stealing from him or her, or planning to harm him or her in some way)? No  Does patient less interested in his or her usual activities or in the activities and plans of others? Yes  Does patient have trouble sleeping at night? No  Dose patient have abnormal movements while asleep?  No    SAFETY:  Hearing and vision issue: No  Any gait or balance disorder: Yes  Uses: walker  Any falls in the last year: Yes  Any history of wandering: No  Are there firearms or guns in the home: No  Does patient drive: No  Any driving accidents or citations in the last year: No  Any concerns about patient's ability to drive: No    ACP REVIEW:  Does patient have POA: No  Does patient have a Living will: No  Any legal assistance needed for healthcare planning?: Yes    ROS: Review of Systems   Unable to perform ROS: Dementia       No Known Allergies    Medications:    Current Outpatient Medications on File Prior to Visit   Medication Sig Dispense Refill    acetaminophen (TYLENOL) 500 mg tablet Take 2 tablets (1,000 mg total) by mouth every 6 (six) hours as needed for mild pain 60 tablet 3    aluminum-magnesium hydroxide-simethicone (MAALOX MAX) 400-400-40 MG/5ML suspension Take 10 mL by mouth every 6 (six) hours as needed for indigestion or heartburn (abdominal pain) 355 mL 0    amitriptyline (ELAVIL) 25 mg tablet Take 1 tablet (25 mg total) by mouth daily at bedtime 30 tablet 3    atorvastatin (LIPITOR) 20 mg tablet TAKE 1 TABLET BY MOUTH ONCE DAILY  (Patient taking differently: 20 mg) 90 tablet 0    docusate sodium (COLACE) 100 mg capsule Take 1 capsule (100 mg total) by mouth 2 (two) times a day 60 capsule 1    Elastic Bandages & Supports (Medical Compression Stockings) MISC by Does not apply route daily High knee compression stockings 20-30 MMHG 2 each 0    hydrochlorothiazide (HYDRODIURIL) 12 5 mg tablet Take 1 tablet (12 5 mg total) by mouth daily 90 tablet 0    ketoconazole (NIZORAL) 2 % cream Apply topically daily To scalp lesions 30 g 3    ketoconazole (NIZORAL) 2 % shampoo Apply 0 4 application topically 3 (three) times a week 100 mL 3    lidocaine (XYLOCAINE) 5 % ointment Apply topically 2 (two) times a day as needed for mild pain 35 44 g 0    lisinopril (ZESTRIL) 40 mg tablet Take 1 tablet (40 mg total) by mouth daily 90 tablet 0    Misc  Devices (CANE) MISC Four point aluminum cane   omeprazole (PriLOSEC) 40 MG capsule Take 1 capsule (40 mg total) by mouth daily Half an hour before breakfast 90 capsule 3    senna-docusate sodium (SENOKOT-S) 8 6-50 mg per tablet Take 1 tablet by mouth daily 30 tablet 6    sucralfate (CARAFATE) 1 g tablet Take 1 tablet (1 g total) by mouth 4 (four) times a day 120 tablet 2    Advair Diskus 500-50 MCG/DOSE inhaler INHALE 1 PUFF BY MOUTH 2 TIMES A DAY  RINSE MOIUTH AFTER USE (Patient not taking: Reported on 9/6/2022) 60 blister 0    albuterol (PROVENTIL HFA,VENTOLIN HFA) 90 mcg/act inhaler Inhale 2 puffs every 6 (six) hours as needed for wheezing (Patient not taking: Reported on 9/6/2022) 1 Inhaler 0    benzonatate (TESSALON PERLES) 100 mg capsule Take 1 capsule (100 mg total) by mouth 3 (three) times a day as needed for cough (Patient not taking: Reported on 9/6/2022) 20 capsule 0    Calcium Carbonate-Vitamin D 500-125 MG-UNIT TABS every 24 hours (Patient not taking: Reported on 9/6/2022)      Diclofenac Sodium (VOLTAREN) 1 % APPLY 2 GRAMS 4 TIMES A DAY TOPICALLY (Patient not taking: Reported on 9/6/2022) 100 g 0    dicyclomine (BENTYL) 20 mg tablet Take 1 tablet (20 mg total) by mouth 3 (three) times a day as needed (abdominal pain) (Patient not taking: Reported on 9/6/2022) 90 tablet 3    fluticasone (FLONASE) 50 mcg/act nasal spray 2 sprays into each nostril daily (Patient not taking: Reported on 9/6/2022) 1 Bottle 3    furosemide (LASIX) 40 mg tablet Take 1 tablet (40 mg total) by mouth daily (Patient not taking: Reported on 9/6/2022) 90 tablet 0    guaiFENesin (ROBITUSSIN) 100 MG/5ML oral liquid Take 10 mL (200 mg total) by mouth 3 (three) times a day as needed for cough (Patient not taking: Reported on 9/6/2022) 120 mL 0    levETIRAcetam (KEPPRA) 500 mg tablet Take 1 tablet (500 mg total) by mouth every 12 (twelve) hours for 7 doses (Patient not taking: Reported on 9/6/2022) 7 tablet 0     No current facility-administered medications on file prior to visit         History:  Past Medical History:   Diagnosis Date    COPD (chronic obstructive pulmonary disease) ([facility] [address] )     Dementia ([facility] [address] )     Diverticulosis 2001    GERD (gastroesophageal reflux disease)     Glaucoma     Heart failure ([facility] [address] )     History of cystocele     Hypercholesterolemia     Hypertension     Obesity     Positive PPD 1999    treated     Past Surgical History:   Procedure Laterality Date    BUNIONECTOMY Left 08/25/2021    Procedure: Mertie Prim, EXCISION OF WOUND;  Surgeon: Mary Morgan DPM;  Location: [address] [place] [address];  Service: Podiatry  CARDIOVASCULAR STRESS TEST  2001    dobutamine stress test    CATARACT EXTRACTION      CHOLECYSTECTOMY  04/2002    COLONOSCOPY      CYSTOCELE REPAIR      HYSTERECTOMY      KNEE SURGERY Bilateral     knee replacement    IN REMOVAL DEEP IMPLANT Left 08/25/2021    Procedure: REMOVAL HARDWARE FOOT;  Surgeon: Kaylynn Ruiz DPM;  Location: [address] [place] [address] MAIN OR;  Service: Podiatry    UPPER GASTROINTESTINAL ENDOSCOPY       Family History   Problem Relation Age of Onset    No Known Problems Mother     No Known Problems Father     No Known Problems Maternal Grandfather     No Known Problems Paternal Grandmother     No Known Problems Paternal Grandfather      Social History     Socioeconomic History    Marital status:      Spouse name: Not on file    Number of children: Not on file    Years of education: Not on file    Highest education level: Not on file   Occupational History    Not on file   Tobacco Use    Smoking status: Never Smoker    Smokeless tobacco: Never Used   Vaping Use    Vaping Use: Never used   Substance and Sexual Activity    Alcohol use: Not Currently    Drug use: Not Currently    Sexual activity: Not Currently     Partners: Male   Other Topics Concern    Not on file   Social History Narrative    Lives at home with son and 2 grandkids  Consumes 1-2 cups of coffee per week     Social Determinants of Health     Financial Resource Strain: Low Risk     Difficulty of Paying Living Expenses: Not hard at all   Food Insecurity: No Food Insecurity    Worried About Running Out of Food in the Last Year: Never true    Karen of Food in the Last Year: Never true   Transportation Needs: No Transportation Needs    Lack of Transportation (Medical): No    Lack of Transportation (Non-Medical):  No   Physical Activity: Not on file   Stress: Not on file   Social Connections: Not on file   Intimate Partner Violence: Not on file   Housing Stability: Unknown    Unable to Pay for Housing in the Last Year: No    Number of Places Lived in the Last Year: Not on file    Unstable Housing in the Last Year: No     Past Surgical History:   Procedure Laterality Date    BUNIONECTOMY Left 2021    Procedure: Abundio Sensor, EXCISION OF WOUND;  Surgeon: Coby Rdz DPM;  Location: [address] [place] [address] MAIN OR;  Service: Podiatry    CARDIOVASCULAR STRESS TEST      dobutamine stress test    CATARACT EXTRACTION      CHOLECYSTECTOMY  2002    COLONOSCOPY      CYSTOCELE REPAIR      HYSTERECTOMY      KNEE SURGERY Bilateral     knee replacement    MI REMOVAL DEEP IMPLANT Left 2021    Procedure: REMOVAL HARDWARE FOOT;  Surgeon: Coby Rdz DPM;  Location:  MAIN OR;  Service: Podiatry    UPPER GASTROINTESTINAL ENDOSCOPY         OBJECTIVE:  There were no vitals filed for this visit  There is no height or weight on file to calculate BMI  Physical Exam  Constitutional:       General: She is not in acute distress  Appearance: Normal appearance  She is not ill-appearing or diaphoretic  HENT:      Head: Normocephalic and atraumatic  Nose: Nose normal       Mouth/Throat:      Mouth: Mucous membranes are moist    Eyes:      General:         Right eye: No discharge  Left eye: No discharge  Conjunctiva/sclera: Conjunctivae normal    Pulmonary:      Effort: Pulmonary effort is normal  No respiratory distress  Abdominal:      General: There is no distension  Palpations: Abdomen is soft  Tenderness: There is no abdominal tenderness  Musculoskeletal:      Cervical back: Normal range of motion  Skin:     General: Skin is dry  Neurological:      General: No focal deficit present  Mental Status: She is alert  Mental status is at baseline  She is disoriented  Motor: Weakness present        Gait: Gait abnormal    Psychiatric:         Mood and Affect: Mood normal          [place]ico/5, unable to complete      Labs & Imaging:  Lab Results   Component Value Date    WBC 4 99 08/17/2022    HGB 11 3 (L) 08/17/2022    HCT 34 7 (L) 08/17/2022    MCV 89 08/17/2022     08/17/2022     Lab Results   Component Value Date     05/11/2018    SODIUM 132 (L) 08/19/2022    K 3 4 (L) 08/19/2022    CL 96 08/19/2022    CO2 30 08/19/2022    ANIONGAP 12 05/11/2018    AGAP 6 08/19/2022    BUN 18 08/19/2022    CREATININE 0 93 08/19/2022    GLUC 147 (H) 08/19/2022    GLUF 106 (H) 05/20/2022    CALCIUM 9 0 08/19/2022    AST 31 08/16/2022    ALT 18 08/16/2022    ALKPHOS 84 08/16/2022    PROT 7 5 05/11/2018    TP 7 6 08/16/2022    BILITOT 0 3 05/11/2018    TBILI 0 48 08/16/2022    EGFR 55 08/19/2022     No results found for: HGBA1C  Lab Results   Component Value Date    PHYHRNJW49 733 08/10/2021     Lab Results   Component Value Date    TOP4UTUFNOQT 1 720 08/10/2021     Lab Results   Component Value Date    RPR Non-Reactive 08/10/2021     No results found for: [id number], [id number]   Results for orders placed during the hospital encounter of 08/16/22    CT head wo contrast    Narrative  CT BRAIN - WITHOUT CONTRAST    INDICATION:   re-eval SAH  COMPARISON:  Head CT 8/17/2022    TECHNIQUE:  CT examination of the brain was performed  In addition to axial images, sagittal and coronal 2D reformatted images were created and submitted for interpretation  Radiation dose length product (DLP) for this visit:  823 51 mGy-cm   This examination, like all CT scans performed in the [facility], was performed utilizing techniques to minimize radiation dose exposure, including the use of iterative  reconstruction and automated exposure control  IMAGE QUALITY:  Diagnostic      FINDINGS:    PARENCHYMA AND EXTRA-AXIAL SPACES:    No significant change in right greater than left bifrontal subarachnoid hemorrhage and possible tiny cortical hemorrhage in the parasagittal left frontal lobe (series 2 image 28)    Stable bilateral cerebral convexity hypodense subdural collections measuring up to 7-8mm  Nonspecific  decreased attenuation involving periventricular and subcortical white matter, most compatible with moderate to advance microangiopathic change  No intracranial mass, mass effect or midline shift  No CT signs of acute infarction  VENTRICLES: Normal for the patient's age  VISUALIZED ORBITS AND PARANASAL SINUSES:  Unremarkable  CALVARIUM AND EXTRACRANIAL SOFT TISSUES:  Normal     Impression  1  No significant change in right greater than left bifrontal subarachnoid hemorrhage and possible tiny cortical hemorrhage in the parasagittal left frontal lobe  2   Stable bilateral cerebral convexity 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

## 2022-09-06 NOTE — PROGRESS NOTES
Virtual Regular Visit    Verification of patient location:    Patient is located in the following state in which I hold an active license [place]      Assessment/Plan:    Problem List Items Addressed This Visit        Nervous and Auditory    Dementia [facility])    Relevant Orders    [facility]    Subarachnoid hemorrhage [facility]) - Primary    Relevant Orders    Referral to [facility]       Other    Gait instability    Relevant Orders    [facility]          Gait instability / dementia/subarachnoid hemorrhage   - At last visit, referral was placed to [facility] services for PT/OT, however patient has yet to hear about scheduling this  Will place updated referral   - Patient's son is also inquiring if patient qualifies for home visit program  Will message Dr Anthony Zavala and Dr Shabnam Gonzales to find out more information  Reason for visit is   Chief Complaint   Patient presents with    Virtual Regular Visit        Encounter provider Radha Casanova PA-C    Provider located at [address] [address] [phone]      Recent Visits  Date Type Provider Dept   09/06/22 Telephone YELITZA Bianchi Nadia   09/06/22 Telemedicine [facility] Jessica   Showing recent visits within past 7 days and meeting all other requirements  Future Appointments  No visits were found meeting these conditions  Showing future appointments within next 150 days and meeting all other requirements       The patient was identified by name and date of birth  Patric Age was informed that this is a telemedicine visit and that the visit is being conducted through [address] [place] [address] and patient was informed that this is a secure, HIPAA-compliant platform  She agrees to proceed     My office door was closed  No one else was in the room    She acknowledged consent and understanding of privacy and security of the video platform  The patient has agreed to participate and understands they can discontinue the visit at any time  Patient is aware this is a billable service  Subjective  Geraldine Rosales is a 80 y o  female   With known past medical history of gait instability, anemia, aortic stenosis, hypertension, carpal tunnel syndrome on both sides, glaucoma, hyperlipidemia, history of breast cancer, constipation, chronic diastolic congestive heart failure, GERD, COPD, dementia, history of subarachnoid hemorrhage who is seen today via telemedicine for follow-up  Patient is accompanied today by her son, Rachelle Freed, who provides history for today's visit  Yuli Noriega notes at patient's last visit, a referral was placed for home PT/OT, however he has yet to hear anything about scheduling this  Son notes since patient left the hospital, she has decreased energy and is weak  Son notes patient does use her walker when ambulating, but recently she has been nervous to walk because she is scared of falling  Son notes she almost fell in the bathroom on Friday  Also, son  Notes at last visit they discussed patient potentially being added to home visit list and he would like to know if patient qualifies           Past Medical History:   Diagnosis Date    [facility]ronic obstructive pulmonary [facility]) [facility] [address] [facility]     Dementia [facility] [address] [facility]     Diverticulosis 2001    GERD (gastroesophageal reflux disease)     Glaucoma     Heart failure ([facility] [address] [facility]     History of cystocele     Hypercholesterolemia     Hypertension     Obesity     Positive PPD 1999    treated       Past Surgical History:   Procedure Laterality Date    BUNIONECTOMY Left 08/25/2021    Procedure: Viktor Srinivasan, EXCISION OF WOUND;  Surgeon: Kush Ramey DPM;  Location: [address] [place] [address];  Service: Podiatry    CARDIOVASCULAR STRESS TEST  2001    dobutamine stress test    CATARACT EXTRACTION      CHOLECYSTECTOMY  04/2002    COLONOSCOPY      CYSTOCELE REPAIR      HYSTERECTOMY  KNEE SURGERY Bilateral     knee replacement    MI REMOVAL DEEP IMPLANT Left 08/25/2021    Procedure: REMOVAL HARDWARE FOOT;  Surgeon: Trey Martinez DPM;  [facility];  Service: Podiatry    UPPER GASTROINTESTINAL ENDOSCOPY         Current Outpatient Medications   Medication Sig Dispense Refill    acetaminophen (TYLENOL) 500 mg tablet Take 2 tablets (1,000 mg total) by mouth every 6 (six) hours as needed for mild pain 60 tablet 3    Advair Diskus 500-50 MCG/DOSE inhaler INHALE 1 PUFF BY MOUTH 2 TIMES A DAY  RINSE MOIUTH AFTER USE (Patient not taking: Reported on 9/6/2022) 60 blister 0    albuterol (PROVENTIL HFA,VENTOLIN HFA) 90 mcg/act inhaler Inhale 2 puffs every 6 (six) hours as needed for wheezing (Patient not taking: Reported on 9/6/2022) 1 Inhaler 0    aluminum-magnesium hydroxide-simethicone (MAALOX MAX) 400-400-40 MG/5ML suspension Take 10 mL by mouth every 6 (six) hours as needed for indigestion or heartburn (abdominal pain) 355 mL 0    amitriptyline (ELAVIL) 25 mg tablet Take 1 tablet (25 mg total) by mouth daily at bedtime 30 tablet 3    atorvastatin (LIPITOR) 20 mg tablet TAKE 1 TABLET BY MOUTH ONCE DAILY   (Patient taking differently: 20 mg) 90 tablet 0    benzonatate (TESSALON PERLES) 100 mg capsule Take 1 capsule (100 mg total) by mouth 3 (three) times a day as needed for cough (Patient not taking: Reported on 9/6/2022) 20 capsule 0    Calcium Carbonate-Vitamin D 500-125 MG-UNIT TABS every 24 hours (Patient not taking: Reported on 9/6/2022)      Diclofenac Sodium (VOLTAREN) 1 % APPLY 2 GRAMS 4 TIMES A DAY TOPICALLY (Patient not taking: Reported on 9/6/2022) 100 g 0    dicyclomine (BENTYL) 20 mg tablet Take 1 tablet (20 mg total) by mouth 3 (three) times a day as needed (abdominal pain) (Patient not taking: Reported on 9/6/2022) 90 tablet 3    docusate sodium (COLACE) 100 mg capsule Take 1 capsule (100 mg total) by mouth 2 (two) times a day 60 capsule 1    Elastic Bandages & Supports (Medical Compression Stockings) MISC by Does not apply route daily High knee compression stockings 20-30 MMHG 2 each 0    fluticasone (FLONASE) 50 mcg/act nasal spray 2 sprays into each nostril daily (Patient not taking: Reported on 9/6/2022) 1 Bottle 3    furosemide (LASIX) 40 mg tablet Take 1 tablet (40 mg total) by mouth daily (Patient not taking: Reported on 9/6/2022) 90 tablet 0    guaiFENesin (ROBITUSSIN) 100 MG/5ML oral liquid Take 10 mL (200 mg total) by mouth 3 (three) times a day as needed for cough (Patient not taking: Reported on 9/6/2022) 120 mL 0    hydrochlorothiazide (HYDRODIURIL) 12 5 mg tablet Take 1 tablet (12 5 mg total) by mouth daily 90 tablet 0    ketoconazole (NIZORAL) 2 % cream Apply topically daily To scalp lesions 30 g 3    ketoconazole (NIZORAL) 2 % shampoo Apply 0 4 application topically 3 (three) times a week 100 mL 3    levETIRAcetam (KEPPRA) 500 mg tablet Take 1 tablet (500 mg total) by mouth every 12 (twelve) hours for 7 doses (Patient not taking: Reported on 9/6/2022) 7 tablet 0    lidocaine (XYLOCAINE) 5 % ointment Apply topically 2 (two) times a day as needed for mild pain 35 44 g 0    lisinopril (ZESTRIL) 40 mg tablet Take 1 tablet (40 mg total) by mouth daily 90 tablet 0    Misc  Devices (CANE) MISC Four point aluminum cane   omeprazole (PriLOSEC) 40 MG capsule Take 1 capsule (40 mg total) by mouth daily Half an hour before breakfast 90 capsule 3    senna-docusate sodium (SENOKOT-S) 8 6-50 mg per tablet Take 1 tablet by mouth daily 30 tablet 6    sucralfate (CARAFATE) 1 g tablet Take 1 tablet (1 g total) by mouth 4 (four) times a day 120 tablet 2     No current facility-administered medications for this visit  No Known Allergies    Review of Systems   Constitutional: Negative for appetite change and fever  HENT: Negative for congestion  Respiratory: Negative for chest tightness and shortness of breath      Cardiovascular: Negative for chest pain    Gastrointestinal: Negative for nausea and vomiting  Genitourinary: Negative for difficulty urinating and dysuria  Musculoskeletal: Positive for gait problem  Negative for arthralgias  Neurological: Positive for weakness  Negative for seizures  Psychiatric/Behavioral: Positive for confusion  The patient is nervous/anxious  Video Exam    There were no vitals filed for this visit  Physical Exam  Constitutional:       General: She is not in acute distress  Appearance: She is not toxic-appearing  HENT:      Head: Normocephalic  Nose: Nose normal    Eyes:      General: No scleral icterus  Conjunctiva/sclera: Conjunctivae normal    Pulmonary:      Effort: Pulmonary effort is normal    Neurological:      Mental Status: She is alert  Mental status is at baseline  She is disoriented  Psychiatric:         Cognition and Memory: Cognition is impaired            I spent 15 minutes directly with the patient during this visit

## 2022-09-06 NOTE — TELEPHONE ENCOUNTER
Patient is added to the home visit wait list  She is second in the wait list and we will contact family whenever a spot opens for her

## 2022-09-06 NOTE — TELEPHONE ENCOUNTER
Staff called me reporting daughter is stating she is unable to bring pt in for her visit and she is not able to physically get her here      I approved a virtual for this time

## 2022-09-07 ENCOUNTER — HOME CARE VISIT (OUTPATIENT)
Dept: HOME HEALTH SERVICES | Facility: HOME HEALTHCARE | Age: 87
End: 2022-09-07

## 2022-09-07 NOTE — ASSESSMENT & PLAN NOTE
Patient previously seen by ENT with recommendations for hearing aids  Recommend facing patient directly and standing in close proximity when communicating to avoid confusion  Maintain Falls precautions

## 2022-09-07 NOTE — CASE COMMUNICATION
Notification of Assess not [address]  [facility] has assessed your patient for Home Health services and has determined the patient is not eligible for service due to the following: Arrived at scheduled visit time used [employer] # [id number] to communicate with patient  Patient alert to person only  Patient unable to answer any question other then name  Son reports patient unable to state plan to improve mobility and will be unable to follow  POC  Son agrees no need for nursing  TC to Dr Taj Vasquez spoke to [place] [id number] to notify of no need for nursing identified and PT unable to admit due patients advanced dementia  Requested referral to [facility] be made today  Per [place] referral to [facility] already sent       [employer] RN

## 2022-09-07 NOTE — PATIENT INSTRUCTIONS
CARE PLAN:  1  Dementia without behavioral disturbance, unspecified dementia type [facility])  Assessment & Plan:  Minicog 0/5 ( unable to assess(, CSDD 5, TUGT deferred  Patient with a prior history moderate to severe dementia  Presenting after recent fall with subarachnoid hemorrhage  Patient remains confused at baseline with significant debility  Per medical records, during recent hospitalization, end of life discussion was held with family aware of prognosis  Today son is requesting the patient to have ancillary services with OT and PT  Son also questioned why her recovery has been slow  I did have a detailed discussion with the patient's son who is his primary caregiver that given the patient's age, frailty and already did existing dementia, if she does recover, this will be a very slow process  Would defer any medication management given patient's polypharmacy at this time  Reorientation and redirection as needed  Manage chronic conditions  Maintain Falls precautions  Encourage patient to remain active mentally, physically and socially  Participate in cognitively stimulating exercises as able      2  Fall, initial encounter  Assessment & Plan:  History of recent fall with subsequent subarachnoid hemorrhage noted on imaging  Maintain Falls precautions  Will refer to PT for gait training, balance and strengthening  Recommend a falls Alert device as a safety precaution      3  Gait instability  Assessment & Plan:  Patient with significant debility since recent hospitalization  Will refer to PT for gait training, balance and strengthening  Recommend a falls Alert device as a safety precaution  Maintain Falls precautions      4   Hearing loss, unspecified hearing loss type, unspecified laterality  Assessment & Plan:  Patient previously seen by ENT with recommendations for hearing aids  Recommend facing patient directly and standing in close proximity when communicating to avoid confusion  Maintain Falls precautions      5  Benign essential hypertension  Assessment & Plan:  Maintained on lisinopril  Recommend healthy lifestyle changes      6   Slow transit constipation  Assessment & Plan:  Continues on Senokot S  Encourage increased fiber intake  Physical activity as able

## 2022-09-07 NOTE — ASSESSMENT & PLAN NOTE
Minicog 0/5 ( unable to assess(, CSDD 5, TUGT deferred  Patient with a prior history moderate to severe dementia  Presenting after recent fall with subarachnoid hemorrhage  Patient remains confused at baseline with significant debility  Per medical records, during recent hospitalization, end of life discussion was held with family aware of prognosis  Today son is requesting the patient to have ancillary services with OT and PT  Son also questioned why her recovery has been slow  I did have a detailed discussion with the patient's son who is his primary caregiver that given the patient's age, frailty and already did existing dementia, if she does recover, this will be a very slow process  Would defer any medication management given patient's polypharmacy at this time  Reorientation and redirection as needed  Manage chronic conditions  Maintain Falls precautions  Encourage patient to remain active mentally, physically and socially  Participate in cognitively stimulating exercises as able

## 2022-09-07 NOTE — ASSESSMENT & PLAN NOTE
History of recent fall with subsequent subarachnoid hemorrhage noted on imaging  Maintain Falls precautions  Will refer to PT for gait training, balance and strengthening  Recommend a falls Alert device as a safety precaution

## 2022-09-07 NOTE — ASSESSMENT & PLAN NOTE
HOSPITALIST PROGRESS NOTE      Patient: Alesha Brandon Date: 7/29/2021   YOB: 1960 Admission Date: 7/17/2021   MRN: [id number] Attending: Shubham Douglas MD     Treatment Team: Attending Provider: Shubham Douglas MD; Hospitalist: Shubham Douglas MD; Consulting Physician: Oscar Gaona MD; Consulting Physician: Timothy Schmidt MD; : ANGELINE Mora; Consulting Physician: Cheryl Gómez MD; Consulting Physician: Raúl Chu MD; Occupational Therapist: Merary Crystal OT; Registered Nurse: Nicol Santos, RN; Physical Therapist: Camila Flores PT; Dietitian: Kendra Li RD; [facility]/Nursing Assistant: Sue Hairston [facility]    Chief Complaint:  The patient is a 60 year old female was admitted for chief complaint of Weakness and Fall       Hospital course:    Alesha Brandon is a 60 year old female with H/O DM-T2 on insulin, peripheral neuropathy, diabetic foot ulcer with Pseudomonas aeruginosa , COPD, ESRD on HD Tuesday Thursday, Saturday, HTN, obstructive sleep apnea, NOCAD, HFpEF, presented to ED c/o fever, chill, weakness. Pt had nkle fracture in October, 2020, that required operative repair, subsequently required a cast and then a boot, and developing wound. Recently she was treated in [facility] for Dodd grade 4 diabetic foot.  Underwent MRI which was negative for osteo but nonspecific collection in the plantar soft tissues at the level of the posterior calcaneus extending to the level of the mid body. She was seen by ID and was treated with antibiotics till 8/13/21.    On this admission, MRI showed Large plantar calcaneal ulcer with underlying infectious/inflammatory process extending to the calcaneal cortex resulting in osteolysis. Pt was seen by ID, Ortho and wound care. 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 IPR consulted and Pt was accepted. Vascular likes to Follow-up in ~3 weeks for incision recheck and staple removal.     Subjective: comfortable    PROBLEM  Patient with significant debility since recent hospitalization  Will refer to PT for gait training, balance and strengthening  Recommend a falls Alert device as a safety precaution  Maintain Falls precautions LIST    Patient Active Problem List   Diagnosis   • Essential (primary) hypertension   • Diabetes mellitus (CMS/[facility])   • Diabetes mellitus (CMS/[facility])   • Chronic kidney disease   • Hyperlipidemia   • Kidney stones   • CKD (chronic kidney disease) stage 4, GFR 15-29 ml/min (CMS/[facility])   • Hx of chronic kidney disease   • Shortness of breath   • Pulmonary edema   • End stage renal disease (CMS/[facility])   • Depressive disorder, not elsewhere classified   • Pre-transplant evaluation for kidney transplant   • MARTITA on CPAP   • COPD, mild (CMS/[facility])   • Bilateral carpal tunnel syndrome   • Polyneuropathy   • Callus of foot   • Diabetic ulcer of right heel associated with type 2 diabetes mellitus, with fat layer exposed (CMS/[facility])   • Pressure ulcer of right heel, stage 3 (CMS/[facility])       PHYSICAL EXAM    Vital Last Value 24 Hour Range   Temperature 98 °F (36.7 °C) Temp  Min: 97.9 °F (36.6 °C)  Max: 98.6 °F (37 °C)   Pulse 69 Pulse  Min: 40  Max: 80   Respiratory 18 Resp  Min: 16  Max: 18   Blood Pressure (!) 163/60 BP  Min: 111/66  Max: 163/60   Pulse Oximetry 98 % SpO2  Min: 95 %  Max: 99 %   CVP   No data recorded     Vital Today Admit   Weight 107.3 kg Weight: 103.8 kg   Height N/A Height: 5' 2\" (157.5 cm)   BMI N/A BMI (Calculated): 41.85       PHYSICAL EXAM    General:  Alert, at no distress  Chest/Lungs: diminished air entry, no ronchi or crackles  CVS: S1,S2+  Abdomen: Soft, nontender, BS+.  Neuro: Rt BKA  Extremities: no Edema.           INTAKE & OUTPUT        Intake/Output Summary (Last 24 hours) at 7/29/2021 1037  Last data filed at 7/28/2021 1432  Gross per 24 hour   Intake 230 ml   Output --   Net 230 ml     I/O last 3 completed shifts:  In: 430 [P.O.:430]  Out: -   I/O this shift:  In: -   Out: 2000 [Other:2000]    Last Stool Occurrence: 1 (07/28/21 0800)    Weight over the past 48 Hours:    Weight    07/27/21 0401 07/27/21 1055 07/28/21 0610 07/29/21 0537   Weight: 110 kg 99.9 kg 110.7 kg 107.3 kg       Vital  Signs:    Patient Vitals for the past 48 hrs:   Weight   21 1055 99.9 kg   21 0610 110.7 kg   21 0537 107.3 kg       ALLERGY    ALLERGIES:  No Known Allergies    Home Medications:     Medications Prior to Admission   Medication Sig Dispense Refill   • [] sodium chloride 0.9 % Solution 100 mL with cefepime 2 g Recon Soln 2,000 mg Inject 2,000 mg into the vein 3 days a week.     • sodium chloride 0.9 % Solution 100 mL with vancomycin 500 MG Recon Soln 500 mg Inject 500 mg into the vein 3 days a week. After dialysis.     • Insulin Lispro (HUMALOG KWIKPEN SC) Inject 16 Units into the skin 3 times daily (before meals).     • Insulin Glargine (LANTUS SOLOSTAR SC) Inject 36 Units into the skin nightly.     • aspirin 81 MG chewable tablet Chew 81 mg by mouth daily.     • losartan (COZAAR) 50 MG tablet Take 50 mg by mouth daily.      • carvedilol (COREG) 12.5 MG tablet Take 1 tablet by mouth 2 times daily (with meals). 180 tablet 3   • amLODIPine (NORVASC) 5 MG tablet Take 1 tablet by mouth daily. 90 tablet 3   • B Complex-C-Folic Acid (SOFIA-MARZENA PO) Take 1 tablet by mouth daily.     • ergocalciferol (DRISDOL) 1.25 mg (50,000 units) capsule Take 1.25 mg by mouth twice monthly. (every other Wednesday)     • pantoprazole (PROTONIX) 40 MG tablet Take 40 mg by mouth daily.     • gabapentin (NEURONTIN) 300 MG capsule Take by mouth as directed. Take 3 caps (=900mg) on nights after Dialysis and take 1 cap (=300mg) all other nights of the week     • Epoetin Adryan (EPOGEN IJ) Inject 5,000 Units as directed 3 days a week. Every Tuesday, Thursday, and Saturday at dialysis      • Doxercalciferol (HECTOROL IV) Inject 12 mcg into the vein 3 days a week. Every Tuesday, Thursday, and Saturday at dialysis      • fluoxetine (PROZAC) 40 MG capsule Take 40 mg by mouth daily.     • sevelamer (RENAGEL) 800 MG tablet Take 1,600 mg by mouth 3 times daily (with meals). 2 tabs (=1,600mg)     • linaGLIPtin (TRADJENTA) 5 MG  tablet Take 1 tablet by mouth daily. 30 tablet 3   • clopidogrel (PLAVIX) 75 MG tablet Take 1 tablet by mouth daily. Do not start before June 29, 2021. Not to be taken together with aspirin. 30 tablet 0   • Iron Sucrose (VENOFER IV) Inject 50 mg into the vein 1 day a week. Tuesday at Dialysis     • atorvastatin (LIPITOR) 20 MG tablet Take 20 mg by mouth daily.            MEDICATIONS / INFUSIONS     • polyethylene glycol  17 g Oral Daily   • docusate sodium-sennosides  2 tablet Oral BID   • insulin glargine  33 Units Subcutaneous Nightly   • doxercalciferol  6 mcg Intravenous Once per day on Tue Thu Sat   • epoetin altaf-epbx  10,000 Units Subcutaneous Once per day on Tue Thu Sat   • acetaminophen  1,000 mg Oral 3 times per day   • vancomycin (VANCOCIN) IVPB  500 mg Intravenous Once per day on Tue Thu Sat   • sodium chloride (PF)  10 mL Injection 3 times per day   • metroNIDAZOLE  500 mg Oral TID   • gabapentin  300 mg Oral Once per day on Sun Mon Wed Fri   • sodium chloride (PF)  2 mL Intracatheter 2 times per day   • amLODIPine  5 mg Oral Daily   • aspirin  81 mg Oral Daily   • FLUoxetine  40 mg Oral Daily   • gabapentin  900 mg Oral Once per day on Tue Thu Sat   • pantoprazole  40 mg Oral QAM AC   • sevelamer carbonate  1,600 mg Oral TID WC   • heparin (porcine)  5,000 Units Subcutaneous 3 times per day   • VANCOMYCIN - PHARMACIST MONITORED   Does not apply See Admin Instructions   • atorvastatin  20 mg Oral Daily   • clopidogrel  75 mg Oral Daily   • carvedilol  12.5 mg Oral BID WC   • insulin lispro   Subcutaneous TID AC   • [Held by provider] losartan  50 mg Oral Daily   • cefepime (MAXIPIME) IVPB  1,000 mg Intravenous Daily   • guaiFENesin  1,200 mg Oral 2 times per day   • B complex-vitamin C-folic acid  1 tablet Oral Daily              BLOOD SUGAR REVIEW      Recent Labs   Lab 07/28/21  1149 07/28/21  1610 07/28/21  1823 07/28/21  2041 07/29/21  0536   GLUCOSE BEDSIDE 154* 146* 164* 153* 239*        LABS    Recent Labs   Lab 07/29/21  0713 07/28/21  0524 07/27/21  0548 07/26/21  0729 07/24/21  0526   WBC 11.7*  --   --  11.0 9.8   HCT 25.4*  --   --  27.0* 26.9*   HGB 7.8*  --   --  8.1* 8.0*     --   --  308 268   SODIUM 130* 136 132* 132* 133*   POTASSIUM 5.1 4.5 5.5* 5.0 5.4*   CHLORIDE 99 102 98 102 99   CO2 21 24 22 23 20*   CALCIUM 9.7 9.5 9.4 9.8 8.8   GLUCOSE 258* 80 209* 94 214*   BUN 65* 37* 87* 67* 69*   CREATININE 5.70* 4.32* 7.08* 5.74* 6.29*   PHOS 4.2  --   --   --  6.2*             Recent Labs   Lab 07/28/21  1149 07/28/21  1610 07/28/21  1823 07/28/21  2041 07/29/21  0536   GLUCOSE BEDSIDE 154* 146* 164* 153* 239*        INR (no units)   Date Value   07/17/2021 1.1            RADIOLOGY    ECG:   Encounter Date: 07/17/21   Electrocardiogram 12-Lead   Result Value    Systolic Blood Pressure 148    Diastolic Blood Pressure 65    Ventricular Rate EKG/Min (BPM) 91    Atrial Rate (BPM) 91    OR-Interval (MSEC) 182    QRS-Interval (MSEC) 90    QT-Interval (MSEC) 358    QTc 440    P Axis (Degrees) 72    R Axis (Degrees) 98    T Axis (Degrees) 72    REPORT TEXT      Normal sinus rhythm  Rightward axis  Borderline ECG  When compared with ECG of  07-JUN-2021 17:55,  No significant change was found  Confirmed by SHAYLA COATES, SHAYLEE ([id number]),  Milka Llamas ([id number]) on 7/17/2021 5:13:35 AM         No results found.    Assessment and Plan    Diabetic foot ulcer with possible osteomyelitis in MRI  S/P Rt BKA 7/21  ESRD on HD Tuesday Thursday, Saturday  DM-T2 on insulin with hypoglycemia  Peripheral neuropathy  Prior diabetic foot ulcer with Pseudomonas   COPD  HTN  Obstructive sleep apnea  NOCAD  HFpEF, chronic  Hyponatremia  Hyperkalemia  Acute blood loss anemia  Constipation    Ct Vanco, Cefepime and Flagyl, per ID  Ct HD, Nephrology following  Duoneb and guaifenesin  Ct Lantus and SS  Ct asa, Plavix, amlodipine, coreg   PTA Linagliptine and cozaar on hold  IPR consulted and accepted, DC after  insurance authorization  Bowel regimen    EMILY: today  Barriers: insurance authorization        DVT Prophylaxis  :   Current Active Medications for DVT Prophylaxis (From admission, onward)         Stop     heparin (porcine) injection 5,000 Units  5,000 Units,   Subcutaneous,   3 times per day      --                 Shubham Douglas MD   [facility]  7/29/2021 10:37 AM   Cross Cover between 7 PM and 7 AM - Page [phone]

## 2022-09-09 ENCOUNTER — TELEPHONE (OUTPATIENT)
Dept: FAMILY MEDICINE CLINIC | Facility: CLINIC | Age: 87
End: 2022-09-09

## 2022-09-09 NOTE — TELEPHONE ENCOUNTER
Pt's daughter came to our office requesting help for pt  Pt had a fall injury and has not been able to be her self  Pt has been "bed bound" since  Pt's daughter is requesting if possible to have pcp evaluate pt in the home  Pt has been having loss of appetite and other medical situations  Please advice       Keith Dunbar    [phone] (Turkmen speaking)

## 2022-09-10 ENCOUNTER — HOSPITAL ENCOUNTER (INPATIENT)
Facility: HOSPITAL | Age: 87
LOS: 3 days | Discharge: HOSPICE | DRG: 083 | End: 2022-09-13
Attending: SURGERY | Admitting: SURGERY
Payer: MEDICARE

## 2022-09-10 ENCOUNTER — APPOINTMENT (EMERGENCY)
Dept: CT IMAGING | Facility: HOSPITAL | Age: 87
DRG: 083 | End: 2022-09-10
Payer: MEDICARE

## 2022-09-10 ENCOUNTER — HOSPITAL ENCOUNTER (EMERGENCY)
Facility: HOSPITAL | Age: 87
DRG: 083 | End: 2022-09-10
Attending: EMERGENCY MEDICINE
Payer: MEDICARE

## 2022-09-10 ENCOUNTER — APPOINTMENT (OUTPATIENT)
Dept: RADIOLOGY | Facility: HOSPITAL | Age: 87
DRG: 083 | End: 2022-09-10
Payer: MEDICARE

## 2022-09-10 VITALS
SYSTOLIC BLOOD PRESSURE: 126 MMHG | TEMPERATURE: 97.9 F | DIASTOLIC BLOOD PRESSURE: 62 MMHG | HEART RATE: 92 BPM | RESPIRATION RATE: 20 BRPM | OXYGEN SATURATION: 100 %

## 2022-09-10 DIAGNOSIS — N17.9 AKI (ACUTE KIDNEY INJURY) (HCC): Primary | ICD-10-CM

## 2022-09-10 DIAGNOSIS — R53.1 WEAKNESS: ICD-10-CM

## 2022-09-10 DIAGNOSIS — S06.5XAA SDH (SUBDURAL HEMATOMA): Primary | ICD-10-CM

## 2022-09-10 DIAGNOSIS — R77.8 ELEVATED TROPONIN: ICD-10-CM

## 2022-09-10 DIAGNOSIS — S06.5XAA SUBDURAL HEMATOMA: ICD-10-CM

## 2022-09-10 DIAGNOSIS — Z51.5 END OF LIFE CARE: ICD-10-CM

## 2022-09-10 PROBLEM — G93.5 BRAIN COMPRESSION (HCC): Status: ACTIVE | Noted: 2022-01-01

## 2022-09-10 PROBLEM — S06.5X9A SDH (SUBDURAL HEMATOMA): Status: ACTIVE | Noted: 2022-01-01

## 2022-09-10 LAB
ALBUMIN SERPL BCP-MCNC: 2.9 G/DL (ref 3.5–5)
ALP SERPL-CCNC: 101 U/L (ref 46–116)
ALT SERPL W P-5'-P-CCNC: 24 U/L (ref 12–78)
ANION GAP SERPL CALCULATED.3IONS-SCNC: 8 MMOL/L (ref 4–13)
APTT PPP: 25 SECONDS (ref 23–37)
AST SERPL W P-5'-P-CCNC: 25 U/L (ref 5–45)
BASOPHILS # BLD AUTO: 0.04 THOUSANDS/ΜL (ref 0–0.1)
BASOPHILS NFR BLD AUTO: 0 % (ref 0–1)
BILIRUB SERPL-MCNC: 0.59 MG/DL (ref 0.2–1)
BUN SERPL-MCNC: 51 MG/DL (ref 5–25)
CALCIUM ALBUM COR SERPL-MCNC: 10.2 MG/DL (ref 8.3–10.1)
CALCIUM SERPL-MCNC: 9.3 MG/DL (ref 8.3–10.1)
CARDIAC TROPONIN I PNL SERPL HS: 301 NG/L
CHLORIDE SERPL-SCNC: 93 MMOL/L (ref 96–108)
CO2 SERPL-SCNC: 30 MMOL/L (ref 21–32)
CREAT SERPL-MCNC: 1.35 MG/DL (ref 0.6–1.3)
EOSINOPHIL # BLD AUTO: 0.42 THOUSAND/ΜL (ref 0–0.61)
EOSINOPHIL NFR BLD AUTO: 4 % (ref 0–6)
ERYTHROCYTE [DISTWIDTH] IN BLOOD BY AUTOMATED COUNT: 13.7 % (ref 11.6–15.1)
GFR SERPL CREATININE-BSD FRML MDRD: 35 ML/MIN/1.73SQ M
GLUCOSE SERPL-MCNC: 176 MG/DL (ref 65–140)
HCT VFR BLD AUTO: 32.4 % (ref 34.8–46.1)
HGB BLD-MCNC: 11.2 G/DL (ref 11.5–15.4)
IMM GRANULOCYTES # BLD AUTO: 0.03 THOUSAND/UL (ref 0–0.2)
IMM GRANULOCYTES NFR BLD AUTO: 0 % (ref 0–2)
INR PPP: 1.16 (ref 0.84–1.19)
LYMPHOCYTES # BLD AUTO: 1.13 THOUSANDS/ΜL (ref 0.6–4.47)
LYMPHOCYTES NFR BLD AUTO: 11 % (ref 14–44)
MCH RBC QN AUTO: 30.3 PG (ref 26.8–34.3)
MCHC RBC AUTO-ENTMCNC: 34.6 G/DL (ref 31.4–37.4)
MCV RBC AUTO: 88 FL (ref 82–98)
MONOCYTES # BLD AUTO: 0.57 THOUSAND/ΜL (ref 0.17–1.22)
MONOCYTES NFR BLD AUTO: 6 % (ref 4–12)
NEUTROPHILS # BLD AUTO: 7.86 THOUSANDS/ΜL (ref 1.85–7.62)
NEUTS SEG NFR BLD AUTO: 79 % (ref 43–75)
NRBC BLD AUTO-RTO: 0 /100 WBCS
NT-PROBNP SERPL-MCNC: 1506 PG/ML
PLATELET # BLD AUTO: 184 THOUSANDS/UL (ref 149–390)
PMV BLD AUTO: 9.2 FL (ref 8.9–12.7)
POTASSIUM SERPL-SCNC: 3.4 MMOL/L (ref 3.5–5.3)
PROT SERPL-MCNC: 8.5 G/DL (ref 6.4–8.4)
PROTHROMBIN TIME: 14.8 SECONDS (ref 11.6–14.5)
RBC # BLD AUTO: 3.7 MILLION/UL (ref 3.81–5.12)
SODIUM SERPL-SCNC: 131 MMOL/L (ref 135–147)
WBC # BLD AUTO: 10.05 THOUSAND/UL (ref 4.31–10.16)

## 2022-09-10 PROCEDURE — 80053 COMPREHEN METABOLIC PANEL: CPT | Performed by: PHYSICIAN ASSISTANT

## 2022-09-10 PROCEDURE — 93005 ELECTROCARDIOGRAM TRACING: CPT

## 2022-09-10 PROCEDURE — 83880 ASSAY OF NATRIURETIC PEPTIDE: CPT | Performed by: PHYSICIAN ASSISTANT

## 2022-09-10 PROCEDURE — 73030 X-RAY EXAM OF SHOULDER: CPT

## 2022-09-10 PROCEDURE — 71045 X-RAY EXAM CHEST 1 VIEW: CPT

## 2022-09-10 PROCEDURE — 99446 NTRPROF PH1/NTRNET/EHR 5-10: CPT | Performed by: NEUROLOGICAL SURGERY

## 2022-09-10 PROCEDURE — 96365 THER/PROPH/DIAG IV INF INIT: CPT

## 2022-09-10 PROCEDURE — 99285 EMERGENCY DEPT VISIT HI MDM: CPT

## 2022-09-10 PROCEDURE — 84484 ASSAY OF TROPONIN QUANT: CPT | Performed by: PHYSICIAN ASSISTANT

## 2022-09-10 PROCEDURE — G1004 CDSM NDSC: HCPCS

## 2022-09-10 PROCEDURE — 85610 PROTHROMBIN TIME: CPT | Performed by: PHYSICIAN ASSISTANT

## 2022-09-10 PROCEDURE — 36415 COLL VENOUS BLD VENIPUNCTURE: CPT | Performed by: PHYSICIAN ASSISTANT

## 2022-09-10 PROCEDURE — 70450 CT HEAD/BRAIN W/O DYE: CPT

## 2022-09-10 PROCEDURE — 71250 CT THORAX DX C-: CPT

## 2022-09-10 PROCEDURE — 96361 HYDRATE IV INFUSION ADD-ON: CPT

## 2022-09-10 PROCEDURE — 74176 CT ABD & PELVIS W/O CONTRAST: CPT

## 2022-09-10 PROCEDURE — 99291 CRITICAL CARE FIRST HOUR: CPT | Performed by: PHYSICIAN ASSISTANT

## 2022-09-10 PROCEDURE — 72125 CT NECK SPINE W/O DYE: CPT

## 2022-09-10 PROCEDURE — 85025 COMPLETE CBC W/AUTO DIFF WBC: CPT | Performed by: PHYSICIAN ASSISTANT

## 2022-09-10 PROCEDURE — 99223 1ST HOSP IP/OBS HIGH 75: CPT | Performed by: SURGERY

## 2022-09-10 PROCEDURE — 85730 THROMBOPLASTIN TIME PARTIAL: CPT | Performed by: PHYSICIAN ASSISTANT

## 2022-09-10 RX ORDER — HALOPERIDOL 5 MG/ML
0.5 INJECTION INTRAMUSCULAR EVERY 2 HOUR PRN
Status: DISCONTINUED | OUTPATIENT
Start: 2022-09-10 | End: 2022-09-13 | Stop reason: HOSPADM

## 2022-09-10 RX ORDER — BISACODYL 10 MG
10 SUPPOSITORY, RECTAL RECTAL DAILY PRN
Status: DISCONTINUED | OUTPATIENT
Start: 2022-09-10 | End: 2022-09-13 | Stop reason: HOSPADM

## 2022-09-10 RX ORDER — SODIUM CHLORIDE 3 G/100ML
100 INJECTION, SOLUTION INTRAVENOUS ONCE
Status: COMPLETED | OUTPATIENT
Start: 2022-09-10 | End: 2022-09-10

## 2022-09-10 RX ORDER — MORPHINE SULFATE 4 MG/ML
4 INJECTION, SOLUTION INTRAMUSCULAR; INTRAVENOUS
Status: DISCONTINUED | OUTPATIENT
Start: 2022-09-10 | End: 2022-09-13 | Stop reason: HOSPADM

## 2022-09-10 RX ORDER — LORAZEPAM 2 MG/ML
1 INJECTION INTRAMUSCULAR
Status: DISCONTINUED | OUTPATIENT
Start: 2022-09-10 | End: 2022-09-13 | Stop reason: HOSPADM

## 2022-09-10 RX ORDER — GLYCOPYRROLATE 0.2 MG/ML
0.1 INJECTION INTRAMUSCULAR; INTRAVENOUS EVERY 4 HOURS PRN
Status: DISCONTINUED | OUTPATIENT
Start: 2022-09-10 | End: 2022-09-13 | Stop reason: HOSPADM

## 2022-09-10 RX ORDER — DIAZEPAM 5 MG/ML
2.5 INJECTION, SOLUTION INTRAMUSCULAR; INTRAVENOUS
Status: DISCONTINUED | OUTPATIENT
Start: 2022-09-10 | End: 2022-09-13 | Stop reason: HOSPADM

## 2022-09-10 RX ADMIN — SODIUM CHLORIDE 100 ML: 3 INJECTION, SOLUTION INTRAVENOUS at 13:54

## 2022-09-10 RX ADMIN — SODIUM CHLORIDE 250 ML: 9 INJECTION, SOLUTION INTRAVENOUS at 14:19

## 2022-09-10 RX ADMIN — LEVETIRACETAM 1000 MG: 100 INJECTION, SOLUTION INTRAVENOUS at 17:56

## 2022-09-10 NOTE — PROGRESS NOTES
On-Call Telephone Note    [facility] the request of the [facility]uma Service  Jeff Hatch 80 y o  female MRN: [id number]  Unit/Bed#: ED 12 Encounter: [id number]    Per provider report, patient presents with progressive overall functional decline  Patient was admitted to hospital 3-4 weeks ago with traumatic subarachnoid hemorrhage and bilateral hygromas  Available past medical history,social history, surgical history, medication list, drug allergies and review of systems were reviewed  /51 (BP Location: Right arm)   Pulse 94   Temp 97 9 °F (36 6 °C) (Oral)   Resp 18   SpO2 98%      Clinical exam per provider report, awake alert and oriented to person but not place or time  Protecting airway  Some focal weakness in left lower extremity  Obeys commands in all 4 limbs  Imaging personally reviewed  CT scan of the head can not be personally reviewed at present due to difficulties with PACs system  However some screen shots were sent  The patient appears to have 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 with brain compression and minimal midline shift  Assessment and Plan  3 80year-old woman with numerous comorbidities and history of dementia with progressive decline over the past 3 weeks or so  Likely related to bilateral subdural hygromas, though it sounds as if there was some acute decline around the time of her initial head injury  No history of AC or AP  2  It seems as if the patient has signed forearms for being DNR DNI  I explained to the emergency room AP that any surgical intervention would be a life-saving procedure but may not restore independent function  It is quite possible that should the patient have any improvement after any surgical intervention which could range from bilateral craniotomies to heather hole drainage, she would likely not regain functional independence  This will be discussed with the family    3  Patient is not a candidate for MMA embolization at this time as both subdural collections are quite large and symptomatic  4  ER team will follow up with Trauma should the patient's family wish to transfer  All questions answered  Provider is in agreement with the course of action  A total of 8 minutes was spent discussing the presentation, physical exam and formulating a management plan with the provider

## 2022-09-10 NOTE — ED NOTES
All monitors removed from pt per orders  Pt denies pain at this time   Informed that pain medication is ordered and if pt has pain to let RN know, family and pt verbalized understanding      Audrey Sosa RN  09/10/22 [id number]

## 2022-09-10 NOTE — TRAUMA DOCUMENTATION
Pt unable to follow commands without family member having to translate, and then show the action to be performed  Pt slower to respond even after above steps  Pt rambling to family members, daughter not translating what pt is saying  1548: Another family member arrived and assisted with translation along with using interrupter

## 2022-09-10 NOTE — ED PROVIDER NOTES
History  Chief Complaint   Patient presents with    Medical Problem     Pt arrived via ems from home  Hx of dementia  Per ems, family stated pt went unresponsive for short period of time and pt not "acting herself " Cannot obtain any info from pt  Patient presents the ER for evaluation of weakness  Per EMS, patient had an episode of unresponsiveness at home so the family called 911 and stated that she was not acting herself  States that that was the only information that they got from the family and the patient was not providing any other information  When the patient's family came to the ER  They noted that over the past week she has progressively declined and bed becoming weaker  Denies any sudden onset of new symptoms, states that is been progressive since she fell last Friday, 8 days ago  States that she did not hit her head at that time  States that she did fall around 3 weeks ago and was hospitalized at [facility] following the fall  States that since then she has just been progressively declining a has significantly worsened over last week  States that she is having difficulty walking around at home and they the pick her up and help her when she has to go to the bathroom  Patient's daughter states that she has also noticed some changes in her speech over the past week  Again they declined any sudden onset changes over the past day or 2  Prior to Admission Medications   Prescriptions Last Dose Informant Patient Reported? Taking? Advair Diskus 500-50 MCG/DOSE inhaler  Child No No   Sig: INHALE 1 PUFF BY MOUTH 2 TIMES A DAY  RINSE MOIUTH AFTER USE   Patient not taking: Reported on 9/6/2022   Calcium Carbonate-Vitamin D 500-125 MG-UNIT TABS  Child Yes No   Sig: every 24 hours   Patient not taking: Reported on 9/6/2022   Diclofenac Sodium (VOLTAREN) 1 %  Child No No   Sig: APPLY 2 GRAMS 4 TIMES A DAY TOPICALLY   Patient not taking: Reported on 9/6/2022   Elastic Bandages & Supports (Medical Compression Stockings) MISC  Child No No   Sig: by Does not apply route daily High knee compression stockings 20-30 MMHG   Misc  Devices (CANE) MISC  Child Yes No   Sig: Four point aluminum cane  acetaminophen (TYLENOL) 500 mg tablet  Child No No   Sig: Take 2 tablets (1,000 mg total) by mouth every 6 (six) hours as needed for mild pain   albuterol (PROVENTIL HFA,VENTOLIN HFA) 90 mcg/act inhaler  Child No No   Sig: Inhale 2 puffs every 6 (six) hours as needed for wheezing   Patient not taking: Reported on 2022   aluminum-magnesium hydroxide-simethicone (MAALOX MAX) 400-400-40 MG/5ML suspension  Child No No   Sig: Take 10 mL by mouth every 6 (six) hours as needed for indigestion or heartburn (abdominal pain)   amitriptyline (ELAVIL) 25 mg tablet   No No   Sig: Take 1 tablet (25 mg total) by mouth daily at bedtime   atorvastatin (LIPITOR) 20 mg tablet  Child No No   Sig: TAKE 1 TABLET BY MOUTH ONCE DAILY     Patient taking differently: 20 mg   benzonatate (TESSALON PERLES) 100 mg capsule  Child No No   Sig: Take 1 capsule (100 mg total) by mouth 3 (three) times a day as needed for cough   Patient not taking: Reported on 2022   dicyclomine (BENTYL) 20 mg tablet   No No   Sig: Take 1 tablet (20 mg total) by mouth 3 (three) times a day as needed (abdominal pain)   Patient not taking: Reported on 2022   docusate sodium (COLACE) 100 mg capsule  Child No No   Sig: Take 1 capsule (100 mg total) by mouth 2 (two) times a day   fluticasone (FLONASE) 50 mcg/act nasal spray  Child No No   Si sprays into each nostril daily   Patient not taking: Reported on 2022   furosemide (LASIX) 40 mg tablet   No No   Sig: Take 1 tablet (40 mg total) by mouth daily   Patient not taking: Reported on 2022   guaiFENesin (ROBITUSSIN) 100 MG/5ML oral liquid  Child No No   Sig: Take 10 mL (200 mg total) by mouth 3 (three) times a day as needed for cough   Patient not taking: Reported on 2022 hydrochlorothiazide (HYDRODIURIL) 12 5 mg tablet   No No   Sig: Take 1 tablet (12 5 mg total) by mouth daily   ketoconazole (NIZORAL) 2 % cream   No No   Sig: Apply topically daily To scalp lesions   ketoconazole (NIZORAL) 2 % shampoo   No No   Sig: Apply 0 4 application topically 3 (three) times a week   levETIRAcetam (KEPPRA) 500 mg tablet   No No   Sig: Take 1 tablet (500 mg total) by mouth every 12 (twelve) hours for 7 doses   Patient not taking: Reported on 9/6/2022   lidocaine (XYLOCAINE) 5 % ointment  Child No No   Sig: Apply topically 2 (two) times a day as needed for mild pain   lisinopril (ZESTRIL) 40 mg tablet   No No   Sig: Take 1 tablet (40 mg total) by mouth daily   omeprazole (PriLOSEC) 40 MG capsule  Child No No   Sig: Take 1 capsule (40 mg total) by mouth daily Half an hour before breakfast   senna-docusate sodium (SENOKOT-S) 8 6-50 mg per tablet   No No   Sig: Take 1 tablet by mouth daily   sucralfate (CARAFATE) 1 g tablet   No No   Sig: Take 1 tablet (1 g total) by mouth 4 (four) times a day      Facility-Administered Medications: None       Past Medical History:   Diagnosis Date    [facility]ch[facility] disease) ([facility]     [facility]     Diverticulosis 2001    GERD (gastroesophageal reflux disease)     Glaucoma     Heart failure ([facility] )     History of cystocele     Hypercholesterolemia     Hypertension     Obesity     Positive PPD 1999    treated       Past Surgical History:   Procedure Laterality Date    BUNIONECTOMY Left 08/25/2021    Procedure: Ethan Pack, EXCISION OF WOUND;  Surgeon: Lc Taylor DPM;  Location: [facility] MAIN OR;  Service: Podiatry    CARDIOVASCULAR STRESS TEST  2001    dobutamine stress test    CATARACT EXTRACTION      CHOLECYSTECTOMY  04/2002    COLONOSCOPY      CYSTOCELE REPAIR      HYSTERECTOMY      KNEE SURGERY Bilateral     knee replacement    LA REMOVAL DEEP IMPLANT Left 08/25/2021    Procedure: REMOVAL HARDWARE FOOT;  Surgeon: Lauryn Peres DPM;  Location:  MAIN OR;  Service: Podiatry    UPPER GASTROINTESTINAL ENDOSCOPY         Family History   Problem Relation Age of Onset    No Known Problems Mother     No Known Problems Father     No Known Problems Maternal Grandfather     No Known Problems Paternal Grandmother     No Known Problems Paternal Grandfather      I have reviewed and agree with the history as documented  E-Cigarette/Vaping    E-Cigarette Use Never User      E-Cigarette/Vaping Substances    Nicotine No     THC No     CBD No     Flavoring No     Other No     Unknown No      Social History     Tobacco Use    Smoking status: Never Smoker    Smokeless tobacco: Never Used   Vaping Use    Vaping Use: Never used   Substance Use Topics    Alcohol use: Not Currently    Drug use: Not Currently       Review of Systems   Constitutional: Negative for fever  HENT: Negative for congestion  Respiratory: Negative for shortness of breath  Cardiovascular: Negative for chest pain  Gastrointestinal: Positive for abdominal pain  Negative for nausea and vomiting  Musculoskeletal: Positive for neck pain  Skin: Negative for rash  Neurological: Positive for weakness and headaches  All other systems reviewed and are negative  Physical Exam  Physical Exam  Constitutional:       Appearance: She is well-developed  HENT:      Head: Normocephalic and atraumatic  Ears:      Comments: No facial assymetry     Nose: Nose normal       Mouth/Throat:      Comments: No tongue deviation  Eyes:      Extraocular Movements: Extraocular movements intact  Conjunctiva/sclera: Conjunctivae normal    Neck:      Comments: TTP of posterior lateral neck  Cardiovascular:      Rate and Rhythm: Normal rate  Pulmonary:      Effort: Pulmonary effort is normal  No respiratory distress  Abdominal:      Palpations: Abdomen is soft  Tenderness: There is no abdominal tenderness     Musculoskeletal:      Cervical back: Normal range of motion  Tenderness present  Comments: Patient able to lift right leg slightly off of bed  Will not lift left leg off of bed  4/5 plantarflexion of right foot  Patient will not platarflex left foot  TTP of right shoulder  Limited ROM of right shoulder  Difficulty raising left arm (question of pain/poor effort/weakness)   Skin:     General: Skin is warm  Capillary Refill: Capillary refill takes less than 2 seconds  Neurological:      Mental Status: She is alert  Comments: Patient oriented to person, occasionally place  Not year   Follows commands          Vital Signs  ED Triage Vitals   Temperature Pulse Respirations Blood Pressure SpO2   09/10/22 1151 09/10/22 1132 09/10/22 1132 09/10/22 1135 09/10/22 1134   97 9 °F (36 6 °C) 88 18 98/52 94 %      Temp Source Heart Rate Source Patient Position - Orthostatic VS BP Location FiO2 (%)   09/10/22 1151 09/10/22 1132 09/10/22 1135 09/10/22 1135 --   Oral Monitor Lying Left arm       Pain Score       --                  Vitals:    09/10/22 1345 09/10/22 1415 09/10/22 1430 09/10/22 1445   BP: 109/53 (!) 88/50 (!) 84/45 126/62   Pulse: 96 94 92 92   Patient Position - Orthostatic VS: Lying Lying Lying Lying         Visual Acuity  Visual Acuity    Flowsheet Row Most Recent Value   L Pupil Size (mm) 2   R Pupil Size (mm) 2          ED Medications  Medications   sodium chloride (HYPERTONIC) 3 % bolus 100 mL (0 mL Intravenous Stopped 9/10/22 1419)   sodium chloride 0 9 % bolus 250 mL (250 mL Intravenous New Bag 9/10/22 1419)       Diagnostic Studies  Results Reviewed     Procedure Component Value Units Date/Time    NT-BNP PRO [[id number]]  (Abnormal) Collected: 09/10/22 1318    Lab Status: Final result Specimen: Blood from Arm, Right Updated: 09/10/22 1350     NT-proBNP 1,506 pg/mL     HS Troponin 0hr (reflex protocol) [[id number]]  (Abnormal) Collected: 09/10/22 1318    Lab Status: Final result Specimen: Blood from Arm, Right Updated: 09/10/22 2319 hs TnI 0hr 301 ng/L     Comprehensive metabolic panel [[id number]]  (Abnormal) Collected: 09/10/22 1318    Lab Status: Final result Specimen: Blood from Arm, Right Updated: 09/10/22 1343     Sodium 131 mmol/L      Potassium 3 4 mmol/L      Chloride 93 mmol/L      CO2 30 mmol/L      ANION GAP 8 mmol/L      BUN 51 mg/dL      Creatinine 1 35 mg/dL      Glucose 176 mg/dL      Calcium 9 3 mg/dL      Corrected Calcium 10 2 mg/dL      AST 25 U/L      ALT 24 U/L      Alkaline Phosphatase 101 U/L      Total Protein 8 5 g/dL      Albumin 2 9 g/dL      Total Bilirubin 0 59 mg/dL      eGFR 35 ml/min/1 73sq m     Narrative:      [place] guidelines for Chronic Kidney Disease (CKD):     Stage 1 with normal or high GFR (GFR > 90 mL/min/1 73 square meters)    Stage 2 Mild CKD (GFR = 60-89 mL/min/1 73 square meters)    Stage 3A Moderate CKD (GFR = 45-59 mL/min/1 73 square meters)    Stage 3B Moderate CKD (GFR = 30-44 mL/min/1 73 square meters)    Stage 4 Severe CKD (GFR = 15-29 mL/min/1 73 square meters)    Stage 5 End Stage CKD (GFR <15 mL/min/1 73 square meters)  Note: GFR calculation is accurate only with a steady state creatinine    Protime-INR [[id number]]  (Abnormal) Collected: 09/10/22 1318    Lab Status: Final result Specimen: Blood from Arm, Right Updated: 09/10/22 1340     Protime 14 8 seconds      INR 1 16    APTT [[id number]]  (Normal) Collected: 09/10/22 1318    Lab Status: Final result Specimen: Blood from Arm, Right Updated: 09/10/22 1340     PTT 25 seconds     CBC and differential [[id number]]  (Abnormal) Collected: 09/10/22 1236    Lab Status: Final result Specimen: Blood from Arm, Left Updated: 09/10/22 1302     WBC 10 05 Thousand/uL      RBC 3 70 Million/uL      Hemoglobin 11 2 g/dL      Hematocrit 32 4 %      MCV 88 fL      MCH 30 3 pg      MCHC 34 6 g/dL      RDW 13 7 %      MPV 9 2 fL      Platelets 575 Thousands/uL      nRBC 0 /100 WBCs      Neutrophils Relative 79 %      Immat GRANS % 0 %      Lymphocytes Relative 11 %      Monocytes Relative 6 %      Eosinophils Relative 4 %      Basophils Relative 0 %      Neutrophils Absolute 7 86 Thousands/µL      Immature Grans Absolute 0 03 Thousand/uL      Lymphocytes Absolute 1 13 Thousands/µL      Monocytes Absolute 0 57 Thousand/µL      Eosinophils Absolute 0 42 Thousand/µL      Basophils Absolute 0 04 Thousands/µL                  CT chest abdomen pelvis wo contrast   Final Result by Gracia Souza MD (09/10 1536)         1  No acute abnormality in the chest, abdomen, pelvis  2   Colonic diverticulosis  Workstation performed: [id number]         XR chest 1 view portable   Final Result by Makeda Strauss MD (09/10 1340)      No acute cardiopulmonary disease  Workstation performed: [id number]         XR shoulder 2+ views RIGHT   Final Result by Jono Huber MD (09/10 1610)      No acute osseous abnormality  Degenerative changes as described  Workstation performed: [id number]         CT head without contrast   Final Result by Bossman Lee MD (09/10 1303)      Bilat  complex mixed density subdural hematomas containing components of acute, subacute, and chronic blood with multiple membranes evident  Subdural collections have significantly increased in size when compared to August 18, 2022 with mass effect    resulting in supratentorial sulcal effacement and right to left midline shift of 3 mm  I reported these results to Frankey Layer via HIPAA compliant secure electronic messaging on 9/10/2022 at 1:00 PM                 Workstation performed: [id number]         CT spine cervical without contrast   Final Result by Bossman Lee MD (09/10 1302)      No cervical spine fracture or traumatic malalignment  Degenerative changes               Workstation performed: [id number]                    Procedures  CriticalCare Time  Performed by: Ana Marie Rajan Yin PA-C  Authorized by: Janessa Vazquez PA-C     Critical care provider statement:     Critical care time (minutes):  60    Critical care time was exclusive of:  Separately billable procedures and treating other patients    Critical care was necessary to treat or prevent imminent or life-threatening deterioration of the following conditions:  Circulatory failure and CNS failure or compromise    Critical care was time spent personally by me on the following activities:  Obtaining history from patient or surrogate, development of treatment plan with patient or surrogate, discussions with consultants, evaluation of patient's response to treatment, examination of patient, review of old charts, re-evaluation of patient's condition, ordering and review of radiographic studies, ordering and review of laboratory studies, ordering and performing treatments and interventions and interpretation of cardiac output measurements             ED Course  ED Course as of 09/11/22 2109   Sat Sep 10, 2022   1222 Blood Pressure: 98/52   1222 Temperature: 97 9 °F (36 6 °C)   1222 Pulse: 88   1222 Respirations: 18   1222 SpO2: 94 %  96% in room during evaluation   1249 Radiology called for stat CT read   1312 Reviewed with trauma  Will accept patient to [facility]  Recommends bolus of 3% saline  1326 Trauma made aware of POLST  States if family would only want comfort measures, she may stay here, if they would want intervention, she would need to be transferred  1403 Had extensive discussion with family about neurosurgeries discussion  Family states that they are not sure if they would want intervention and would like to be transferred to [place]   [phone] Patient resting  Low BP, fluids hung (hx of CHF however per chart review, ECHO in 2021 shows EF of 60%)  Patient still mentating and mainting airway   1452 Called rads for STAT read   [phone] Discussed with neurology  States high risk for intervention   States it would be a life saving surgery if they did anything, may not regain normal function  Surgery itself would be very high risk  Would not likely do anything today  If family wants intervention or eval, send to [facility]     Patient stable throughout ER stay  Had long discussion with patient and patient's family regarding goals of care  Discussed with them that if they wished for discomfort measures and hospice, so patient may stay here however they wanted any intervention, she would have to be transfer to [facility]  Discussed with neurosurgery who stated that any surgery would be considered life-saving and may not restore all function and there are high risk for this  Discussed with patient's family  Patient's family states that they believe that they may want to do comfort measures however they are not sure and that they would like to have her transfer to [facility] as at this time they do not want to commit to comfort measures only      Disposition  Final diagnoses:   OSWALDO (acute kidney injury) ([facility] )   Subdural hematoma (HCC)   Weakness   Elevated troponin     Time reflects when diagnosis was documented in both MDM as applicable and the Disposition within this note     Time User Action Codes Description Comment    9/10/2022  4:42 PM [place] Add [N17 9] OSWALDO (acute kidney injury) ([facility] )     9/10/2022  4:42 PM [place] Add [F53 [id number]] Subdural hematoma ([facility] )     9/10/2022  4:43 PM [place] Add [R53 1] Weakness     9/10/2022  4:43 PM [place] Add [R77 8] Elevated troponin       ED Disposition     ED Disposition   Transfer to Another Facility-In Network    Condition   --    Date/Time   Sat Sep 10, 2022  1:17 PM    Comment   Geraldine Mar Push should be transferred out to [facility]           MD Documentation    Flowsheet Row Most Recent Value   Patient Condition The patient has been stabilized such that within reasonable medical probability, no material deterioration of the patient condition or the condition of the unborn child(mati) is likely to result from the transfer   Reason for Transfer Level of Care needed not available at this facility   Benefits of Transfer Specialized equipment and/or services available at the receiving facility (Include comment)________________________   Risks of Transfer Potential for delay in receiving treatment, Potential deterioration of medical condition, Loss of IV, Increased discomfort during transfer, Possible worsening of condition or death during transfer   Accepting Physician Dr Alfred Lopez Name, [facility]   Provider Certification General risk, such as traffic hazards, adverse weather conditions, rough terrain or turbulence, possible failure of equipment (including vehicle or aircraft), or consequences of actions of persons outside the control of the transport personnel, Unanticipated needs of medical equipment and personnel during transport, Risk of worsening condition, The possibility of a transport vehicle being unavailable      RN Documentation    Flowsheet Row Most [facility] Name, [facility]      Follow-up Information    None         Discharge Medication List as of 9/10/2022  3:09 PM      CONTINUE these medications which have NOT CHANGED    Details   acetaminophen (TYLENOL) 500 mg tablet Take 2 tablets (1,000 mg total) by mouth every 6 (six) hours as needed for mild pain, Starting Fri 12/17/2021, Normal      Advair Diskus 500-50 MCG/DOSE inhaler INHALE 1 PUFF BY MOUTH 2 TIMES A DAY  RINSE MOIUTH AFTER USE, Normal      albuterol (PROVENTIL HFA,VENTOLIN HFA) 90 mcg/act inhaler Inhale 2 puffs every 6 (six) hours as needed for wheezing, Starting Wed 4/1/2020, Normal      aluminum-magnesium hydroxide-simethicone (MAALOX MAX) 400-400-40 MG/5ML suspension Take 10 mL by mouth every 6 (six) hours as needed for indigestion or heartburn (abdominal pain), Starting Tue 10/5/2021, Normal      amitriptyline (ELAVIL) 25 mg tablet Take 1 tablet (25 mg total) by mouth daily at bedtime, Starting Wed 8/24/2022, Normal      atorvastatin (LIPITOR) 20 mg tablet TAKE 1 TABLET BY MOUTH ONCE DAILY , Normal      benzonatate (TESSALON PERLES) 100 mg capsule Take 1 capsule (100 mg total) by mouth 3 (three) times a day as needed for cough, Starting Tue 3/3/2020, Normal      Calcium Carbonate-Vitamin D 500-125 MG-UNIT TABS every 24 hours, Starting Thu 1/12/2017, Historical Med      Diclofenac Sodium (VOLTAREN) 1 % APPLY 2 GRAMS 4 TIMES A DAY TOPICALLY, Normal      dicyclomine (BENTYL) 20 mg tablet Take 1 tablet (20 mg total) by mouth 3 (three) times a day as needed (abdominal pain), Starting Thu 7/7/2022, Normal      docusate sodium (COLACE) 100 mg capsule Take 1 capsule (100 mg total) by mouth 2 (two) times a day, Starting Fri 9/11/2020, Normal      Elastic Bandages & Supports (Medical Compression Stockings) MISC by Does not apply route daily High knee compression stockings 20-30 MMHG, Starting Thu 8/27/2020, No Print      fluticasone (FLONASE) 50 mcg/act nasal spray 2 sprays into each nostril daily, Starting Thu 11/21/2019, Normal      furosemide (LASIX) 40 mg tablet Take 1 tablet (40 mg total) by mouth daily, Starting Tue 8/23/2022, Normal      guaiFENesin (ROBITUSSIN) 100 MG/5ML oral liquid Take 10 mL (200 mg total) by mouth 3 (three) times a day as needed for cough, Starting Mon 7/19/2021, Normal      hydrochlorothiazide (HYDRODIURIL) 12 5 mg tablet Take 1 tablet (12 5 mg total) by mouth daily, Starting Tue 8/23/2022, Normal      ketoconazole (NIZORAL) 2 % cream Apply topically daily To scalp lesions, Starting Tue 8/23/2022, Normal      ketoconazole (NIZORAL) 2 % shampoo Apply 0 4 application topically 3 (three) times a week, Starting Wed 8/24/2022, Normal      levETIRAcetam (KEPPRA) 500 mg tablet Take 1 tablet (500 mg total) by mouth every 12 (twelve) hours for 7 doses, Starting Fri 8/19/2022, Until Tue 8/23/2022, Normal      lidocaine (XYLOCAINE) 5 % ointment Apply topically 2 (two) times a day as needed for mild pain, Starting Mon 1/13/2020, Normal      lisinopril (ZESTRIL) 40 mg tablet Take 1 tablet (40 mg total) by mouth daily, Starting Tue 8/23/2022, Normal      Misc  Devices (CANE) MISC Four point aluminum cane , Historical Med      omeprazole (PriLOSEC) 40 MG capsule Take 1 capsule (40 mg total) by mouth daily Half an hour before breakfast, Starting Thu 12/2/2021, Normal      senna-docusate sodium (SENOKOT-S) 8 6-50 mg per tablet Take 1 tablet by mouth daily, Starting Tue 8/23/2022, Normal      sucralfate (CARAFATE) 1 g tablet Take 1 tablet (1 g total) by mouth 4 (four) times a day, Starting Fri 7/8/2022, Normal             No discharge procedures on file      PDMP Review     None          ED Provider  Electronically Signed by           Norma Burris PA-C  09/11/22 6037

## 2022-09-10 NOTE — TRAUMA DOCUMENTATION
pts son at bedside, informed that pt sister would like update  Son will provide to sister   pts son update that trauma resident was in and will be back

## 2022-09-10 NOTE — TRAUMA DOCUMENTATION
pts sister at bedside with multiple questions, pts niece at bedside translating for RN to pts sister  Informed that pts son has been listed in the chart with pt to all visits  Informed pts sister to please speak with pts son regarding updates as RN does not know previous monthly visits and only knows about current visit that brought her to ER today

## 2022-09-10 NOTE — H&P
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 who presents w generalized weakness and neurological decline over the last week  Vss  Afebrile  Pt with gcs 15, pupils 3 and reactive b/l  1/5 strength upper and lower ext b/l  Plan:   Long discussion with family regarding goals of care  Pt will be admitted to trauma surgery service with level 4 comfort care code status  Hospice consult for home hospice discharge on 9/11 vs 9/12  Pain control  keppra seizure ppx    History of Present Illness     Chief Complaint: generalized weakness  Mechanism:Other: fall 3-4 weeks ago  HPI:    Kat Mijares is a 80 y o  female with history of traumatic subarachnoid hemorrhage with bilateral hygromas 3-4 weeks ago who presents with generalized weakness and neurological decline over the last week  Patient [facility] where she was evaluated, CT head demonstrating subdural hematoma with midline shift, patient was then transferred to [facility] for neurosurgical evaluation  Upon arrival patient was evaluated, long goals of care discussion was had with the family, and it is the family's wishes that the patient will not undergo any invasive procedure, and they would like to transition to hospice, comfort measures only  Patient will therefore be admitted to the trauma surgery service for case management consult for hospice liaison  Review of Systems   Constitutional: Positive for fatigue  Negative for chills and fever  HENT: Negative  Eyes: Negative      Respiratory: Negative  Cardiovascular: Negative  Gastrointestinal: Negative  Endocrine: Negative  Genitourinary: Negative  Musculoskeletal: Negative  12-point, complete review of systems was reviewed and negative except as stated above       Historical Information     Past Medical History:   Diagnosis Date    [facility]     Dementia ([facility] )     Diverticulosis 2001    GERD (gastroesophageal reflux disease)     Glaucoma     Heart failure ([facility] )     History of cystocele     Hypercholesterolemia     Hypertension     Obesity     Positive PPD 1999    treated     Past Surgical History:   Procedure Laterality Date    BUNIONECTOMY Left 08/25/2021    Procedure: [place], EXCISION OF WOUND;  Surgeon: Lc Taylor DPM;  [facility] MA[facility] OR;  Service: Podiatry    CARDIOVASCULAR STRESS TEST  2001    dobutamine stress test    CATARACT EXTRACTION      CHOLECYSTECTOMY  04/2002    COLONOSCOPY      CYSTOCELE REPAIR      HYSTERECTOMY      KNEE SURGERY Bilateral     knee replacement    DE REMOVAL DEEP IMPLANT Left 08/25/2021    Procedure: REMOVAL HARDWARE FOOT;  Surgeon: Lc Taylor DPM;  Location: [facility] MAIN OR;  Service: Podiatry    UPPER GASTROINTESTINAL ENDOSCOPY          Social History     Tobacco Use    Smoking status: Never Smoker    Smokeless tobacco: Never Used   Vaping Use    Vaping Use: Never used   Substance Use Topics    Alcohol use: Not Currently    Drug use: Not Currently     Immunization History   Administered Date(s) Administered    COVID-19 MODERNA VACC 0 5 ML IM 03/17/2021, 04/14/2021, 12/17/2021    INFLUENZA 12/18/2009, 09/23/2010, 10/13/2011, 11/06/2012, 10/23/2013, 10/29/2014, 10/28/2015, 10/07/2016, 11/20/2017, 10/16/2018    Influenza Split 11/06/2012, 10/23/2013    Influenza Split High Dose Preservative Free IM 11/20/2017, 10/16/2018, 10/10/2020    Influenza, high dose seasonal 0 7 mL 10/03/2019, 10/19/2020, 01/27/2022    Influenza, seasonal, injectable 10/11/2011    Pneumococcal Conjugate 13-Valent 10/28/2016    Tdap 08/14/2009, 02/25/2020     Last Tetanus: unknown  Family History: Non-contributory    1  Before the illness or injury that brought you to the Emergency, did you need someone to help you on a regular basis? 1=No   2  Since the illness or injury that brought you to the Emergency, have you needed more help than usual to take care of yourself? 1=Yes   3  Have you been hospitalized for one or more nights during the past 6 months (excluding a stay in the Emergency Department)? 1=Yes   4  In general, do you see well? 1=No   5  In general, do you have serious problems with your memory? 1=Yes   6  Do you take more than three different medications everyday? 1=Yes   TOTAL   6     Did you order a geriatric consult if the score was 2 or greater?: no     Meds/Allergies   all current active meds have been reviewed   No Known Allergies    Objective   Initial Vitals:   Temperature: 99 °F (37 2 °C) (09/10/22 1530)  Pulse: 96 (09/10/22 1530)  Respirations: 20 (09/10/22 1530)  Blood Pressure: 117/60 (09/10/22 1530)    Primary Survey:   Airway:        Status: patent;        Pre-hospital Interventions: none        Hospital Interventions: none  Breathing:        Pre-hospital Interventions: none       Effort: normal       Right breath sounds: normal       Left breath sounds: normal  Circulation:        Rhythm: regular       Rate: regular   Right Pulses Left Pulses    R radial: 2+  R femoral: 2+  R pedal: 2+  R carotid: 2+   L radial: 2+  L femoral: 2+  L pedal: 2+  L carotid: 2+     Disability:        GCS: Eye: 4; Verbal: 4 Motor: 6 Total: 14       Right Pupil: 3 mm;  round;  reactive         Left Pupil:  3 mm;  round;  reactive      R Motor Strength L Motor Strength    R : 1/5  R dorsiflex: 1/5  R plantarflex: 1/5 L : 1/5  L dorsiflex: 1/5  L plantarflex: 1/5        Sensory:  No sensory deficit  Exposure:       Completed:  Yes Secondary Survey:  Physical Exam  Vitals reviewed  Constitutional:       General: She is not in acute distress  Appearance: Normal appearance  HENT:      Head: Normocephalic and atraumatic  Nose: Nose normal       Mouth/Throat:      Mouth: Mucous membranes are moist    Eyes:      Extraocular Movements: Extraocular movements intact  Pupils: Pupils are equal, round, and reactive to light  Cardiovascular:      Rate and Rhythm: Normal rate  Pulses: Normal pulses  Pulmonary:      Effort: Pulmonary effort is normal    Abdominal:      General: There is no distension  Palpations: Abdomen is soft  Tenderness: There is no abdominal tenderness  There is no guarding  Genitourinary:     Comments: deferred  Musculoskeletal:         General: Normal range of motion  Cervical back: Normal range of motion  Skin:     General: Skin is warm  Capillary Refill: Capillary refill takes 2 to 3 seconds  Neurological:      Mental Status: She is alert  Mental status is at baseline  Psychiatric:         Mood and Affect: Mood normal          Invasive Devices  Report    Peripheral Intravenous Line  Duration           Peripheral IV 09/10/22 Left Antecubital <1 day    Peripheral IV 09/10/22 Left Hand <1 day              Lab Results: Results: I have personally reviewed all pertinent laboratory/tests results    Imaging Results: I have personally reviewed pertinent reports  Chest Xray(s): N/A   FAST exam(s): N/A   CT Scan(s): positive for acute findings: Bilat  complex mixed density subdural hematomas containing components of acute, subacute, and chronic blood with multiple membranes evident    Subdural collections have significantly increased in size when compared to August 18, 2022 with mass effect    Additional Xray(s): negative for acute findings     Other Studies:     Code Status: Level 4 - Comfort Care  Advance Directive and Living Will:      Power of :    POLST:    I have spent 30 minutes with Family today in which greater than 50% of this time was spent in counseling/coordination of care regarding Diagnostic results and Prognosis

## 2022-09-10 NOTE — TRAUMA DOCUMENTATION
Pt arrived with right arm in sling, not splinted   Able to move fingers, slow to squeeze RNs hand and noted weak grasp with both right and left hand  Able to wiggle toes on right leg however unable to move right leg  Very slow to wiggle toes on left leg and unable to move left leg  C/o lower abd pain, purewick applied due to family asking for urine sample stating "it had an odor"

## 2022-09-10 NOTE — EMTALA/ACUTE CARE TRANSFER
[facility]  [address] [facility] [address]  Dept: [phone]      EMTALA TRANSFER CONSENT    NAME Nirmala Stevenson 1934                              MRN [id number]    I have been informed of my rights regarding examination, treatment, and transfer   by Dr Wendie Freeman MD    Benefits: Specialized equipment and/or services available at the receiving facility (Include comment)________________________    Risks: Potential for delay in receiving treatment, Potential deterioration of medical condition, Loss of IV, Increased discomfort during transfer, Possible worsening of condition or death during transfer      Consent for Transfer:  I acknowledge that my medical condition has been evaluated and explained to me by the emergency department physician or other qualified medical person and/or my attending physician, who has recommended that I be transferred to the service of  Accepting Physician: Dr Reyes Varela at [address] [facility] Name, [place] : [facility]  The above potential benefits of such transfer, the potential risks associated with such transfer, and the probable risks of not being transferred have been explained to me, and I fully understand them  The doctor has explained that, in my case, the benefits of transfer outweigh the risks  I agree to be transferred  I authorize the performance of emergency medical procedures and treatments upon me in both transit and upon arrival at the receiving facility  Additionally, I authorize the release of any and all medical records to the receiving facility and request they be transported with me, if possible  I understand that the safest mode of transportation during a medical emergency is an ambulance and that the Hospital advocates the use of this mode of transport   Risks of traveling to the receiving facility by car, including absence of medical control, life sustaining equipment, such as oxygen, and medical personnel has been explained to me and I fully understand them  (JERROD CORRECT BOX BELOW)  [  ]  I consent to the stated transfer and to be transported by ambulance/helicopter  [  ]  I consent to the stated transfer, but refuse transportation by ambulance and accept full responsibility for my transportation by car  I understand the risks of non-ambulance transfers and I exonerate the Hospital and its staff from any deterioration in my condition that results from this refusal     X___________________________________________    DATE  09/10/22  TIME________  Signature of patient or legally responsible individual signing on patient behalf           RELATIONSHIP TO PATIENT_________________________          Provider Certification    NAME Stanford Johns 1934                              MRN [id number]    A medical screening exam was performed on the above named patient  Based on the examination:    Condition Necessitating Transfer There were no encounter diagnoses      Patient Condition: The patient has been stabilized such that within reasonable medical probability, no material deterioration of the patient condition or the condition of the unborn child(mati) is likely to result from the transfer    Reason for Transfer: Level of Care needed not available at this facility    Transfer Requirements: [facility] [address]   · Space available and qualified personnel available for treatment as acknowledged by    · Agreed to accept transfer and to provide appropriate medical treatment as acknowledged by       Dr Som Karimi  · Appropriate medical records of the examination and treatment of the patient are provided at the time of transfer   [address], [address] _______  · Transfer will be performed by qualified personnel from    and appropriate transfer equipment as required, including the use of necessary and appropriate life support measures  Provider Certification: I have examined the patient and explained the following risks and benefits of being transferred/refusing transfer to the patient/family:  General risk, such as traffic hazards, adverse weather conditions, rough terrain or turbulence, possible failure of equipment (including vehicle or aircraft), or consequences of actions of persons outside the control of the transport personnel, Unanticipated needs of medical equipment and personnel during transport, Risk of worsening condition, The possibility of a transport vehicle being unavailable      Based on these reasonable risks and benefits to the patient and/or the unborn child(mati), and based upon the information available at the time of the patients examination, I certify that the medical benefits reasonably to be expected from the provision of appropriate medical treatments at another medical facility outweigh the increasing risks, if any, to the individuals medical condition, and in the case of labor to the unborn child, from effecting the transfer      X____________________________________________ DATE 09/10/22        TIME_______      ORIGINAL - SEND TO MEDICAL RECORDS   COPY - SEND WITH PATIENT DURING TRANSFER

## 2022-09-11 ENCOUNTER — TRANSCRIBE ORDERS (OUTPATIENT)
Dept: HOME HEALTH SERVICES | Facility: HOME HEALTHCARE | Age: 87
End: 2022-09-11

## 2022-09-11 ENCOUNTER — HOME CARE VISIT (OUTPATIENT)
Dept: HOME HEALTH SERVICES | Facility: HOME HEALTHCARE | Age: 87
End: 2022-09-11
Payer: MEDICARE

## 2022-09-11 DIAGNOSIS — S06.5XAA SUBDURAL HEMATOMA: Primary | ICD-10-CM

## 2022-09-11 LAB
ATRIAL RATE: 87 BPM
ATRIAL RATE: 97 BPM
P AXIS: 44 DEGREES
P AXIS: 47 DEGREES
PR INTERVAL: 190 MS
PR INTERVAL: 192 MS
QRS AXIS: -24 DEGREES
QRS AXIS: -26 DEGREES
QRSD INTERVAL: 92 MS
QRSD INTERVAL: 94 MS
QT INTERVAL: 324 MS
QT INTERVAL: 352 MS
QTC INTERVAL: 411 MS
QTC INTERVAL: 423 MS
T WAVE AXIS: 146 DEGREES
T WAVE AXIS: 150 DEGREES
VENTRICULAR RATE: 87 BPM
VENTRICULAR RATE: 97 BPM

## 2022-09-11 PROCEDURE — 99232 SBSQ HOSP IP/OBS MODERATE 35: CPT | Performed by: SURGERY

## 2022-09-11 PROCEDURE — 93010 ELECTROCARDIOGRAM REPORT: CPT | Performed by: INTERNAL MEDICINE

## 2022-09-11 RX ADMIN — LEVETIRACETAM 500 MG: 100 INJECTION, SOLUTION INTRAVENOUS at 07:30

## 2022-09-11 RX ADMIN — MORPHINE SULFATE 2 MG: 2 INJECTION, SOLUTION INTRAMUSCULAR; INTRAVENOUS at 05:42

## 2022-09-11 RX ADMIN — LEVETIRACETAM 500 MG: 100 INJECTION, SOLUTION INTRAVENOUS at 17:45

## 2022-09-11 NOTE — PLAN OF CARE
Problem: Prexisting or High Potential for Compromised Skin Integrity  Goal: Skin integrity is maintained or improved  Description: INTERVENTIONS:  - Identify patients at risk for skin breakdown  - Assess and monitor skin integrity  - Assess and monitor nutrition and hydration status  - Monitor labs   - Assess for incontinence   - Turn and reposition patient  - Assist with mobility/ambulation  - Relieve pressure over bony prominences  - Avoid friction and shearing  - Provide appropriate hygiene as needed including keeping skin clean and dry  - Evaluate need for skin moisturizer/barrier cream  - Collaborate with interdisciplinary team   - Patient/family teaching  - Consider wound care consult   Outcome: Progressing     Problem: Potential for Falls  Goal: Patient will remain free of falls  Description: INTERVENTIONS:  - Educate patient/family on patient safety including physical limitations  - Instruct patient to call for assistance with activity   - Consult OT/PT to assist with strengthening/mobility   - Keep Call bell within reach  - Keep bed low and locked with side rails adjusted as appropriate  - Keep care items and personal belongings within reach  - Initiate and maintain comfort rounds  - Make Fall Risk Sign visible to staff  - Offer Toileting every 2 Hours, in advance of need  - Initiate/Maintain bed alarm  - Obtain necessary fall risk management equipment:   - Apply yellow socks and bracelet for high fall risk patients  - Consider moving patient to room near nurses station  Outcome: Progressing     Problem: MOBILITY - ADULT  Goal: Maintain or return to baseline ADL function  Description: INTERVENTIONS:  -  Assess patient's ability to carry out ADLs; assess patient's baseline for ADL function and identify physical deficits which impact ability to perform ADLs (bathing, care of mouth/teeth, toileting, grooming, dressing, etc )  - Assess/evaluate cause of self-care deficits   - Assess range of motion  - Assess patient's mobility; develop plan if impaired  - Assess patient's need for assistive devices and provide as appropriate  - Encourage maximum independence but intervene and supervise when necessary  - Involve family in performance of ADLs  - Assess for home care needs following discharge   - Consider OT consult to assist with ADL evaluation and planning for discharge  - Provide patient education as appropriate  Outcome: Progressing  Goal: Maintains/Returns to pre admission functional level  Description: INTERVENTIONS:  - Perform BMAT or MOVE assessment daily    - Set and communicate daily mobility goal to care team and patient/family/caregiver     - Collaborate with rehabilitation services on mobility goals if consulted  - Out of bed for toileting  - Record patient progress and toleration of activity level   Outcome: Progressing     Problem: PAIN - ADULT  Goal: Verbalizes/displays adequate comfort level or baseline comfort level  Description: Interventions:  - Encourage patient to monitor pain and request assistance  - Assess pain using appropriate pain scale  - Administer analgesics based on type and severity of pain and evaluate response  - Implement non-pharmacological measures as appropriate and evaluate response  - Consider cultural and social influences on pain and pain management  - Notify physician/advanced practitioner if interventions unsuccessful or patient reports new pain  Outcome: Progressing     Problem: DISCHARGE PLANNING  Goal: Discharge to home or other facility with appropriate resources  Description: INTERVENTIONS:  - Identify barriers to discharge w/patient and caregiver  - Arrange for needed discharge resources and transportation as appropriate  - Identify discharge learning needs (meds, wound care, etc )  - Arrange for interpretive services to assist at discharge as needed  - Refer to Case Management Department for coordinating discharge planning if the patient needs post-hospital services based on physician/advanced practitioner order or complex needs related to functional status, cognitive ability, or social support system  Outcome: Progressing     Problem: Knowledge Deficit  Goal: Patient/family/caregiver demonstrates understanding of disease process, treatment plan, medications, and discharge instructions  Description: Complete learning assessment and assess knowledge base    Interventions:  - Provide teaching at level of understanding  - Provide teaching via preferred learning methods  Outcome: Progressing

## 2022-09-11 NOTE — CASE MANAGEMENT
Case Management Assessment & Discharge Planning Note    Patient name Abiodun Ch  Location [facility]96-82 MRN [id number]  : 1934 Date 2022       Current Admission Date: 9/10/2022  Current Admission Diagnosis:SDH (subdural hematoma) [facility])   Patient Active Problem List    Diagnosis Date Noted    [facility]) ([facility] ) 09/10/2022    Brain compression ([facility] ) 09/10/2022    OSWALDO (acute kidney injury) ([facility] ) 09/10/2022    Seborrhea capitis 2022    End of life care 2022    Hyponatremia 2022    Subarachnoid hemorrhage ([facility] ) 2022    At risk for polypharmacy 2022    Class 1 obesity 2022    Abdominal pain, epigastric 2022    Bruise 03/10/2022    Fall 2022    Influenza vaccine needed 2022    Ganglion cyst of joint of finger of left hand 2021    Hearing loss 10/27/2021    Dementia ([facility] ) 10/26/2021    Nonintractable headache 2021    Pre-op evaluation 2021    Pain of toe of left foot 2021    Right upper quadrant pain 2020    2019 novel coronavirus disease (COVID-19) 2020    Right rotator cuff tear arthropathy 2020    Acute pain of right shoulder 2019    COPD (chronic obstructive pulmonary disease) [facility] ) 2019    Central perforation of tympanic membrane 2019    Osteoarthritis 2018    Anal or rectal pain 2018    Hemorrhoids 2018    Constipation 2018    Chronic diastolic congestive heart failure ([address] ) 2018    GERD (gastroesophageal reflux disease) 2018    Cataract 2018    Malignant neoplasm of overlapping sites of left female breast ([facility] ) 11/10/2016    HLD (hyperlipidemia) 10/31/2016    Aortic stenosis 10/26/2016    Carpal tunnel syndrome on both sides 2016    Bilateral occipital neuralgia 10/15/2015    Gait instability 2015    Anemia 2014    Benign essential hypertension 2014    Glaucoma 09/11/2014      LOS (days): 1  Geometric Mean LOS (GMLOS) (days):   Days to GMLOS:     OBJECTIVE:  PATIENT READMITTED TO HOSPITAL  Risk of Unplanned Readmission Score: 31 21         Current admission status: Inpatient       Preferred Pharmacy:   [address], [address]  [address]  Phone: [phone] Fax: [phone]    Primary Care Provider: Darylene Haddock, MD    Primary Insurance: [address] [facility] [facility]  Secondary Insurance: Marilyn Rad    ASSESSMENT:  Sarai Phelps 2 Representative - Son   Primary Phone: [phone] (Mobile)               Advance Directives  Does patient have a [address]?: Yes  Does patient have Advance Directives?: Yes    Patient Information  Admitted from[de-identified] Home  Mental Status: Confused  During Assessment patient was accompanied by:  Son  Assessment information provided by[de-identified] Son  Primary Caregiver: Family  Caregiver's Name[de-identified] Luigi Molina Relationship to Patient[de-identified] Family Member  Support Systems: Family members  [place] of Residence: [employer] [facility] you [facility]  Type of Current Residence: [address] [place] home  Upon entering residence, is there a bedroom on the main floor (no further steps)?: Yes  Upon entering residence, is there a bathroom on the main floor (no further steps)?: Yes  In the last 12 months, was there a time when you were not able to pay the mortgage or rent on time?: No  In the last 12 months, how many places have you lived?: 1  In the last 12 months, was there a time when you did not have a steady place to sleep or slept in a shelter (including now)?: No  Homeless/housing insecurity resource given?: N/A  Living Arrangements: Lives w/ Son  Is patient a ?: No    Activities of Daily Living Prior to Admission  Functional Status: Assistance  Completes ADLs independently?: No  Level of ADL dependence: Assistance  Ambulates independently?: No  Level of ambulatory dependence: Assistance  Does patient currently own DME?: Yes  What DME does the patient currently own?: Wheelchair, Ophelia Brunson    Patient Information Continued  Income Source: Pension/assisted  Does patient have prescription coverage?: Yes  Within the past 12 months, you worried that your food would run out before you got the money to buy more : Never true  Within the past 12 months, the food you bought just didn't last and you didn't have money to get more : Never true  Food insecurity resource given?: N/A  Does patient receive dialysis treatments?: No  Does patient have a history of substance abuse?: No  Does patient have a history of Mental Health Diagnosis?: No         Means of Transportation  Means of Transport to Appts[de-identified] Family transport  In the past 12 months, has lack of transportation kept you from medical appointments or from getting medications?: No  In the past 12 months, has lack of transportation kept you from meetings, work, or from getting things needed for daily living?: No  Was application for public transport provided?: N/A        DISCHARGE DETAILS:    Discharge planning discussed with[de-identified] Apoorva Coleman of Choice: Yes  Comments - Freedom of Choice: Discussed FOC  CM contacted family/caregiver?: Yes  Were Treatment Team discharge recommendations reviewed with patient/caregiver?: Yes  Did patient/caregiver verbalize understanding of patient care needs?: Yes  Were patient/caregiver advised of the risks associated with not following Treatment Team discharge recommendations?: Yes    Other Referral/Resources/Interventions Provided:  Interventions: Hospice  Referral Comments: Multiple family members present in room, at patient's bedside, including patient's 3 children, and 2 grandchildren  Son, Eric Marin, with whom, patient resides  Eric Marin is a paid caregiver for patient  Family is requesting home hospice through [facility] explained hospice care, including hospice house  Family wishes to take patient home  Referral entered in [address]  CM reviewed d/c planning process including the following: identifying help at home, patient preference for d/c planning needs, Discharge Lounge, [facility] Meds to Bed program, availability of treatment team to discuss questions or concerns patient and/or family may have regarding understanding medications and recognizing signs and symptoms once discharged  CM also encouraged patient to follow up with all recommended appointments after discharge  Patient advised of importance for patient and family to participate in managing patients medical well being  Information obtained from sonKameron  Patient resides with son x 3 years  Kameron Khoury states that there is ramp access into his house

## 2022-09-11 NOTE — UTILIZATION REVIEW
Initial Clinical Review    Admission: Date/Time/Statement:   Admission Orders (From admission, onward)     Ordered        09/10/22 1649  Inpatient Admission  Once                      Orders Placed This Encounter   Procedures    Inpatient Admission     Standing Status:   Standing     Number of Occurrences:   1     Order Specific Question:   Level of Care     Answer:   Med Surg [16]     Order Specific Question:   Estimated length of stay     Answer:   More than 2 Midnights     Order Specific Question:   Certification     Answer:   I certify that inpatient services are medically necessary for this patient for a duration of greater than two midnights  See H&P and MD Progress Notes for additional information about the patient's course of treatment  ED Arrival Information     Expected   9/10/2022     Arrival   9/10/2022 15:22    Acuity   Emergent            Means of arrival   Ambulance    Escorted by   [facility])    Service   Trauma    Admission type   Emergency            Arrival complaint   [facility]           Chief Complaint   Patient presents with    Trauma     Trauma transfer from [place]      Initial Presentation: 80 y o  female, transfer [facility] ED presents with generalized weakness and neurological decline over last week  Fell 3-4 wks ago  At [facility] ED,  CT Head showed with generalized weakness and neurological decline over the last week  Patient [facility] where she was evaluated, CT head demonstrating  Transferred for Neurosurgery evaluation  Goal of care discussed with family upon arrival  Per their wish, pt pt will not undergo any invasive procedure, and they would like to transition to hospice, comfort measures only  PMH for Traumatic subarachnoid hemorrhage with bilateral hygromas 3-4 weeks ago  Admit Inpatient level of care for w generalized weakness and neurological decline over the last week   Traumatic subarachnoid hemorrhage w b/l hygroma 3-4 wk ago  Hospice consult for home hospice  Iv Keppra for seizure ppx  Pain control      Date: 9/11  Day 2:   Progress notes; Continue comfort care  Diet as tolerated  Continue Keppra for seizure ppx  Case management for hospice eval      ED Triage Vitals [09/10/22 1530]   Temperature Pulse Respirations Blood Pressure SpO2   99 °F (37 2 °C) 96 20 117/60 100 %      Temp Source Heart Rate Source Patient Position - Orthostatic VS BP Location FiO2 (%)   Oral Monitor Lying -- --      Pain Score       --          Wt Readings from Last 1 Encounters:   08/29/22 80 7 kg (178 lb)     Additional Vital Signs:   09/11/22 0701 -- 90 16 -- -- 92 % -- -- -- --   09/10/22 1700 -- 96 22 115/56 80 92 % 28 2 L/min Nasal cannula --   09/10/22 1630 -- 94 22 108/55 -- 98 % -- -- Nasal cannula Lying   09/10/22 1600 -- 94 22 121/57 -- 98 % -- -- Nasal cannula        Pertinent Labs/Diagnostic Test Results:   9/10  CT Head - Bilat  complex mixed density subdural hematomas containing components of acute, subacute, and chronic blood with multiple membranes evident  Subdural collections have significantly increased in size when compared to August 18, 2022 with mass effect   resulting in supratentorial sulcal effacement and right to left midline shift of 3 mm      CT chest/abd/pelvis - 1  No acute abnormality in the chest, abdomen, pelvis  2   Colonic diverticulosis  CT Cervical Spine - No cervical spine fracture or traumatic malalignment  Degenerative changes  PCXR - No acute cardiopulmonary disease        Results from last 7 days   Lab Units 09/10/22  1236   WBC Thousand/uL 10 05   HEMOGLOBIN g/dL 11 2*   HEMATOCRIT % 32 4*   PLATELETS Thousands/uL 184   NEUTROS ABS Thousands/µL 7 86*         Results from last 7 days   Lab Units 09/10/22  1318   SODIUM mmol/L 131*   POTASSIUM mmol/L 3 4*   CHLORIDE mmol/L 93*   CO2 mmol/L 30   ANION GAP mmol/L 8   BUN mg/dL 51*   CREATININE mg/dL 1 35*   EGFR ml/min/1 73sq m 35   CALCIUM mg/dL 9 3     Results from last 7 days   Lab Units 09/10/22  1318   AST U/L 25   ALT U/L 24   ALK PHOS U/L 101   TOTAL PROTEIN g/dL 8 5*   ALBUMIN g/dL 2 9*   TOTAL BILIRUBIN mg/dL 0 59         Results from last 7 days   Lab Units 09/10/22  1318   GLUCOSE RANDOM mg/dL 176*       Results from last 7 days   Lab Units 09/10/22  1318   HS TNI 0HR ng/L 301*         Results from last 7 days   Lab Units 09/10/22  1318   PROTIME seconds 14 8*   INR  1 16   PTT seconds 25         Results from last 7 days   Lab Units 09/10/22  1318   NT-PRO BNP pg/mL 1,506*       ED Treatment:   Medication Administration from 09/10/2022 1319 to 09/10/2022 1815       Date/Time Order Dose Route Action     09/10/2022 1756 levETIRAcetam (KEPPRA) 1,000 mg in sodium chloride 0 9 % 100 mL IVPB 1,000 mg Intravenous New Bag        Past Medical History:   Diagnosis Date    COPD (chronic obstructive pulmonary disease) ([place] [address] )     Dementia ([place] [address] )     Diverticulosis 2001    GERD (gastroesophageal reflux disease)     Glaucoma     [facility] failure [facility]     History of cystocele     Hypercholesterolemia     Hypertension     Obesity     Positive PPD 1999    treated     Present on Admission:   SDH [facility]subdural hematoma) ([facility]   HLD (hyperlipidemia)   Glaucoma   Dementia ([facility]   Brain compression ([place] [address] )   OSWALDO (acute kidney injury) ([place] [address] )   GERD (gastroesophageal reflux disease)   Chronic diastolic congestive heart failure ([facility])      Admitting Diagnosis: End of life care [Z51 5]  Subdural hematoma ([place] [address] ) [S06 5X9A]  SDH (subdural hematoma) ([place] [address] ) [S06 5X9A]  Age/Sex: 80 y o  female     Admission Orders:  Scheduled Medications:  levETIRAcetam, 500 mg, Intravenous, Q12H [address]      Continuous IV Infusions: None     PRN Meds:  bisacodyl, 10 mg, Rectal, Daily PRN  diazepam, 2 5 mg, Intravenous, Q30 Min PRN  glycopyrrolate, 0 1 mg, Intravenous, Q4H PRN  haloperidol lactate, 0 5 mg, Intravenous, Q2H PRN  LORazepam, 1 mg, Intravenous, Q10 Min PRN  morphine injection, 2 mg, Intravenous, Q3H PRN 9/11 x1  morphine injection, 4 mg, Intravenous, Q3H PRN        IP CONSULT TO HOSPICE  IP CONSULT TO CASE MANAGEMENT    Network Utilization Review Department  ATTENTION: Please call with any questions or concerns to [phone] and carefully listen to the prompts so that you are directed to the right person  All voicemails are confidential   Erika Chavez all requests for admission clinical reviews, approved or denied determinations and any other requests to dedicated fax number below belonging to the campus where the patient is receiving treatment   List of dedicated fax numbers for the Facilities:  [address] DENIALS (Administrative/Medical Necessity) [phone]   [address] (Maternity/NICU/Pediatrics) [phone]   [address]  [address] Medical [place] [address] [address] [place] [address] Jonathan Arzola [address] [place] [address] [phone]

## 2022-09-11 NOTE — PROGRESS NOTES
Progress Note - Bharat Smart 80 y o  female MRN: [id number]    Unit/Bed#: [facility] 619-01 Encounter: [id number]      Assessment:  79 y/o F w traumatic subarachnoid hemorrhage w b/l hygroma 3-4 wk ago who presents w generalized weakness and neurological decline over the last week         Plan:  Diet as tolerated  Comfort care order set  Continue keppra for seizure ppx  Appreciate case mgt for home hospice eval    Subjective:   No acute events  Family bedside  Requesting transition to home hospice today  Objective:     Vitals: Blood pressure 115/56, pulse 90, temperature 99 °F (37 2 °C), temperature source Oral, resp  rate 16, SpO2 92 %, not currently breastfeeding  ,There is no height or weight on file to calculate BMI  Intake/Output Summary (Last 24 hours) at 9/11/2022 1006  Last data filed at 9/11/2022 0745  Gross per 24 hour   Intake 100 ml   Output --   Net 100 ml       Physical Exam  General: NAD  HEENT: NC/AT  MMM  Cv: RRR  Lungs: normal effort  Ab: Soft, NT/ND  Ex: no CCE  Neuro: Awake, alert  Invasive Devices  Report    Peripheral Intravenous Line  Duration           Peripheral IV 09/10/22 Left Antecubital <1 day                Lab, Imaging and other studies: I have personally reviewed pertinent reports  VTE Pharmacologic Prophylaxis: Reason for no pharmacologic prophylaxis brain bleed  level 4 comfort care  VTE Mechanical Prophylaxis: reason for no mechanical VTE prophylaxis level 4 comfort care

## 2022-09-11 NOTE — PLAN OF CARE
Problem: Prexisting or High Potential for Compromised Skin Integrity  Goal: Skin integrity is maintained or improved  Description: INTERVENTIONS:  - Identify patients at risk for skin breakdown  - Assess and monitor skin integrity  - Assess and monitor nutrition and hydration status  - Monitor labs   - Assess for incontinence   - Turn and reposition patient  - Assist with mobility/ambulation  - Relieve pressure over bony prominences  - Avoid friction and shearing  - Provide appropriate hygiene as needed including keeping skin clean and dry  - Evaluate need for skin moisturizer/barrier cream  - Collaborate with interdisciplinary team   - Patient/family teaching  - Consider wound care consult   Outcome: Not Progressing     Problem: Potential for Falls  Goal: Patient will remain free of falls  Description: INTERVENTIONS:  - Educate patient/family on patient safety including physical limitations  - Instruct patient to call for assistance with activity   - Consult OT/PT to assist with strengthening/mobility   - Keep Call bell within reach  - Keep bed low and locked with side rails adjusted as appropriate  - Keep care items and personal belongings within reach  - Initiate and maintain comfort rounds  - Make Fall Risk Sign visible to staff  - Offer Toileting every 2 Hours, in advance of need  - Initiate/Maintain bedalarm  - Obtain necessary fall risk management equipment:   - Apply yellow socks and bracelet for high fall risk patients  - Consider moving patient to room near nurses station  Outcome: Not Progressing     Problem: MOBILITY - ADULT  Goal: Maintain or return to baseline ADL function  Description: INTERVENTIONS:  -  Assess patient's ability to carry out ADLs; assess patient's baseline for ADL function and identify physical deficits which impact ability to perform ADLs (bathing, care of mouth/teeth, toileting, grooming, dressing, etc )  - Assess/evaluate cause of self-care deficits   - Assess range of motion  - Assess patient's mobility; develop plan if impaired  - Assess patient's need for assistive devices and provide as appropriate  - Encourage maximum independence but intervene and supervise when necessary  - Involve family in performance of ADLs  - Assess for home care needs following discharge   - Consider OT consult to assist with ADL evaluation and planning for discharge  - Provide patient education as appropriate  Outcome: Not Progressing  Goal: Maintains/Returns to pre admission functional level  Description: INTERVENTIONS:  - Perform BMAT or MOVE assessment daily    - Set and communicate daily mobility goal to care team and patient/family/caregiver  - Collaborate with rehabilitation services on mobility goals if consulted  - Perform Range of Motion 3 times a day  - Reposition patient every 2 hours    - Dangle patient 3 times a day  - Stand patient 3 times a day  - Ambulate patient 3 times a day  - Out of bed to chair 3 times a day   - Out of bed for meals 3 times a day  - Out of bed for toileting  - Record patient progress and toleration of activity level   Outcome: Not Progressing     Problem: PAIN - ADULT  Goal: Verbalizes/displays adequate comfort level or baseline comfort level  Description: Interventions:  - Encourage patient to monitor pain and request assistance  - Assess pain using appropriate pain scale  - Administer analgesics based on type and severity of pain and evaluate response  - Implement non-pharmacological measures as appropriate and evaluate response  - Consider cultural and social influences on pain and pain management  - Notify physician/advanced practitioner if interventions unsuccessful or patient reports new pain  Outcome: Not Progressing     Problem: DISCHARGE PLANNING  Goal: Discharge to home or other facility with appropriate resources  Description: INTERVENTIONS:  - Identify barriers to discharge w/patient and caregiver  - Arrange for needed discharge resources and transportation as appropriate  - Identify discharge learning needs (meds, wound care, etc )  - Arrange for interpretive services to assist at discharge as needed  - Refer to Case Management Department for coordinating discharge planning if the patient needs post-hospital services based on physician/advanced practitioner order or complex needs related to functional status, cognitive ability, or social support system  Outcome: Not Progressing     Problem: Knowledge Deficit  Goal: Patient/family/caregiver demonstrates understanding of disease process, treatment plan, medications, and discharge instructions  Description: Complete learning assessment and assess knowledge base  Interventions:  - Provide teaching at level of understanding  - Provide teaching via preferred learning methods  Outcome: Not Progressing     Problem: Nutrition/Hydration-ADULT  Goal: Nutrient/Hydration intake appropriate for improving, restoring or maintaining nutritional needs  Description: Monitor and assess patient's nutrition/hydration status for malnutrition  Collaborate with interdisciplinary team and initiate plan and interventions as ordered  Monitor patient's weight and dietary intake as ordered or per policy  Utilize nutrition screening tool and intervene as necessary  Determine patient's food preferences and provide high-protein, high-caloric foods as appropriate       INTERVENTIONS:  - Monitor oral intake, urinary output, labs, and treatment plans  - Assess nutrition and hydration status and recommend course of action  - Evaluate amount of meals eaten  - Assist patient with eating if necessary   - Allow adequate time for meals  - Recommend/ encourage appropriate diets, oral nutritional supplements, and vitamin/mineral supplements  - Order, calculate, and assess calorie counts as needed  - Recommend, monitor, and adjust tube feedings and TPN/PPN based on assessed needs  - Assess need for intravenous fluids  - Provide specific nutrition/hydration education as appropriate  - Include patient/family/caregiver in decisions related to nutrition  Outcome: Not Progressing

## 2022-09-12 PROCEDURE — 99232 SBSQ HOSP IP/OBS MODERATE 35: CPT | Performed by: EMERGENCY MEDICINE

## 2022-09-12 RX ADMIN — LEVETIRACETAM 500 MG: 100 INJECTION, SOLUTION INTRAVENOUS at 07:29

## 2022-09-12 RX ADMIN — LEVETIRACETAM 500 MG: 100 INJECTION, SOLUTION INTRAVENOUS at 17:14

## 2022-09-12 NOTE — UTILIZATION REVIEW
Continued Stay Review    Date: 09/11/2022                          Current Patient Class: IP Current Level of Care: MS    HPI:88 y o  female initially admitted on 09/10/2022    Assessment/Plan:   Hospice Note: Pt is approved for home hospice  Oxygen, bed, OBT, BSC and WC ordered and for delivery on Monday  Will have same day hospice admission when dc home, CM requested to arrange transport for Tuesday       Vital Signs: /56   Pulse 90   Temp 99 °F (37 2 °C) (Oral)   Resp 16   SpO2 92%       Pertinent Labs/Diagnostic Results:       Results from last 7 days   Lab Units 09/10/22  1236   WBC Thousand/uL 10 05   HEMOGLOBIN g/dL 11 2*   HEMATOCRIT % 32 4*   PLATELETS Thousands/uL 184   NEUTROS ABS Thousands/µL 7 86*         Results from last 7 days   Lab Units 09/10/22  1318   SODIUM mmol/L 131*   POTASSIUM mmol/L 3 4*   CHLORIDE mmol/L 93*   CO2 mmol/L 30   ANION GAP mmol/L 8   BUN mg/dL 51*   CREATININE mg/dL 1 35*   EGFR ml/min/1 73sq m 35   CALCIUM mg/dL 9 3     Results from last 7 days   Lab Units 09/10/22  1318   AST U/L 25   ALT U/L 24   ALK PHOS U/L 101   TOTAL PROTEIN g/dL 8 5*   ALBUMIN g/dL 2 9*   TOTAL BILIRUBIN mg/dL 0 59         Results from last 7 days   Lab Units 09/10/22  1318   GLUCOSE RANDOM mg/dL 176*       Results from last 7 days   Lab Units 09/10/22  1318   HS TNI 0HR ng/L 301*         Results from last 7 days   Lab Units 09/10/22  1318   PROTIME seconds 14 8*   INR  1 16   PTT seconds 25       Results from last 7 days   Lab Units 09/10/22  1318   NT-PRO BNP pg/mL 1,506*       Medications:   Scheduled Medications:  levETIRAcetam, 500 mg, Intravenous, Q12H [facility]      Continuous IV Infusions:     PRN Meds:  bisacodyl, 10 mg, Rectal, Daily PRN  diazepam, 2 5 mg, Intravenous, Q30 Min PRN  glycopyrrolate, 0 1 mg, Intravenous, Q4H PRN  haloperidol lactate, 0 5 mg, Intravenous, Q2H PRN  LORazepam, 1 mg, Intravenous, Q10 Min PRN  morphine injection, 2 mg, Intravenous, Q3H PRN  morphine injection, 4 mg, Intravenous, Q3H PRN        Discharge Plan: TBD    Network Utilization Review Department  ATTENTION: Please call with any questions or concerns to [phone] and carefully listen to the prompts so that you are directed to the right person  All voicemails are confidential   Claudia Grand all requests for admission clinical reviews, approved or denied determinations and any other requests to dedicated fax number below belonging to the campus where the patient is receiving treatment   List of dedicated fax numbers for the Facilities:  [address] DENIALS (Administrative/Medical Necessity) [phone]   [address] (Maternity/NICU/Pediatrics) [phone]   [address]  [address] Medical [place] [address] [id number] [address] [facility] [place] [address] [phone]

## 2022-09-12 NOTE — HOSPICE NOTE
Hospice referral received and met with family and discussed medicare hospice benefit and answered all question  Pt is approved for home hospice  and oxygen, bed, OBT, BSC and wc ordered for Monday delivery  CM, please arrange  transport to home Tuesday am for same day hospice admission   Weekday liaison to follow up and obtain consents and ooh dnr

## 2022-09-12 NOTE — TELEPHONE ENCOUNTER
I don't see any mention of discharge planning   Appears she's going to be receiving end of life care so I'm not sure what she's requesting

## 2022-09-12 NOTE — TELEPHONE ENCOUNTER
Pt's daughter came in today stating pt will be getting discharge from hospice and they are requesting a letter form pcp stating pt is in need of 24 hour care in the home  Pt's daughter was informed a f/u visit but be done, but daughter stated pt is unable to come into the office due to her medical status  Please advice

## 2022-09-12 NOTE — UTILIZATION REVIEW
Inpatient Admission Authorization Request   NOTIFICATION OF INPATIENT ADMISSION/INPATIENT AUTHORIZATION REQUEST   SERVICING FACILITY:   [place] [address]  Address: [address] [place] [address], [address] [place] [address]  Tax ID: [id number]  NPI: [id number]  Place of Service: Inpatient [address] [facility]  60W  Place of Service Code: 24     ATTENDING PROVIDER:  Attending Name and NPI#: Estherjennifer Ness [[id number]]  Address: [address] [place] [address], [address] [place] [address]  Phone: [phone]     UTILIZATION REVIEW CONTACT:  Deon Purcell, Utilization   Network Utilization Review Department  Phone: [phone]  Fax: [phone]  Email: [email or web address]     PHYSICIAN ADVISORY SERVICES:  FOR [id number] REVIEW - MEDICAL NECESSITY DENIAL  Phone: [phone]  Fax: [phone]  Email: [email or web address]     TYPE OF REQUEST:  Inpatient Status     ADMISSION INFORMATION:  ADMISSION DATE/TIME: 9/10/22  4:49 PM  PATIENT DIAGNOSIS CODE/DESCRIPTION:  End of life care [Z51 5]  Subdural hematoma ([facility] ) [S78 2B3C]  SDH (subdural hematoma) ([facility] [facility] ) [V85 0N0U]  DISCHARGE DATE/TIME: No discharge date for patient encounter  IMPORTANT INFORMATION:  Please contact Deon Purcell directly with any questions or concerns regarding this request  Department voicemails are confidential     Send requests for admission clinical reviews, concurrent reviews, approvals, and administrative denials due to lack of clinical to fax [phone]

## 2022-09-12 NOTE — PROGRESS NOTES
Progress Note - Trauma   De Emerson 80 y o  female [id number]   Unit/Bed#: [facility] 619-01 Encounter: [id number]     ASSESSMENT:   88F traumatic SAH and subsequent SDH currently on comfort care pending hospice placement    PLAN:  Comfort care  -morphine p r n   -Haldol p r n   -Robinul  -Dulcolax  -Keppra seizure prophylaxis  -pleasure feeds  -pending home hospice Tuesday        Disposition:  Pending home hospice Tuesday    SUBJECTIVE:  Chief Complaint: SDH    Subjective:  Family translating for patient, reports patient has been talking a lot but speaking gibberish  They report she has had leg cramps overnight  OBJECTIVE:   Vitals:        Intake/Output:  I/O       09/10 0701 09/11 0700 09/11 0701 09/12 0700    IV Piggyback  200    Total Intake  200    Urine  800    Total Output  800    Net  -600               Nutrition: Diet Regular; Pleasure Feed  GI Prophylaxis/Bowel Regimen:  Dulcolax  VTE Prophylaxis:Reason for no pharmacologic prophylaxis Comfort care     Physical Exam:   GENERAL APPEARANCE:  No acute distress  NEURO:  Awake and alert  HEENT:  Normocephalic  CV:  Regular rate and rhythm, no murmurs  LUNGS:  Clear to auscultation bilaterally  GI:  Nondistended, nontender to palpation  MSK:  No lower extremity edema  SKIN:  Warm and dry    Invasive Devices  Report    Peripheral Intravenous Line  Duration           Peripheral IV 09/10/22 Left Antecubital 1 day                      Lab Results: Results: I have personally reviewed all pertinent laboratory/tests results  Imaging/EKG Studies: I have personally reviewed pertinent reports

## 2022-09-13 ENCOUNTER — HOME CARE VISIT (OUTPATIENT)
Dept: HOME HOSPICE | Facility: HOSPICE | Age: 87
End: 2022-09-13
Payer: MEDICARE

## 2022-09-13 ENCOUNTER — HOME CARE VISIT (OUTPATIENT)
Dept: HOME HEALTH SERVICES | Facility: HOME HEALTHCARE | Age: 87
End: 2022-09-13
Payer: MEDICARE

## 2022-09-13 VITALS
DIASTOLIC BLOOD PRESSURE: 56 MMHG | TEMPERATURE: 98.5 F | WEIGHT: 178 LBS | HEIGHT: 61 IN | HEART RATE: 84 BPM | BODY MASS INDEX: 33.61 KG/M2 | SYSTOLIC BLOOD PRESSURE: 118 MMHG

## 2022-09-13 VITALS
DIASTOLIC BLOOD PRESSURE: 56 MMHG | OXYGEN SATURATION: 92 % | SYSTOLIC BLOOD PRESSURE: 115 MMHG | RESPIRATION RATE: 16 BRPM | TEMPERATURE: 99 F | HEART RATE: 100 BPM

## 2022-09-13 DIAGNOSIS — Z51.5 HOSPICE CARE: Primary | ICD-10-CM

## 2022-09-13 PROCEDURE — 99238 HOSP IP/OBS DSCHRG MGMT 30/<: CPT | Performed by: EMERGENCY MEDICINE

## 2022-09-13 PROCEDURE — NC001 PR NO CHARGE: Performed by: EMERGENCY MEDICINE

## 2022-09-13 PROCEDURE — G0299 HHS/HOSPICE OF RN EA 15 MIN: HCPCS

## 2022-09-13 RX ORDER — OXYCODONE HCL 20 MG/ML
5 CONCENTRATE, ORAL ORAL EVERY 6 HOURS PRN
Qty: 30 ML | Refills: 0 | Status: SHIPPED | OUTPATIENT
Start: 2022-09-13 | End: 2022-09-16 | Stop reason: SDUPTHER

## 2022-09-13 RX ORDER — LORAZEPAM 2 MG/ML
CONCENTRATE ORAL
Qty: 30 ML | Refills: 0 | Status: SHIPPED | OUTPATIENT
Start: 2022-09-13 | End: 2022-09-16 | Stop reason: SDUPTHER

## 2022-09-13 NOTE — PLAN OF CARE
Problem: Prexisting or High Potential for Compromised Skin Integrity  Goal: Skin integrity is maintained or improved  Description: INTERVENTIONS:  - Identify patients at risk for skin breakdown  - Assess and monitor skin integrity  - Assess and monitor nutrition and hydration status  - Monitor labs   - Assess for incontinence   - Turn and reposition patient  - Assist with mobility/ambulation  - Relieve pressure over bony prominences  - Avoid friction and shearing  - Provide appropriate hygiene as needed including keeping skin clean and dry  - Evaluate need for skin moisturizer/barrier cream  - Collaborate with interdisciplinary team   - Patient/family teaching  - Consider wound care consult   9/13/2022 1107 by Nirmal Smith LPN  Outcome: Not Progressing  9/13/2022 1105 by Nirmal Smith LPN  Outcome: Not Progressing     Problem: Potential for Falls  Goal: Patient will remain free of falls  Description: INTERVENTIONS:  - Educate patient/family on patient safety including physical limitations  - Instruct patient to call for assistance with activity   - Consult OT/PT to assist with strengthening/mobility   - Keep Call bell within reach  - Keep bed low and locked with side rails adjusted as appropriate  - Keep care items and personal belongings within reach  - Initiate and maintain comfort rounds  - Make Fall Risk Sign visible to staff  - Offer Toileting every 2 Hours, in advance of need  - Initiate/Maintain bedalarm  - Obtain necessary fall risk management equipment:   - Apply yellow socks and bracelet for high fall risk patients  - Consider moving patient to room near nurses station  9/13/2022 1107 by Nirmal Smith LPN  Outcome: Not Progressing  9/13/2022 1105 by Nirmal Smith LPN  Outcome: Not Progressing     Problem: MOBILITY - ADULT  Goal: Maintain or return to baseline ADL function  Description: INTERVENTIONS:  -  Assess patient's ability to carry out ADLs; assess patient's baseline for ADL function and identify physical deficits which impact ability to perform ADLs (bathing, care of mouth/teeth, toileting, grooming, dressing, etc )  - Assess/evaluate cause of self-care deficits   - Assess range of motion  - Assess patient's mobility; develop plan if impaired  - Assess patient's need for assistive devices and provide as appropriate  - Encourage maximum independence but intervene and supervise when necessary  - Involve family in performance of ADLs  - Assess for home care needs following discharge   - Consider OT consult to assist with ADL evaluation and planning for discharge  - Provide patient education as appropriate  9/13/2022 1107 by Brian Mckeon LPN  Outcome: Not Progressing  9/13/2022 1105 by Brian Mckeon LPN  Outcome: Not Progressing  Goal: Maintains/Returns to pre admission functional level  Description: INTERVENTIONS:  - Perform BMAT or MOVE assessment daily    - Set and communicate daily mobility goal to care team and patient/family/caregiver  - Collaborate with rehabilitation services on mobility goals if consulted  - Perform Range of Motion 3 times a day  - Reposition patient every 2 hours    - Dangle patient 3 times a day  - Stand patient 3 times a day  - Ambulate patient 3 times a day  - Out of bed to chair 3 times a day   - Out of bed for meals 3 times a day  - Out of bed for toileting  - Record patient progress and toleration of activity level   9/13/2022 1107 by Brian Mckeon LPN  Outcome: Not Progressing  9/13/2022 1105 by Brian Mckeon LPN  Outcome: Not Progressing     Problem: PAIN - ADULT  Goal: Verbalizes/displays adequate comfort level or baseline comfort level  Description: Interventions:  - Encourage patient to monitor pain and request assistance  - Assess pain using appropriate pain scale  - Administer analgesics based on type and severity of pain and evaluate response  - Implement non-pharmacological measures as appropriate and evaluate response  - Consider cultural and social influences on pain and pain management  - Notify physician/advanced practitioner if interventions unsuccessful or patient reports new pain  9/13/2022 1107 by Susan Baez LPN  Outcome: Not Progressing  9/13/2022 1105 by Susan Baez LPN  Outcome: Not Progressing     Problem: DISCHARGE PLANNING  Goal: Discharge to home or other facility with appropriate resources  Description: INTERVENTIONS:  - Identify barriers to discharge w/patient and caregiver  - Arrange for needed discharge resources and transportation as appropriate  - Identify discharge learning needs (meds, wound care, etc )  - Arrange for interpretive services to assist at discharge as needed  - Refer to Case Management Department for coordinating discharge planning if the patient needs post-hospital services based on physician/advanced practitioner order or complex needs related to functional status, cognitive ability, or social support system  9/13/2022 1107 by Susan Baez LPN  Outcome: Not Progressing  9/13/2022 1105 by Susan Baez LPN  Outcome: Not Progressing     Problem: Knowledge Deficit  Goal: Patient/family/caregiver demonstrates understanding of disease process, treatment plan, medications, and discharge instructions  Description: Complete learning assessment and assess knowledge base  Interventions:  - Provide teaching at level of understanding  - Provide teaching via preferred learning methods  9/13/2022 1107 by Susan Baez LPN  Outcome: Not Progressing  9/13/2022 1105 by Susan Baez LPN  Outcome: Not Progressing     Problem: Nutrition/Hydration-ADULT  Goal: Nutrient/Hydration intake appropriate for improving, restoring or maintaining nutritional needs  Description: Monitor and assess patient's nutrition/hydration status for malnutrition  Collaborate with interdisciplinary team and initiate plan and interventions as ordered    Monitor patient's weight and dietary intake as ordered or per policy  Utilize nutrition screening tool and intervene as necessary  Determine patient's food preferences and provide high-protein, high-caloric foods as appropriate       INTERVENTIONS:  - Monitor oral intake, urinary output, labs, and treatment plans  - Assess nutrition and hydration status and recommend course of action  - Evaluate amount of meals eaten  - Assist patient with eating if necessary   - Allow adequate time for meals  - Recommend/ encourage appropriate diets, oral nutritional supplements, and vitamin/mineral supplements  - Order, calculate, and assess calorie counts as needed  - Recommend, monitor, and adjust tube feedings and TPN/PPN based on assessed needs  - Assess need for intravenous fluids  - Provide specific nutrition/hydration education as appropriate  - Include patient/family/caregiver in decisions related to nutrition  9/13/2022 1107 by Jose C Fong LPN  Outcome: Not Progressing  9/13/2022 1105 by Jose C Fong LPN  Outcome: Progressing

## 2022-09-13 NOTE — CASE MANAGEMENT
Case Management Discharge Planning Note    Patient name Raman Pérez  Location [facility] 619/[facility] 537-80 MRN [id number]  : 1934 Date 2022       Current Admission Date: 9/10/2022  Current Admission Diagnosis:SDH (subdural hematoma) [facility])   Patient Active Problem List    Diagnosis Date Noted    [facility] 09/10/2022    Brain compression ([facility] ) 09/10/2022    OSWALDO (acute kidney injury) ([facility] ) 09/10/2022    Seborrhea capitis 2022    End of life care 2022    Hyponatremia 2022    Subarachnoid hemorrhage ([facility] ) 2022    At risk for polypharmacy 2022    Class 1 obesity 2022    Abdominal pain, epigastric 2022    Bruise 03/10/2022    Fall 2022    Influenza vaccine needed 2022    Ganglion cyst of joint of finger of left hand 2021    Hearing loss 10/27/2021    Dementia ([facility] ) 10/26/2021    Nonintractable headache 2021    Pre-op evaluation 2021    Pain of toe of left foot 2021    Right upper quadrant pain 2020    2019 novel coronavirus disease (COVID-19) 2020    Right rotator cuff tear arthropathy 2020    Acute pain of right shoulder 2019    COPD (chronic obstructive pulmonary disease) ([facility] ) 2019    Central perforation of tympanic membrane 2019    Osteoarthritis 2018    Anal or rectal pain 2018    Hemorrhoids 2018    Constipation 2018    Chronic diastolic congestive heart failure ([address] ) 2018    GERD (gastroesophageal reflux disease) 2018    Cataract 2018    Malignant neoplasm of overlapping sites of left female breast ([facility] ) 11/10/2016    HLD (hyperlipidemia) 10/31/2016    Aortic stenosis 10/26/2016    Carpal tunnel syndrome on both sides 2016    Bilateral occipital neuralgia 10/15/2015    Gait instability 2015    Anemia 2014    Benign essential hypertension 2014    Glaucoma 2014 LOS (days): 3  Geometric Mean LOS (GMLOS) (days): 2 10  Days to GMLOS:-0 6     OBJECTIVE:  Risk of Unplanned Readmission Score: 31 97         Current admission status: Inpatient   Preferred Pharmacy:   [address] [facility],[address], [address]  [address]  Phone: [phone] Fax: [phone]    Primary Care Provider: Sneha Stewart MD    Primary Insurance: [address] MEDICARE [facility] RE[facility]  Secondary Insurance: [address] [place] [address]    DISCHARGE DETAILS:    Discharge planning discussed with[de-identified] SonTanner of Choice: Yes  Comments - Freedom of Choice: Discussed fOC  CM contacted family/caregiver?: Yes  Were Treatment Team discharge recommendations reviewed with patient/caregiver?: Yes  Did patient/caregiver verbalize understanding of patient care needs?: Yes  Were patient/caregiver advised of the risks associated with not following Treatment Team discharge recommendations?: Yes    Other Referral/Resources/Interventions Provided:  Referral Comments:  hospice able to accept patient for home hospice today  sonVinny in agreement, consents signed, Out of hospital DNR signed    Transport at Discharge : [facility]  Dispatcher Contacted: Yes  Number/Name of Dispatcher: Luis Antonio Nelson by [facility] and Unit #): [facility]  ETA of Transport (Date): 09/13/22  ETA of Transport (Time): 1100  IMM Given (Date):: 09/13/22  IMM Given to[de-identified] Family     IMM reviewed with patient's caregiver, patient's caregiver agrees with discharge determination

## 2022-09-13 NOTE — DISCHARGE SUMMARY
Discharge Summary - Patric Age 80 y o  female MRN: [id number]    Unit/Bed#: [facility] 236-36 Encounter: [id number]    Admission Date:   Admission Orders (From admission, onward)     Ordered        09/10/22 1649  Inpatient Admission  Once                        Admitting Diagnosis: End of life care [Z51 5]  Subdural hematoma ([facility] ) [S06 5X9A]  SDH (subdural hematoma) ([facility] [facility] ) [S06 5X9A]    HPI: 80 y o  female with history of traumatic subarachnoid hemorrhage with bilateral hygromas 3-4 weeks ago who presents with generalized weakness and neurological decline over the last week  Patient initially [facility] where she was evaluated, CT head demonstrating subdural hematoma with midline shift, patient was then transferred to [facility] for neurosurgical evaluation  Upon arrival patient was evaluated, long goals of care discussion was had with the family, and it is the family's wishes that the patient will not undergo any invasive procedure, and they would like to transition to hospice, comfort measures only  Patient will therefore be admitted to the trauma surgery service for case management consult for hospice liaison  Procedures Performed: No orders of the defined types were placed in this encounter  Summary of Hospital Course: pt admitted for level 4 comfort care and discharged to hospice on 9/13    Significant Findings, Care, Treatment and Services Provided: none    Complications: none    Discharge Diagnosis: same  Medical Problems             Resolved Problems  Date Reviewed: 9/7/2022   None                 Condition at Discharge: good         Discharge instructions/Information to patient and family:   See after visit summary for information provided to patient and family  Provisions for Follow-Up Care:  See after visit summary for information related to follow-up care and any pertinent home health orders        PCP: Jigar Vail MD    Disposition: See After Visit Summary for discharge disposition information  Planned Readmission: No      Discharge Statement   I spent 30 minutes discharging the patient  This time was spent on the day of discharge  I had direct contact with the patient on the day of discharge  Additional documentation is required if more than 30 minutes were spent on discharge  Discharge Medications:  See after visit summary for reconciled discharge medications provided to patient and family

## 2022-09-13 NOTE — PROGRESS NOTES
Progress Note - Trauma   Kat Conte 80 y o  female [id number]   Unit/Bed#: [facility] 619-01 Encounter: [id number]     ASSESSMENT:   Patient Active Problem List   Diagnosis    Gait instability    Anemia    Aortic stenosis    Benign essential hypertension    Bilateral occipital neuralgia    Carpal tunnel syndrome on both sides    Glaucoma    HLD (hyperlipidemia)    Malignant neoplasm of overlapping sites of left female breast (HCC)    Cataract    Hemorrhoids    Constipation    Chronic diastolic congestive heart failure (HCC)    GERD (gastroesophageal reflux disease)    Anal or rectal pain    Osteoarthritis    COPD (chronic obstructive pulmonary disease) (HCC)    Central perforation of tympanic membrane    Acute pain of right shoulder    Right rotator cuff tear arthropathy    2019 novel coronavirus disease (COVID-19)    Right upper quadrant pain    Pain of toe of left foot    Pre-op evaluation    Nonintractable headache    Dementia (HCC)    Hearing loss    Ganglion cyst of joint of finger of left hand    Influenza vaccine needed    Fall    Bruise    Abdominal pain, epigastric    Class 1 obesity    At risk for polypharmacy    Subarachnoid hemorrhage (HCC)    Hyponatremia    End of life care    Seborrhea capitis    SDH (subdural hematoma) (HCC)    Brain compression (HCC)    OSWALDO (acute kidney injury) (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, SAH, and bilateral hygromas, transitioned to comfort care  PLAN:  Comfort care orders  Accepted to home hospice - pending transport home today    Disposition: d/c to home hospice    SUBJECTIVE:    Subjective: Patient resting comfortably  Family at bedside with no complaints or concerns  No acute overnight events       OBJECTIVE:   Vitals:        Intake/Output:  I/O       09/11 0701 09/12 0700 09/12 0701 09/13 0700 09/13 0701 09/14 0700    IV Piggyback 200 200     Total Intake 200 200     Urine 800 Total Output 800      Net -600 +200                 Nutrition: Diet Regular; Pleasure Feed  GI Prophylaxis/Bowel Regimen: per comfort care orders  VTE Prophylaxis:Reason for no pharmacologic prophylaxis patient comfort care     Physical Exam:   General: No acute distress  Neuro: patient resting comfortably  HEENT: Moist mucus membranes  CV: Regular rate  Pulm: No respiratory distress  MSK: no obvious MSK deformities  Skin: Warm and dry      Invasive Devices  Report    Peripheral Intravenous Line  Duration           Peripheral IV 09/10/22 Left Antecubital 2 days                      Lab Results: no new  Imaging/EKG Studies: no new   Other Studies: NA

## 2022-09-13 NOTE — PLAN OF CARE
Problem: Prexisting or High Potential for Compromised Skin Integrity  Goal: Skin integrity is maintained or improved  Description: INTERVENTIONS:  - Identify patients at risk for skin breakdown  - Assess and monitor skin integrity  - Assess and monitor nutrition and hydration status  - Monitor labs   - Assess for incontinence   - Turn and reposition patient  - Assist with mobility/ambulation  - Relieve pressure over bony prominences  - Avoid friction and shearing  - Provide appropriate hygiene as needed including keeping skin clean and dry  - Evaluate need for skin moisturizer/barrier cream  - Collaborate with interdisciplinary team   - Patient/family teaching  - Consider wound care consult   9/13/2022 1210 by Mu Hernandez LPN  Outcome: Adequate for Discharge  9/13/2022 1107 by Mu Hernandez LPN  Outcome: Not Progressing  9/13/2022 1105 by Mu Hernandez LPN  Outcome: Not Progressing     Problem: Potential for Falls  Goal: Patient will remain free of falls  Description: INTERVENTIONS:  - Educate patient/family on patient safety including physical limitations  - Instruct patient to call for assistance with activity   - Consult OT/PT to assist with strengthening/mobility   - Keep Call bell within reach  - Keep bed low and locked with side rails adjusted as appropriate  - Keep care items and personal belongings within reach  - Initiate and maintain comfort rounds  - Make Fall Risk Sign visible to staff  - Offer Toileting every 2 Hours, in advance of need  - Initiate/Maintain bedalarm  - Obtain necessary fall risk management equipment:   - Apply yellow socks and bracelet for high fall risk patients  - Consider moving patient to room near nurses station  9/13/2022 1210 by Mu Hernandez LPN  Outcome: Adequate for Discharge  9/13/2022 1107 by Mu Hernandez LPN  Outcome: Not Progressing  9/13/2022 1105 by Mu Hernandez LPN  Outcome: Not Progressing     Problem: MOBILITY - ADULT  Goal: Maintain or return to baseline ADL function  Description: INTERVENTIONS:  -  Assess patient's ability to carry out ADLs; assess patient's baseline for ADL function and identify physical deficits which impact ability to perform ADLs (bathing, care of mouth/teeth, toileting, grooming, dressing, etc )  - Assess/evaluate cause of self-care deficits   - Assess range of motion  - Assess patient's mobility; develop plan if impaired  - Assess patient's need for assistive devices and provide as appropriate  - Encourage maximum independence but intervene and supervise when necessary  - Involve family in performance of ADLs  - Assess for home care needs following discharge   - Consider OT consult to assist with ADL evaluation and planning for discharge  - Provide patient education as appropriate  9/13/2022 1210 by Estella Zhong LPN  Outcome: Adequate for Discharge  9/13/2022 1107 by Estella Zhong LPN  Outcome: Not Progressing  9/13/2022 1105 by Estella Zhong LPN  Outcome: Not Progressing  Goal: Maintains/Returns to pre admission functional level  Description: INTERVENTIONS:  - Perform BMAT or MOVE assessment daily    - Set and communicate daily mobility goal to care team and patient/family/caregiver  - Collaborate with rehabilitation services on mobility goals if consulted  - Perform Range of Motion 3 times a day  - Reposition patient every 2 hours    - Dangle patient 3 times a day  - Stand patient 3 times a day  - Ambulate patient 3 times a day  - Out of bed to chair 3 times a day   - Out of bed for meals 3 times a day  - Out of bed for toileting  - Record patient progress and toleration of activity level   9/13/2022 1210 by Estella Zhong LPN  Outcome: Adequate for Discharge  9/13/2022 1107 by Estella Zhong LPN  Outcome: Not Progressing  9/13/2022 1105 by Estella Zhong LPN  Outcome: Not Progressing     Problem: PAIN - ADULT  Goal: Verbalizes/displays adequate comfort level or baseline comfort level  Description: Interventions:  - Encourage patient to monitor pain and request assistance  - Assess pain using appropriate pain scale  - Administer analgesics based on type and severity of pain and evaluate response  - Implement non-pharmacological measures as appropriate and evaluate response  - Consider cultural and social influences on pain and pain management  - Notify physician/advanced practitioner if interventions unsuccessful or patient reports new pain  9/13/2022 1210 by Brian Mckeon LPN  Outcome: Adequate for Discharge  9/13/2022 1107 by Brian Mckeon LPN  Outcome: Not Progressing  9/13/2022 1105 by Brian Mckeon LPN  Outcome: Not Progressing     Problem: DISCHARGE PLANNING  Goal: Discharge to home or other facility with appropriate resources  Description: INTERVENTIONS:  - Identify barriers to discharge w/patient and caregiver  - Arrange for needed discharge resources and transportation as appropriate  - Identify discharge learning needs (meds, wound care, etc )  - Arrange for interpretive services to assist at discharge as needed  - Refer to Case Management Department for coordinating discharge planning if the patient needs post-hospital services based on physician/advanced practitioner order or complex needs related to functional status, cognitive ability, or social support system  9/13/2022 1210 by Brian Mckeon LPN  Outcome: Adequate for Discharge  9/13/2022 1107 by Brian Mckeon LPN  Outcome: Not Progressing  9/13/2022 1105 by Brian Mckeon LPN  Outcome: Not Progressing     Problem: Knowledge Deficit  Goal: Patient/family/caregiver demonstrates understanding of disease process, treatment plan, medications, and discharge instructions  Description: Complete learning assessment and assess knowledge base    Interventions:  - Provide teaching at level of understanding  - Provide teaching via preferred learning methods  9/13/2022 1210 by Brian Mckeon LPN  Outcome: Adequate for Discharge  9/13/2022 1107 by Vivienne Lares LPN  Outcome: Not Progressing  9/13/2022 1105 by Vivienne Lares LPN  Outcome: Not Progressing     Problem: Nutrition/Hydration-ADULT  Goal: Nutrient/Hydration intake appropriate for improving, restoring or maintaining nutritional needs  Description: Monitor and assess patient's nutrition/hydration status for malnutrition  Collaborate with interdisciplinary team and initiate plan and interventions as ordered  Monitor patient's weight and dietary intake as ordered or per policy  Utilize nutrition screening tool and intervene as necessary  Determine patient's food preferences and provide high-protein, high-caloric foods as appropriate       INTERVENTIONS:  - Monitor oral intake, urinary output, labs, and treatment plans  - Assess nutrition and hydration status and recommend course of action  - Evaluate amount of meals eaten  - Assist patient with eating if necessary   - Allow adequate time for meals  - Recommend/ encourage appropriate diets, oral nutritional supplements, and vitamin/mineral supplements  - Order, calculate, and assess calorie counts as needed  - Recommend, monitor, and adjust tube feedings and TPN/PPN based on assessed needs  - Assess need for intravenous fluids  - Provide specific nutrition/hydration education as appropriate  - Include patient/family/caregiver in decisions related to nutrition  9/13/2022 1210 by Vivienne Lares LPN  Outcome: Adequate for Discharge  9/13/2022 1107 by Vivienne Lares LPN  Outcome: Not Progressing  9/13/2022 1105 by Vivienne Lares LPN  Outcome: Progressing

## 2022-09-14 ENCOUNTER — HOME CARE VISIT (OUTPATIENT)
Dept: HOME HEALTH SERVICES | Facility: HOME HEALTHCARE | Age: 87
End: 2022-09-14
Payer: MEDICARE

## 2022-09-14 ENCOUNTER — TRANSITIONAL CARE MANAGEMENT (OUTPATIENT)
Dept: FAMILY MEDICINE CLINIC | Facility: CLINIC | Age: 87
End: 2022-09-14

## 2022-09-14 PROCEDURE — G0156 HHCP-SVS OF AIDE,EA 15 MIN: HCPCS

## 2022-09-14 NOTE — UTILIZATION REVIEW
Notification of Discharge   This is a Notification of Discharge from our facility [address]  Please be advised that this patient has been discharge from our facility  Below you will find the admission and discharge date and time including the patients disposition  UTILIZATION REVIEW CONTACT:  Vani Rey  Utilization   Network Utilization Review Department  Phone: [phone] x carefully listen to the prompts  All voicemails are confidential   Email: [email or web address]     PHYSICIAN ADVISORY SERVICES:  FOR [id number] REVIEW - MEDICAL NECESSITY DENIAL  Phone: [phone]  Fax: [phone]  Email: [email or web address]     PRESENTATION DATE: 9/10/2022  3:22 PM  OBERVATION ADMISSION DATE:   INPATIENT ADMISSION DATE: 9/10/22  4:49 PM   DISCHARGE DATE: 9/13/2022 11:48 AM  DISPOSITION: Home/Self Care Home/Self Care      IMPORTANT INFORMATION:  Send all requests for admission clinical reviews, approved or denied determinations and any other requests to dedicated fax number below belonging to the campus where the patient is receiving treatment   List of dedicated fax numbers:  [address] DENIALS (Administrative/Medical Necessity) [phone]   [address] (Maternity/NICU/Pediatrics) [phone]   Edie Lagunas [phone]   130 [facility] [phone]   [address] [place] [address] [phone]   [address] [phone]   [facility]  [phone]   [address] [place] [address], [address]  2401 [facility] 1000 [address] [facility] [phone]

## 2022-09-15 ENCOUNTER — HOME CARE VISIT (OUTPATIENT)
Dept: HOME HOSPICE | Facility: HOSPICE | Age: 87
End: 2022-09-15
Payer: MEDICARE

## 2022-09-15 ENCOUNTER — HOME CARE VISIT (OUTPATIENT)
Dept: HOME HEALTH SERVICES | Facility: HOME HEALTHCARE | Age: 87
End: 2022-09-15
Payer: MEDICARE

## 2022-09-15 PROBLEM — G96.08 SUBDURAL HYGROMA: Status: ACTIVE | Noted: 2022-01-01

## 2022-09-15 PROCEDURE — G0155 HHCP-SVS OF CSW,EA 15 MIN: HCPCS

## 2022-09-15 PROCEDURE — G0299 HHS/HOSPICE OF RN EA 15 MIN: HCPCS

## 2022-09-15 PROCEDURE — G0156 HHCP-SVS OF AIDE,EA 15 MIN: HCPCS

## 2022-09-16 ENCOUNTER — HOME CARE VISIT (OUTPATIENT)
Dept: HOME HOSPICE | Facility: HOSPICE | Age: 87
End: 2022-09-16
Payer: MEDICARE

## 2022-09-16 ENCOUNTER — HOME CARE VISIT (OUTPATIENT)
Dept: HOME HEALTH SERVICES | Facility: HOME HEALTHCARE | Age: 87
End: 2022-09-16
Payer: MEDICARE

## 2022-09-16 DIAGNOSIS — Z51.5 HOSPICE CARE: Primary | ICD-10-CM

## 2022-09-16 PROCEDURE — G0156 HHCP-SVS OF AIDE,EA 15 MIN: HCPCS

## 2022-09-16 RX ORDER — LORAZEPAM 2 MG/ML
1 CONCENTRATE ORAL EVERY 6 HOURS
Qty: 30 ML | Refills: 0 | Status: SHIPPED | OUTPATIENT
Start: 2022-09-16 | End: 2022-09-17

## 2022-09-16 RX ORDER — OXYCODONE HCL 20 MG/ML
4 CONCENTRATE, ORAL ORAL EVERY 6 HOURS
Qty: 30 ML | Refills: 0 | Status: SHIPPED | OUTPATIENT
Start: 2022-09-16 | End: 2022-09-19

## 2022-09-17 ENCOUNTER — HOME CARE VISIT (OUTPATIENT)
Dept: HOME HEALTH SERVICES | Facility: HOME HEALTHCARE | Age: 87
End: 2022-09-17
Payer: MEDICARE

## 2022-09-17 DIAGNOSIS — Z51.5 HOSPICE CARE: Primary | ICD-10-CM

## 2022-09-17 PROCEDURE — G0299 HHS/HOSPICE OF RN EA 15 MIN: HCPCS

## 2022-09-17 RX ORDER — MORPHINE SULFATE 100 MG/5ML
10 SOLUTION, CONCENTRATE ORAL EVERY 4 HOURS
Qty: 30 ML | Refills: 0 | Status: SHIPPED | OUTPATIENT
Start: 2022-09-17

## 2022-09-17 RX ORDER — LORAZEPAM 2 MG/ML
1 CONCENTRATE ORAL EVERY 4 HOURS
Qty: 30 ML | Refills: 0 | Status: SHIPPED | OUTPATIENT
Start: 2022-09-17

## 2022-09-23 ENCOUNTER — HOME CARE VISIT (OUTPATIENT)
Dept: HOME HOSPICE | Facility: HOSPICE | Age: 87
End: 2022-09-23
Payer: MEDICARE

## 2022-09-23 NOTE — CASE COMMUNICATION
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  She enjoyed gardening and cooking  Pt is Religious      [facility]:  MSW Ektajennifer Merritt was made aware by RN Brisa Rojas that son Coleen Neves asked for emotional support  MSW arrived to the home and the house was full with family, all her children and grandchildren  They were all supporting one another and were very accepting of MSW visit  Pt was laying in bed, her grandaughters had did her hair and put lipstick on as pt was very into her looks and wouldnt want to be seen without lipstick  Pt was laying peacefully with a smile  Family found comfort in seeing pt with a smile because it gave them reass urance that she was ok  Family was surrounding her bed saying there final good byes and just admiring her  They shared stories about her to MSW  MSW inquired about the 2 siblings that son Coleen Neves made reference too prior about needing bereavement follow up but he reports that they are doing ok, they just needed time to process things  He reports should anything change he would call hospice but for now he feels they are doing ok  C/ C: MSW Ekta Merritt contacted Coleen Neves to offer condolence  At the time of call Coleen Neves was calm and he reports that his mom had past away in his arms  He reports that all 10 children where present at [facility] and right now they are ok  Coleen Neves reports that its surreal that she past but he knows that thats life, we are here only for a little until its time for our departure  MSW inquired about needed emotional support for the rest of the  family and he reports currently everyone is ok and no need for MSW visit  MSW advised should they need anything please call back    Coleen Neves reports being very appreciative of hospice services and staff    SON: [facility] LR     MSW Bhavya Atwood to follow Rachael Doty on a LR POC in 4-6 weeks  At this time inquire if any additional family would like bereavement services

## 2023-03-01 ENCOUNTER — TELEPHONE (OUTPATIENT)
Dept: FAMILY MEDICINE CLINIC | Facility: CLINIC | Age: 88
End: 2023-03-01

## 2023-03-01 NOTE — TELEPHONE ENCOUNTER
PCP SIGNATURE NEEDED FOR [facility] FORM RECEIVED VIA FAX AND PLACED IN PCP FOLDER TO BE DELIVERED AT ASSIGNED TIMES        Cert of medical necessity

## 2024-07-29 NOTE — TELEPHONE ENCOUNTER
Chief Complaint   Patient presents with   • Derm Problem     Red itchy rash in left axilla x 1 week       History of Present Illness:  Hayes Arguello Jr is a 17 year old male who presents to the clinic for evaluation. PCP is Dr. Manriquez.    Current medications, allergies and past medical history personally reviewed in the electronic medical records today.     Patient complains of a rash in the left axilla for the past 1 week. He states the rash is red and itchy. He states he has been applying over the counter cortisone cream without relief. He denies changes to his deodorant, soaps, detergent.       Physical Examination:  Visit Vitals  /74   Pulse 79   Wt 88.7 kg (195 lb 8 oz)   SpO2 97%     Constitutional: Patient is oriented to person, place, and time and well-developed, well-nourished, and in no acute distress.  Skin: multiple erythematous bumps under left axilla. No open sores. No drainage or bleeding noted.   Psychiatric: Mood, memory, affect and judgement normal.    Assessment and Plan:   1. Rash, left axilla: likely fungal. Treat with Lotrimin twice daily for 1 week or until resolved.     Patient verbalized understanding of treatment plan and will call with any questions or concerns.    Manjit Cuadra PA-C     Late entry  Called and spoke with patient's daughter Carolyn Castañeda who accompanied patient to her earliest visit through the use of a   Reviewed the results of the ultrasound which showed only a subcentimeter pancreatic cyst but no other acute abnormality  Per patient's daughter the pain has improved slightly and she has been able to eat  Discussed other possible causes such as constipation as patient states that her bowel movements are typically hard and small  Will trial patient on a stool softener and encourage increased PO hydration and addition of fiber in the diet  Patient also has a history of reflux disease but states that she is complaint with her medications  Advised to stay also watch the type of food she ingests and avoid acidic and spicy foods  Patient's daughter advised to call should symptoms worsen or fail to resolve  - significant subcutaneous edema tracking from the distal femur to tibia-fibula in RLE without evidence of subcutaneous gas on CT imaging  - evidence of stage 2 ulcer with drainage from the right anterior shin: will send culture from wound site and continue with linezolid/zosyn in the interm given vancomycin intolerance, blood culture pending   - consulted palliative for assistance with pain medication regimen: given 0.25 IV dilaudid x 1, has severe CKD - - would hold off on tramadol and morphine at this time and on Bipap - significant subcutaneous edema tracking from the distal femur to tibia-fibula in RLE without evidence of subcutaneous gas on CT imaging  - evidence of stage 2 ulcer with drainage from the right anterior shin: will send culture from wound site and continue with linezolid/zosyn in the interm given vancomycin intolerance, blood culture pending   - consulted palliative for assistance with pain medication regimen: given 0.25 IV dilaudid x 1, has severe CKD - - would hold off on tramadol and morphine at this time and on Bipap  - wound care consult placed met sepsis criteria on admission with fevers, + source, tachycardia, tachypnea- significant subcutaneous edema tracking from the distal femur to tibia-fibula in RLE without evidence of subcutaneous gas on CT imaging  - evidence of stage 2 ulcer with drainage from the right anterior shin: will send culture from wound site and continue with linezolid/zosyn in the interm given vancomycin intolerance, blood culture pending   - consulted palliative for assistance with pain medication regimen: given 0.25 IV dilaudid x 1, has severe CKD - - would hold off on tramadol and morphine at this time and on Bipap  - wound care consult placed

## (undated) DEVICE — GAUZE SPONGES,16 PLY: Brand: CURITY

## (undated) DEVICE — NEEDLE BLUNT 18 G X 1 1/2IN

## (undated) DEVICE — NEEDLE 25G X 1 1/2

## (undated) DEVICE — SYRINGE 10ML LL

## (undated) DEVICE — GLOVE SRG LF STRL BGL SKNSNS 7 PF

## (undated) DEVICE — CHLORAPREP HI-LITE 26ML ORANGE

## (undated) DEVICE — PENCIL ELECTROSURG E-Z CLEAN -0035H

## (undated) DEVICE — COBAN 4 IN STERILE

## (undated) DEVICE — DRAPE C-ARM X-RAY

## (undated) DEVICE — THIN OFFSET (9.0 X 0.38 X 25.0MM)

## (undated) DEVICE — SUT ETHILON 3-0 PS-1 18 IN 1663G

## (undated) DEVICE — SUT VICRYL 3-0 SH 27 IN J416H

## (undated) DEVICE — INTENDED FOR TISSUE SEPARATION, AND OTHER PROCEDURES THAT REQUIRE A SHARP SURGICAL BLADE TO PUNCTURE OR CUT.: Brand: BARD-PARKER ® SAFETYLOCK CARBON RIB-BACK BLADES

## (undated) DEVICE — TUBING SUCTION 5MM X 12 FT

## (undated) DEVICE — STERILE POLYISOPRENE POWDER-FREE SURGICAL GLOVES WITH EMOLLIENT COATING: Brand: PROTEXIS

## (undated) DEVICE — SINGLE PORT MANIFOLD: Brand: NEPTUNE 2

## (undated) DEVICE — CUFF TOURNIQUET 18 X 4 IN QUICK CONNECT DISP 1 BLADDER

## (undated) DEVICE — SCD SEQUENTIAL COMPRESSION COMFORT SLEEVE MEDIUM KNEE LENGTH: Brand: KENDALL SCD

## (undated) DEVICE — STOCKINETTE 2P PREROLLD 6X60

## (undated) DEVICE — BETHLEHEM UNIVERSAL  MIONR EXT: Brand: CARDINAL HEALTH

## (undated) DEVICE — INTENDED FOR TISSUE SEPARATION, AND OTHER PROCEDURES THAT REQUIRE A SHARP SURGICAL BLADE TO PUNCTURE OR CUT.: Brand: BARD-PARKER SAFETY BLADES SIZE 15, STERILE

## (undated) DEVICE — CURITY NON-ADHERENT STRIPS: Brand: CURITY

## (undated) DEVICE — KERLIX BANDAGE ROLL: Brand: KERLIX